# Patient Record
Sex: MALE | Race: WHITE | NOT HISPANIC OR LATINO | Employment: OTHER | URBAN - METROPOLITAN AREA
[De-identification: names, ages, dates, MRNs, and addresses within clinical notes are randomized per-mention and may not be internally consistent; named-entity substitution may affect disease eponyms.]

---

## 2020-01-13 ENCOUNTER — TRANSFERRED RECORDS (OUTPATIENT)
Dept: HEALTH INFORMATION MANAGEMENT | Facility: CLINIC | Age: 75
End: 2020-01-13

## 2020-02-03 ENCOUNTER — TRANSFERRED RECORDS (OUTPATIENT)
Dept: HEALTH INFORMATION MANAGEMENT | Facility: CLINIC | Age: 75
End: 2020-02-03

## 2020-02-11 ENCOUNTER — TRANSFERRED RECORDS (OUTPATIENT)
Dept: HEALTH INFORMATION MANAGEMENT | Facility: CLINIC | Age: 75
End: 2020-02-11

## 2020-02-24 ENCOUNTER — TRANSFERRED RECORDS (OUTPATIENT)
Dept: HEALTH INFORMATION MANAGEMENT | Facility: CLINIC | Age: 75
End: 2020-02-24

## 2020-02-27 ENCOUNTER — TRANSFERRED RECORDS (OUTPATIENT)
Dept: HEALTH INFORMATION MANAGEMENT | Facility: CLINIC | Age: 75
End: 2020-02-27

## 2020-03-13 ENCOUNTER — TRANSFERRED RECORDS (OUTPATIENT)
Dept: HEALTH INFORMATION MANAGEMENT | Facility: CLINIC | Age: 75
End: 2020-03-13

## 2020-03-24 ENCOUNTER — TRANSFERRED RECORDS (OUTPATIENT)
Dept: HEALTH INFORMATION MANAGEMENT | Facility: CLINIC | Age: 75
End: 2020-03-24

## 2020-04-23 ENCOUNTER — TRANSFERRED RECORDS (OUTPATIENT)
Dept: HEALTH INFORMATION MANAGEMENT | Facility: CLINIC | Age: 75
End: 2020-04-23

## 2020-04-29 ENCOUNTER — TRANSFERRED RECORDS (OUTPATIENT)
Dept: HEALTH INFORMATION MANAGEMENT | Facility: CLINIC | Age: 75
End: 2020-04-29

## 2020-05-05 ENCOUNTER — TRANSFERRED RECORDS (OUTPATIENT)
Dept: HEALTH INFORMATION MANAGEMENT | Facility: CLINIC | Age: 75
End: 2020-05-05

## 2020-05-07 ENCOUNTER — TRANSFERRED RECORDS (OUTPATIENT)
Dept: HEALTH INFORMATION MANAGEMENT | Facility: CLINIC | Age: 75
End: 2020-05-07

## 2020-05-28 ENCOUNTER — TELEPHONE (OUTPATIENT)
Dept: ONCOLOGY | Facility: CLINIC | Age: 75
End: 2020-05-28

## 2020-05-28 NOTE — TELEPHONE ENCOUNTER
I received a call from Newton asking if it is possible to schedule an infusion appt for 6/19/20 at [facility]. Newton is scheduled as a new patient with Dr Chen on 6/15/20 to establish care/transfer care from [facility] in [place]. I spoke with Sukhi at [facility] to inquire about scheduling the infusion appt for a date after his appt with Dr Chen & was told that once the visit with Dr Chen is completed & infusion is a part of the care plan, Newton would be able to schedule at that point, but not prior to. I explained that Newton was concerned that he wouldn't be able to get in for an appt on 6/19/20 if he has to wait to schedule until 6/15/20.   I called Newton & informed him of this. Newton then asked if we have records from [facility] yet, and I was able to pull some records through [facility] into Epic, and the records team has already started the process for records, path, imaging & anything additional needed.

## 2020-05-28 NOTE — TELEPHONE ENCOUNTER
RECORDS STATUS - ALL OTHER DIAGNOSIS      RECORDS RECEIVED FROM: Epic/   DATE RECEIVED: 6/15/2020    NOTES STATUS DETAILS   OFFICE NOTE from referring provider     OFFICE NOTE from medical oncologist Complete- Medical Records   Fax: [phone]  I faxed a request for records on 5/28/2020   They had originally fax records (160pgs or so) to Fax: [phone] [facility] Records are in [facility]- Last office visit was on 5/7/2020   DISCHARGE SUMMARY from hospital     DISCHARGE REPORT from the ER     OPERATIVE REPORT     MEDICATION LIST     CLINICAL TRIAL TREATMENTS TO DATE     LABS     PATHOLOGY REPORTS Complete- Bx slide consult is in process- EPIC  [facility]  Ph: [phone]  Fax: [phone]  Mailing Address:   [facility] [place] Pathology Dept   [address] [address] [phone]  Tracking Number:   [id number]   Medical Records Dept   Ph: [phone]  Fax: [phone]    [facility] [place] [place]  Ph: [phone]  Fax: [phone]       ANYTHING RELATED TO DIAGNOSIS Complete  Epic-outside records - [facility]  Labs last updated on 5/5/2020    GENONOMIC TESTING     TYPE:     IMAGING (NEED IMAGES & REPORT)     CT SCANS [facility]  Ph: Ph:[phone]   Fax: [phone]    Mailing Address:   [address]   [facility] [address]    Tracking Number for IMG Disc: [id number]   CT chest neck 2/3/2020            MRI     MAMMO     ULTRASOUND     PET [facility]       Action    Action Takenph: [phone]   5/28/2020 2:52pm     In-basket message:   Patient needs records from Dr Margie Menon [facility]   Appt with Dr Chen 6/15/20   He said he asked for them to be sent but I do not see in system.   Thanks.   Kathleen     Action: I called Dr Margie Menon [facility] Phone: [phone]- I was on hold for a while.     I called pt Newton- he provided me with the [facility]   Pt was dx in Feb 2020  First infusion date was on May 8th, he has another  infusion appt tomorrow (5/29/2020) and then on June 19th    Release signed May 8th     6/5/2020 11:27am   I checked the tracking number on the [facility] disc: Jun, 05 2020 9:59 AM Delivered [place], [place]     I called [facility] again ph: [phone]    6/9/2020 10:11am   I emailed the path report and genetic notes to HIM's scanning

## 2020-06-03 ENCOUNTER — TRANSFERRED RECORDS (OUTPATIENT)
Dept: HEALTH INFORMATION MANAGEMENT | Facility: CLINIC | Age: 75
End: 2020-06-03

## 2020-06-05 NOTE — TELEPHONE ENCOUNTER
Action 06/08/20 1:01 PM - Sandy   Action Taken  Pathology received from [facility] and sent to be reviewed with filled out pathology form.     Action 06/05/20 11:58 AM - Sandy   Action Taken  Imaging disc and report received from [facility] and sent to [facility] to be uploaded into PACs.  Reports sent to [facility] to be scanned into the chart.

## 2020-06-08 PROCEDURE — 00000346 ZZHCL STATISTIC REVIEW OUTSIDE SLIDES TC 88321: Performed by: INTERNAL MEDICINE

## 2020-06-09 LAB — COPATH REPORT: NORMAL

## 2020-06-14 NOTE — PROGRESS NOTES
NEW PATIENT ONCOLOGY CONSULT      REQUESTING PROVIDER/SERVICE: Dr. Odell Young, [facility]    PATIENT NAME: Newton Buchanan   MRN# [id number]     Date of Visit: Kevin 15, 2020     YOB: 1945       REASON FOR VISIT/CC:    Stage IV metastatic squamous cell carcinoma   Maybe transferring care from [place] [facility] Diamond from [facility] Hematology/Oncology from [facility]      HISTORY OF ONCOLOGY ILLNESS:    Patient had noted right neck swelling over 2 years. Has had FNAs in the past that were benign. He was followed by q 6 months.     He reported the neck mass was getting larger. Denies any pain/dysphagia/difficuly swallowing/sob/skin changes.   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     2/24/20 PET/CT found:  1. Hypermetabolic right cervical lymphadenopathy.  2. Numerous hypermetabolic bilateral lung metastases.  3. Hypermetabolic right hilar nodes, also likely metastatic disease    It was felt that with no obvious primary on PET/CT but given CK7 +, P16 positivity, and neck mass on presentation, suspicion is high that origin is still of H&N (right cervical LAD) with metastatic spread to lung with bilateral multiple lung nodules .  Pt made informed decision to proceed with single agent Keytruda due to his PD-L1 testing from [employer] with CPS of 70.    Last dose was 5/29/2020 in [place] before his trip back to [place].        PAST MEDICAL HISTORY  Obstructive sleep apnea   HTN  Diverticulosis of colon. Esophageal reflux disease   Admitted to hospital 03/09/2020 for lower GI bleed, ischemic colitis,  c.dff repeat testing 03/31/2020 positive, s/p vancomycin.   Ascending polyp  Malignant neoplasm of prostate /2008 PSA 4.79, Gl 3+3 s/p proctectomy.      MEDICATIONS/ALLERY:  Reviewed in Epic system.        SOCIAL HISTORY:    Single. Denies smoking, one drink per night.   He is retired in [place], maybe moving back to [place].  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  Denies smoking or ETOH      FAMILY HISTORY:    Denies any FH of cancers       REVIEW OF SYSTEMS:   A 10-point review of systems was performed. Pertinent findings are noted in the HPI.    GENERAL: pt is in usual state of health.  No B symptoms  NEURO:   No headache, double vision, or focal weakness.  No neuropathy.   SKIN:  No chronic skin rash or skin infection.   CARDIOVASCULAR:  No High blood pressure or hyperlipidemia. NO REYES  PULMONARY:  Obstructive sleep apnea   GI: Diverticulosis of colon. Esophageal reflux disease   Admitted to hospital 2020 for lower GI bleed, ischemic colitis,  c.dff repeat testing 2020 positive, s/p vancomycin. + Ascending polyp.  Reports today he has loose BM again in the last 2 days.   :  No urgency, frequency.  No recurrent urinary tract infection.  No kidney problems.   RHEUMATOLOGY/MUSCULOSKELETAL SYSTEM:  no arthritic pain, or muscle pain. No muscle ache.   ENDOCRINE:  No history of diabetes or thyroid problem.  No complaints of hot flashes.   HEMATOLOGY:  No history of bleeding or thrombosis episode.   Oncology: Malignant neoplasm of prostate in/ PSA 4.79, Gl 3+3 s/p proctectomy.  On tx for his presumed H/N cancer dx 2020.  IMMUNOLOGY:  No recurrent fever or chills episode.  No recurrent infectious episode.   PSYCHIATRY:  No anxiety or depression.          PHYSICAL EXAMINATION:   VITAL SIGNS:  Blood pressure 139/67, pulse 66, temperature 98.5  F (36.9  C), temperature source Tympanic, resp. rate 18, height 1.816 m (5' 11.5\"), weight 86.1 kg (189 lb 14.4 oz), SpO2 97 %.    GENERAL APPEARANCE:  looks like stated age, very pleasant, not in acute distress.     ECO    ENT, MOUTH: Pupils are equally reactive to light.  Sclerae are anicteric.  Moist oral mucosa without lesion or ulcer.   Negative pharynx.  No oral thrush.   NECK:  Supple.  No jugular venous distention.  Thyroid is not palpable. Right submandibular elias " sternomastoid muscle area fullness  LYMPH NODES:  + right cervical JOSÉ MIGUEL.  CARDIOVASCULAR:  S1, S2 regular with no murmurs or gallops.  No carotid or abdominal bruits.   PULMONARY:  Lungs are clear to auscultation and percussion bilaterally.  There is no wheezing or rhonchi.   GASTROINTESTINAL:  Abdomen is soft, nontender.  No hepatosplenomegaly.  No signs of ascites.  No mass appreciable.   MUSCULOSKELETAL/EXTREMITIES:  No edema.  No cyanotic changes.  No signs of joint deformity.  No lymphedema.   NEUROLOGIC:  Cranial nerves II-XII are grossly intact.  Sensation intact.  Muscle strength and muscle tone symmetrical, 5/5 throughout.   BACK  No spinal or paraspinal tenderness.  No CVA tenderness.   SKIN:  No petechiae.  No rash.  No signs of cellulitis.   PSYCHIATRIC: Oriented to time, person, and places. Normal mood and affect. Good memory. Proper insight and judgement.       CURRENT LAB DATA REVIEWED TODAY WITH PATIENT:    2/11/20 Biopsy metastatic squamous cell carcinoma - HPV16+.      CURRENT IMAGING REVIEWED TODAY WITH PATIENT:  2/24/20 PET/CT IMPRESSION:   1. Hypermetabolic right cervical lymphadenopathy.  2. Numerous hypermetabolic bilateral lung metastases.  3. Hypermetabolic right hilar nodes, also likely metastatic disease      2/3/20 CT Neck/Chest IMPRESSION:  1. Right cervical lymphadenopathy, with areas suspicious for  necrosis, highly concerning for metastatic involvement by  malignancy. Recommend direct visualization of the upper  aerodigestive tract.  2. Numerous bilateral lung nodules, suspicious for metastases.         OLD DATA REVIEWED TODAY WITH SUMMARY  dexa 2019 - low density.       ASSESSMENT AND PLAN DISCUSSED WITH PATIENT TODAY:   1.  Presumed  H&N (right cervical LAD) with metastatic spread to lung with bilateral multiple lung nodules.   He is first-line palliative treatment with Keytruda was started when he was in [place] early May.    He is spending his summer in [place].  Today's the  first time he is seen this for this cancer treatment progress.    Need to obtain PD-L 1 testing result from [place].     He he is to continue Keytruda but he is here [place], next dose due July 19.    We discussed he can use therapy side effect profiles, they can be unpredictable.  And how he should be monitored.    He understands treatment is palliative in nature.  He is amazed at how good his quality of life is and he is already having some response with immunotherapy single agent.    We discussed the timing of restaging PET scan.  If he continues to have clinical response by physical examination, I do not feel compelled to do PET scan after 4 cycles.      2.  Return loose bowel movement after his trip from [place] back to [place].  Twice C. difficile in March in [place] was treated with Vanco taper.    Advised him to be rechecked with C. difficile toxin.      3. osteopenia on DEXA scan 2019.  We discussed importance of vitamin D intake outdoor activity weightbearing exercise.

## 2020-06-15 ENCOUNTER — HOSPITAL ENCOUNTER (OUTPATIENT)
Dept: LAB | Facility: CLINIC | Age: 75
Discharge: HOME | End: 2020-06-15
Attending: INTERNAL MEDICINE | Admitting: INTERNAL MEDICINE
Payer: MEDICARE

## 2020-06-15 ENCOUNTER — ONCOLOGY VISIT (OUTPATIENT)
Dept: ONCOLOGY | Facility: CLINIC | Age: 75
End: 2020-06-15
Attending: INTERNAL MEDICINE
Payer: MEDICARE

## 2020-06-15 ENCOUNTER — PRE VISIT (OUTPATIENT)
Dept: ONCOLOGY | Facility: CLINIC | Age: 75
End: 2020-06-15

## 2020-06-15 VITALS
SYSTOLIC BLOOD PRESSURE: 139 MMHG | HEIGHT: 72 IN | RESPIRATION RATE: 18 BRPM | TEMPERATURE: 98.5 F | BODY MASS INDEX: 25.72 KG/M2 | OXYGEN SATURATION: 97 % | WEIGHT: 189.9 LBS | DIASTOLIC BLOOD PRESSURE: 67 MMHG | HEART RATE: 66 BPM

## 2020-06-15 DIAGNOSIS — M85.80 OSTEOPENIA, UNSPECIFIED LOCATION: ICD-10-CM

## 2020-06-15 DIAGNOSIS — R19.7 DIARRHEA, UNSPECIFIED TYPE: Primary | ICD-10-CM

## 2020-06-15 DIAGNOSIS — C76.0 HEAD AND NECK CANCER (H): ICD-10-CM

## 2020-06-15 LAB
ALBUMIN SERPL-MCNC: 3.7 G/DL (ref 3.4–5)
ALP SERPL-CCNC: 60 U/L (ref 40–150)
ALT SERPL W P-5'-P-CCNC: 36 U/L (ref 0–70)
ANION GAP SERPL CALCULATED.3IONS-SCNC: 2 MMOL/L (ref 3–14)
AST SERPL W P-5'-P-CCNC: 24 U/L (ref 0–45)
BILIRUB SERPL-MCNC: 0.7 MG/DL (ref 0.2–1.3)
BUN SERPL-MCNC: 14 MG/DL (ref 7–30)
CALCIUM SERPL-MCNC: 9.1 MG/DL (ref 8.5–10.1)
CHLORIDE SERPL-SCNC: 105 MMOL/L (ref 94–109)
CO2 SERPL-SCNC: 32 MMOL/L (ref 20–32)
CREAT SERPL-MCNC: 0.84 MG/DL (ref 0.66–1.25)
GFR SERPL CREATININE-BSD FRML MDRD: 86 ML/MIN/{1.73_M2}
GLUCOSE SERPL-MCNC: 93 MG/DL (ref 70–99)
POTASSIUM SERPL-SCNC: 4.1 MMOL/L (ref 3.4–5.3)
PROT SERPL-MCNC: 7.5 G/DL (ref 6.8–8.8)
SODIUM SERPL-SCNC: 139 MMOL/L (ref 133–144)
TSH SERPL DL<=0.005 MIU/L-ACNC: 1.32 MU/L (ref 0.4–4)

## 2020-06-15 PROCEDURE — 36415 COLL VENOUS BLD VENIPUNCTURE: CPT | Performed by: INTERNAL MEDICINE

## 2020-06-15 PROCEDURE — 84443 ASSAY THYROID STIM HORMONE: CPT | Performed by: INTERNAL MEDICINE

## 2020-06-15 PROCEDURE — 80053 COMPREHEN METABOLIC PANEL: CPT | Performed by: INTERNAL MEDICINE

## 2020-06-15 PROCEDURE — 99204 OFFICE O/P NEW MOD 45 MIN: CPT | Performed by: INTERNAL MEDICINE

## 2020-06-15 PROCEDURE — G0463 HOSPITAL OUTPT CLINIC VISIT: HCPCS

## 2020-06-15 RX ORDER — PROCHLORPERAZINE MALEATE 10 MG
10 TABLET ORAL EVERY 6 HOURS PRN
COMMUNITY
Start: 2020-03-30 | End: 2021-01-20

## 2020-06-15 RX ORDER — FLUTICASONE PROPIONATE 50 MCG
1-2 SPRAY, SUSPENSION (ML) NASAL
COMMUNITY
Start: 2018-12-11 | End: 2023-02-09

## 2020-06-15 RX ORDER — LOSARTAN POTASSIUM 25 MG/1
TABLET ORAL
COMMUNITY
Start: 2020-03-09 | End: 2020-06-15

## 2020-06-15 RX ORDER — ASPIRIN 81 MG/1
TABLET, CHEWABLE ORAL
COMMUNITY
End: 2020-06-15

## 2020-06-15 RX ORDER — PRAVASTATIN SODIUM 20 MG
20 TABLET ORAL DAILY
COMMUNITY
Start: 2020-04-15 | End: 2023-02-09

## 2020-06-15 RX ORDER — SILDENAFIL 100 MG/1
100 TABLET, FILM COATED ORAL
COMMUNITY
Start: 2020-03-09 | End: 2020-06-15

## 2020-06-15 RX ORDER — SACCHAROMYCES BOULARDII 250 MG
1 CAPSULE ORAL 2 TIMES DAILY
COMMUNITY
End: 2023-02-09

## 2020-06-15 ASSESSMENT — PAIN SCALES - GENERAL: PAINLEVEL: NO PAIN (0)

## 2020-06-15 ASSESSMENT — MIFFLIN-ST. JEOR: SCORE: 1636.44

## 2020-06-15 NOTE — LETTER
6/15/2020         RE: Newton Buchanan  [address] [facility] [address]        Dear Colleague,    Thank you for referring your patient, Newton Buchanan, to the [facility]. Please see a copy of my visit note below.    NEW PATIENT ONCOLOGY CONSULT      REQUESTING PROVIDER/SERVICE: Dr. Odell Young, [facility]    PATIENT NAME: Newton Buchanan   MRN# [id number]     Date of Visit: Kevin 15, 2020     YOB: 1945       REASON FOR VISIT/CC:    Stage IV metastatic squamous cell carcinoma   Maybe transferring care from [place] [facility] from [facility] Hematology/Oncology from [facility]      HISTORY OF ONCOLOGY ILLNESS:    Patient had noted right neck swelling over 2 years. Has had FNAs in the past that were benign. He was followed by q 6 months.     He reported the neck mass was getting larger. Denies any pain/dysphagia/difficuly swallowing/sob/skin changes.   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     2/24/20 PET/CT found:  1. Hypermetabolic right cervical lymphadenopathy.  2. Numerous hypermetabolic bilateral lung metastases.  3. Hypermetabolic right hilar nodes, also likely metastatic disease    It was felt that with no obvious primary on PET/CT but given CK7 +, P16 positivity, and neck mass on presentation, suspicion is high that origin is still of H&N (right cervical LAD) with metastatic spread to lung with bilateral multiple lung nodules .  Pt made informed decision to proceed with single agent Keytruda due to his PD-L1 testing (need to obtain this from [place]).   Last dose was 5/29/2020 in [place] before his trip back to [place].        PAST MEDICAL HISTORY  Obstructive sleep apnea   HTN  Diverticulosis of colon. Esophageal reflux disease   Admitted to hospital 03/09/2020 for lower GI bleed, ischemic colitis,  c.dff repeat testing 03/31/2020 positive, s/p vancomycin.   Ascending polyp  Malignant neoplasm of prostate  "/ PSA 4.79, Gl 3+3 s/p proctectomy.      MEDICATIONS/ALLERY:  Reviewed in Epic system.        SOCIAL HISTORY:    Single. Denies smoking, one drink per night.   [place] retired in [place], maybe moving back to [place].  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  Denies smoking or ETOH      FAMILY HISTORY:    Denies any FH of cancers       REVIEW OF SYSTEMS:   A 10-point review of systems was performed. Pertinent findings are noted in the HPI.    GENERAL: pt is in usual state of health.  No B symptoms  NEURO:   No headache, double vision, or focal weakness.  No neuropathy.   SKIN:  No chronic skin rash or skin infection.   CARDIOVASCULAR:  No High blood pressure or hyperlipidemia. NO REYES  PULMONARY:  Obstructive sleep apnea   GI: Diverticulosis of colon. Esophageal reflux disease   Admitted to hospital 2020 for lower GI bleed, ischemic colitis,  c.dff repeat testing 2020 positive, s/p vancomycin. + Ascending polyp.  Reports today he has loose BM again in the last 2 days.   :  No urgency, frequency.  No recurrent urinary tract infection.  No kidney problems.   RHEUMATOLOGY/MUSCULOSKELETAL SYSTEM:  no arthritic pain, or muscle pain. No muscle ache.   ENDOCRINE:  No history of diabetes or thyroid problem.  No complaints of hot flashes.   HEMATOLOGY:  No history of bleeding or thrombosis episode.   Oncology: Malignant neoplasm of prostate in/ PSA 4.79, Gl 3+3 s/p proctectomy.  On tx for his presumed H/N cancer dx 2020.  IMMUNOLOGY:  No recurrent fever or chills episode.  No recurrent infectious episode.   PSYCHIATRY:  No anxiety or depression.          PHYSICAL EXAMINATION:   VITAL SIGNS:  Blood pressure 139/67, pulse 66, temperature 98.5  F (36.9  C), temperature source Tympanic, resp. rate 18, height 1.816 m (5' 11.5\"), weight 86.1 kg (189 lb 14.4 oz), SpO2 97 %.    GENERAL APPEARANCE:  looks like stated age, very pleasant, not in acute distress.     ECO    ENT, MOUTH: Pupils are " equally reactive to light.  Sclerae are anicteric.  Moist oral mucosa without lesion or ulcer.   Negative pharynx.  No oral thrush.   NECK:  Supple.  No jugular venous distention.  Thyroid is not palpable. Right submandibular elias sternomastoid muscle area fullness  LYMPH NODES:  + right cervical JOSÉ MIGUEL.  CARDIOVASCULAR:  S1, S2 regular with no murmurs or gallops.  No carotid or abdominal bruits.   PULMONARY:  Lungs are clear to auscultation and percussion bilaterally.  There is no wheezing or rhonchi.   GASTROINTESTINAL:  Abdomen is soft, nontender.  No hepatosplenomegaly.  No signs of ascites.  No mass appreciable.   MUSCULOSKELETAL/EXTREMITIES:  No edema.  No cyanotic changes.  No signs of joint deformity.  No lymphedema.   NEUROLOGIC:  Cranial nerves II-XII are grossly intact.  Sensation intact.  Muscle strength and muscle tone symmetrical, 5/5 throughout.   BACK  No spinal or paraspinal tenderness.  No CVA tenderness.   SKIN:  No petechiae.  No rash.  No signs of cellulitis.   PSYCHIATRIC: Oriented to time, person, and places. Normal mood and affect. Good memory. Proper insight and judgement.       CURRENT LAB DATA REVIEWED TODAY WITH PATIENT:    2/11/20 Biopsy metastatic squamous cell carcinoma - HPV16+.      CURRENT IMAGING REVIEWED TODAY WITH PATIENT:  2/24/20 PET/CT IMPRESSION:   1. Hypermetabolic right cervical lymphadenopathy.  2. Numerous hypermetabolic bilateral lung metastases.  3. Hypermetabolic right hilar nodes, also likely metastatic disease      2/3/20 CT Neck/Chest IMPRESSION:  1. Right cervical lymphadenopathy, with areas suspicious for  necrosis, highly concerning for metastatic involvement by  malignancy. Recommend direct visualization of the upper  aerodigestive tract.  2. Numerous bilateral lung nodules, suspicious for metastases.         OLD DATA REVIEWED TODAY WITH SUMMARY  dexa 2019 - low density.       ASSESSMENT AND PLAN DISCUSSED WITH PATIENT TODAY:   1.  Presumed  H&N (right cervical  "LAD) with metastatic spread to lung with bilateral multiple lung nodules.   He is first-line palliative treatment with Keytruda was started when he was in [place] early May.    He is spending his summer in [place].  Today's the first time he is seen this for this cancer treatment progress.    Need to obtain PD-L 1 testing result from [place].     He he is to continue Keytruda but he is here [place], next dose due July 19.    We discussed he can use therapy side effect profiles, they can be unpredictable.  And how he should be monitored.    He understands treatment is palliative in nature.  He is amazed at how good his quality of life is and he is already having some response with immunotherapy single agent.    We discussed the timing of restaging PET scan.  If he continues to have clinical response by physical examination, I do not feel compelled to do PET scan after 4 cycles.      2.  Return loose bowel movement after his trip from [place] back to [place].  Twice C. difficile in March in [place] was treated with Vanco taper.    Advised him to be rechecked with C. difficile toxin.      3. osteopenia on DEXA scan 2019.  We discussed importance of vitamin D intake outdoor activity weightbearing exercise.    Oncology Rooming Note    Farida 15, 2020 1:25 PM   Newton Buchanan is a 74 year old male who presents for:    Chief Complaint   Patient presents with     Oncology Clinic Visit     New Patient - Stage IV metastatic squamous cell carcinoma, transfer from [place], [place]     Initial Vitals: /67 (BP Location: Left arm, Patient Position: Sitting, Cuff Size: Adult Regular)   Pulse 66   Temp 98.5  F (36.9  C) (Tympanic)   Resp 18   Ht 1.816 m (5' 11.5\")   Wt 86.1 kg (189 lb 14.4 oz)   SpO2 97%   BMI 26.12 kg/m   Estimated body mass index is 26.12 kg/m  as calculated from the following:    Height as of this encounter: 1.816 m (5' 11.5\").    Weight as of this encounter: 86.1 kg (189 lb 14.4 oz). Body " surface area is 2.08 meters squared.  No Pain (0) Comment: Data Unavailable   No LMP for male patient.  Allergies reviewed: Yes  Medications reviewed: Yes    Medications: Medication refills not needed today.  Pharmacy name entered into [employer]:      [id number] - [address] AT [address]    Clinical concerns: New Patient - Stage IV metastatic squamous cell carcinoma, transfer from [place], [place].       Sandy Carrillo [facility]                Again, thank you for allowing me to participate in the care of your patient.        Sincerely,        Rosetta Chen MD, MD

## 2020-06-15 NOTE — PROGRESS NOTES
"Oncology Rooming Note    Farida 15, 2020 1:25 PM   Newton Buchanan is a 74 year old male who presents for:    Chief Complaint   Patient presents with     Oncology Clinic Visit     New Patient - Stage IV metastatic squamous cell carcinoma, transfer from [place], [place]     Initial Vitals: /67 (BP Location: Left arm, Patient Position: Sitting, Cuff Size: Adult Regular)   Pulse 66   Temp 98.5  F (36.9  C) (Tympanic)   Resp 18   Ht 1.816 m (5' 11.5\")   Wt 86.1 kg (189 lb 14.4 oz)   SpO2 97%   BMI 26.12 kg/m   Estimated body mass index is 26.12 kg/m  as calculated from the following:    Height as of this encounter: 1.816 m (5' 11.5\").    Weight as of this encounter: 86.1 kg (189 lb 14.4 oz). Body surface area is 2.08 meters squared.  No Pain (0) Comment: Data Unavailable   No LMP for male patient.  Allergies reviewed: Yes  Medications reviewed: Yes    Medications: Medication refills not needed today.  Pharmacy name entered into [employer]:      [id number] - [facility] [address] AT [facility] OF [address] [place] [address]    Clinical concerns: New Patient - Stage IV metastatic squamous cell carcinoma, transfer from [place], [place].       Sandy Carrillo CMA              "

## 2020-06-15 NOTE — PATIENT INSTRUCTIONS
c diff test ordered.   Obtain PD-L1 testing result from [place], [place] [facility]ematology/Oncology from [facility].   Start keytruda on 19th.   Follow-up with July 9th tx.

## 2020-06-19 ENCOUNTER — INFUSION THERAPY VISIT (OUTPATIENT)
Dept: INFUSION THERAPY | Facility: CLINIC | Age: 75
End: 2020-06-19
Attending: INTERNAL MEDICINE
Payer: MEDICARE

## 2020-06-19 VITALS — TEMPERATURE: 98 F | SYSTOLIC BLOOD PRESSURE: 108 MMHG | DIASTOLIC BLOOD PRESSURE: 63 MMHG | HEART RATE: 55 BPM

## 2020-06-19 DIAGNOSIS — C76.0 HEAD AND NECK CANCER (H): Primary | ICD-10-CM

## 2020-06-19 PROCEDURE — 96413 CHEMO IV INFUSION 1 HR: CPT

## 2020-06-19 PROCEDURE — 25000128 H RX IP 250 OP 636: Performed by: NURSE PRACTITIONER

## 2020-06-19 PROCEDURE — 25800030 ZZH RX IP 258 OP 636: Performed by: NURSE PRACTITIONER

## 2020-06-19 RX ORDER — NALOXONE HYDROCHLORIDE 0.4 MG/ML
.1-.4 INJECTION, SOLUTION INTRAMUSCULAR; INTRAVENOUS; SUBCUTANEOUS
Status: CANCELLED | OUTPATIENT
Start: 2020-06-19

## 2020-06-19 RX ORDER — SODIUM CHLORIDE 9 MG/ML
1000 INJECTION, SOLUTION INTRAVENOUS CONTINUOUS PRN
Status: CANCELLED
Start: 2020-06-19

## 2020-06-19 RX ORDER — ALBUTEROL SULFATE 90 UG/1
1-2 AEROSOL, METERED RESPIRATORY (INHALATION)
Status: CANCELLED
Start: 2020-06-19

## 2020-06-19 RX ORDER — LORAZEPAM 2 MG/ML
0.5 INJECTION INTRAMUSCULAR EVERY 4 HOURS PRN
Status: CANCELLED
Start: 2020-06-19

## 2020-06-19 RX ORDER — MEPERIDINE HYDROCHLORIDE 25 MG/ML
25 INJECTION INTRAMUSCULAR; INTRAVENOUS; SUBCUTANEOUS EVERY 30 MIN PRN
Status: CANCELLED | OUTPATIENT
Start: 2020-06-19

## 2020-06-19 RX ORDER — METHYLPREDNISOLONE SODIUM SUCCINATE 125 MG/2ML
125 INJECTION, POWDER, LYOPHILIZED, FOR SOLUTION INTRAMUSCULAR; INTRAVENOUS
Status: CANCELLED
Start: 2020-06-19

## 2020-06-19 RX ORDER — ALBUTEROL SULFATE 0.83 MG/ML
2.5 SOLUTION RESPIRATORY (INHALATION)
Status: CANCELLED | OUTPATIENT
Start: 2020-06-19

## 2020-06-19 RX ORDER — DIPHENHYDRAMINE HYDROCHLORIDE 50 MG/ML
50 INJECTION INTRAMUSCULAR; INTRAVENOUS
Status: CANCELLED
Start: 2020-06-19

## 2020-06-19 RX ORDER — EPINEPHRINE 1 MG/ML
0.3 INJECTION, SOLUTION, CONCENTRATE INTRAVENOUS EVERY 5 MIN PRN
Status: CANCELLED | OUTPATIENT
Start: 2020-06-19

## 2020-06-19 RX ORDER — HEPARIN SODIUM,PORCINE 10 UNIT/ML
5 VIAL (ML) INTRAVENOUS
Status: CANCELLED | OUTPATIENT
Start: 2020-06-19

## 2020-06-19 RX ORDER — HEPARIN SODIUM (PORCINE) LOCK FLUSH IV SOLN 100 UNIT/ML 100 UNIT/ML
5 SOLUTION INTRAVENOUS
Status: CANCELLED | OUTPATIENT
Start: 2020-06-19

## 2020-06-19 RX ADMIN — SODIUM CHLORIDE 250 ML: 9 INJECTION, SOLUTION INTRAVENOUS at 10:34

## 2020-06-19 RX ADMIN — SODIUM CHLORIDE 200 MG: 9 INJECTION, SOLUTION INTRAVENOUS at 10:54

## 2020-06-19 NOTE — PROGRESS NOTES
Infusion Nursing Note:  Newton Buchanan presents today for Keytruda.    Patient seen by provider today: No   present during visit today: Not Applicable.    Note: N/A.    Intravenous Access:  Peripheral IV placed.    Treatment Conditions:  Lab Results   Component Value Date     06/15/2020                   Lab Results   Component Value Date    POTASSIUM 4.1 06/15/2020           No results found for: MAG         Lab Results   Component Value Date    CR 0.84 06/15/2020                   Lab Results   Component Value Date    JÚNIOR 9.1 06/15/2020                Lab Results   Component Value Date    BILITOTAL 0.7 06/15/2020           Lab Results   Component Value Date    ALBUMIN 3.7 06/15/2020                    Lab Results   Component Value Date    ALT 36 06/15/2020           Lab Results   Component Value Date    AST 24 06/15/2020       Results reviewed, labs MET treatment parameters, ok to proceed with treatment.      Post Infusion Assessment:  Patient tolerated infusion without incident.  Blood return noted pre and post infusion  Site patent and intact, free from redness, edema or discomfort.  No evidence of extravasations.       Discharge Plan:   Patient discharged in stable condition accompanied by: self.  Departure Mode: Ambulatory. Pt to return 7/9/2020.    Crescencio Cervantes RN

## 2020-06-27 ENCOUNTER — HOSPITAL ENCOUNTER (EMERGENCY)
Facility: CLINIC | Age: 75
Discharge: HOME | End: 2020-06-27
Attending: PHYSICIAN ASSISTANT | Admitting: PHYSICIAN ASSISTANT
Payer: MEDICARE

## 2020-06-27 ENCOUNTER — APPOINTMENT (OUTPATIENT)
Dept: ULTRASOUND IMAGING | Facility: CLINIC | Age: 75
End: 2020-06-27
Attending: PHYSICIAN ASSISTANT
Payer: MEDICARE

## 2020-06-27 VITALS
BODY MASS INDEX: 25.73 KG/M2 | OXYGEN SATURATION: 97 % | SYSTOLIC BLOOD PRESSURE: 136 MMHG | WEIGHT: 190 LBS | HEART RATE: 61 BPM | DIASTOLIC BLOOD PRESSURE: 70 MMHG | RESPIRATION RATE: 16 BRPM | HEIGHT: 72 IN | TEMPERATURE: 98.2 F

## 2020-06-27 DIAGNOSIS — R60.0 EDEMA OF RIGHT LOWER LEG: ICD-10-CM

## 2020-06-27 LAB
ALBUMIN SERPL-MCNC: 3.6 G/DL (ref 3.4–5)
ALP SERPL-CCNC: 62 U/L (ref 40–150)
ALT SERPL W P-5'-P-CCNC: 34 U/L (ref 0–70)
ANION GAP SERPL CALCULATED.3IONS-SCNC: 8 MMOL/L (ref 3–14)
AST SERPL W P-5'-P-CCNC: 26 U/L (ref 0–45)
BASOPHILS # BLD AUTO: 0 10E9/L (ref 0–0.2)
BASOPHILS NFR BLD AUTO: 0.2 %
BILIRUB SERPL-MCNC: 1 MG/DL (ref 0.2–1.3)
BUN SERPL-MCNC: 11 MG/DL (ref 7–30)
CALCIUM SERPL-MCNC: 9.2 MG/DL (ref 8.5–10.1)
CHLORIDE SERPL-SCNC: 105 MMOL/L (ref 94–109)
CO2 SERPL-SCNC: 25 MMOL/L (ref 20–32)
CREAT SERPL-MCNC: 0.88 MG/DL (ref 0.66–1.25)
DIFFERENTIAL METHOD BLD: ABNORMAL
EOSINOPHIL # BLD AUTO: 0.2 10E9/L (ref 0–0.7)
EOSINOPHIL NFR BLD AUTO: 1.7 %
ERYTHROCYTE [DISTWIDTH] IN BLOOD BY AUTOMATED COUNT: 12.5 % (ref 10–15)
GFR SERPL CREATININE-BSD FRML MDRD: 85 ML/MIN/{1.73_M2}
GLUCOSE SERPL-MCNC: 116 MG/DL (ref 70–99)
HCT VFR BLD AUTO: 39 % (ref 40–53)
HGB BLD-MCNC: 13.3 G/DL (ref 13.3–17.7)
IMM GRANULOCYTES # BLD: 0 10E9/L (ref 0–0.4)
IMM GRANULOCYTES NFR BLD: 0.3 %
LYMPHOCYTES # BLD AUTO: 1.3 10E9/L (ref 0.8–5.3)
LYMPHOCYTES NFR BLD AUTO: 11.6 %
MCH RBC QN AUTO: 30 PG (ref 26.5–33)
MCHC RBC AUTO-ENTMCNC: 34.1 G/DL (ref 31.5–36.5)
MCV RBC AUTO: 88 FL (ref 78–100)
MONOCYTES # BLD AUTO: 1 10E9/L (ref 0–1.3)
MONOCYTES NFR BLD AUTO: 8.8 %
NEUTROPHILS # BLD AUTO: 8.7 10E9/L (ref 1.6–8.3)
NEUTROPHILS NFR BLD AUTO: 77.4 %
NRBC # BLD AUTO: 0 10*3/UL
NRBC BLD AUTO-RTO: 0 /100
PLATELET # BLD AUTO: 284 10E9/L (ref 150–450)
POTASSIUM SERPL-SCNC: 4.1 MMOL/L (ref 3.4–5.3)
PROT SERPL-MCNC: 7.3 G/DL (ref 6.8–8.8)
RBC # BLD AUTO: 4.44 10E12/L (ref 4.4–5.9)
SODIUM SERPL-SCNC: 138 MMOL/L (ref 133–144)
WBC # BLD AUTO: 11.3 10E9/L (ref 4–11)

## 2020-06-27 PROCEDURE — 99284 EMERGENCY DEPT VISIT MOD MDM: CPT | Mod: Z6 | Performed by: PHYSICIAN ASSISTANT

## 2020-06-27 PROCEDURE — 99284 EMERGENCY DEPT VISIT MOD MDM: CPT | Mod: 25 | Performed by: PHYSICIAN ASSISTANT

## 2020-06-27 PROCEDURE — 80053 COMPREHEN METABOLIC PANEL: CPT | Performed by: PHYSICIAN ASSISTANT

## 2020-06-27 PROCEDURE — 85025 COMPLETE CBC W/AUTO DIFF WBC: CPT | Performed by: PHYSICIAN ASSISTANT

## 2020-06-27 PROCEDURE — 93971 EXTREMITY STUDY: CPT | Mod: RT

## 2020-06-27 ASSESSMENT — MIFFLIN-ST. JEOR: SCORE: 1644.83

## 2020-06-27 NOTE — ED AVS SNAPSHOT
[facility] Emergency Department  [address] [address]  Phone:  [phone]  Fax:  [phone]                                    Newton Buchanan   MRN: [id number]    Department:  [facility] Emergency Department   Date of Visit:  6/27/2020           After Visit Summary Signature Page    I have received my discharge instructions, and my questions have been answered. I have discussed any challenges I see with this plan with the nurse or doctor.    ..........................................................................................................................................  Patient/Patient Representative Signature      ..........................................................................................................................................  Patient Representative Print Name and Relationship to Patient    ..................................................               ................................................  Date                                   Time    ..........................................................................................................................................  Reviewed by Signature/Title    ...................................................              ..............................................  Date                                               Time          22EPIC Rev 08/18

## 2020-06-27 NOTE — ED PROVIDER NOTES
History     Chief Complaint   Patient presents with     Leg Pain     knee injury 3 weeks ago; now has pain and bruising below the knee     HPI  Newton Buchanan is a 74 year old male with past medical history significant for squamous of carcinoma of head/neck, hypertension, GERD, sleep apnea, history of prostate cancer who presents to the  with concern over right lower leg pain and swelling.  Patient reports that he did sustain a fall several weeks ago while he was hiking landing directly on the knee.  He did have some pain, swelling initially which seemed to resolve.  He then moved across the country to [place] spending 4 days driving in car 8-10-hour per day 10-14 days prior to arrival in ED.  In the last 2 to 3 days he had noted increasing pain, swelling diffusely of the right lower leg accompanied by ecchymosis of the ankle.  He denies any overlying erythema rashes or skin changes.  No fever, chills, allergies, cough, dyspnea, wheezing, vomiting, diarrhea or abdominal pain.  He is currently undergoing immunotherapy of his cancer with Keytruda every 3 weeks.  He has follow up appointment with oncology scheduled on 7/9/20.      Allergies:  Allergies   Allergen Reactions     Atorvastatin      Other reaction(s): Confusion  unusual behavior and dizziness       Problem List:    Patient Active Problem List    Diagnosis Date Noted     Head and neck cancer (H) 06/14/2020     Priority: Medium     Essential hypertension with goal blood pressure less than 140/90 06/08/2016     Priority: Medium     Gastroesophageal reflux disease without esophagitis 06/08/2016     Priority: Medium     CRYSTAL (obstructive sleep apnea) 06/08/2016     Priority: Medium     Uses CPAP.  Uses prn nasal sprays for nasal congestion at times so he can use his CPAP.       Prostate cancer (H) 06/08/2016     Priority: Medium     2008:Robotic prostate removal.  6/8/2016:He reports PSA has been oK.         Past Medical History:    No past medical history  on file.    Past Surgical History:    Past Surgical History:   Procedure Laterality Date     DAVINCI PROSTATECTOMY       Family History:    No family history on file.    Social History:  Marital Status:  Single [1]  Social History     Tobacco Use     Smoking status: Never Smoker     Smokeless tobacco: Never Used   Substance Use Topics     Alcohol use: Yes     Alcohol/week: 0.0 standard drinks     Comment: weekly     Drug use: No        Medications:    aspirin 81 MG tablet  Cholecalciferol (VITAMIN D) 2000 UNITS tablet  fluticasone (FLONASE) 50 MCG/ACT nasal spray  losartan (COZAAR) 25 MG tablet  omeprazole (PRILOSEC) 40 MG capsule  pembrolizumab (KEYTRUDA) 25 MG/ML  pravastatin (PRAVACHOL) 20 MG tablet  prochlorperazine (COMPAZINE) 10 MG tablet  saccharomyces boulardii (FLORASTOR) 250 MG capsule  sildenafil (VIAGRA) 100 MG tablet  VITAMIN D, CHOLECALCIFEROL, PO      Review of Systems   Constitutional: Negative for chills and fever.   Respiratory: Negative for cough and shortness of breath.    Cardiovascular: Negative for chest pain and palpitations.   Gastrointestinal: Negative for abdominal pain, diarrhea and vomiting.   Musculoskeletal: Positive for joint swelling (right ankle). Negative for gait problem.   Skin: Positive for color change (ecchymosis). Negative for rash and wound.   Neurological: Negative for dizziness, light-headedness and headaches.     Physical Exam   BP: (!) 152/72  Pulse: 61  Temp: 98.2  F (36.8  C)  Resp: 16  Height: 182.9 cm (6')  Weight: 86.2 kg (190 lb)  SpO2: 97 %    Physical Exam  Constitutional:       General: He is not in acute distress.     Appearance: Normal appearance. He is not ill-appearing or toxic-appearing.   HENT:      Head: Normocephalic and atraumatic.   Cardiovascular:      Rate and Rhythm: Normal rate and regular rhythm.      Heart sounds: No murmur. No friction rub. No gallop.    Pulmonary:      Effort: Pulmonary effort is normal. No respiratory distress.      Breath  sounds: Normal breath sounds. No stridor. No wheezing, rhonchi or rales.   Musculoskeletal:      Right knee: He exhibits swelling. He exhibits normal range of motion, no effusion, no ecchymosis, no laceration, no erythema, no LCL laxity and no MCL laxity. Tenderness found.      Right ankle: He exhibits decreased range of motion, swelling and ecchymosis. He exhibits no deformity and no laceration. Tenderness. Achilles tendon normal.      Right lower leg: He exhibits tenderness and swelling. He exhibits no bony tenderness, no deformity and no laceration. Edema present.   Skin:     General: Skin is warm and dry.      Capillary Refill: Capillary refill takes less than 2 seconds.      Findings: Ecchymosis (medial and lateral aspect of right heel) present. No abrasion, erythema, laceration or rash.   Neurological:      Mental Status: He is alert.      Sensory: No sensory deficit.       ED Course        Procedures        Critical Care time:  none          Results for orders placed or performed during the hospital encounter of 06/27/20   US Lower Extremity Venous Duplex Right     Status: None    Narrative    ULTRASOUND RIGHT LOWER EXTREMITY VENOUS DUPLEX  6/27/2020 2:48 PM    CLINICAL HISTORY: Swelling, rule out DVT.    TECHNIQUE: Venous Duplex ultrasound of the right lower extremity with  and without compression, augmentation and duplex. Color flow and  spectral Doppler with waveform analysis performed.    COMPARISON: None.    FINDINGS: Exam includes the common femoral, femoral, popliteal, and  contralateral common femoral veins as well as segmentally visualized  deep calf veins and greater saphenous vein.     RIGHT: No deep vein thrombosis. No superficial thrombophlebitis. Along  the inferior right knee there is a small fluid collection that is 2.7  x 0.4 x 2.6 cm. At the medial right knee there is an additional mildly  complex fluid collection that is 2.0 x 0.8 x 2.3 cm.      Impression    IMPRESSION:  1.  No deep venous  thrombosis in the right lower extremity.  2.  Two fluid collections about the right knee as above. One of these  appears complex medially and could represent blood products.  Infectious etiology cannot be entirely excluded based on imaging  alone.    BHAVNA MCMANUS MD   CBC with platelets differential     Status: Abnormal   Result Value Ref Range    WBC 11.3 (H) 4.0 - 11.0 10e9/L    RBC Count 4.44 4.4 - 5.9 10e12/L    Hemoglobin 13.3 13.3 - 17.7 g/dL    Hematocrit 39.0 (L) 40.0 - 53.0 %    MCV 88 78 - 100 fl    MCH 30.0 26.5 - 33.0 pg    MCHC 34.1 31.5 - 36.5 g/dL    RDW 12.5 10.0 - 15.0 %    Platelet Count 284 150 - 450 10e9/L    Diff Method Automated Method     % Neutrophils 77.4 %    % Lymphocytes 11.6 %    % Monocytes 8.8 %    % Eosinophils 1.7 %    % Basophils 0.2 %    % Immature Granulocytes 0.3 %    Nucleated RBCs 0 0 /100    Absolute Neutrophil 8.7 (H) 1.6 - 8.3 10e9/L    Absolute Lymphocytes 1.3 0.8 - 5.3 10e9/L    Absolute Monocytes 1.0 0.0 - 1.3 10e9/L    Absolute Eosinophils 0.2 0.0 - 0.7 10e9/L    Absolute Basophils 0.0 0.0 - 0.2 10e9/L    Abs Immature Granulocytes 0.0 0 - 0.4 10e9/L    Absolute Nucleated RBC 0.0    Comprehensive metabolic panel     Status: Abnormal   Result Value Ref Range    Sodium 138 133 - 144 mmol/L    Potassium 4.1 3.4 - 5.3 mmol/L    Chloride 105 94 - 109 mmol/L    Carbon Dioxide 25 20 - 32 mmol/L    Anion Gap 8 3 - 14 mmol/L    Glucose 116 (H) 70 - 99 mg/dL    Urea Nitrogen 11 7 - 30 mg/dL    Creatinine 0.88 0.66 - 1.25 mg/dL    GFR Estimate 85 >60 mL/min/[1.73_m2]    GFR Estimate If Black >90 >60 mL/min/[1.73_m2]    Calcium 9.2 8.5 - 10.1 mg/dL    Bilirubin Total 1.0 0.2 - 1.3 mg/dL    Albumin 3.6 3.4 - 5.0 g/dL    Protein Total 7.3 6.8 - 8.8 g/dL    Alkaline Phosphatase 62 40 - 150 U/L    ALT 34 0 - 70 U/L    AST 26 0 - 45 U/L     Medications - No data to display    Assessments & Plan (with Medical Decision Making)     I have reviewed the nursing notes.  I have reviewed the  findings, diagnosis, plan and need for follow up with the patient.     Discharge Medication List as of 6/27/2020  3:36 PM        Final diagnoses:   Edema of right lower leg     74-year-old male who is currently undergoing treatment with Keytruda for squamous cell carcinoma of the head and neck presents to the emergency department with concern over right lower leg swelling, ecchymosis, pain.  He had stable vital signs upon arrival.  Physical exam findings as described above are significant for mild pitting edema of the right lower leg, loacliazed pain, swellig iferior to the left knee overlying the prepatellar bursa, ecchymosis over the medial and lateral aspect of the left heel.  As part of evaluation he did have laboratory testing including non-concerning CBC, CMP.  Ultrasound of his right lower leg was negative for evidence of DVT, there was two complex areas of fluid collection one just inferior to the knee and one medially.  I suspect that patient has bursitis secondary to his fall with dependent edema of the right lower extremity.  There is no evidence of erythema, warmth to suggest cellulitis or abscess.  He has good ROM of the knee and I do not suspect septic arthritis.  Patient was discharged home stable with instructions for elevation of the right lower leg, activity as tolerated.  Follow-up with primary care provider oncology as previously scheduled.  Worrisome reasons to return to the ER sooner discussed.     Disclaimer: This note consists of symbols derived from keyboarding, dictation, and/or voice recognition software. As a result, there may be errors in the script that have gone undetected.  Please consider this when interpreting information found in the chart.    6/27/2020   [facility] EMERGENCY DEPARTMENT     Olga Mercer PA-C  06/28/20 [id number]

## 2020-06-28 ASSESSMENT — ENCOUNTER SYMPTOMS
JOINT SWELLING: 1
DIARRHEA: 0
DIZZINESS: 0
COUGH: 0
ABDOMINAL PAIN: 0
CHILLS: 0
LIGHT-HEADEDNESS: 0
HEADACHES: 0
WOUND: 0
PALPITATIONS: 0
SHORTNESS OF BREATH: 0
FEVER: 0
COLOR CHANGE: 1
VOMITING: 0

## 2020-07-09 ENCOUNTER — INFUSION THERAPY VISIT (OUTPATIENT)
Dept: INFUSION THERAPY | Facility: CLINIC | Age: 75
End: 2020-07-09
Attending: INTERNAL MEDICINE
Payer: MEDICARE

## 2020-07-09 ENCOUNTER — HOSPITAL ENCOUNTER (OUTPATIENT)
Dept: LAB | Facility: CLINIC | Age: 75
Discharge: HOME | End: 2020-07-09
Attending: INTERNAL MEDICINE | Admitting: INTERNAL MEDICINE
Payer: MEDICARE

## 2020-07-09 ENCOUNTER — VIRTUAL VISIT (OUTPATIENT)
Dept: ONCOLOGY | Facility: CLINIC | Age: 75
End: 2020-07-09
Attending: INTERNAL MEDICINE
Payer: MEDICARE

## 2020-07-09 VITALS — SYSTOLIC BLOOD PRESSURE: 129 MMHG | TEMPERATURE: 97.7 F | HEART RATE: 56 BPM | DIASTOLIC BLOOD PRESSURE: 57 MMHG

## 2020-07-09 DIAGNOSIS — C76.0 HEAD AND NECK CANCER (H): Primary | ICD-10-CM

## 2020-07-09 DIAGNOSIS — M85.80 OSTEOPENIA, UNSPECIFIED LOCATION: ICD-10-CM

## 2020-07-09 DIAGNOSIS — Z78.9 ALCOHOL USE: ICD-10-CM

## 2020-07-09 LAB
ALBUMIN SERPL-MCNC: 3.6 G/DL (ref 3.4–5)
ALP SERPL-CCNC: 53 U/L (ref 40–150)
ALT SERPL W P-5'-P-CCNC: 29 U/L (ref 0–70)
ANION GAP SERPL CALCULATED.3IONS-SCNC: 4 MMOL/L (ref 3–14)
AST SERPL W P-5'-P-CCNC: 21 U/L (ref 0–45)
BILIRUB SERPL-MCNC: 0.7 MG/DL (ref 0.2–1.3)
BUN SERPL-MCNC: 11 MG/DL (ref 7–30)
CALCIUM SERPL-MCNC: 9.1 MG/DL (ref 8.5–10.1)
CHLORIDE SERPL-SCNC: 108 MMOL/L (ref 94–109)
CO2 SERPL-SCNC: 29 MMOL/L (ref 20–32)
CREAT SERPL-MCNC: 0.85 MG/DL (ref 0.66–1.25)
GFR SERPL CREATININE-BSD FRML MDRD: 86 ML/MIN/{1.73_M2}
GLUCOSE SERPL-MCNC: 78 MG/DL (ref 70–99)
POTASSIUM SERPL-SCNC: 4.3 MMOL/L (ref 3.4–5.3)
PROT SERPL-MCNC: 7.3 G/DL (ref 6.8–8.8)
SODIUM SERPL-SCNC: 141 MMOL/L (ref 133–144)
TSH SERPL DL<=0.005 MIU/L-ACNC: 1.93 MU/L (ref 0.4–4)

## 2020-07-09 PROCEDURE — 25800030 ZZH RX IP 258 OP 636: Performed by: INTERNAL MEDICINE

## 2020-07-09 PROCEDURE — 36415 COLL VENOUS BLD VENIPUNCTURE: CPT | Performed by: INTERNAL MEDICINE

## 2020-07-09 PROCEDURE — 80053 COMPREHEN METABOLIC PANEL: CPT | Performed by: INTERNAL MEDICINE

## 2020-07-09 PROCEDURE — 40001009 ZZH VIDEO/TELEPHONE VISIT; NO CHARGE

## 2020-07-09 PROCEDURE — 99214 OFFICE O/P EST MOD 30 MIN: CPT | Mod: 95 | Performed by: INTERNAL MEDICINE

## 2020-07-09 PROCEDURE — 25000128 H RX IP 250 OP 636: Performed by: INTERNAL MEDICINE

## 2020-07-09 PROCEDURE — 84443 ASSAY THYROID STIM HORMONE: CPT | Performed by: INTERNAL MEDICINE

## 2020-07-09 PROCEDURE — 96413 CHEMO IV INFUSION 1 HR: CPT

## 2020-07-09 PROCEDURE — G0463 HOSPITAL OUTPT CLINIC VISIT: HCPCS | Mod: 25

## 2020-07-09 RX ORDER — NALOXONE HYDROCHLORIDE 0.4 MG/ML
.1-.4 INJECTION, SOLUTION INTRAMUSCULAR; INTRAVENOUS; SUBCUTANEOUS
Status: CANCELLED | OUTPATIENT
Start: 2020-07-09

## 2020-07-09 RX ORDER — HEPARIN SODIUM (PORCINE) LOCK FLUSH IV SOLN 100 UNIT/ML 100 UNIT/ML
5 SOLUTION INTRAVENOUS
Status: CANCELLED | OUTPATIENT
Start: 2020-07-09

## 2020-07-09 RX ORDER — ALBUTEROL SULFATE 0.83 MG/ML
2.5 SOLUTION RESPIRATORY (INHALATION)
Status: CANCELLED | OUTPATIENT
Start: 2020-07-09

## 2020-07-09 RX ORDER — LORAZEPAM 2 MG/ML
0.5 INJECTION INTRAMUSCULAR EVERY 4 HOURS PRN
Status: CANCELLED
Start: 2020-07-09

## 2020-07-09 RX ORDER — EPINEPHRINE 1 MG/ML
0.3 INJECTION, SOLUTION, CONCENTRATE INTRAVENOUS EVERY 5 MIN PRN
Status: CANCELLED | OUTPATIENT
Start: 2020-07-09

## 2020-07-09 RX ORDER — ALBUTEROL SULFATE 90 UG/1
1-2 AEROSOL, METERED RESPIRATORY (INHALATION)
Status: CANCELLED
Start: 2020-07-09

## 2020-07-09 RX ORDER — DIPHENHYDRAMINE HYDROCHLORIDE 50 MG/ML
50 INJECTION INTRAMUSCULAR; INTRAVENOUS
Status: CANCELLED
Start: 2020-07-09

## 2020-07-09 RX ORDER — METHYLPREDNISOLONE SODIUM SUCCINATE 125 MG/2ML
125 INJECTION, POWDER, LYOPHILIZED, FOR SOLUTION INTRAMUSCULAR; INTRAVENOUS
Status: CANCELLED
Start: 2020-07-09

## 2020-07-09 RX ORDER — MEPERIDINE HYDROCHLORIDE 25 MG/ML
25 INJECTION INTRAMUSCULAR; INTRAVENOUS; SUBCUTANEOUS EVERY 30 MIN PRN
Status: CANCELLED | OUTPATIENT
Start: 2020-07-09

## 2020-07-09 RX ORDER — HEPARIN SODIUM,PORCINE 10 UNIT/ML
5 VIAL (ML) INTRAVENOUS
Status: CANCELLED | OUTPATIENT
Start: 2020-07-09

## 2020-07-09 RX ORDER — SODIUM CHLORIDE 9 MG/ML
1000 INJECTION, SOLUTION INTRAVENOUS CONTINUOUS PRN
Status: CANCELLED
Start: 2020-07-09

## 2020-07-09 RX ADMIN — SODIUM CHLORIDE 200 MG: 9 INJECTION, SOLUTION INTRAVENOUS at 15:18

## 2020-07-09 RX ADMIN — SODIUM CHLORIDE 250 ML: 9 INJECTION, SOLUTION INTRAVENOUS at 15:47

## 2020-07-09 NOTE — PROGRESS NOTES
Type of Visit: Video Visit  Patient has given verbal consent for Video visit.     Video Start Time: 1:50 pm  Video End Time: 2:15 pm    Originating Location (pt. Location): Home     Distant Location (provider location):  [facility]      Platform used for Video Visit:  [facility]    ONCOLOGY FOLLOW UP VISIT      REQUESTING PROVIDER/SERVICE: Dr. Odell Young, [facility]    PATIENT NAME: Newton Buchanan   MRN# [id number]     Date of Visit: Jul 9, 2020     YOB: 1945       REASON FOR VISIT/CC:    Stage IV metastatic squamous cell carcinoma   Maybe transferring care from [place] [facility] from [facility]/Oncology from [facility]      HISTORY OF ONCOLOGY ILLNESS:    Patient had noted right neck swelling over 2 years. Has had FNAs in the past that were benign. He was followed by q 6 months.     He reported the neck mass was getting larger. Denies any pain/dysphagia/difficuly swallowing/sob/skin changes.   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     2/24/20 PET/CT found:  1. Hypermetabolic right cervical lymphadenopathy.  2. Numerous hypermetabolic bilateral lung metastases.  3. Hypermetabolic right hilar nodes, also likely metastatic disease    It was felt that with no obvious primary on PET/CT but given CK7 +, P16 positivity, and neck mass on presentation, suspicion is high that origin is still of H&N (right cervical LAD) with metastatic spread to lung with bilateral multiple lung nodules .  Pt made informed decision to proceed with single agent Keytruda due to his PD-L1 testing from [employer] with CPS of 70.  Started early May in [place].       INTERVAL HISTORY:  He transferred his care to us in June 2020.       PAST MEDICAL HISTORY  Obstructive sleep apnea   HTN  Diverticulosis of colon. Esophageal reflux disease   Admitted to hospital 03/09/2020 for lower GI bleed, ischemic colitis,  c.dff repeat testing  03/31/2020 positive, s/p vancomycin.   Ascending polyp  Malignant neoplasm of prostate /2008 PSA 4.79, Gl 3+3 s/p proctectomy.      MEDICATIONS/ALLERY:  Reviewed in Epic system.        SOCIAL HISTORY:    Single. Denies smoking, one drink per night.   He is retired in [place], maybe moving back to [place].  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  Denies smoking or ETOH      FAMILY HISTORY:    Denies any FH of cancers       REVIEW OF SYSTEMS:   A 10-point review of systems was performed. Pertinent findings are noted in the HPI.    GENERAL: pt is in usual state of health.  No B symptoms  NEURO:   No headache, double vision, or focal weakness.  No neuropathy.   SKIN:  No chronic skin rash or skin infection.   CARDIOVASCULAR:  No High blood pressure or hyperlipidemia. NO REYES  PULMONARY:  Obstructive sleep apnea   GI: Diverticulosis of colon. Esophageal reflux disease   Admitted to hospital 03/09/2020 for lower GI bleed, ischemic colitis,  c.dff repeat testing 03/31/2020 positive, s/p vancomycin. + Ascending polyp.  Reports today he has loose BM again in the last 2 days.   :  No urgency, frequency.  No recurrent urinary tract infection.  No kidney problems.   RHEUMATOLOGY/MUSCULOSKELETAL SYSTEM:  no arthritic pain, or muscle pain. No muscle ache.   ENDOCRINE:  No history of diabetes or thyroid problem.  No complaints of hot flashes.   HEMATOLOGY:  No history of bleeding or thrombosis episode.   Oncology: Malignant neoplasm of prostate in/2008 PSA 4.79, Gl 3+3 s/p proctectomy.  On tx for his presumed H/N cancer dx 2/2020.  IMMUNOLOGY:  No recurrent fever or chills episode.  No recurrent infectious episode.   PSYCHIATRY:  No anxiety or depression.          PHYSICAL EXAMINATION ATTAINABLE DURING VIDEO VISIT:  CONSTITUTIONAL - Pt looks like stated age, pleasant, not in acute distress. Not obese.  NEURO: Oriented to time, person, and places. No tremor. Normal gait.   ENT, MOUTH: Pupils are equal.  Sclerae are  anicteric.  Moist oral mucosa. No oral thrush.   NECK:  No jugular venous distention.  No thyroid enlargement.   RESPIRATORY: talk nl, no sob during conversation, no cough.   MUSCULOSKELETAL/EXTREMITIES:  No edema.  No joint deformity. Normal range of motion.  SKIN:  No petechiae.  No rash.  No signs of cellulitis.   PSYCHIATRIC: Normal mood and affect. Good memory. Proper insight and judgement.   THE REST OF A COMPREHENSIVE PHYSICIAL EXAM IS DEFERRED DUE TO COVID-19 PUBLIC HEALTH EMERGENCY RELATED VIDEO VISIT RESCTRICTION.      CURRENT LAB DATA REVIEWED TODAY WITH PATIENT:  DURING VISIT:   CMP, TSH are fine    2/11/20 Biopsy metastatic squamous cell carcinoma - HPV16+.      CURRENT IMAGING REVIEWED TODAY WITH PATIENT DURING VISIT:  2/24/20 PET/CT IMPRESSION:   1. Hypermetabolic right cervical lymphadenopathy.  2. Numerous hypermetabolic bilateral lung metastases.  3. Hypermetabolic right hilar nodes, also likely metastatic disease      2/3/20 CT Neck/Chest IMPRESSION:  1. Right cervical lymphadenopathy, with areas suspicious for  necrosis, highly concerning for metastatic involvement by  malignancy. Recommend direct visualization of the upper  aerodigestive tract.  2. Numerous bilateral lung nodules, suspicious for metastases.         OLD DATA REVIEWED TODAY WITH SUMMARY  dexa 2019 - low density.       ASSESSMENT AND PLAN DISCUSSED WITH PATIENT TODAY DURING VISIT:   1.  Presumed  H&N (right cervical LAD) with metastatic spread to lung with bilateral multiple lung nodules.   He is first-line palliative treatment with Keytruda was started when he was in [place] early May.    We discussed he can use therapy side effect profiles, they can be unpredictable.  And how he should be monitored.    Plan restaging PET in Aug.     He understands treatment is palliative in nature.  He is amazed at how good his quality of life is and he is already having some response with immunotherapy single agent.      2.  ETOH use with  Keytruda.  We discussed the limited data regarding this concern.   We discussed what is considered acceptable amount of ETOH.       3. osteopenia on DEXA scan 2019.  We discussed importance of vitamin D intake outdoor activity weightbearing exercise.

## 2020-07-09 NOTE — LETTER
"    7/9/2020         RE: Newton Buchanan  [address] [address]        Dear Colleague,    Thank you for referring your patient, Newton Buchanan, to the [facility]. Please see a copy of my visit note below.    Newton Buchanan is a 74 year old male who is being evaluated via a billable video visit.      The patient has been notified of following:     \"This video visit will be conducted via a call between you and your physician/provider. We have found that certain health care needs can be provided without the need for an in-person physical exam.  This service lets us provide the care you need with a video conversation.  If a prescription is necessary we can send it directly to your pharmacy.  If lab work is needed we can place an order for that and you can then stop by our lab to have the test done at a later time.    Video visits are billed at different rates depending on your insurance coverage.  Please reach out to your insurance provider with any questions.    If during the course of the call the physician/provider feels a video visit is not appropriate, you will not be charged for this service.\"    Patient has given verbal consent for Video visit? Yes  How would you like to obtain your AVS? [employer]  Patient would like the video invitation sent by:  [phone] please use [facility].  Will anyone else be joining your video visit? No        Video-Visit Details    Type of service:  Video Visit      Originating Location (pt. Location)     Distant Location (provider location):  [facility]     Platform used for Video Visit: Adrienne Carrillo CMA        Type of Visit: Video Visit  Patient has given verbal consent for Video visit.     Video Start Time: 1:50 pm  Video End Time: 2:15 pm    Originating Location (pt. Location): Home     Distant Location (provider location):  [facility]      Platform used for Video Visit:  [employer]    ONCOLOGY FOLLOW UP VISIT      REQUESTING " PROVIDER/SERVICE: Dr. Odell Young, [facility]    PATIENT NAME: Newton Buchanan   MRN# [id number]     Date of Visit: Jul 9, 2020     YOB: 1945       REASON FOR VISIT/CC:    Stage IV metastatic squamous cell carcinoma   Maybe transferring care from [place] [facility] from [facility] from [facility]      HISTORY OF ONCOLOGY ILLNESS:    Patient had noted right neck swelling over 2 years. Has had FNAs in the past that were benign. He was followed by q 6 months.     He reported the neck mass was getting larger. Denies any pain/dysphagia/difficuly swallowing/sob/skin changes.   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     2/24/20 PET/CT found:  1. Hypermetabolic right cervical lymphadenopathy.  2. Numerous hypermetabolic bilateral lung metastases.  3. Hypermetabolic right hilar nodes, also likely metastatic disease    It was felt that with no obvious primary on PET/CT but given CK7 +, P16 positivity, and neck mass on presentation, suspicion is high that origin is still of H&N (right cervical LAD) with metastatic spread to lung with bilateral multiple lung nodules .  Pt made informed decision to proceed with single agent Keytruda due to his PD-L1 testing from [employer] with CPS of 70.  Started early May in [place].       INTERVAL HISTORY:  He transferred his care to us in June 2020.       PAST MEDICAL HISTORY  Obstructive sleep apnea   HTN  Diverticulosis of colon. Esophageal reflux disease   Admitted to hospital 03/09/2020 for lower GI bleed, ischemic colitis,  c.dff repeat testing 03/31/2020 positive, s/p vancomycin.   Ascending polyp  Malignant neoplasm of prostate /2008 PSA 4.79, Gl 3+3 s/p proctectomy.      MEDICATIONS/ALLERY:  Reviewed in Epic system.        SOCIAL HISTORY:    Single. Denies smoking, one drink per night.   He is retired in [place], maybe moving back to [place].  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  Denies smoking or ETOH      FAMILY HISTORY:    Denies any FH of cancers       REVIEW OF SYSTEMS:   A 10-point review of systems was performed. Pertinent findings are noted in the HPI.    GENERAL: pt is in usual state of health.  No B symptoms  NEURO:   No headache, double vision, or focal weakness.  No neuropathy.   SKIN:  No chronic skin rash or skin infection.   CARDIOVASCULAR:  No High blood pressure or hyperlipidemia. NO REYES  PULMONARY:  Obstructive sleep apnea   GI: Diverticulosis of colon. Esophageal reflux disease   Admitted to hospital 03/09/2020 for lower GI bleed, ischemic colitis,  c.dff repeat testing 03/31/2020 positive, s/p vancomycin. + Ascending polyp.  Reports today he has loose BM again in the last 2 days.   :  No urgency, frequency.  No recurrent urinary tract infection.  No kidney problems.   RHEUMATOLOGY/MUSCULOSKELETAL SYSTEM:  no arthritic pain, or muscle pain. No muscle ache.   ENDOCRINE:  No history of diabetes or thyroid problem.  No complaints of hot flashes.   HEMATOLOGY:  No history of bleeding or thrombosis episode.   Oncology: Malignant neoplasm of prostate in/2008 PSA 4.79, Gl 3+3 s/p proctectomy.  On tx for his presumed H/N cancer dx 2/2020.  IMMUNOLOGY:  No recurrent fever or chills episode.  No recurrent infectious episode.   PSYCHIATRY:  No anxiety or depression.          PHYSICAL EXAMINATION ATTAINABLE DURING VIDEO VISIT:  CONSTITUTIONAL - Pt looks like stated age, pleasant, not in acute distress. Not obese.  NEURO: Oriented to time, person, and places. No tremor. Normal gait.   ENT, MOUTH: Pupils are equal.  Sclerae are anicteric.  Moist oral mucosa. No oral thrush.   NECK:  No jugular venous distention.  No thyroid enlargement.   RESPIRATORY: talk nl, no sob during conversation, no cough.   MUSCULOSKELETAL/EXTREMITIES:  No edema.  No joint deformity. Normal range of motion.  SKIN:  No petechiae.  No rash.  No signs of cellulitis.   PSYCHIATRIC:  Normal mood and affect. Good memory. Proper insight and judgement.   THE REST OF A COMPREHENSIVE PHYSICIAL EXAM IS DEFERRED DUE TO COVID-19 PUBLIC HEALTH EMERGENCY RELATED VIDEO VISIT RESCTRICTION.      CURRENT LAB DATA REVIEWED TODAY WITH PATIENT:  DURING VISIT:   CMP, TSH are fine    2/11/20 Biopsy metastatic squamous cell carcinoma - HPV16+.      CURRENT IMAGING REVIEWED TODAY WITH PATIENT DURING VISIT:  2/24/20 PET/CT IMPRESSION:   1. Hypermetabolic right cervical lymphadenopathy.  2. Numerous hypermetabolic bilateral lung metastases.  3. Hypermetabolic right hilar nodes, also likely metastatic disease      2/3/20 CT Neck/Chest IMPRESSION:  1. Right cervical lymphadenopathy, with areas suspicious for  necrosis, highly concerning for metastatic involvement by  malignancy. Recommend direct visualization of the upper  aerodigestive tract.  2. Numerous bilateral lung nodules, suspicious for metastases.         OLD DATA REVIEWED TODAY WITH SUMMARY  dexa 2019 - low density.       ASSESSMENT AND PLAN DISCUSSED WITH PATIENT TODAY DURING VISIT:   1.  Presumed  H&N (right cervical LAD) with metastatic spread to lung with bilateral multiple lung nodules.   He is first-line palliative treatment with Keytruda was started when he was in [place] early May.    We discussed he can use therapy side effect profiles, they can be unpredictable.  And how he should be monitored.    Plan restaging PET in Aug.     He understands treatment is palliative in nature.  He is amazed at how good his quality of life is and he is already having some response with immunotherapy single agent.      2.  ETOH use with Keytruda.  We discussed the limited data regarding this concern.   We discussed what is considered acceptable amount of ETOH.       3. osteopenia on DEXA scan 2019.  We discussed importance of vitamin D intake outdoor activity weightbearing exercise.    Again, thank you for allowing me to participate in the care of your patient.         Sincerely,        Rosetta Chen MD, MD

## 2020-07-09 NOTE — LETTER
"    7/9/2020         RE: Newton Buchanan  [address] [facility] [address]        Dear Colleague,    Thank you for referring your patient, Newton Buchanan, to the [facility]. Please see a copy of my visit note below.    Newton Buchanan is a 74 year old male who is being evaluated via a billable video visit.      The patient has been notified of following:     \"This video visit will be conducted via a call between you and your physician/provider. We have found that certain health care needs can be provided without the need for an in-person physical exam.  This service lets us provide the care you need with a video conversation.  If a prescription is necessary we can send it directly to your pharmacy.  If lab work is needed we can place an order for that and you can then stop by our lab to have the test done at a later time.    Video visits are billed at different rates depending on your insurance coverage.  Please reach out to your insurance provider with any questions.    If during the course of the call the physician/provider feels a video visit is not appropriate, you will not be charged for this service.\"    Patient has given verbal consent for Video visit? Yes  How would you like to obtain your AVS? REEL Qualifiedhart  Patient would like the video invitation sent by:  [phone] please use [employer].  Will anyone else be joining your video visit? No        Video-Visit Details    Type of service:  Video Visit      Originating Location (pt. Location)     Distant Location (provider location):  [facility]     Platform used for Video Visit: Adrienne Carrillo CMA        Type of Visit: Video Visit  Patient has given verbal consent for Video visit.     Video Start Time: 1:50 pm  Video End Time: 2:15 pm    Originating Location (pt. Location): Home     Distant Location (provider location):  [facility]      Platform used for Video Visit:  DoximLÃ¡nzanos    ONCOLOGY FOLLOW UP VISIT      REQUESTING " PROVIDER/SERVICE: Dr. Odell Young, [facility]    PATIENT NAME: Newton Buchanan   MRN# [id number]     Date of Visit: Jul 9, 2020     YOB: 1945       REASON FOR VISIT/CC:    Stage IV metastatic squamous cell carcinoma   Maybe transferring care from [place] [facility] from [facility]/Oncology from [facility]      HISTORY OF ONCOLOGY ILLNESS:    Patient had noted right neck swelling over 2 years. Has had FNAs in the past that were benign. He was followed by q 6 months.     He reported the neck mass was getting larger. Denies any pain/dysphagia/difficuly swallowing/sob/skin changes.   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     2/24/20 PET/CT found:  1. Hypermetabolic right cervical lymphadenopathy.  2. Numerous hypermetabolic bilateral lung metastases.  3. Hypermetabolic right hilar nodes, also likely metastatic disease    It was felt that with no obvious primary on PET/CT but given CK7 +, P16 positivity, and neck mass on presentation, suspicion is high that origin is still of H&N (right cervical LAD) with metastatic spread to lung with bilateral multiple lung nodules .  Pt made informed decision to proceed with single agent Keytruda due to his PD-L1 testing from [employer] with CPS of 70.  Started early May in [place].       INTERVAL HISTORY:  He transferred his care to us in June 2020.       PAST MEDICAL HISTORY  Obstructive sleep apnea   HTN  Diverticulosis of colon. Esophageal reflux disease   Admitted to hospital 03/09/2020 for lower GI bleed, ischemic colitis,  c.dff repeat testing 03/31/2020 positive, s/p vancomycin.   Ascending polyp  Malignant neoplasm of prostate /2008 PSA 4.79, Gl 3+3 s/p proctectomy.      MEDICATIONS/ALLERY:  Reviewed in Epic system.        SOCIAL HISTORY:    Single. Denies smoking, one drink per night.   He is retired in [place], maybe moving back to [place].  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  Denies smoking or ETOH      FAMILY HISTORY:    Denies any FH of cancers       REVIEW OF SYSTEMS:   A 10-point review of systems was performed. Pertinent findings are noted in the HPI.    GENERAL: pt is in usual state of health.  No B symptoms  NEURO:   No headache, double vision, or focal weakness.  No neuropathy.   SKIN:  No chronic skin rash or skin infection.   CARDIOVASCULAR:  No High blood pressure or hyperlipidemia. NO REYES  PULMONARY:  Obstructive sleep apnea   GI: Diverticulosis of colon. Esophageal reflux disease   Admitted to hospital 03/09/2020 for lower GI bleed, ischemic colitis,  c.dff repeat testing 03/31/2020 positive, s/p vancomycin. + Ascending polyp.  Reports today he has loose BM again in the last 2 days.   :  No urgency, frequency.  No recurrent urinary tract infection.  No kidney problems.   RHEUMATOLOGY/MUSCULOSKELETAL SYSTEM:  no arthritic pain, or muscle pain. No muscle ache.   ENDOCRINE:  No history of diabetes or thyroid problem.  No complaints of hot flashes.   HEMATOLOGY:  No history of bleeding or thrombosis episode.   Oncology: Malignant neoplasm of prostate in/2008 PSA 4.79, Gl 3+3 s/p proctectomy.  On tx for his presumed H/N cancer dx 2/2020.  IMMUNOLOGY:  No recurrent fever or chills episode.  No recurrent infectious episode.   PSYCHIATRY:  No anxiety or depression.          PHYSICAL EXAMINATION ATTAINABLE DURING VIDEO VISIT:  CONSTITUTIONAL - Pt looks like stated age, pleasant, not in acute distress. Not obese.  NEURO: Oriented to time, person, and places. No tremor. Normal gait.   ENT, MOUTH: Pupils are equal.  Sclerae are anicteric.  Moist oral mucosa. No oral thrush.   NECK:  No jugular venous distention.  No thyroid enlargement.   RESPIRATORY: talk nl, no sob during conversation, no cough.   MUSCULOSKELETAL/EXTREMITIES:  No edema.  No joint deformity. Normal range of motion.  SKIN:  No petechiae.  No rash.  No signs of cellulitis.   PSYCHIATRIC:  Normal mood and affect. Good memory. Proper insight and judgement.   THE REST OF A COMPREHENSIVE PHYSICIAL EXAM IS DEFERRED DUE TO COVID-19 PUBLIC HEALTH EMERGENCY RELATED VIDEO VISIT RESCTRICTION.      CURRENT LAB DATA REVIEWED TODAY WITH PATIENT:  DURING VISIT:   CMP, TSH are fine    2/11/20 Biopsy metastatic squamous cell carcinoma - HPV16+.      CURRENT IMAGING REVIEWED TODAY WITH PATIENT DURING VISIT:  2/24/20 PET/CT IMPRESSION:   1. Hypermetabolic right cervical lymphadenopathy.  2. Numerous hypermetabolic bilateral lung metastases.  3. Hypermetabolic right hilar nodes, also likely metastatic disease      2/3/20 CT Neck/Chest IMPRESSION:  1. Right cervical lymphadenopathy, with areas suspicious for  necrosis, highly concerning for metastatic involvement by  malignancy. Recommend direct visualization of the upper  aerodigestive tract.  2. Numerous bilateral lung nodules, suspicious for metastases.         OLD DATA REVIEWED TODAY WITH SUMMARY  dexa 2019 - low density.       ASSESSMENT AND PLAN DISCUSSED WITH PATIENT TODAY DURING VISIT:   1.  Presumed  H&N (right cervical LAD) with metastatic spread to lung with bilateral multiple lung nodules.   He is first-line palliative treatment with Keytruda was started when he was in [place] early May.    We discussed he can use therapy side effect profiles, they can be unpredictable.  And how he should be monitored.    Plan restaging PET in Aug.     He understands treatment is palliative in nature.  He is amazed at how good his quality of life is and he is already having some response with immunotherapy single agent.      2.  ETOH use with Keytruda.  We discussed the limited data regarding this concern.   We discussed what is considered acceptable amount of ETOH.       3. osteopenia on DEXA scan 2019.  We discussed importance of vitamin D intake outdoor activity weightbearing exercise.    Again, thank you for allowing me to participate in the care of your patient.         Sincerely,        Rosetta Chen MD, MD

## 2020-07-09 NOTE — PROGRESS NOTES
Infusion Nursing Note:  Newton Buchanan presents today for Keytruda.    Patient seen by provider today: Yes: Dr. Chen via virtual visit   present during visit today: Not Applicable.    Note: N/A.    Intravenous Access:  Labs drawn without difficulty.  Peripheral IV placed.    Treatment Conditions:  Lab Results   Component Value Date     07/09/2020                   Lab Results   Component Value Date    POTASSIUM 4.3 07/09/2020           No results found for: MAG         Lab Results   Component Value Date    CR 0.85 07/09/2020                   Lab Results   Component Value Date    JÚNIOR 9.1 07/09/2020                Lab Results   Component Value Date    BILITOTAL 0.7 07/09/2020           Lab Results   Component Value Date    ALBUMIN 3.6 07/09/2020                    Lab Results   Component Value Date    ALT 29 07/09/2020           Lab Results   Component Value Date    AST 21 07/09/2020       Results reviewed, labs MET treatment parameters, ok to proceed with treatment.      Post Infusion Assessment:  Patient tolerated infusion without incident.  Blood return noted pre and post infusion.  Site patent and intact, free from redness, edema or discomfort.  No evidence of extravasations.  Access discontinued per protocol.       Discharge Plan:   Patient discharged in stable condition accompanied by: self.  Departure Mode: Ambulatory.  Pt to return on 8/3 at 2:00 pm for labs followed by clinic appt with Dr. Chen and Sharad Carroll RN

## 2020-07-09 NOTE — PROGRESS NOTES
"Newton Buchanan is a 74 year old male who is being evaluated via a billable video visit.      The patient has been notified of following:     \"This video visit will be conducted via a call between you and your physician/provider. We have found that certain health care needs can be provided without the need for an in-person physical exam.  This service lets us provide the care you need with a video conversation.  If a prescription is necessary we can send it directly to your pharmacy.  If lab work is needed we can place an order for that and you can then stop by our lab to have the test done at a later time.    Video visits are billed at different rates depending on your insurance coverage.  Please reach out to your insurance provider with any questions.    If during the course of the call the physician/provider feels a video visit is not appropriate, you will not be charged for this service.\"    Patient has given verbal consent for Video visit? Yes  How would you like to obtain your AVS? [place]  Patient would like the video invitation sent by:  [phone] please use XipLink.  Will anyone else be joining your video visit? No        Video-Visit Details    Type of service:  Video Visit      Originating Location (pt. Location)     Distant Location (provider location):  [facility]     Platform used for Video Visit: Adrienne Carrillo CMA      "

## 2020-08-03 ENCOUNTER — HOSPITAL ENCOUNTER (OUTPATIENT)
Dept: LAB | Facility: CLINIC | Age: 75
Discharge: HOME | End: 2020-08-03
Attending: INTERNAL MEDICINE | Admitting: INTERNAL MEDICINE
Payer: MEDICARE

## 2020-08-03 ENCOUNTER — INFUSION THERAPY VISIT (OUTPATIENT)
Dept: INFUSION THERAPY | Facility: CLINIC | Age: 75
End: 2020-08-03
Attending: INTERNAL MEDICINE
Payer: MEDICARE

## 2020-08-03 ENCOUNTER — ONCOLOGY VISIT (OUTPATIENT)
Dept: ONCOLOGY | Facility: CLINIC | Age: 75
End: 2020-08-03
Attending: INTERNAL MEDICINE
Payer: MEDICARE

## 2020-08-03 VITALS
RESPIRATION RATE: 20 BRPM | DIASTOLIC BLOOD PRESSURE: 66 MMHG | BODY MASS INDEX: 26.36 KG/M2 | WEIGHT: 194.6 LBS | OXYGEN SATURATION: 97 % | SYSTOLIC BLOOD PRESSURE: 135 MMHG | HEART RATE: 58 BPM | TEMPERATURE: 98.4 F | HEIGHT: 72 IN

## 2020-08-03 VITALS — SYSTOLIC BLOOD PRESSURE: 126 MMHG | DIASTOLIC BLOOD PRESSURE: 68 MMHG | HEART RATE: 54 BPM

## 2020-08-03 DIAGNOSIS — Z86.19 HISTORY OF CLOSTRIDIOIDES DIFFICILE COLITIS: ICD-10-CM

## 2020-08-03 DIAGNOSIS — C76.0 HEAD AND NECK CANCER (H): Primary | ICD-10-CM

## 2020-08-03 DIAGNOSIS — M85.80 OSTEOPENIA, UNSPECIFIED LOCATION: ICD-10-CM

## 2020-08-03 LAB
ALBUMIN SERPL-MCNC: 3.7 G/DL (ref 3.4–5)
ALP SERPL-CCNC: 48 U/L (ref 40–150)
ALT SERPL W P-5'-P-CCNC: 27 U/L (ref 0–70)
ANION GAP SERPL CALCULATED.3IONS-SCNC: 4 MMOL/L (ref 3–14)
AST SERPL W P-5'-P-CCNC: 18 U/L (ref 0–45)
BILIRUB SERPL-MCNC: 0.6 MG/DL (ref 0.2–1.3)
BUN SERPL-MCNC: 20 MG/DL (ref 7–30)
CALCIUM SERPL-MCNC: 9.3 MG/DL (ref 8.5–10.1)
CHLORIDE SERPL-SCNC: 107 MMOL/L (ref 94–109)
CO2 SERPL-SCNC: 27 MMOL/L (ref 20–32)
CREAT SERPL-MCNC: 0.9 MG/DL (ref 0.66–1.25)
GFR SERPL CREATININE-BSD FRML MDRD: 84 ML/MIN/{1.73_M2}
GLUCOSE SERPL-MCNC: 93 MG/DL (ref 70–99)
POTASSIUM SERPL-SCNC: 4.1 MMOL/L (ref 3.4–5.3)
PROT SERPL-MCNC: 7.1 G/DL (ref 6.8–8.8)
SODIUM SERPL-SCNC: 138 MMOL/L (ref 133–144)
TSH SERPL DL<=0.005 MIU/L-ACNC: 2.08 MU/L (ref 0.4–4)

## 2020-08-03 PROCEDURE — 96413 CHEMO IV INFUSION 1 HR: CPT

## 2020-08-03 PROCEDURE — 36415 COLL VENOUS BLD VENIPUNCTURE: CPT | Performed by: INTERNAL MEDICINE

## 2020-08-03 PROCEDURE — 99214 OFFICE O/P EST MOD 30 MIN: CPT | Performed by: INTERNAL MEDICINE

## 2020-08-03 PROCEDURE — 36415 COLL VENOUS BLD VENIPUNCTURE: CPT

## 2020-08-03 PROCEDURE — 84443 ASSAY THYROID STIM HORMONE: CPT | Performed by: INTERNAL MEDICINE

## 2020-08-03 PROCEDURE — 80053 COMPREHEN METABOLIC PANEL: CPT | Performed by: INTERNAL MEDICINE

## 2020-08-03 PROCEDURE — 25800030 ZZH RX IP 258 OP 636: Performed by: INTERNAL MEDICINE

## 2020-08-03 PROCEDURE — G0463 HOSPITAL OUTPT CLINIC VISIT: HCPCS | Mod: 25

## 2020-08-03 PROCEDURE — 25000128 H RX IP 250 OP 636: Performed by: INTERNAL MEDICINE

## 2020-08-03 RX ORDER — METHYLPREDNISOLONE SODIUM SUCCINATE 125 MG/2ML
125 INJECTION, POWDER, LYOPHILIZED, FOR SOLUTION INTRAMUSCULAR; INTRAVENOUS
Status: CANCELLED
Start: 2020-08-03

## 2020-08-03 RX ORDER — DIPHENHYDRAMINE HYDROCHLORIDE 50 MG/ML
50 INJECTION INTRAMUSCULAR; INTRAVENOUS
Status: CANCELLED
Start: 2020-08-03

## 2020-08-03 RX ORDER — HEPARIN SODIUM (PORCINE) LOCK FLUSH IV SOLN 100 UNIT/ML 100 UNIT/ML
5 SOLUTION INTRAVENOUS
Status: CANCELLED | OUTPATIENT
Start: 2020-08-03

## 2020-08-03 RX ORDER — SODIUM CHLORIDE 9 MG/ML
1000 INJECTION, SOLUTION INTRAVENOUS CONTINUOUS PRN
Status: CANCELLED
Start: 2020-08-03

## 2020-08-03 RX ORDER — NALOXONE HYDROCHLORIDE 0.4 MG/ML
.1-.4 INJECTION, SOLUTION INTRAMUSCULAR; INTRAVENOUS; SUBCUTANEOUS
Status: CANCELLED | OUTPATIENT
Start: 2020-08-03

## 2020-08-03 RX ORDER — EPINEPHRINE 1 MG/ML
0.3 INJECTION, SOLUTION, CONCENTRATE INTRAVENOUS EVERY 5 MIN PRN
Status: CANCELLED | OUTPATIENT
Start: 2020-08-03

## 2020-08-03 RX ORDER — ALBUTEROL SULFATE 0.83 MG/ML
2.5 SOLUTION RESPIRATORY (INHALATION)
Status: CANCELLED | OUTPATIENT
Start: 2020-08-03

## 2020-08-03 RX ORDER — LORAZEPAM 2 MG/ML
0.5 INJECTION INTRAMUSCULAR EVERY 4 HOURS PRN
Status: CANCELLED
Start: 2020-08-03

## 2020-08-03 RX ORDER — MEPERIDINE HYDROCHLORIDE 25 MG/ML
25 INJECTION INTRAMUSCULAR; INTRAVENOUS; SUBCUTANEOUS EVERY 30 MIN PRN
Status: CANCELLED | OUTPATIENT
Start: 2020-08-03

## 2020-08-03 RX ORDER — ALBUTEROL SULFATE 90 UG/1
1-2 AEROSOL, METERED RESPIRATORY (INHALATION)
Status: CANCELLED
Start: 2020-08-03

## 2020-08-03 RX ORDER — HEPARIN SODIUM,PORCINE 10 UNIT/ML
5 VIAL (ML) INTRAVENOUS
Status: CANCELLED | OUTPATIENT
Start: 2020-08-03

## 2020-08-03 RX ADMIN — SODIUM CHLORIDE 250 ML: 9 INJECTION, SOLUTION INTRAVENOUS at 15:23

## 2020-08-03 RX ADMIN — SODIUM CHLORIDE 200 MG: 9 INJECTION, SOLUTION INTRAVENOUS at 15:21

## 2020-08-03 ASSESSMENT — MIFFLIN-ST. JEOR: SCORE: 1665.83

## 2020-08-03 ASSESSMENT — PAIN SCALES - GENERAL: PAINLEVEL: NO PAIN (0)

## 2020-08-03 NOTE — LETTER
"    8/3/2020         RE: Newton Buchanan  [address] [facility] [address]        Dear Colleague,    Thank you for referring your patient, Newton Buchanan, to the [facility]. Please see a copy of my visit note below.    Oncology Rooming Note    August 3, 2020 2:32 PM   Newton Buchanan is a 74 year old male who presents for:    Chief Complaint   Patient presents with     Oncology Clinic Visit     Recheck Head and neck cancer, Labs & Keytruda     Initial Vitals: /66 (BP Location: Left arm, Patient Position: Sitting, Cuff Size: Adult Regular)   Pulse 58   Temp 98.4  F (36.9  C) (Tympanic)   Resp 20   Ht 1.829 m (6' 0.01\")   Wt 88.3 kg (194 lb 9.6 oz)   SpO2 97%   BMI 26.39 kg/m   Estimated body mass index is 26.39 kg/m  as calculated from the following:    Height as of this encounter: 1.829 m (6' 0.01\").    Weight as of this encounter: 88.3 kg (194 lb 9.6 oz). Body surface area is 2.12 meters squared.  No Pain (0) Comment: Data Unavailable   No LMP for male patient.  Allergies reviewed: Yes  Medications reviewed: Yes    Medications: Medication refills not needed today.  Pharm[facility] into LS9: [id number] - [address] AT [address]    Clinical concerns:   Recheck Head and neck cancer, Labs & Keytruda.      Sandy Carrillo CMA                  ONCOLOGY FOLLOW UP VISIT      REQUESTING PROVIDER/SERVICE: Dr. Odell Young, [facility]    PATIENT NAME: Newton Buchanan   MRN# [id number]     Date of Visit: Aug 3, 2020     YOB: 1945       REASON FOR VISIT/CC:    Stage IV metastatic squamous cell carcinoma   Maybe transferring care from [place] [facility] from [facility]Oncology from [facility]      HISTORY OF ONCOLOGY ILLNESS:    Patient had noted right neck swelling over 2 years. Has had FNAs in the past that were benign. He was followed by q 6 months.     He reported the " "neck mass was getting larger. Denies any pain/dysphagia/difficuly swallowing/sob/skin changes.   Eventually got it biopsied 20 Biopsy metastatic squamous cell carcinoma - HPV16+.     20 PET/CT found:  1. Hypermetabolic right cervical lymphadenopathy.  2. Numerous hypermetabolic bilateral lung metastases.  3. Hypermetabolic right hilar nodes, also likely metastatic disease    It was felt that with no obvious primary on PET/CT but given CK7 +, P16 positivity, and neck mass on presentation, suspicion is high that origin is still of H&N (right cervical LAD) with metastatic spread to lung with bilateral multiple lung nodules .  Pt made informed decision to proceed with single agent Keytruda due to his PD-L1 testing from [facility] with CPS of 70.  Started early May in [place].     He transferred his care to us in 2020.       INTERVAL HISTORY:  He continues to tolerating Keytruda well, with decreasing neck mass.      PAST MEDICAL HISTORY  Obstructive sleep apnea   HTN  Diverticulosis of colon. Esophageal reflux disease   Admitted to hospital 2020 for lower GI bleed, ischemic colitis,  c.dff repeat testing 2020 positive, s/p vancomycin.   Ascending polyp  Malignant neoplasm of prostate / PSA 4.79, Gl 3+3 s/p proctectomy.      MEDICATIONS/ALLERY:  Reviewed in Epic system.        SOCIAL HISTORY:    Single. Denies smoking, one drink per night.   He is retired in [place], maybe moving back to [place].  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    Denies smoking or ETOH      FAMILY HISTORY:    Denies any FH of cancers       REVIEW OF SYSTEMS:   Reports nl BM.        PHYSICAL EXAMINATION:   Blood pressure 135/66, pulse 58, temperature 98.4  F (36.9  C), temperature source Tympanic, resp. rate 20, height 1.829 m (6' 0.01\"), weight 88.3 kg (194 lb 9.6 oz), SpO2 97 %.    GENERAL APPEARANCE:  looks like stated age, very pleasant, not in acute distress.      ECO     ENT, MOUTH: Pupils are " equally reactive to light.  Sclerae are anicteric.  Moist oral mucosa without lesion or ulcer.   Negative pharynx.  No oral thrush.   NECK:  Supple.  No jugular venous distention.  Thyroid is not palpable. No longer has right submandibular elias sternomastoid muscle area fullness  LYMPH NODES:  negative superficial JOSÉ MIGUEL.   CARDIOVASCULAR:  S1, S2 regular with no murmurs or gallops.  No carotid or abdominal bruits.   PULMONARY:  Lungs are clear to auscultation and percussion bilaterally.  There is no wheezing or rhonchi.   GASTROINTESTINAL:  Abdomen is soft, nontender.  No hepatosplenomegaly.  No signs of ascites.  No mass appreciable.   MUSCULOSKELETAL/EXTREMITIES:  No edema.  No cyanotic changes.  No signs of joint deformity.  No lymphedema.   NEUROLOGIC:  Cranial nerves II-XII are grossly intact.  Sensation intact.  Muscle strength and muscle tone symmetrical, 5/5 throughout.   BACK  No spinal or paraspinal tenderness.  No CVA tenderness.   SKIN:  No petechiae.  No rash.  No signs of cellulitis.   PSYCHIATRIC: Oriented to time, person, and places. Normal mood and affect. Good memory. Proper insight and judgement.       CURRENT LAB DATA REVIEWED TODAY WITH PATIENT:  DURING VISIT:   CMP, TSH are fine    2/11/20 Biopsy metastatic squamous cell carcinoma - HPV16+.      CURRENT IMAGING REVIEWED TODAY WITH PATIENT DURING VISIT:  2/24/20 PET/CT IMPRESSION:   1. Hypermetabolic right cervical lymphadenopathy.  2. Numerous hypermetabolic bilateral lung metastases.  3. Hypermetabolic right hilar nodes, also likely metastatic disease      2/3/20 CT Neck/Chest IMPRESSION:  1. Right cervical lymphadenopathy, with areas suspicious for  necrosis, highly concerning for metastatic involvement by  malignancy. Recommend direct visualization of the upper  aerodigestive tract.  2. Numerous bilateral lung nodules, suspicious for metastases.         OLD DATA REVIEWED TODAY WITH SUMMARY  dexa 2019 - low density.       ASSESSMENT AND PLAN  DISCUSSED WITH PATIENT TODAY DURING VISIT:   1.  Presumed  H&N (right cervical LAD) with metastatic spread to lung with bilateral multiple lung nodules.   He is first-line palliative treatment with Keytruda was started when he was in [place] early May 2020.    We discussed he can use therapy side effect profiles, they can be unpredictable.  And how he should be monitored.    Plan restaging PET in Aug.     He understands treatment is palliative in nature.  He is amazed at how good his quality of life is and he is already having some response with immunotherapy single agent.    Close to normal after the duration of Keytruda treatment in people who has response from it.     2.  Hx of C diff.   He is probiotics Florastor. He is to continue.  He feels it is helpful.       3. osteopenia on DEXA scan 2019.  We discussed importance of vitamin D intake outdoor activity weightbearing exercise.    Again, thank you for allowing me to participate in the care of your patient.        Sincerely,        Rosetta Chen MD, MD

## 2020-08-03 NOTE — PROGRESS NOTES
Infusion Nursing Note:  Newton Buchanan presents today for Keytruda C3D1.    Patient seen by provider today: Yes: Dr. Chen   present during visit today: Not Applicable.    Note: N/A.    Intravenous Access:  Peripheral IV placed.    Treatment Conditions:  Lab Results   Component Value Date     08/03/2020                   Lab Results   Component Value Date    POTASSIUM 4.1 08/03/2020           No results found for: MAG         Lab Results   Component Value Date    CR 0.90 08/03/2020                   Lab Results   Component Value Date    JÚNIOR 9.3 08/03/2020                Lab Results   Component Value Date    BILITOTAL 0.6 08/03/2020           Lab Results   Component Value Date    ALBUMIN 3.7 08/03/2020                    Lab Results   Component Value Date    ALT 27 08/03/2020           Lab Results   Component Value Date    AST 18 08/03/2020   Results reviewed, labs MET treatment parameters, ok to proceed with treatment.    Post Infusion Assessment:  Patient tolerated infusion without incident.  Blood return noted pre and post infusion.  Site patent and intact, free from redness, edema or discomfort.  No evidence of extravasations.  Access discontinued per protocol.     Discharge Plan:   Discharge instructions reviewed with: Patient.  Patient and/or family verbalized understanding of discharge instructions and all questions answered.  AVS to patient via [employer].  Patient will return 8/24/2020 for next appointment.   Patient discharged in stable condition accompanied by: self.  Departure Mode: Ambulatory.    Mercy Carvalho RN

## 2020-08-03 NOTE — PROGRESS NOTES
ONCOLOGY FOLLOW UP VISIT      REQUESTING PROVIDER/SERVICE: Dr. Odell Young, PMD [facility]    PATIENT NAME: Newton Buchanan   MRN# [id number]     Date of Visit: Aug 3, 2020     YOB: 1945       REASON FOR VISIT/CC:    Stage IV metastatic squamous cell carcinoma   Maybe transferring care from [place] [facility] from [facility] from [facility]      HISTORY OF ONCOLOGY ILLNESS:    Patient had noted right neck swelling over 2 years. Has had FNAs in the past that were benign. He was followed by q 6 months.     He reported the neck mass was getting larger. Denies any pain/dysphagia/difficuly swallowing/sob/skin changes.   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     2/24/20 PET/CT found:  1. Hypermetabolic right cervical lymphadenopathy.  2. Numerous hypermetabolic bilateral lung metastases.  3. Hypermetabolic right hilar nodes, also likely metastatic disease    It was felt that with no obvious primary on PET/CT but given CK7 +, P16 positivity, and neck mass on presentation, suspicion is high that origin is still of H&N (right cervical LAD) with metastatic spread to lung with bilateral multiple lung nodules .  Pt made informed decision to proceed with single agent Keytruda due to his PD-L1 testing from [facility] with CPS of 70.  Started early May in [place].     He transferred his care to us in June 2020.       INTERVAL HISTORY:  He continues to tolerating Keytruda well, with decreasing neck mass.      PAST MEDICAL HISTORY  Obstructive sleep apnea   HTN  Diverticulosis of colon. Esophageal reflux disease   Admitted to hospital 03/09/2020 for lower GI bleed, ischemic colitis,  c.dff repeat testing 03/31/2020 positive, s/p vancomycin.   Ascending polyp  Malignant neoplasm of prostate /2008 PSA 4.79, Gl 3+3 s/p proctectomy.      MEDICATIONS/ALLERY:  Reviewed in Epic system.        SOCIAL HISTORY:    Single.  "Denies smoking, one drink per night.   He is retired in [place], maybe moving back to [place].  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    Denies smoking or ETOH      FAMILY HISTORY:    Denies any FH of cancers       REVIEW OF SYSTEMS:   Reports nl BM.        PHYSICAL EXAMINATION:   Blood pressure 135/66, pulse 58, temperature 98.4  F (36.9  C), temperature source Tympanic, resp. rate 20, height 1.829 m (6' 0.01\"), weight 88.3 kg (194 lb 9.6 oz), SpO2 97 %.    GENERAL APPEARANCE:  looks like stated age, very pleasant, not in acute distress.      ECO     ENT, MOUTH: Pupils are equally reactive to light.  Sclerae are anicteric.  Moist oral mucosa without lesion or ulcer.   Negative pharynx.  No oral thrush.   NECK:  Supple.  No jugular venous distention.  Thyroid is not palpable. No longer has right submandibular elias sternomastoid muscle area fullness  LYMPH NODES:  negative superficial JOSÉ MIGUEL.   CARDIOVASCULAR:  S1, S2 regular with no murmurs or gallops.  No carotid or abdominal bruits.   PULMONARY:  Lungs are clear to auscultation and percussion bilaterally.  There is no wheezing or rhonchi.   GASTROINTESTINAL:  Abdomen is soft, nontender.  No hepatosplenomegaly.  No signs of ascites.  No mass appreciable.   MUSCULOSKELETAL/EXTREMITIES:  No edema.  No cyanotic changes.  No signs of joint deformity.  No lymphedema.   NEUROLOGIC:  Cranial nerves II-XII are grossly intact.  Sensation intact.  Muscle strength and muscle tone symmetrical, 5/5 throughout.   BACK  No spinal or paraspinal tenderness.  No CVA tenderness.   SKIN:  No petechiae.  No rash.  No signs of cellulitis.   PSYCHIATRIC: Oriented to time, person, and places. Normal mood and affect. Good memory. Proper insight and judgement.       CURRENT LAB DATA REVIEWED TODAY WITH PATIENT:  DURING VISIT:   CMP, TSH are fine    20 Biopsy metastatic squamous cell carcinoma - HPV16+.      CURRENT IMAGING REVIEWED TODAY WITH PATIENT DURING " VISIT:  2/24/20 PET/CT IMPRESSION:   1. Hypermetabolic right cervical lymphadenopathy.  2. Numerous hypermetabolic bilateral lung metastases.  3. Hypermetabolic right hilar nodes, also likely metastatic disease      2/3/20 CT Neck/Chest IMPRESSION:  1. Right cervical lymphadenopathy, with areas suspicious for  necrosis, highly concerning for metastatic involvement by  malignancy. Recommend direct visualization of the upper  aerodigestive tract.  2. Numerous bilateral lung nodules, suspicious for metastases.         OLD DATA REVIEWED TODAY WITH SUMMARY  dexa 2019 - low density.       ASSESSMENT AND PLAN DISCUSSED WITH PATIENT TODAY DURING VISIT:   1.  Presumed  H&N (right cervical LAD) with metastatic spread to lung with bilateral multiple lung nodules.   He is first-line palliative treatment with Keytruda was started when he was in [place] early May 2020.    We discussed he can use therapy side effect profiles, they can be unpredictable.  And how he should be monitored.    Plan restaging PET in Aug.     He understands treatment is palliative in nature.  He is amazed at how good his quality of life is and he is already having some response with immunotherapy single agent.    Close to normal after the duration of Keytruda treatment in people who has response from it.     2.  Hx of C diff.   He is probiotics Florastor. He is to continue.  He feels it is helpful.       3. osteopenia on DEXA scan 2019.  We discussed importance of vitamin D intake outdoor activity weightbearing exercise.

## 2020-08-03 NOTE — PROGRESS NOTES
"Oncology Rooming Note    August 3, 2020 2:32 PM   Newton Buchanan is a 74 year old male who presents for:    Chief Complaint   Patient presents with     Oncology Clinic Visit     Recheck Head and neck cancer, Labs & Keytruda     Initial Vitals: /66 (BP Location: Left arm, Patient Position: Sitting, Cuff Size: Adult Regular)   Pulse 58   Temp 98.4  F (36.9  C) (Tympanic)   Resp 20   Ht 1.829 m (6' 0.01\")   Wt 88.3 kg (194 lb 9.6 oz)   SpO2 97%   BMI 26.39 kg/m   Estimated body mass index is 26.39 kg/m  as calculated from the following:    Height as of this encounter: 1.829 m (6' 0.01\").    Weight as of this encounter: 88.3 kg (194 lb 9.6 oz). Body surface area is 2.12 meters squared.  No Pain (0) Comment: Data Unavailable   No LMP for male patient.  Allergies reviewed: Yes  Medications reviewed: Yes    Medications: Medication refills not needed today.  Pharmacy name entered into Test[employer]: [id number] - [place] [id number] AT [address]    Clinical concerns:   Recheck Head and neck cancer, Labs & Keytruda.      Sandy Carrillo CMA              "

## 2020-08-13 ENCOUNTER — HOSPITAL ENCOUNTER (OUTPATIENT)
Dept: PET IMAGING | Facility: CLINIC | Age: 75
Discharge: HOME | End: 2020-08-13
Attending: INTERNAL MEDICINE | Admitting: INTERNAL MEDICINE
Payer: MEDICARE

## 2020-08-13 PROCEDURE — 25000128 H RX IP 250 OP 636: Performed by: INTERNAL MEDICINE

## 2020-08-13 PROCEDURE — A9552 F18 FDG: HCPCS | Performed by: INTERNAL MEDICINE

## 2020-08-13 PROCEDURE — 74177 CT ABD & PELVIS W/CONTRAST: CPT

## 2020-08-13 PROCEDURE — 34300033 ZZH RX 343: Performed by: INTERNAL MEDICINE

## 2020-08-13 RX ORDER — IOPAMIDOL 755 MG/ML
10-135 INJECTION, SOLUTION INTRAVASCULAR ONCE
Status: COMPLETED | OUTPATIENT
Start: 2020-08-13 | End: 2020-08-13

## 2020-08-13 RX ADMIN — IOPAMIDOL 119 ML: 755 INJECTION, SOLUTION INTRAVENOUS at 10:39

## 2020-08-13 RX ADMIN — FLUDEOXYGLUCOSE F-18 15.52 MCI.: 500 INJECTION, SOLUTION INTRAVENOUS at 10:37

## 2020-08-19 NOTE — PROGRESS NOTES
ONCOLOGY FOLLOW UP VISIT      REQUESTING PROVIDER/SERVICE: Dr. Odell Young, PMD [facility]    PATIENT NAME: Newton Buchanan   MRN# [id number]     Date of Visit: Aug 24, 2020     YOB: 1945       REASON FOR VISIT/CC:    Stage IV metastatic squamous cell carcinoma   Maybe transferring care from [place] [facility] from [facility] from [facility]      HISTORY OF ONCOLOGY ILLNESS:    Patient had noted right neck swelling over 2 years. Has had FNAs in the past that were benign. He was followed by q 6 months.     He reported the neck mass was getting larger. Denies any pain/dysphagia/difficuly swallowing/sob/skin changes.   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     2/24/20 PET/CT found:  1. Hypermetabolic right cervical lymphadenopathy.  2. Numerous hypermetabolic bilateral lung metastases.  3. Hypermetabolic right hilar nodes, also likely metastatic disease    It was felt that with no obvious primary on PET/CT but given CK7 +, P16 positivity, and neck mass on presentation, suspicion is high that origin is still of H&N (right cervical LAD) with metastatic spread to lung with bilateral multiple lung nodules .  Pt made informed decision to proceed with single agent Keytruda due to his PD-L1 testing from [facility] with CPS of 70.  Started early May in [place].     He transferred his care to us in June 2020.       INTERVAL HISTORY:  He has mixed PET response in 8/2020 in neck JOSÉ MIGUEL, and decreased lung nodules uptake.       PAST MEDICAL HISTORY  Obstructive sleep apnea   HTN  Diverticulosis of colon. Esophageal reflux disease   Admitted to hospital 03/09/2020 for lower GI bleed, ischemic colitis,  c.dff repeat testing 03/31/2020 positive, s/p vancomycin.   Ascending polyp  Malignant neoplasm of prostate /2008 PSA 4.79, Gl 3+3 s/p proctectomy.      MEDICATIONS/ALLERY:  Reviewed in Epic system.        SOCIAL  "HISTORY:    Single. Denies smoking, one drink per night.   He is retired in [place], maybe moving back to [place].  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   Denies smoking/. He drinks daily with a couple of michael with dinner.       FAMILY HISTORY:    Denies any FH of cancers       REVIEW OF SYSTEMS:   Reports nl BM.   Extra fatigue.        PHYSICAL EXAMINATION:   Blood pressure (!) 140/74, pulse 60, temperature 98.6  F (37  C), temperature source Tympanic, resp. rate 18, height 1.829 m (6' 0.01\"), weight 87.7 kg (193 lb 4.8 oz), SpO2 96 %.      GENERAL APPEARANCE:  looks like stated age, very pleasant, not in acute distress.      ECO     ENT, MOUTH: Pupils are equally reactive to light.  Sclerae are anicteric.  Moist oral mucosa without lesion or ulcer.   Negative pharynx.  No oral thrush.   NECK:  Supple.  No jugular venous distention.  Thyroid is not palpable. Right cervical level V, and level II LN fullness.   LYMPH NODES:  negative superficial JOSÉ MIGUEL else where   CARDIOVASCULAR:  S1, S2 regular with no murmurs or gallops.  No carotid or abdominal bruits.   PULMONARY:  Lungs are clear to auscultation and percussion bilaterally.  There is no wheezing or rhonchi.   GASTROINTESTINAL:  Abdomen is soft, nontender.  No hepatosplenomegaly.  No signs of ascites.  No mass appreciable.   MUSCULOSKELETAL/EXTREMITIES:  No edema.  No cyanotic changes.  No signs of joint deformity.  No lymphedema.   NEUROLOGIC:  Cranial nerves II-XII are grossly intact.  Sensation intact.  Muscle strength and muscle tone symmetrical, 5/5 throughout.   BACK  No spinal or paraspinal tenderness.  No CVA tenderness.   SKIN:  No petechiae.  No rash.  No signs of cellulitis.   PSYCHIATRIC: Oriented to time, person, and places. Normal mood and affect. Good memory. Proper insight and judgement.       CURRENT LAB DATA REVIEWED TODAY WITH PATIENT:  DURING VISIT:   CMP, TSH are fine    20 Biopsy metastatic squamous cell carcinoma - " HPV16+.      CURRENT IMAGING REVIEWED TODAY WITH PATIENT DURING VISIT:  8/2020 PET  1. Multiple hypermetabolic lymph nodes in the neck which demonstrate mixed response since PET-CT 2/24/2020  E.g.   - Image 115: New right level 2B node, 1.6 x 1.0 cm, SUV max 10.5.  - Image 110: New right level 2B node, 0.9 x 0.7 cm, SUV max 6.1.  - Image 110: Smaller right level 2A node, 2.1 x 1.7 cm, SUV max 14.4; previously 3.6 x 3.0 cm, SUV max 13.2.  - Resolution of the previously seen hypermetabolic right level 3 lymph nodes.  2. Mildly increased FDG uptake to the palatine tonsils without appreciable mass lesion on CT images, the degree of uptake has mildly increased, slightly asymmetrically greater on  the right, SUV max 7.0 on right and 5.3 on left (series 5, image 79), previously 6.3 and 5.1 respectively.  3. Decreased FDG uptake to the numerous solid pulmonary nodules bilaterally. Resolved hypermetabolism of the previously seen hilar lymph nodes. No newly hypermetabolic lymph nodes in the chest.      2/24/20 PET/CT IMPRESSION:   1. Hypermetabolic right cervical lymphadenopathy.  2. Numerous hypermetabolic bilateral lung metastases.  3. Hypermetabolic right hilar nodes, also likely metastatic disease      2/3/20 CT Neck/Chest IMPRESSION:  1. Right cervical lymphadenopathy, with areas suspicious for  necrosis, highly concerning for metastatic involvement by  malignancy. Recommend direct visualization of the upper  aerodigestive tract.  2. Numerous bilateral lung nodules, suspicious for metastases.         OLD DATA REVIEWED TODAY WITH SUMMARY  dexa 2019 - low density.       ASSESSMENT AND PLAN DISCUSSED WITH PATIENT TODAY DURING VISIT:   1.  Presumed  H&N (right cervical LAD) with metastatic spread to lung with bilateral multiple lung nodules.   He is first-line palliative treatment with Keytruda was started when he was in [place] early May 2020.    We discussed he can use therapy side effect profiles, they can be  unpredictable.  And how he should be monitored.    Due to his mixed response by PET in 8/2020, recommend him to consider seeing  ENT at  to for right level 2B JOSÉ MIGUEL biopsy and also local exam on palatine tonsils.    Meanwhile continue keytruda      He understands treatment is palliative in nature.  He is amazed at how good his quality of life is and he is already having some response with immunotherapy single agent.    Close to normal after the duration of Keytruda treatment in people who has response from it.       2.  Hx of C diff.   He is probiotics Florastor. He is to continue.  He feels it is helpful.   He is to stay on probiotic for minimal 6 months.       3. osteopenia on DEXA scan 2019.  We discussed importance of vitamin D intake outdoor activity weightbearing exercise.

## 2020-08-24 ENCOUNTER — INFUSION THERAPY VISIT (OUTPATIENT)
Dept: INFUSION THERAPY | Facility: CLINIC | Age: 75
End: 2020-08-24
Attending: NURSE PRACTITIONER
Payer: MEDICARE

## 2020-08-24 ENCOUNTER — ONCOLOGY VISIT (OUTPATIENT)
Dept: ONCOLOGY | Facility: CLINIC | Age: 75
End: 2020-08-24
Attending: INTERNAL MEDICINE
Payer: MEDICARE

## 2020-08-24 ENCOUNTER — HOSPITAL ENCOUNTER (OUTPATIENT)
Dept: LAB | Facility: CLINIC | Age: 75
Discharge: HOME | End: 2020-08-24
Attending: INTERNAL MEDICINE | Admitting: NURSE PRACTITIONER
Payer: MEDICARE

## 2020-08-24 VITALS — DIASTOLIC BLOOD PRESSURE: 64 MMHG | HEART RATE: 54 BPM | SYSTOLIC BLOOD PRESSURE: 128 MMHG

## 2020-08-24 VITALS
DIASTOLIC BLOOD PRESSURE: 74 MMHG | HEART RATE: 60 BPM | SYSTOLIC BLOOD PRESSURE: 140 MMHG | HEIGHT: 72 IN | RESPIRATION RATE: 18 BRPM | WEIGHT: 193.3 LBS | BODY MASS INDEX: 26.18 KG/M2 | TEMPERATURE: 98.6 F | OXYGEN SATURATION: 96 %

## 2020-08-24 DIAGNOSIS — C76.0 HEAD AND NECK CANCER (H): Primary | ICD-10-CM

## 2020-08-24 DIAGNOSIS — Z78.9 ALCOHOL USE: ICD-10-CM

## 2020-08-24 DIAGNOSIS — Z86.19 HISTORY OF CLOSTRIDIOIDES DIFFICILE COLITIS: ICD-10-CM

## 2020-08-24 DIAGNOSIS — M85.80 OSTEOPENIA, UNSPECIFIED LOCATION: ICD-10-CM

## 2020-08-24 LAB
ALBUMIN SERPL-MCNC: 3.8 G/DL (ref 3.4–5)
ALP SERPL-CCNC: 46 U/L (ref 40–150)
ALT SERPL W P-5'-P-CCNC: 29 U/L (ref 0–70)
ANION GAP SERPL CALCULATED.3IONS-SCNC: 3 MMOL/L (ref 3–14)
AST SERPL W P-5'-P-CCNC: 21 U/L (ref 0–45)
BILIRUB SERPL-MCNC: 0.8 MG/DL (ref 0.2–1.3)
BUN SERPL-MCNC: 13 MG/DL (ref 7–30)
CALCIUM SERPL-MCNC: 8.9 MG/DL (ref 8.5–10.1)
CHLORIDE SERPL-SCNC: 106 MMOL/L (ref 94–109)
CO2 SERPL-SCNC: 31 MMOL/L (ref 20–32)
CREAT SERPL-MCNC: 0.81 MG/DL (ref 0.66–1.25)
GFR SERPL CREATININE-BSD FRML MDRD: 88 ML/MIN/{1.73_M2}
GLUCOSE SERPL-MCNC: 93 MG/DL (ref 70–99)
POTASSIUM SERPL-SCNC: 4 MMOL/L (ref 3.4–5.3)
PROT SERPL-MCNC: 7.3 G/DL (ref 6.8–8.8)
SODIUM SERPL-SCNC: 140 MMOL/L (ref 133–144)
TSH SERPL DL<=0.005 MIU/L-ACNC: 1.48 MU/L (ref 0.4–4)

## 2020-08-24 PROCEDURE — 36415 COLL VENOUS BLD VENIPUNCTURE: CPT | Performed by: NURSE PRACTITIONER

## 2020-08-24 PROCEDURE — 25000128 H RX IP 250 OP 636: Performed by: INTERNAL MEDICINE

## 2020-08-24 PROCEDURE — 99214 OFFICE O/P EST MOD 30 MIN: CPT | Performed by: INTERNAL MEDICINE

## 2020-08-24 PROCEDURE — 25800030 ZZH RX IP 258 OP 636: Performed by: INTERNAL MEDICINE

## 2020-08-24 PROCEDURE — 84443 ASSAY THYROID STIM HORMONE: CPT | Performed by: NURSE PRACTITIONER

## 2020-08-24 PROCEDURE — G0463 HOSPITAL OUTPT CLINIC VISIT: HCPCS | Mod: 25

## 2020-08-24 PROCEDURE — 80053 COMPREHEN METABOLIC PANEL: CPT | Performed by: NURSE PRACTITIONER

## 2020-08-24 PROCEDURE — 96413 CHEMO IV INFUSION 1 HR: CPT

## 2020-08-24 RX ORDER — MEPERIDINE HYDROCHLORIDE 25 MG/ML
25 INJECTION INTRAMUSCULAR; INTRAVENOUS; SUBCUTANEOUS EVERY 30 MIN PRN
Status: CANCELLED | OUTPATIENT
Start: 2020-08-24

## 2020-08-24 RX ORDER — METHYLPREDNISOLONE SODIUM SUCCINATE 125 MG/2ML
125 INJECTION, POWDER, LYOPHILIZED, FOR SOLUTION INTRAMUSCULAR; INTRAVENOUS
Status: CANCELLED
Start: 2020-08-24

## 2020-08-24 RX ORDER — HEPARIN SODIUM (PORCINE) LOCK FLUSH IV SOLN 100 UNIT/ML 100 UNIT/ML
5 SOLUTION INTRAVENOUS
Status: CANCELLED | OUTPATIENT
Start: 2020-08-24

## 2020-08-24 RX ORDER — EPINEPHRINE 1 MG/ML
0.3 INJECTION, SOLUTION, CONCENTRATE INTRAVENOUS EVERY 5 MIN PRN
Status: CANCELLED | OUTPATIENT
Start: 2020-08-24

## 2020-08-24 RX ORDER — LORAZEPAM 2 MG/ML
0.5 INJECTION INTRAMUSCULAR EVERY 4 HOURS PRN
Status: CANCELLED
Start: 2020-08-24

## 2020-08-24 RX ORDER — DIPHENHYDRAMINE HYDROCHLORIDE 50 MG/ML
50 INJECTION INTRAMUSCULAR; INTRAVENOUS
Status: CANCELLED
Start: 2020-08-24

## 2020-08-24 RX ORDER — ALBUTEROL SULFATE 90 UG/1
1-2 AEROSOL, METERED RESPIRATORY (INHALATION)
Status: CANCELLED
Start: 2020-08-24

## 2020-08-24 RX ORDER — HEPARIN SODIUM,PORCINE 10 UNIT/ML
5 VIAL (ML) INTRAVENOUS
Status: CANCELLED | OUTPATIENT
Start: 2020-08-24

## 2020-08-24 RX ORDER — SODIUM CHLORIDE 9 MG/ML
1000 INJECTION, SOLUTION INTRAVENOUS CONTINUOUS PRN
Status: CANCELLED
Start: 2020-08-24

## 2020-08-24 RX ORDER — ALBUTEROL SULFATE 0.83 MG/ML
2.5 SOLUTION RESPIRATORY (INHALATION)
Status: CANCELLED | OUTPATIENT
Start: 2020-08-24

## 2020-08-24 RX ORDER — NALOXONE HYDROCHLORIDE 0.4 MG/ML
.1-.4 INJECTION, SOLUTION INTRAMUSCULAR; INTRAVENOUS; SUBCUTANEOUS
Status: CANCELLED | OUTPATIENT
Start: 2020-08-24

## 2020-08-24 RX ADMIN — SODIUM CHLORIDE 200 MG: 9 INJECTION, SOLUTION INTRAVENOUS at 14:33

## 2020-08-24 RX ADMIN — SODIUM CHLORIDE 250 ML: 9 INJECTION, SOLUTION INTRAVENOUS at 15:07

## 2020-08-24 ASSESSMENT — PAIN SCALES - GENERAL: PAINLEVEL: NO PAIN (0)

## 2020-08-24 ASSESSMENT — MIFFLIN-ST. JEOR: SCORE: 1659.93

## 2020-08-24 NOTE — LETTER
8/24/2020         RE: Newton Buchanan  [address] [address]        Dear Colleague,    Thank you for referring your patient, Newton Buchanan, to the [facility]. Please see a copy of my visit note below.      ONCOLOGY FOLLOW UP VISIT      REQUESTING PROVIDER/SERVICE: [facility]ell [facility]    PATIENT NAME: Newton Buchanan   MRN# [id number]     Date of Visit: Aug 24, 2020     YOB: 1945       REASON FOR VISIT/CC:    Stage IV metastatic squamous cell carcinoma   Maybe transferring care from [place] [facility] from [facility] from [facility]      HISTORY OF ONCOLOGY ILLNESS:    Patient had noted right neck swelling over 2 years. Has had FNAs in the past that were benign. He was followed by q 6 months.     He reported the neck mass was getting larger. Denies any pain/dysphagia/difficuly swallowing/sob/skin changes.   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     2/24/20 PET/CT found:  1. Hypermetabolic right cervical lymphadenopathy.  2. Numerous hypermetabolic bilateral lung metastases.  3. Hypermetabolic right hilar nodes, also likely metastatic disease    It was felt that with no obvious primary on PET/CT but given CK7 +, P16 positivity, and neck mass on presentation, suspicion is high that origin is still of H&N (right cervical LAD) with metastatic spread to lung with bilateral multiple lung nodules .  Pt made informed decision to proceed with single agent Keytruda due to his PD-L1 testing from [facility] with CPS of 70.  Started early May in [place].     He transferred his care to us in June 2020.       INTERVAL HISTORY:  He has mixed PET response in 8/2020 in neck JOSÉ MIGUEL, and decreased lung nodules uptake.       PAST MEDICAL HISTORY  Obstructive sleep apnea   HTN  Diverticulosis of colon. Esophageal reflux disease   Admitted to hospital 03/09/2020 for lower GI bleed,  "ischemic colitis,  c.dff repeat testing 2020 positive, s/p vancomycin.   Ascending polyp  Malignant neoplasm of prostate / PSA 4.79, Gl 3+3 s/p proctectomy.      MEDICATIONS/ALLERY:  Reviewed in Epic system.        SOCIAL HISTORY:    Single. Denies smoking, one drink per night.   He is retired in [place], maybe moving back to [place].  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   Denies smoking/. He drinks daily with a couple of michael with dinner.       FAMILY HISTORY:    Denies any FH of cancers       REVIEW OF SYSTEMS:   Reports nl BM.   Extra fatigue.        PHYSICAL EXAMINATION:   Blood pressure (!) 140/74, pulse 60, temperature 98.6  F (37  C), temperature source Tympanic, resp. rate 18, height 1.829 m (6' 0.01\"), weight 87.7 kg (193 lb 4.8 oz), SpO2 96 %.      GENERAL APPEARANCE:  looks like stated age, very pleasant, not in acute distress.      ECO     ENT, MOUTH: Pupils are equally reactive to light.  Sclerae are anicteric.  Moist oral mucosa without lesion or ulcer.   Negative pharynx.  No oral thrush.   NECK:  Supple.  No jugular venous distention.  Thyroid is not palpable. Right cervical level V, and level II LN fullness.   LYMPH NODES:  negative superficial JOSÉ MIGUEL else where   CARDIOVASCULAR:  S1, S2 regular with no murmurs or gallops.  No carotid or abdominal bruits.   PULMONARY:  Lungs are clear to auscultation and percussion bilaterally.  There is no wheezing or rhonchi.   GASTROINTESTINAL:  Abdomen is soft, nontender.  No hepatosplenomegaly.  No signs of ascites.  No mass appreciable.   MUSCULOSKELETAL/EXTREMITIES:  No edema.  No cyanotic changes.  No signs of joint deformity.  No lymphedema.   NEUROLOGIC:  Cranial nerves II-XII are grossly intact.  Sensation intact.  Muscle strength and muscle tone symmetrical, 5/5 throughout.   BACK  No spinal or paraspinal tenderness.  No CVA tenderness.   SKIN:  No petechiae.  No rash.  No signs of cellulitis.   PSYCHIATRIC: Oriented to time, " person, and places. Normal mood and affect. Good memory. Proper insight and judgement.       CURRENT LAB DATA REVIEWED TODAY WITH PATIENT:  DURING VISIT:   CMP, TSH are fine    2/11/20 Biopsy metastatic squamous cell carcinoma - HPV16+.      CURRENT IMAGING REVIEWED TODAY WITH PATIENT DURING VISIT:  8/2020 PET  1. Multiple hypermetabolic lymph nodes in the neck which demonstrate mixed response since PET-CT 2/24/2020  E.g.   - Image 115: New right level 2B node, 1.6 x 1.0 cm, SUV max 10.5.  - Image 110: New right level 2B node, 0.9 x 0.7 cm, SUV max 6.1.  - Image 110: Smaller right level 2A node, 2.1 x 1.7 cm, SUV max 14.4; previously 3.6 x 3.0 cm, SUV max 13.2.  - Resolution of the previously seen hypermetabolic right level 3 lymph nodes.  2. Mildly increased FDG uptake to the palatine tonsils without appreciable mass lesion on CT images, the degree of uptake has mildly increased, slightly asymmetrically greater on  the right, SUV max 7.0 on right and 5.3 on left (series 5, image 79), previously 6.3 and 5.1 respectively.  3. Decreased FDG uptake to the numerous solid pulmonary nodules bilaterally. Resolved hypermetabolism of the previously seen hilar lymph nodes. No newly hypermetabolic lymph nodes in the chest.      2/24/20 PET/CT IMPRESSION:   1. Hypermetabolic right cervical lymphadenopathy.  2. Numerous hypermetabolic bilateral lung metastases.  3. Hypermetabolic right hilar nodes, also likely metastatic disease      2/3/20 CT Neck/Chest IMPRESSION:  1. Right cervical lymphadenopathy, with areas suspicious for  necrosis, highly concerning for metastatic involvement by  malignancy. Recommend direct visualization of the upper  aerodigestive tract.  2. Numerous bilateral lung nodules, suspicious for metastases.         OLD DATA REVIEWED TODAY WITH SUMMARY  dexa 2019 - low density.       ASSESSMENT AND PLAN DISCUSSED WITH PATIENT TODAY DURING VISIT:   1.  Presumed  H&N (right cervical LAD) with metastatic spread to  "lung with bilateral multiple lung nodules.   He is first-line palliative treatment with Keytruda was started when he was in [place] early May 2020.    We discussed he can use therapy side effect profiles, they can be unpredictable.  And how he should be monitored.    Due to his mixed response by PET in 8/2020, recommend him to consider seeing  ENT at [facility] for right level 2B JOSÉ MIGUEL biopsy and also local exam on palatine tonsils.    Meanwhile continue keytruda      He understands treatment is palliative in nature.  He is amazed at how good his quality of life is and he is already having some response with immunotherapy single agent.    Close to normal after the duration of Keytruda treatment in people who has response from it.       2.  Hx of C diff.   He is probiotics Florastor. He is to continue.  He feels it is helpful.   He is to stay on probiotic for minimal 6 months.       3. osteopenia on DEXA scan 2019.  We discussed importance of vitamin D intake outdoor activity weightbearing exercise.    Oncology Rooming Note    August 24, 2020 1:32 PM   Newton Buchanan is a 74 year old male who presents for:    Chief Complaint   Patient presents with     Oncology Clinic Visit     Recheck Head and neck cancer, labs & keytruda     Initial Vitals: BP (!) 140/74 (BP Location: Left arm, Patient Position: Sitting, Cuff Size: Adult Regular)   Pulse 60   Temp 98.6  F (37  C) (Tympanic)   Resp 18   Ht 1.829 m (6' 0.01\")   Wt 87.7 kg (193 lb 4.8 oz)   SpO2 96%   BMI 26.21 kg/m   Estimated body mass index is 26.21 kg/m  as calculated from the following:    Height as of this encounter: 1.829 m (6' 0.01\").    Weight as of this encounter: 87.7 kg (193 lb 4.8 oz). Body surface area is 2.11 meters squared.  No Pain (0) Comment: Data Unavailable   No LMP for male patient.  Allergies reviewed: Yes  Medications reviewed: Yes    Medications: Medication refills not needed today.  Pharmacy name entered into [employer]: [facility] " #[id number] - [address] AT [facility] [address] [place]    Clinical concerns: Recheck Head and neck cancer, labs & Keytruda.       Sandy Carrillo, [facility]                Again, thank you for allowing me to participate in the care of your patient.        Sincerely,        Rosetta Chen MD, MD

## 2020-08-24 NOTE — PATIENT INSTRUCTIONS
Refer to ENT at [facility] for PET result: biopsy right level 2B, and check on palantine tounsil.   Continue Keytruda.  Follow-up with next keytruda

## 2020-08-24 NOTE — PROGRESS NOTES
Infusion Nursing Note:  Newton Buchanan presents today for Keytruda.    Patient seen by provider today: No   present during visit today: Not Applicable.    Note: Labs drawn peripherally.    Intravenous Access:  Peripheral IV placed.    Treatment Conditions:  Lab Results   Component Value Date     08/24/2020                   Lab Results   Component Value Date    POTASSIUM 4.0 08/24/2020           No results found for: MAG         Lab Results   Component Value Date    CR 0.81 08/24/2020                   Lab Results   Component Value Date    JÚNIOR 8.9 08/24/2020                Lab Results   Component Value Date    BILITOTAL 0.8 08/24/2020           Lab Results   Component Value Date    ALBUMIN 3.8 08/24/2020                    Lab Results   Component Value Date    ALT 29 08/24/2020           Lab Results   Component Value Date    AST 21 08/24/2020       Results reviewed, labs MET treatment parameters, ok to proceed with treatment.      Post Infusion Assessment:  Patient tolerated infusion without incident.  Blood return noted pre and post infusion.  Site patent and intact, free from redness, edema or discomfort.  No evidence of extravasations.  Access discontinued per protocol.       Discharge Plan:   Patient discharged in stable condition accompanied by: self.  Departure Mode: Ambulatory.    Shannan Melchor RN

## 2020-08-24 NOTE — PROGRESS NOTES
"Oncology Rooming Note    August 24, 2020 1:32 PM   Newton Buchanan is a 74 year old male who presents for:    Chief Complaint   Patient presents with     Oncology Clinic Visit     Recheck Head and neck cancer, labs & keytruda     Initial Vitals: BP (!) 140/74 (BP Location: Left arm, Patient Position: Sitting, Cuff Size: Adult Regular)   Pulse 60   Temp 98.6  F (37  C) (Tympanic)   Resp 18   Ht 1.829 m (6' 0.01\")   Wt 87.7 kg (193 lb 4.8 oz)   SpO2 96%   BMI 26.21 kg/m   Estimated body mass index is 26.21 kg/m  as calculated from the following:    Height as of this encounter: 1.829 m (6' 0.01\").    Weight as of this encounter: 87.7 kg (193 lb 4.8 oz). Body surface area is 2.11 meters squared.  No Pain (0) Comment: Data Unavailable   No LMP for male patient.  Allergies reviewed: Yes  Medications reviewed: Yes    Medications: Medication refills not needed today.  Pharmacy name entered into [employer]: [facility] #[id number] - [facility] [address] AT [address]    Clinical concerns: Recheck Head and neck cancer, labs & Keytruda.       Sandy Carrillo CMA              "

## 2020-09-02 ENCOUNTER — TELEPHONE (OUTPATIENT)
Dept: OTOLARYNGOLOGY | Facility: CLINIC | Age: 75
End: 2020-09-02

## 2020-09-03 NOTE — TELEPHONE ENCOUNTER
FUTURE VISIT INFORMATION      FUTURE VISIT INFORMATION:    Date: 9/9/2020    Time: 10:20AM    Location: [facility]  REFERRAL INFORMATION:    Referring provider:  Rosetta Chen MD     Referring providers clinic:  [facility]     Reason for visit/diagnosis  Head and neck cancer (H) [C76.0]    RECORDS REQUESTED FROM:       Clinic name Comments Records Status Imaging Status   [facility]  8/24/2020 referral and note from Rosetta Chen MD  [facility]    Imaging 8/13/2020 PET   9/23/16 MRA Neck  9/23/16 MR Brain  [facility] PACs   [facility], [address] [place] [address]  [phone])  6/8/2020 neck biopsy (Specimen #: [id number]) - consult on an outside case from [facility] in [place], [place] (2/11/2020 neck Bx case #) [facility] imaging 2/24/2020 PET CT   2/3/2020 CT Neck  Scanned in [facility] PACS   [facility] Surgery Department    [address] [place] [address]    [phone]   02/11/2020 INCISION and Biopsy of right neck mass with Aristides Blake MD   Care Everywhere

## 2020-09-08 NOTE — PROGRESS NOTES
"Dear Dr. Chen:    I had the pleasure of meeting Newton Buchanan in consultation today at the [facility] at your request.     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  He has a history of right neck swelling over about 2 years. He had FNAs performed by ENT locally in [place] about every 6 months. Then in January 2020 he said that it became larger and more firm so a \"surgical biopsy\" was performed by his local ENT. The biopsy demonstrated p16+ squamous cell carcinoma.  He underwent a PET scan which demonstrated right lymphadenopathy, bilateral lung metastases, hypermetabolic right hilar nodes but no obvious primary. He never underwent panendoscopy or tonsillectomy per report.  He was started on single agent Keytruda due to 70% PD-L1.  This was done in [place] for a total of 2 cycles.  He transferred his care to [facility] (Dr. Chen) in May 2020, received a total of 4 cycles per his report.  He had repeat imaging with a PET scan in August 2020 which demonstrated decrease in the lung nodules but some new lymphadenopathy in the neck.  There was increase in the uptake in the tonsils with slight asymmetry on the right. He says that he noticed new right neck lymphadenopathy about 1 month ago. His next Keytuda infusion is on Monday. He has never received any radiation.    He says that he has never experienced any sore throat, dysphagia or odynophagia. He reports a constrained feeling in his neck that is like a tightening sensation. This does radiate up to his ear on the right side. He denies any current swallowing issues. He denies any hemoptysis. He says his weight is stable. He has no fevers, chills, nightsweats.     He typically spends May to end of October in [place] (siblings here) and spends the rest of the year in [place]. He is trying to sell his house in [place].         Past " medical history: CRYSTAL with CPAP, hypertension, diverticulosis, reflux, history of GI bleed related to hemorrhoids and ischemic colitis, history of C. difficile in March 2020, history of prostate cancer    Past surgical history: Prostatectomy in 2008     Social history: .  No smoking.  No chewing tobacco. Couple glasses of wine daily. He lives in [place]. Retired. Former .    Family history: Negative for head and neck cancer. Sister with breast cancer    MEDICATIONS:     Current Outpatient Medications   Medication Sig Dispense Refill     aspirin 81 MG tablet Take by mouth daily 30 tablet      fluticasone (FLONASE) 50 MCG/ACT nasal spray Spray 1-2 sprays in nostril       losartan (COZAAR) 25 MG tablet Take 1 tablet (25 mg) by mouth daily 90 tablet 3     pembrolizumab (KEYTRUDA) 25 MG/ML        pravastatin (PRAVACHOL) 20 MG tablet Take 20 mg by mouth daily        prochlorperazine (COMPAZINE) 10 MG tablet Take 10 mg by mouth every 6 hours as needed        saccharomyces boulardii (FLORASTOR) 250 MG capsule Take 1 capsule by mouth 2 times daily       sildenafil (VIAGRA) 100 MG tablet Take by mouth daily as needed for erectile dysfunction 30 tablet      VITAMIN D, CHOLECALCIFEROL, PO Take 1,000 Units by mouth daily         ALLERGIES:    Allergies   Allergen Reactions     Atorvastatin      Other reaction(s): Confusion  unusual behavior and dizziness         HABITS/SOCIAL HISTORY:   .    No smoking.  No chewing tobacco. Couple glasses of wine daily.   He lives in [place] from November to May, [place] from May to October.   Retired. Former .    Social History     Socioeconomic History     Marital status: Single     Spouse name: Not on file     Number of children: Not on file     Years of education: Not on file     Highest education level: Not on file   Occupational History     Not on file   Social Needs     Financial resource strain: Not on file     Food insecurity     Worry: Not on file      Inability: Not on file     Transportation needs     Medical: Not on file     Non-medical: Not on file   Tobacco Use     Smoking status: Never Smoker     Smokeless tobacco: Never Used   Substance and Sexual Activity     Alcohol use: Yes     Alcohol/week: 0.0 standard drinks     Comment: weekly     Drug use: No     Sexual activity: Not on file   Lifestyle     Physical activity     Days per week: Not on file     Minutes per session: Not on file     Stress: Not on file   Relationships     Social connections     Talks on phone: Not on file     Gets together: Not on file     Attends Yarsanism service: Not on file     Active member of club or organization: Not on file     Attends meetings of clubs or organizations: Not on file     Relationship status: Not on file     Intimate partner violence     Fear of current or ex partner: Not on file     Emotionally abused: Not on file     Physically abused: Not on file     Forced sexual activity: Not on file   Other Topics Concern     Parent/sibling w/ CABG, MI or angioplasty before 65F 55M? Not Asked   Social History Narrative     Not on file       PAST MEDICAL HISTORY: No past medical history on file.     PAST SURGICAL HISTORY:   Past Surgical History:   Procedure Laterality Date     DAVINCI PROSTATECTOMY         FAMILY HISTORY:  No family history on file.    REVIEW OF SYSTEMS:  12 point ROS was negative other than the symptoms noted above in the HPI.  Patient Supplied Answers to Review of Systems  No flowsheet data found.      PHYSICAL EXAMINATION:   BP (!) 153/84   Pulse 69   Temp 97.7  F (36.5  C)   Resp 18   Ht 1.829 m (6')   Wt 87.5 kg (193 lb)   SpO2 100%   BMI 26.18 kg/m     Appearance:   normal; NAD, age-appropriate appearance, well-developed, normal habitus   Communication:   normal; communicates verbally, normal voice quality   Head/Face:   inspection -  Normal; no scars or visible lesions   Salivary glands -  Normal size, no tenderness, swelling, or palpable  masses   Facial strength -  Normal and symmetric bilateral; H/B I/VI   Skin:  normal, no rash   Ears:  auricle (AD) -  normal  EAC (AD) -  normal  TM (AD) -  Normal, no effusion  auricle (AS) -  normal  EAC (AS) -  normal  TM (AS) -  Normal, no effusion  Normal clinical speech reception   Nose:  Ext. inspection -  Normal  Internal Inspection -  Normal mucosa, septum, and turbinates; deviated septum   Nasopharynx:  normal mucosa, no masses   Oral Cavity:  lips -  Normal mucosa, oral competence, and stoma size   Age-appropriate dentition, healthy gingival mucosa   Hard palate, buccal, floor of mouth mucosa normal   Tongue - normal movement, no lesions, no palpable masses   Oropharynx:  mucosa -  Normal, no lesions  soft palate -  Normal, no lesions, no asymmetry, normal elevation  tonsils -  Normal, no exudates, no abnormal lesions, symmetric, no palpable masses  Right lateral base of tongue with likely primary tumor present about 1 cm in size   Hypopharynx:  Normal pyriform sinus and pharyngeal wall mucosa   No pooled secretions    Larynx:  Epiglottis, AE folds, false vocal cords, true vocal cords, arytenoids normal in appearance, bilaterally mobile cords    Neck: Palpable lymphadenopathy about 2 cm in size in right level V and in right level IB/II  Normal range of motion   Lymphatic:  lymphadenopathy as above   Cardiovascular:  warm, pink, well-perfused extremities without swelling, tenderness, or edema   Respiratory:  Normal respiratory effort, no stridor   Neuro/Psych.:  mood/affect -  normal  mental status -  normal  cranial nerves -  normal          PROCEDURES:   Flexible fiberoptic laryngoscopy: Scope exam was indicated due to oropharyngeal carcinoma. Verbal consent was obtained. The nasal cavity was prepped with an aerosolized solution of topical anesthetic and vasoconstrictive agent. The scope was passed through the anterior nasal cavity and advanced. Inspection of the nasopharynx revealed no gross  abnormality. The base of tongue and vallecula are normal with the exception of a likely primary tumor in the right lateral base of tongue about 1 cm in size. The epiglottis, AE folds, false cords, true cords, arytenoids are normal.  Inspection of the larynx revealed bilaterally mobile vocal cords. Pyriform sinuses are symmetric. The airway is patent. Procedure tolerated well with no immediate complications noted.      RESULTS REVIEWED:   I reviewed the referral notes from Dr. 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 He is currently received Keytruda which was started while he was in [place] and then transitioned his care to [place] while here for the summer. He had repeat imaging in August which showed disease progression in the neck with new lymphadenopathy. I am suspicious that a lesion seen in the right BOT is the primary tumor. I explained to him that based on his imaging I am concerned that this is disease progression. He still has disease in his lungs so I do not think he is a candidate yet for chemoradiation. I think he will need systemic therapy with either chemotherapy or the addition of chemotherapy to his immunotherapy. I discussed with him that I do not think there will be considerable benefit to biopsying the node unless medical oncology is going to do further Next Gen sequencing and he has not yet had traditional chemotherapy. Biopsy of the tongue base may demonstrate the primary but is unlikely to significantly change treatment at this point.  We will review his case at tumor conference on Friday and  contact him with recommendations. We will arrange appropriate follow-up pending this discussion.    Thank you very much for the opportunity to participate in the care of your patient.      Taisha Aparicio MD, M.D.  Otolaryngology- Head & Neck Surgery      This note was dictated with voice recognition software and then edited. Please excuse any unintentional errors.         CC:  Shelley Chen MD  Department of Oncology  [facility]

## 2020-09-09 ENCOUNTER — OFFICE VISIT (OUTPATIENT)
Dept: OTOLARYNGOLOGY | Facility: CLINIC | Age: 75
End: 2020-09-09
Attending: INTERNAL MEDICINE
Payer: MEDICARE

## 2020-09-09 ENCOUNTER — PRE VISIT (OUTPATIENT)
Dept: OTOLARYNGOLOGY | Facility: CLINIC | Age: 75
End: 2020-09-09

## 2020-09-09 VITALS
SYSTOLIC BLOOD PRESSURE: 153 MMHG | TEMPERATURE: 97.7 F | WEIGHT: 193 LBS | DIASTOLIC BLOOD PRESSURE: 84 MMHG | RESPIRATION RATE: 18 BRPM | HEIGHT: 72 IN | OXYGEN SATURATION: 100 % | HEART RATE: 69 BPM | BODY MASS INDEX: 26.14 KG/M2

## 2020-09-09 DIAGNOSIS — C76.0 HEAD AND NECK CANCER (H): Primary | ICD-10-CM

## 2020-09-09 ASSESSMENT — MIFFLIN-ST. JEOR: SCORE: 1658.44

## 2020-09-09 ASSESSMENT — PAIN SCALES - GENERAL: PAINLEVEL: NO PAIN (0)

## 2020-09-09 NOTE — LETTER
"9/9/2020       RE: Newton Buchanan  [address] [address]     Dear Colleague,    Thank you for referring your patient, Newton Buchanan, to the [facility] EAR NOSE [facility] THROAT at [facility]. Please see a copy of my visit note below.    Dear Dr. Chen:    I had the pleasure of meeting Newton Buchanan in consultation today at the [facility] at your request.     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  He has a history of right neck swelling over about 2 years. He had FNAs performed by ENT locally in [place] about every 6 months. Then in January 2020 he said that it became larger and more firm so a \"surgical biopsy\" was performed by his local ENT. The biopsy demonstrated p16+ squamous cell carcinoma.  He underwent a PET scan which demonstrated right lymphadenopathy, bilateral lung metastases, hypermetabolic right hilar nodes but no obvious primary. He never underwent panendoscopy or tonsillectomy per report.  He was started on single agent Keytruda due to 70% PD-L1.  This was done in [place] for a total of 2 cycles.  He transferred his care to [facility] (Dr. Chen) in May 2020, received a total of 4 cycles per his report.  He had repeat imaging with a PET scan in August 2020 which demonstrated decrease in the lung nodules but some new lymphadenopathy in the neck.  There was increase in the uptake in the tonsils with slight asymmetry on the right. He says that he noticed new right neck lymphadenopathy about 1 month ago. His next Keytuda infusion is on Monday. He has never received any radiation.    He says that he has never experienced any sore throat, dysphagia or odynophagia. He reports a constrained feeling in his neck that is like a tightening sensation. This does radiate up to his ear on the right side. He denies any current swallowing issues. " He denies any hemoptysis. He says his weight is stable. He has no fevers, chills, nightsweats.     He typically spends May to end of October in [place] (siblings here) and spends the rest of the year in [place]. He is trying to sell his house in [place].         Past medical history: CRYSTAL with CPAP, hypertension, diverticulosis, reflux, history of GI bleed related to hemorrhoids and ischemic colitis, history of C. difficile in March 2020, history of prostate cancer    Past surgical history: Prostatectomy in 2008     Social history: .  No smoking.  No chewing tobacco. Couple glasses of wine daily. He lives in [place]. Retired. Former .    Family history: Negative for head and neck cancer. Sister with breast cancer    MEDICATIONS:     Current Outpatient Medications   Medication Sig Dispense Refill     aspirin 81 MG tablet Take by mouth daily 30 tablet      fluticasone (FLONASE) 50 MCG/ACT nasal spray Spray 1-2 sprays in nostril       losartan (COZAAR) 25 MG tablet Take 1 tablet (25 mg) by mouth daily 90 tablet 3     pembrolizumab (KEYTRUDA) 25 MG/ML        pravastatin (PRAVACHOL) 20 MG tablet Take 20 mg by mouth daily        prochlorperazine (COMPAZINE) 10 MG tablet Take 10 mg by mouth every 6 hours as needed        saccharomyces boulardii (FLORASTOR) 250 MG capsule Take 1 capsule by mouth 2 times daily       sildenafil (VIAGRA) 100 MG tablet Take by mouth daily as needed for erectile dysfunction 30 tablet      VITAMIN D, CHOLECALCIFEROL, PO Take 1,000 Units by mouth daily         ALLERGIES:    Allergies   Allergen Reactions     Atorvastatin      Other reaction(s): Confusion  unusual behavior and dizziness         HABITS/SOCIAL HISTORY:   .    No smoking.  No chewing tobacco. Couple glasses of wine daily.   He lives in [place] from November to May, [place] from May to October.   Retired. Former .    Social History     Socioeconomic History     Marital status: Single      Spouse name: Not on file     Number of children: Not on file     Years of education: Not on file     Highest education level: Not on file   Occupational History     Not on file   Social Needs     Financial resource strain: Not on file     Food insecurity     Worry: Not on file     Inability: Not on file     Transportation needs     Medical: Not on file     Non-medical: Not on file   Tobacco Use     Smoking status: Never Smoker     Smokeless tobacco: Never Used   Substance and Sexual Activity     Alcohol use: Yes     Alcohol/week: 0.0 standard drinks     Comment: weekly     Drug use: No     Sexual activity: Not on file   Lifestyle     Physical activity     Days per week: Not on file     Minutes per session: Not on file     Stress: Not on file   Relationships     Social connections     Talks on phone: Not on file     Gets together: Not on file     Attends Jainism service: Not on file     Active member of club or organization: Not on file     Attends meetings of clubs or organizations: Not on file     Relationship status: Not on file     Intimate partner violence     Fear of current or ex partner: Not on file     Emotionally abused: Not on file     Physically abused: Not on file     Forced sexual activity: Not on file   Other Topics Concern     Parent/sibling w/ CABG, MI or angioplasty before 65F 55M? Not Asked   Social History Narrative     Not on file       PAST MEDICAL HISTORY: No past medical history on file.     PAST SURGICAL HISTORY:   Past Surgical History:   Procedure Laterality Date     DAVINCI PROSTATECTOMY         FAMILY HISTORY:  No family history on file.    REVIEW OF SYSTEMS:  12 point ROS was negative other than the symptoms noted above in the HPI.  Patient Supplied Answers to Review of Systems  No flowsheet data found.      PHYSICAL EXAMINATION:   BP (!) 153/84   Pulse 69   Temp 97.7  F (36.5  C)   Resp 18   Ht 1.829 m (6')   Wt 87.5 kg (193 lb)   SpO2 100%   BMI 26.18 kg/m     Appearance:    normal; NAD, age-appropriate appearance, well-developed, normal habitus   Communication:   normal; communicates verbally, normal voice quality   Head/Face:   inspection -  Normal; no scars or visible lesions   Salivary glands -  Normal size, no tenderness, swelling, or palpable masses   Facial strength -  Normal and symmetric bilateral; H/B I/VI   Skin:  normal, no rash   Ears:  auricle (AD) -  normal  EAC (AD) -  normal  TM (AD) -  Normal, no effusion  auricle (AS) -  normal  EAC (AS) -  normal  TM (AS) -  Normal, no effusion  Normal clinical speech reception   Nose:  Ext. inspection -  Normal  Internal Inspection -  Normal mucosa, septum, and turbinates; deviated septum   Nasopharynx:  normal mucosa, no masses   Oral Cavity:  lips -  Normal mucosa, oral competence, and stoma size   Age-appropriate dentition, healthy gingival mucosa   Hard palate, buccal, floor of mouth mucosa normal   Tongue - normal movement, no lesions, no palpable masses   Oropharynx:  mucosa -  Normal, no lesions  soft palate -  Normal, no lesions, no asymmetry, normal elevation  tonsils -  Normal, no exudates, no abnormal lesions, symmetric, no palpable masses  Right lateral base of tongue with likely primary tumor present about 1 cm in size   Hypopharynx:  Normal pyriform sinus and pharyngeal wall mucosa   No pooled secretions    Larynx:  Epiglottis, AE folds, false vocal cords, true vocal cords, arytenoids normal in appearance, bilaterally mobile cords    Neck: Palpable lymphadenopathy about 2 cm in size in right level V and in right level IB/II  Normal range of motion   Lymphatic:  lymphadenopathy as above   Cardiovascular:  warm, pink, well-perfused extremities without swelling, tenderness, or edema   Respiratory:  Normal respiratory effort, no stridor   Neuro/Psych.:  mood/affect -  normal  mental status -  normal  cranial nerves -  normal          PROCEDURES:   Flexible fiberoptic laryngoscopy: Scope exam was indicated due to  oropharyngeal carcinoma. Verbal consent was obtained. The nasal cavity was prepped with an aerosolized solution of topical anesthetic and vasoconstrictive agent. The scope was passed through the anterior nasal cavity and advanced. Inspection of the nasopharynx revealed no gross abnormality. The base of tongue and vallecula are normal with the exception of a likely primary tumor in the right lateral base of tongue about 1 cm in size. The epiglottis, AE folds, false cords, true cords, arytenoids are normal.  Inspection of the larynx revealed bilaterally mobile vocal cords. Pyriform sinuses are symmetric. The airway is patent. Procedure tolerated well with no immediate complications noted.      RESULTS REVIEWED:   I reviewed the referral notes from Dr. 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 He is currently received Keytruda which was started while he was in [place] and then transitioned his care to [place] while here for the summer. He had repeat imaging in August which showed disease progression in the neck with new lymphadenopathy. I am suspicious that a lesion seen in the right BOT is the primary tumor. I explained to him that based on his imaging I am concerned that this is disease progression. He still has disease in his lungs so I do not think he is a candidate yet for chemoradiation. I think he will need systemic therapy with either chemotherapy or the addition of chemotherapy to his immunotherapy. I discussed with him that I do not think there will be considerable benefit to biopsying the node unless medical  oncology is going to do further Next Gen sequencing and he has not yet had traditional chemotherapy. Biopsy of the tongue base may demonstrate the primary but is unlikely to significantly change treatment at this point.  We will review his case at tumor conference on Friday and contact him with recommendations. We will arrange appropriate follow-up pending this discussion.    Thank you very much for the opportunity to participate in the care of your patient.      Taisha Aparicio MD, M.D.  Otolaryngology- Head & Neck Surgery      This note was dictated with voice recognition software and then edited. Please excuse any unintentional errors.         CC:  Shelley Chen MD  Department of Oncology  [facility]

## 2020-09-09 NOTE — PROGRESS NOTES
"Head & Neck Tumor Conference Note        Status: New  Staff: Dr. Aparicio    Tumor Site: Unknown primary  Tumor Pathology: p16+ SCC  Tumor Stage: TxN1M1, largest node 3.5 x 3.1 cm  Tumor Treatment:   - palliative immunotherapy with Keytruda (70% PD-L1)    Reason for Review: Review imaging, path, and POC    Brief History: Newton Buchanan is a 74-year-old man referred to establish care for p16+ squamous cell carcinoma.  He has a history of right neck swelling over about 2 years that began enlarging rapidly in January 2020. He underwent  \"surgical biopsy\" by his local ENT, demonstrating p16+ squamous cell carcinoma.  Staging PET/CT showed right-sided lymphadenopathy (largest node 3.5 x 3.1 cm), bilateral lung metastases, and hypermetabolic right hilar nodes but no obvious primary tumor. He never underwent panendoscopy or tonsillectomy per report.  He was started on single agent Keytruda due to 70% PD-L1.  He had two cycles done in [place] and then transferred his care to [facility] (Dr. Chen) in May 2020, received a total of 4 cycles per his report.  He had repeat imaging with a PET scan in August 2020 which demonstrated decrease in the lung nodules but some new lymphadenopathy in the neck.  There was increase in the uptake in the tonsils with slight asymmetry on the right.     Pertinent PMH: No past medical history on file.   Obstructive sleep apnea   HTN  Diverticulosis of colon. Esophageal reflux disease   Admitted to hospital 03/09/2020 for lower GI bleed, ischemic colitis,  c.dff repeat testing 03/31/2020 positive, s/p vancomycin.   Ascending polyp  Malignant neoplasm of prostate (2008 PSA 4.79, Gl 3+3 s/p proctectomy)    Smoking Hx:   Social History     Tobacco Use     Smoking status: Never Smoker     Smokeless tobacco: Never Used   Substance Use Topics     Alcohol use: Yes     Alcohol/week: 0.0 standard drinks     Comment: weekly     Drug use: No     Imaging:   PET/CT (8/13/20):  1. Mixed response to the " cervical lymph nodes, with two new hypermetabolic right level 2B lymph nodes, whereas the remainder have decreased or resolved. Residual uptake in the smaller right level 2A lymph nodes likely contain residual viable tumor. Presence of new lesions disfavors pseudoprogression.   2. Decreased FDG uptake of the numerous metastatic pulmonary nodules.   3. Resolved hypermetabolism of the right hilar lymph nodes.   4. Mildly increased FDG uptake to the palatine tonsils, slightly greater on the right. Favor reactive or inflammatory uptake given lack of correlate on CT. However, in the setting of occult primary and absence of other clear primary on today's or comparison study, may contain the primary mass. Consider direct visualization    Pathology:   FINAL DIAGNOSIS:   CASE FROM [place], [place] (, OBTAINED 02/11/20):   A. RIGHT NECK MASS, EXCISIONAL BIOPSY:   - METASTATIC HPV-ASSOCIATED NONKERATINIZING SQUAMOUS CELL CARCINOMA, present in soft tissue and extending to biopsy margins.     Tumor Board Recommendation:   Discussion: Reviewed most recent imaging, which showed partial response to Keytruda with a mix of new hypermetabolic activity in the neck and decreased/resolution of other metastatic sites in the neck and lungs. Due to partial response to Keytruda, he would qualify for clinical trials for systemic therapy. He has another Keytruda infusion next week, which he should plan to continue in order to qualify for as many trials as possible until a definitive plan is reached.     Plan:   - referral for discussion of clinical trials with medical oncology  - continue Keytruda infusions until definitive plan is reached    Neil Rees MD PGY-3  Otolaryngology- Head and Neck Surgery    Documentation / Disclaimer Cancer Tumor Board Note  Cancer tumor board recommendations do not override what is determined to be reasonable care and treatment, which is dependent on the circumstances of a patient's  case; the patient's medical, social, and personal concerns; and the clinical judgment of the oncologist [physician].

## 2020-09-09 NOTE — NURSING NOTE
Chief Complaint   Patient presents with     Consult     head and neck cancer      Blood pressure (!) 153/84, pulse 69, temperature 97.7  F (36.5  C), resp. rate 18, height 1.829 m (6'), weight 87.5 kg (193 lb), SpO2 100 %.    Oscar Hoang LPN

## 2020-09-11 ENCOUNTER — TUMOR CONFERENCE (OUTPATIENT)
Dept: ONCOLOGY | Facility: CLINIC | Age: 75
End: 2020-09-11
Payer: MEDICARE

## 2020-09-11 DIAGNOSIS — C10.9 SQUAMOUS CELL CARCINOMA OF OROPHARYNX (H): Primary | ICD-10-CM

## 2020-09-11 NOTE — PROGRESS NOTES
Called patient to review tumor board discussion and recommendations. Reviewed with patient that medical oncology here at the [facility] would like to see patient to discuss possibility of clinical trials that he may qualify for. Advised that Dr. Aparicio did update Dr. Chen who was in agreement with this plan.     Patient would like to move forward with referral. Referral for medical oncology placed and patient was advised that he will be called to schedule.     He was given direct line and encouraged to call with further questions or concerns.     Mercy Donald, RN, BSN

## 2020-09-12 NOTE — PROGRESS NOTES
ONCOLOGY FOLLOW UP VISIT        PATIENT NAME: Newton Buchanan   MRN# [id number]     Date of Visit: Sep 14, 2020     YOB: 1945       REASON FOR VISIT/CC:    Stage IV metastatic squamous cell carcinoma   Maybe transferring care from [place] [facility] from [place] [facility] [facility] from [facility]      HISTORY OF ONCOLOGY ILLNESS:    Patient had noted right neck swelling over 2 years. Has had FNAs in the past that were benign. He was followed by q 6 months.     He reported the neck mass was getting larger. Denies any pain/dysphagia/difficuly swallowing/sob/skin changes.   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     2/24/20 PET/CT found:  1. Hypermetabolic right cervical lymphadenopathy.  2. Numerous hypermetabolic bilateral lung metastases.  3. Hypermetabolic right hilar nodes, also likely metastatic disease    It was felt that with no obvious primary on PET/CT but given CK7 +, P16 positivity, and neck mass on presentation, suspicion is high that origin is still of H&N (right cervical LAD) with metastatic spread to lung with bilateral multiple lung nodules .  Pt made informed decision to proceed with single agent Keytruda due to his PD-L1 testing from [employer] with CPS of 70.  Started early May in [place].     He transferred his care to us in June 2020.     He has mixed PET response in 8/2020 in neck JOSÉ MIGUEL, and decreased lung nodules uptake.       INTERVAL HISTORY:  Case is discussed at U conference, and he saw ENT, Dr. Aparicio -- He is still not a candidate for definitive chemoradiation given the distant disease.   Clinical trail is recommended with N-803 (formerly Alt-803), which is an IL-15 superagonist in combination with pembrolizumab.  It is specifically for patients who have had a positive response to keytruda but then experience progression. This is based on 90% disease control in a similar cohort of lung cancer patients that we  "participated in a few years back.   It is a multi-disease cohort phase 2.         PAST MEDICAL HISTORY  Obstructive sleep apnea   HTN  Diverticulosis of colon. Esophageal reflux disease   Admitted to hospital 2020 for lower GI bleed, ischemic colitis,  c.dff repeat testing 2020 positive, s/p vancomycin.   Ascending polyp  Malignant neoplasm of prostate / PSA 4.79, Gl 3+3 s/p proctectomy.      MEDICATIONS/ALLERY:  Reviewed in Epic system.        SOCIAL HISTORY:    Single. Denies smoking, one drink per night.   He is retired in [place], maybe moving back to [place].  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   Denies smoking/. He drinks daily with a couple of michael with dinner.       FAMILY HISTORY:    Denies any FH of cancers       REVIEW OF SYSTEMS:   Reports nl BM.   Extra fatigue.        PHYSICAL EXAMINATION:   Blood pressure 128/65, pulse 61, temperature 98.2  F (36.8  C), temperature source Oral, resp. rate 20, height 1.829 m (6' 0.01\"), weight 86.2 kg (190 lb 1.6 oz), SpO2 97 %.      GENERAL APPEARANCE:  looks like stated age, very pleasant, not in acute distress.      ECO     ENT, MOUTH: Pupils are equally reactive to light.  Sclerae are anicteric.  Moist oral mucosa without lesion or ulcer.   Negative pharynx.  No oral thrush.   NECK:  Supple.  No jugular venous distention.  Thyroid is not palpable. Right cervical level V, and level II LN fullness.   LYMPH NODES:  negative superficial JOSÉ MIGUEL else where   CARDIOVASCULAR:  S1, S2 regular with no murmurs or gallops.  No carotid or abdominal bruits.   PULMONARY:  Lungs are clear to auscultation and percussion bilaterally.  There is no wheezing or rhonchi.   GASTROINTESTINAL:  Abdomen is soft, nontender.  No hepatosplenomegaly.  No signs of ascites.  No mass appreciable.   MUSCULOSKELETAL/EXTREMITIES:  No edema.  No cyanotic changes.  No signs of joint deformity.  No lymphedema.   NEUROLOGIC:  Cranial nerves II-XII are grossly intact.  " Sensation intact.  Muscle strength and muscle tone symmetrical, 5/5 throughout.   BACK  No spinal or paraspinal tenderness.  No CVA tenderness.   SKIN:  No petechiae.  No rash.  No signs of cellulitis.   PSYCHIATRIC: Oriented to time, person, and places. Normal mood and affect. Good memory. Proper insight and judgement.       CURRENT LAB DATA REVIEWED TODAY WITH PATIENT:  DURING VISIT:   CMP, TSH are fine    2/11/20 Biopsy metastatic squamous cell carcinoma - HPV16+.      CURRENT IMAGING REVIEWED TODAY WITH PATIENT DURING VISIT:  8/2020 PET  1. Multiple hypermetabolic lymph nodes in the neck which demonstrate mixed response since PET-CT 2/24/2020  E.g.   - Image 115: New right level 2B node, 1.6 x 1.0 cm, SUV max 10.5.  - Image 110: New right level 2B node, 0.9 x 0.7 cm, SUV max 6.1.  - Image 110: Smaller right level 2A node, 2.1 x 1.7 cm, SUV max 14.4; previously 3.6 x 3.0 cm, SUV max 13.2.  - Resolution of the previously seen hypermetabolic right level 3 lymph nodes.  2. Mildly increased FDG uptake to the palatine tonsils without appreciable mass lesion on CT images, the degree of uptake has mildly increased, slightly asymmetrically greater on  the right, SUV max 7.0 on right and 5.3 on left (series 5, image 79), previously 6.3 and 5.1 respectively.  3. Decreased FDG uptake to the numerous solid pulmonary nodules bilaterally. Resolved hypermetabolism of the previously seen hilar lymph nodes. No newly hypermetabolic lymph nodes in the chest.      2/24/20 PET/CT IMPRESSION:   1. Hypermetabolic right cervical lymphadenopathy.  2. Numerous hypermetabolic bilateral lung metastases.  3. Hypermetabolic right hilar nodes, also likely metastatic disease      2/3/20 CT Neck/Chest IMPRESSION:  1. Right cervical lymphadenopathy, with areas suspicious for  necrosis, highly concerning for metastatic involvement by  malignancy. Recommend direct visualization of the upper  aerodigestive tract.  2. Numerous bilateral lung nodules,  suspicious for metastases.         OLD DATA REVIEWED TODAY WITH SUMMARY  dexa 2019 - low density.       ASSESSMENT AND PLAN DISCUSSED WITH PATIENT TODAY DURING VISIT:   1.  Presumed  H&N (right cervical LAD) with metastatic spread to lung with bilateral multiple lung nodules.   He is first-line palliative treatment with Keytruda was started when he was in [place] early May 2020.    We discussed he can use therapy side effect profiles, they can be unpredictable.  And how he should be monitored.    Due to his mixed response by PET in 8/2020.    Case is discussed at U conference, and he saw ENT, Dr. Aparicio -- He is still not a candidate for definitive chemoradiation given the distant disease. Clinical trail is recommended with N-803 (formerly Alt-803), which is an IL-15 superagonist in combination with pembrolizumab.  It is specifically for patients who have had a positive response to keytruda but then experience progression.  This is based on 90% disease control in a similar cohort of lung cancer patients that we participated in a few years back.  It is a multi-disease cohort phase 2.         I explained to him the rationale of the trial, and encourage him to consider. He is meeting with Dr. Vears in 2 days.     He understands treatment is palliative in nature.  He is amazed at how good his quality of life is and he is already having some response with immunotherapy single agent.      2.  Hx of C diff.   He is probiotics Florastor. He is to continue.  He feels it is helpful.   He is to stay on probiotic for minimal 6 months.       3. osteopenia on DEXA scan 2019.  We discussed importance of vitamin D intake outdoor activity weightbearing exercise.    Total time spent 25 minutes, more than 15 minutes spent in counseling regarding his disease status, PET scan results, rationale and clinical trial recommendations, further treatment recommendations follow-up plan.

## 2020-09-14 ENCOUNTER — ONCOLOGY VISIT (OUTPATIENT)
Dept: ONCOLOGY | Facility: CLINIC | Age: 75
End: 2020-09-14
Attending: INTERNAL MEDICINE
Payer: MEDICARE

## 2020-09-14 ENCOUNTER — HOSPITAL ENCOUNTER (OUTPATIENT)
Dept: LAB | Facility: CLINIC | Age: 75
Discharge: HOME | End: 2020-09-14
Attending: INTERNAL MEDICINE | Admitting: INTERNAL MEDICINE
Payer: MEDICARE

## 2020-09-14 ENCOUNTER — INFUSION THERAPY VISIT (OUTPATIENT)
Dept: INFUSION THERAPY | Facility: CLINIC | Age: 75
End: 2020-09-14
Attending: INTERNAL MEDICINE
Payer: MEDICARE

## 2020-09-14 VITALS
SYSTOLIC BLOOD PRESSURE: 128 MMHG | OXYGEN SATURATION: 97 % | DIASTOLIC BLOOD PRESSURE: 65 MMHG | WEIGHT: 190.1 LBS | HEART RATE: 61 BPM | TEMPERATURE: 98.2 F | RESPIRATION RATE: 20 BRPM | HEIGHT: 72 IN | BODY MASS INDEX: 25.75 KG/M2

## 2020-09-14 VITALS — SYSTOLIC BLOOD PRESSURE: 135 MMHG | HEART RATE: 56 BPM | DIASTOLIC BLOOD PRESSURE: 64 MMHG

## 2020-09-14 DIAGNOSIS — C76.0 HEAD AND NECK CANCER (H): ICD-10-CM

## 2020-09-14 DIAGNOSIS — C10.9 SQUAMOUS CELL CARCINOMA OF OROPHARYNX (H): Primary | ICD-10-CM

## 2020-09-14 DIAGNOSIS — C76.0 HEAD AND NECK CANCER (H): Primary | ICD-10-CM

## 2020-09-14 LAB
ALBUMIN SERPL-MCNC: 3.9 G/DL (ref 3.4–5)
ALP SERPL-CCNC: 52 U/L (ref 40–150)
ALT SERPL W P-5'-P-CCNC: 35 U/L (ref 0–70)
ANION GAP SERPL CALCULATED.3IONS-SCNC: 3 MMOL/L (ref 3–14)
AST SERPL W P-5'-P-CCNC: 29 U/L (ref 0–45)
BILIRUB SERPL-MCNC: 0.9 MG/DL (ref 0.2–1.3)
BUN SERPL-MCNC: 14 MG/DL (ref 7–30)
CALCIUM SERPL-MCNC: 9.5 MG/DL (ref 8.5–10.1)
CHLORIDE SERPL-SCNC: 106 MMOL/L (ref 94–109)
CO2 SERPL-SCNC: 30 MMOL/L (ref 20–32)
CREAT SERPL-MCNC: 0.78 MG/DL (ref 0.66–1.25)
GFR SERPL CREATININE-BSD FRML MDRD: 89 ML/MIN/{1.73_M2}
GLUCOSE SERPL-MCNC: 81 MG/DL (ref 70–99)
POTASSIUM SERPL-SCNC: 4.2 MMOL/L (ref 3.4–5.3)
PROT SERPL-MCNC: 7.4 G/DL (ref 6.8–8.8)
SODIUM SERPL-SCNC: 139 MMOL/L (ref 133–144)
TSH SERPL DL<=0.005 MIU/L-ACNC: 1.46 MU/L (ref 0.4–4)

## 2020-09-14 PROCEDURE — 80053 COMPREHEN METABOLIC PANEL: CPT | Performed by: INTERNAL MEDICINE

## 2020-09-14 PROCEDURE — 99214 OFFICE O/P EST MOD 30 MIN: CPT | Performed by: INTERNAL MEDICINE

## 2020-09-14 PROCEDURE — 25000128 H RX IP 250 OP 636: Performed by: INTERNAL MEDICINE

## 2020-09-14 PROCEDURE — G0463 HOSPITAL OUTPT CLINIC VISIT: HCPCS | Mod: 25

## 2020-09-14 PROCEDURE — 25800030 ZZH RX IP 258 OP 636: Performed by: INTERNAL MEDICINE

## 2020-09-14 PROCEDURE — 84443 ASSAY THYROID STIM HORMONE: CPT | Performed by: INTERNAL MEDICINE

## 2020-09-14 PROCEDURE — 36415 COLL VENOUS BLD VENIPUNCTURE: CPT | Performed by: INTERNAL MEDICINE

## 2020-09-14 PROCEDURE — 96413 CHEMO IV INFUSION 1 HR: CPT

## 2020-09-14 RX ORDER — MEPERIDINE HYDROCHLORIDE 25 MG/ML
25 INJECTION INTRAMUSCULAR; INTRAVENOUS; SUBCUTANEOUS EVERY 30 MIN PRN
Status: CANCELLED | OUTPATIENT
Start: 2020-09-14

## 2020-09-14 RX ORDER — DIPHENHYDRAMINE HYDROCHLORIDE 50 MG/ML
50 INJECTION INTRAMUSCULAR; INTRAVENOUS
Status: CANCELLED
Start: 2020-09-14

## 2020-09-14 RX ORDER — HEPARIN SODIUM (PORCINE) LOCK FLUSH IV SOLN 100 UNIT/ML 100 UNIT/ML
5 SOLUTION INTRAVENOUS
Status: CANCELLED | OUTPATIENT
Start: 2020-09-14

## 2020-09-14 RX ORDER — ALBUTEROL SULFATE 0.83 MG/ML
2.5 SOLUTION RESPIRATORY (INHALATION)
Status: CANCELLED | OUTPATIENT
Start: 2020-09-14

## 2020-09-14 RX ORDER — LORAZEPAM 2 MG/ML
0.5 INJECTION INTRAMUSCULAR EVERY 4 HOURS PRN
Status: CANCELLED
Start: 2020-09-14

## 2020-09-14 RX ORDER — METHYLPREDNISOLONE SODIUM SUCCINATE 125 MG/2ML
125 INJECTION, POWDER, LYOPHILIZED, FOR SOLUTION INTRAMUSCULAR; INTRAVENOUS
Status: CANCELLED
Start: 2020-09-14

## 2020-09-14 RX ORDER — NALOXONE HYDROCHLORIDE 0.4 MG/ML
.1-.4 INJECTION, SOLUTION INTRAMUSCULAR; INTRAVENOUS; SUBCUTANEOUS
Status: CANCELLED | OUTPATIENT
Start: 2020-09-14

## 2020-09-14 RX ORDER — HEPARIN SODIUM,PORCINE 10 UNIT/ML
5 VIAL (ML) INTRAVENOUS
Status: CANCELLED | OUTPATIENT
Start: 2020-09-14

## 2020-09-14 RX ORDER — SODIUM CHLORIDE 9 MG/ML
1000 INJECTION, SOLUTION INTRAVENOUS CONTINUOUS PRN
Status: CANCELLED
Start: 2020-09-14

## 2020-09-14 RX ORDER — ALBUTEROL SULFATE 90 UG/1
1-2 AEROSOL, METERED RESPIRATORY (INHALATION)
Status: CANCELLED
Start: 2020-09-14

## 2020-09-14 RX ORDER — EPINEPHRINE 1 MG/ML
0.3 INJECTION, SOLUTION, CONCENTRATE INTRAVENOUS EVERY 5 MIN PRN
Status: CANCELLED | OUTPATIENT
Start: 2020-09-14

## 2020-09-14 RX ADMIN — SODIUM CHLORIDE 200 MG: 9 INJECTION, SOLUTION INTRAVENOUS at 14:25

## 2020-09-14 RX ADMIN — SODIUM CHLORIDE 250 ML: 9 INJECTION, SOLUTION INTRAVENOUS at 14:25

## 2020-09-14 ASSESSMENT — PAIN SCALES - GENERAL: PAINLEVEL: NO PAIN (0)

## 2020-09-14 ASSESSMENT — MIFFLIN-ST. JEOR: SCORE: 1645.42

## 2020-09-14 NOTE — LETTER
9/14/2020         RE: Newton Buchanan  [address] [facility] [address]        Dear Colleague,    Thank you for referring your patient, Newton Buchanan, to the [facility] [place], operated by [facility]. Please see a copy of my visit note below.      ONCOLOGY FOLLOW UP VISIT        PATIENT NAME: Newton Buchanan   MRN# [id number]     Date of Visit: Sep 14, 2020     YOB: 1945       REASON FOR VISIT/CC:    Stage IV metastatic squamous cell carcinoma   Maybe transferring care from [place] [facility] from [facility] from [facility]      HISTORY OF ONCOLOGY ILLNESS:    Patient had noted right neck swelling over 2 years. Has had FNAs in the past that were benign. He was followed by q 6 months.     He reported the neck mass was getting larger. Denies any pain/dysphagia/difficuly swallowing/sob/skin changes.   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     2/24/20 PET/CT found:  1. Hypermetabolic right cervical lymphadenopathy.  2. Numerous hypermetabolic bilateral lung metastases.  3. Hypermetabolic right hilar nodes, also likely metastatic disease    It was felt that with no obvious primary on PET/CT but given CK7 +, P16 positivity, and neck mass on presentation, suspicion is high that origin is still of H&N (right cervical LAD) with metastatic spread to lung with bilateral multiple lung nodules .  Pt made informed decision to proceed with single agent Keytruda due to his PD-L1 testing from [employer] with CPS of 70.  Started early May in [place].     He transferred his care to us in June 2020.     He has mixed PET response in 8/2020 in neck JOSÉ MIGUEL, and decreased lung nodules uptake.       INTERVAL HISTORY:  Case is discussed at U conference, and he saw ENT, Dr. Aparicio -- He is still not a candidate for definitive chemoradiation given the distant disease.   Clinical trail is recommended with N-803 (formerly Alt-803), which is an IL-15 superagonist in  "combination with pembrolizumab.  It is specifically for patients who have had a positive response to keytruda but then experience progression. This is based on 90% disease control in a similar cohort of lung cancer patients that we participated in a few years back.   It is a multi-disease cohort phase 2.         PAST MEDICAL HISTORY  Obstructive sleep apnea   HTN  Diverticulosis of colon. Esophageal reflux disease   Admitted to hospital 2020 for lower GI bleed, ischemic colitis,  c.dff repeat testing 2020 positive, s/p vancomycin.   Ascending polyp  Malignant neoplasm of prostate / PSA 4.79, Gl 3+3 s/p proctectomy.      MEDICATIONS/ALLERY:  Reviewed in Epic system.        SOCIAL HISTORY:    Single. Denies smoking, one drink per night.   He is retired in [place], maybe moving back to [place].  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   Denies smoking/. He drinks daily with a couple of michael with dinner.       FAMILY HISTORY:    Denies any FH of cancers       REVIEW OF SYSTEMS:   Reports nl BM.   Extra fatigue.        PHYSICAL EXAMINATION:   Blood pressure 128/65, pulse 61, temperature 98.2  F (36.8  C), temperature source Oral, resp. rate 20, height 1.829 m (6' 0.01\"), weight 86.2 kg (190 lb 1.6 oz), SpO2 97 %.      GENERAL APPEARANCE:  looks like stated age, very pleasant, not in acute distress.      ECO     ENT, MOUTH: Pupils are equally reactive to light.  Sclerae are anicteric.  Moist oral mucosa without lesion or ulcer.   Negative pharynx.  No oral thrush.   NECK:  Supple.  No jugular venous distention.  Thyroid is not palpable. Right cervical level V, and level II LN fullness.   LYMPH NODES:  negative superficial JOSÉ MIGUEL else where   CARDIOVASCULAR:  S1, S2 regular with no murmurs or gallops.  No carotid or abdominal bruits.   PULMONARY:  Lungs are clear to auscultation and percussion bilaterally.  There is no wheezing or rhonchi.   GASTROINTESTINAL:  Abdomen is soft, nontender.  No " hepatosplenomegaly.  No signs of ascites.  No mass appreciable.   MUSCULOSKELETAL/EXTREMITIES:  No edema.  No cyanotic changes.  No signs of joint deformity.  No lymphedema.   NEUROLOGIC:  Cranial nerves II-XII are grossly intact.  Sensation intact.  Muscle strength and muscle tone symmetrical, 5/5 throughout.   BACK  No spinal or paraspinal tenderness.  No CVA tenderness.   SKIN:  No petechiae.  No rash.  No signs of cellulitis.   PSYCHIATRIC: Oriented to time, person, and places. Normal mood and affect. Good memory. Proper insight and judgement.       CURRENT LAB DATA REVIEWED TODAY WITH PATIENT:  DURING VISIT:   CMP, TSH are fine    2/11/20 Biopsy metastatic squamous cell carcinoma - HPV16+.      CURRENT IMAGING REVIEWED TODAY WITH PATIENT DURING VISIT:  8/2020 PET  1. Multiple hypermetabolic lymph nodes in the neck which demonstrate mixed response since PET-CT 2/24/2020  E.g.   - Image 115: New right level 2B node, 1.6 x 1.0 cm, SUV max 10.5.  - Image 110: New right level 2B node, 0.9 x 0.7 cm, SUV max 6.1.  - Image 110: Smaller right level 2A node, 2.1 x 1.7 cm, SUV max 14.4; previously 3.6 x 3.0 cm, SUV max 13.2.  - Resolution of the previously seen hypermetabolic right level 3 lymph nodes.  2. Mildly increased FDG uptake to the palatine tonsils without appreciable mass lesion on CT images, the degree of uptake has mildly increased, slightly asymmetrically greater on  the right, SUV max 7.0 on right and 5.3 on left (series 5, image 79), previously 6.3 and 5.1 respectively.  3. Decreased FDG uptake to the numerous solid pulmonary nodules bilaterally. Resolved hypermetabolism of the previously seen hilar lymph nodes. No newly hypermetabolic lymph nodes in the chest.      2/24/20 PET/CT IMPRESSION:   1. Hypermetabolic right cervical lymphadenopathy.  2. Numerous hypermetabolic bilateral lung metastases.  3. Hypermetabolic right hilar nodes, also likely metastatic disease      2/3/20 CT Neck/Chest IMPRESSION:  1.  Right cervical lymphadenopathy, with areas suspicious for  necrosis, highly concerning for metastatic involvement by  malignancy. Recommend direct visualization of the upper  aerodigestive tract.  2. Numerous bilateral lung nodules, suspicious for metastases.         OLD DATA REVIEWED TODAY WITH SUMMARY  dexa 2019 - low density.       ASSESSMENT AND PLAN DISCUSSED WITH PATIENT TODAY DURING VISIT:   1.  Presumed  H&N (right cervical LAD) with metastatic spread to lung with bilateral multiple lung nodules.   He is first-line palliative treatment with Keytruda was started when he was in [place] early May 2020.    We discussed he can use therapy side effect profiles, they can be unpredictable.  And how he should be monitored.    Due to his mixed response by PET in 8/2020.    Case is discussed at U conference, and he saw ENT, Dr. Aparicio -- He is still not a candidate for definitive chemoradiation given the distant disease. Clinical trail is recommended with N-803 (formerly Alt-803), which is an IL-15 superagonist in combination with pembrolizumab.  It is specifically for patients who have had a positive response to keytruda but then experience progression.  This is based on 90% disease control in a similar cohort of lung cancer patients that we participated in a few years back.  It is a multi-disease cohort phase 2.         I explained to him the rationale of the trial, and encourage him to consider. He is meeting with Dr. Veras in 2 days.     He understands treatment is palliative in nature.  He is amazed at how good his quality of life is and he is already having some response with immunotherapy single agent.      2.  Hx of C diff.   He is probiotics Florastor. He is to continue.  He feels it is helpful.   He is to stay on probiotic for minimal 6 months.       3. osteopenia on DEXA scan 2019.  We discussed importance of vitamin D intake outdoor activity weightbearing exercise.    Total time spent 25 minutes, more  "than 15 minutes spent in counseling regarding his disease status, PET scan results, rationale and clinical trial recommendations, further treatment recommendations follow-up plan.    Oncology Rooming Note    September 14, 2020 1:21 PM   Newton Buchanan is a 74 year old male who presents for:    Chief Complaint   Patient presents with     Oncology Clinic Visit     Head and neck cancer,ENT F/U, labs & infusion     Initial Vitals: /65 (BP Location: Left arm, Patient Position: Sitting, Cuff Size: Adult Regular)   Pulse 61   Temp 98.2  F (36.8  C) (Oral)   Resp 20   Ht 1.829 m (6' 0.01\")   Wt 86.2 kg (190 lb 1.6 oz)   SpO2 97%   BMI 25.78 kg/m   Estimated body mass index is 25.78 kg/m  as calculated from the following:    Height as of this encounter: 1.829 m (6' 0.01\").    Weight as of this encounter: 86.2 kg (190 lb 1.6 oz). Body surface area is 2.09 meters squared.  No Pain (0) Comment: Data Unavailable   No LMP for male patient.  Allergies reviewed: Yes  Medications reviewed: Yes    Medications: Medication refills not needed today.  Pharmacy name entered into [facility]: [facility] #[id number] - [address] AT [facility] [place] [address] [place] [address]    Clinical concerns: Head and neck cancer, ENT F/U, labs & infusion.       Sandy Carrillo [facility]                Again, thank you for allowing me to participate in the care of your patient.        Sincerely,        Rosetta Chen MD, MD    "

## 2020-09-14 NOTE — PROGRESS NOTES
Infusion Nursing Note:  Newton Chanel presents today for Keytruda.    Patient seen by provider today: yes: Dr. Chen   present during visit today: Not Applicable.    Note: N/A.    Intravenous Access:  Peripheral IV placed.    Treatment Conditions:  Lab Results   Component Value Date     09/14/2020                   Lab Results   Component Value Date    POTASSIUM 4.2 09/14/2020           No results found for: MAG         Lab Results   Component Value Date    CR 0.78 09/14/2020                   Lab Results   Component Value Date    JÚNIOR 9.5 09/14/2020                Lab Results   Component Value Date    BILITOTAL 0.9 09/14/2020           Lab Results   Component Value Date    ALBUMIN 3.9 09/14/2020                    Lab Results   Component Value Date    ALT 35 09/14/2020           Lab Results   Component Value Date    AST 29 09/14/2020       Results reviewed, labs MET treatment parameters, ok to proceed with treatment.      Post Infusion Assessment:  Patient tolerated infusion without incident.  Blood return noted pre and post infusion.  Site patent and intact, free from redness, edema or discomfort.  No evidence of extravasations.       Discharge Plan:   Patient discharged in stable condition accompanied by: self.  Departure Mode: Ambulatory. Pt will return 10/5/2020.    Crescencio Cervantes RN

## 2020-09-14 NOTE — PROGRESS NOTES
"Oncology Rooming Note    September 14, 2020 1:21 PM   Newton Buchanan is a 74 year old male who presents for:    Chief Complaint   Patient presents with     Oncology Clinic Visit     Head and neck cancer,ENT F/U, labs & infusion     Initial Vitals: /65 (BP Location: Left arm, Patient Position: Sitting, Cuff Size: Adult Regular)   Pulse 61   Temp 98.2  F (36.8  C) (Oral)   Resp 20   Ht 1.829 m (6' 0.01\")   Wt 86.2 kg (190 lb 1.6 oz)   SpO2 97%   BMI 25.78 kg/m   Estimated body mass index is 25.78 kg/m  as calculated from the following:    Height as of this encounter: 1.829 m (6' 0.01\").    Weight as of this encounter: 86.2 kg (190 lb 1.6 oz). Body surface area is 2.09 meters squared.  No Pain (0) Comment: Data Unavailable   No LMP for male patient.  Allergies reviewed: Yes  Medications reviewed: Yes    Medications: Medication refills not needed today.  Pharmacy name entered into [facility]: [id number] - [place] [id number] AT [address]    Clinical concerns: Head and neck cancer, ENT F/U, labs & infusion.       Sandy Carrillo CMA              "

## 2020-09-14 NOTE — PATIENT INSTRUCTIONS
keytruda today.   To see Dr. Veras on Wed regarding clinical trial.   Follow-up open at this point.

## 2020-09-15 NOTE — TELEPHONE ENCOUNTER
RECORDS STATUS - ALL OTHER DIAGNOSIS      RECORDS RECEIVED FROM: [facility] - Internal Referral   DATE RECEIVED: 9/15/20   NOTES STATUS DETAILS   OFFICE NOTE from referring provider Liliana Chen    Also seen: Dr. Aparicio - 9/9/20    Tumor Conference: 9/11/20   OFFICE NOTE from medical oncologist Liliana Chen: 9/14/20   DISCHARGE SUMMARY from hospital [facility] 6/27/20, 9/23/16   DISCHARGE REPORT from the ER     OPERATIVE REPORT [facility] 2/11/20   MEDICATION LIST     CLINICAL TRIAL TREATMENTS TO DATE     LABS     PATHOLOGY REPORTS [facility] 6/8/20: Path Consult   ANYTHING RELATED TO DIAGNOSIS     GENONOMIC TESTING     TYPE:     IMAGING (NEED IMAGES & REPORT)     CT SCANS PACS    MRI     MAMMO     ULTRASOUND PACS    PET PACS

## 2020-09-16 ENCOUNTER — ONCOLOGY VISIT (OUTPATIENT)
Dept: ONCOLOGY | Facility: CLINIC | Age: 75
End: 2020-09-16
Attending: INTERNAL MEDICINE
Payer: MEDICARE

## 2020-09-16 ENCOUNTER — PRE VISIT (OUTPATIENT)
Dept: ONCOLOGY | Facility: CLINIC | Age: 75
End: 2020-09-16

## 2020-09-16 VITALS
RESPIRATION RATE: 16 BRPM | BODY MASS INDEX: 26.15 KG/M2 | HEART RATE: 61 BPM | DIASTOLIC BLOOD PRESSURE: 79 MMHG | HEIGHT: 72 IN | TEMPERATURE: 98 F | WEIGHT: 193.1 LBS | OXYGEN SATURATION: 98 % | SYSTOLIC BLOOD PRESSURE: 144 MMHG

## 2020-09-16 DIAGNOSIS — C10.9 SQUAMOUS CELL CARCINOMA OF OROPHARYNX (H): ICD-10-CM

## 2020-09-16 PROCEDURE — 99215 OFFICE O/P EST HI 40 MIN: CPT | Mod: ZP | Performed by: INTERNAL MEDICINE

## 2020-09-16 PROCEDURE — G0463 HOSPITAL OUTPT CLINIC VISIT: HCPCS | Mod: ZF

## 2020-09-16 ASSESSMENT — PAIN SCALES - GENERAL: PAINLEVEL: NO PAIN (0)

## 2020-09-16 ASSESSMENT — MIFFLIN-ST. JEOR: SCORE: 1654.03

## 2020-09-16 NOTE — LETTER
9/16/2020         RE: Newton Buchanan  [address] [address]        Dear Colleague,    Thank you for referring your patient, Newton Buchanan, to the [facility]. Please see a copy of my visit note below.    REASON FOR VISIT:    CANCER STAGE: Cancer Staging  No matching staging information was found for the patient.      HISTORY OF PRESENT ILLNESS:    I am seeing Newton Buchanan for a 2nd opinion and consider for a potential clinical trial.      Mr. Buchanan has had a lump in his neck for several years. Apparently, this was biopsied on several occasions but was negative per patient.   It wasn't until last winter that it began to grow. 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  This was done in [facility].  This was in Feb of 2020.  He had a PET/CT at that time that showed evidence of distant metastatic disease in the lungs with several hypermetabolic lung nodules.  Shortly thereafter, the coronarvirus pandemic started.  He had concern for an infection and was put on antibiotics but ultimately developed C. Diff colitis and this was a relatively prolonged hospitalization.  IN any case, this delayed the start of systemic treatment.  PDL-1 was 70% on his biopsy and therefore, he was recommended to start on pembrolizumab monotherapy which he did in early May. He received his 2nd dose of pembrolizumab on May 29 before returning to [place].  He typically summers here, but is planning on moving back her full time soon.     He established with Dr. Chen at [facility] shortly after returning here.  HE has received keytruda every 3 weeks since then.  He has noticed very early after he started the keytruda that the mass in his L neck has shrunk considerably - he can barely notice it anymore.  He has not had any toxicity with the keytruda except perhaps fatigue - which he is not sure related.  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    He otherwise does not and has not had other symptoms. He does not have any pain, no shortness of breath. He is fully active during the day. He eats and drinks well and has not had any recent weight loss.      He did notice in the past several weeks a new lymph node in his R neck that has popped up.  Again this is not tender, but he has noticed it.      PMH  HTN - well-controlled  Prostate cancer - s/p prostatectomy in 2007    Socialx  He is a retired  in [place].  He did previously serve in the [employer] over 20 years ago.   He is a never smoker.  He drinks 1-2 glasses of wine per day but says he has never been a \"heavy\" drinker. He is a .  He also had a son that passed away at a young age.      Allergies: Atovastatin    FamHX - no known hx of cancer    Review Of Systems  10-point review of systems were negative except as noted in HPI.        EXAM:  Blood pressure (!) 144/79, pulse 61, temperature 98  F (36.7  C), temperature source Oral, resp. rate 16, height 1.821 m (5' 11.69\"), weight 87.6 kg (193 lb 1.6 oz), SpO2 98 %.  GEN: alert and oriented x 3, nad  HEENT: perrla, eomi, sclera anicteric, oral mucosa moist without thrush  NECK: he has a palpable firm mass in his R neck - this is not tender to palpation  HT: reg rate and rhythm, no murmurs  LUNGS: clear to auscultation bilaterally  ABD: soft, nt, nd, +bs x 4  EXT: no clubbing, cyanosis, or edema  NEURO: CN 2-12 intact, MS 5/5 b/l    Current Outpatient Medications   Medication Sig Dispense Refill     aspirin 81 MG tablet Take by mouth daily 30 tablet      fluticasone (FLONASE) 50 MCG/ACT nasal spray Spray 1-2 sprays in nostril       losartan (COZAAR) 25 MG tablet Take 1 tablet (25 mg) by mouth daily 90 tablet 3     pembrolizumab (KEYTRUDA) 25 MG/ML        pravastatin (PRAVACHOL) 20 MG tablet Take 20 mg by mouth daily        prochlorperazine (COMPAZINE) 10 MG " tablet Take 10 mg by mouth every 6 hours as needed        saccharomyces boulardii (FLORASTOR) 250 MG capsule Take 1 capsule by mouth 2 times daily       sildenafil (VIAGRA) 100 MG tablet Take by mouth daily as needed for erectile dysfunction 30 tablet      VITAMIN D, CHOLECALCIFEROL, PO Take 1,000 Units by mouth daily             Recent Labs   Lab Test 06/27/20  1324   WBC 11.3*   HGB 13.3        @labrcent[na,potassium,chloride,co2,bun,cr@  Recent Labs   Lab Test 09/14/20  1302   PROTTOTAL 7.4   ALBUMIN 3.9   BILITOTAL 0.9   AST 29   ALT 35   ALKPHOS 52         No results found for this or any previous visit (from the past 744 hour(s)).        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  On the other hand has 2 new hypermetabolic lymph nodes in his neck.  These are relatively small, but concerning that they could be progression.     We discussed the possibilities.  1) if we called this progression - he could potentially enroll in a clinical trial to try to combine pembrolizumab with cytokine therapy - we discussed the rationale for this approach - the uncertainties about clinical trials and the extra study visits that might be involved.   2) We could offer him radiotherapy to the neck - either alone or with systemic chemotherapy.  The major drawback of this approach is the toxicity of radiation therapy on the neck - on the other hand it is very likely to work on controlling the disease in his neck.    3) Add chemotherapy to pembrolizumab - we discussed the chemo+io arm of the [id number] study.  The main drawback here again is toxicity.   4) We could consider this unconfirmed progression and continue with keytruda - the drawback here is that we could be wrong and delay his starting more effective therapy.      In the end, the patient and I agree that perhaps the most prudent way forward would be to continue with keytruda cautiously as he is asymptomatic now and it is possible that this is inflammatory.  He feels that he cannot do radiation therapy now due to his other things that he needs to do.  And is not interested in adding chemotherapy at this point due to toxicity.  He would be open to study participation, but also is wary of the additional study visits and time commitment.      I have communicated the above to Dr. Chen and he will receive 2 more cycles of Keytruda and reimage.  I have asked him to keep close watch on his R neck node as it is palpable and if it is growing substantially to let Dr. Chen or I know.      I did provide him with consent form for QUILT3.055 for him to look over in case he was interested and my email address if he has questions.      I spent 55 minutes with the patient.  >50% of the time was spent in counseling and coordination of care.    Xavier Veras   of Medicine  Division of Hematology, Oncology, and Transplantation

## 2020-09-16 NOTE — NURSING NOTE
"Oncology Rooming Note    September 16, 2020 10:37 AM   Newton Buchanan is a 74 year old male who presents for:    Chief Complaint   Patient presents with     Oncology Clinic Visit     New; R Neck SCC     Initial Vitals: BP (!) 144/79 (BP Location: Right arm, Patient Position: Sitting, Cuff Size: Adult Regular)   Pulse 61   Temp 98  F (36.7  C) (Oral)   Resp 16   Ht 1.821 m (5' 11.69\")   Wt 87.6 kg (193 lb 1.6 oz)   SpO2 98%   BMI 26.41 kg/m   Estimated body mass index is 26.41 kg/m  as calculated from the following:    Height as of this encounter: 1.821 m (5' 11.69\").    Weight as of this encounter: 87.6 kg (193 lb 1.6 oz). Body surface area is 2.11 meters squared.  No Pain (0) Comment: Data Unavailable   No LMP for male patient.  Allergies reviewed: Yes  Medications reviewed: Yes    Medications: Medication refills not needed today.  Pharmacy name entered into [email or web address]: [facility] #[id number] - [address] AT [address]    Clinical concerns: Would like to discuss trial.        Jessica Garcia CMA              "

## 2020-09-17 NOTE — PROGRESS NOTES
REASON FOR VISIT:    CANCER STAGE: Cancer Staging  No matching staging information was found for the patient.      HISTORY OF PRESENT ILLNESS:    I am seeing Newton Buchanan for a 2nd opinion and consider for a potential clinical trial.      Mr. Buchanan has had a lump in his neck for several years. Apparently, this was biopsied on several occasions but was negative per patient.   It wasn't until last winter that it began to grow. 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  This was done in [facility].  This was in Feb of 2020.  He had a PET/CT at that time that showed evidence of distant metastatic disease in the lungs with several hypermetabolic lung nodules.  Shortly thereafter, the coronarvirus pandemic started.  He had concern for an infection and was put on antibiotics but ultimately developed C. Diff colitis and this was a relatively prolonged hospitalization.  IN any case, this delayed the start of systemic treatment.  PDL-1 was 70% on his biopsy and therefore, he was recommended to start on pembrolizumab monotherapy which he did in early May. He received his 2nd dose of pembrolizumab on May 29 before returning to [place].  He typically summers here, but is planning on moving back her full time soon.     He established with Dr. Chen at [facility] shortly after returning here.  HE has received keytruda every 3 weeks since then.  He has noticed very early after he started the keytruda that the mass in his L neck has shrunk considerably - he can barely notice it anymore.  He has not had any toxicity with the keytruda except perhaps fatigue - which he is not sure related.  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    He otherwise does not and has not had other symptoms. He does not have any pain, no shortness of breath. He is fully active during the day. He eats and drinks well and  "has not had any recent weight loss.      He did notice in the past several weeks a new lymph node in his R neck that has popped up.  Again this is not tender, but he has noticed it.      PMH  HTN - well-controlled  Prostate cancer - s/p prostatectomy in 2007    SocialHx  He is a retired  in [place].  He did previously serve in the [employer] over 20 years ago.   He is a never smoker.  He drinks 1-2 glasses of wine per day but says he has never been a \"heavy\" drinker. He is a .  He also had a son that passed away at a young age.      Allergies: Atovastatin    FamHX - no known hx of cancer    Review Of Systems  10-point review of systems were negative except as noted in HPI.        EXAM:  Blood pressure (!) 144/79, pulse 61, temperature 98  F (36.7  C), temperature source Oral, resp. rate 16, height 1.821 m (5' 11.69\"), weight 87.6 kg (193 lb 1.6 oz), SpO2 98 %.  GEN: alert and oriented x 3, nad  HEENT: perrla, eomi, sclera anicteric, oral mucosa moist without thrush  NECK: he has a palpable firm mass in his R neck - this is not tender to palpation  HT: reg rate and rhythm, no murmurs  LUNGS: clear to auscultation bilaterally  ABD: soft, nt, nd, +bs x 4  EXT: no clubbing, cyanosis, or edema  NEURO: CN 2-12 intact, MS 5/5 b/l    Current Outpatient Medications   Medication Sig Dispense Refill     aspirin 81 MG tablet Take by mouth daily 30 tablet      fluticasone (FLONASE) 50 MCG/ACT nasal spray Spray 1-2 sprays in nostril       losartan (COZAAR) 25 MG tablet Take 1 tablet (25 mg) by mouth daily 90 tablet 3     pembrolizumab (KEYTRUDA) 25 MG/ML        pravastatin (PRAVACHOL) 20 MG tablet Take 20 mg by mouth daily        prochlorperazine (COMPAZINE) 10 MG tablet Take 10 mg by mouth every 6 hours as needed        saccharomyces boulardii (FLORASTOR) 250 MG capsule Take 1 capsule by mouth 2 times daily       sildenafil (VIAGRA) 100 MG tablet Take by mouth daily as needed for erectile dysfunction 30 " tablet      VITAMIN D, CHOLECALCIFEROL, PO Take 1,000 Units by mouth daily             Recent Labs   Lab Test 06/27/20  1324   WBC 11.3*   HGB 13.3        @labrcent[na,potassium,chloride,co2,bun,cr@  Recent Labs   Lab Test 09/14/20  1302   PROTTOTAL 7.4   ALBUMIN 3.9   BILITOTAL 0.9   AST 29   ALT 35   ALKPHOS 52         No results found for this or any previous visit (from the past 744 hour(s)).        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  On the other hand has 2 new hypermetabolic lymph nodes in his neck.  These are relatively small, but concerning that they could be progression.     We discussed the possibilities.  1) if we called this progression - he could potentially enroll in a clinical trial to try to combine pembrolizumab with cytokine therapy - we discussed the rationale for this approach - the uncertainties about clinical trials and the extra study visits that might be involved.   2) We could offer him radiotherapy to the neck - either alone or with systemic chemotherapy.  The major drawback of this approach is the toxicity of radiation therapy on the neck - on the other hand it is very likely to work on controlling the disease in his neck.    3) Add chemotherapy to pembrolizumab - we discussed the chemo+io arm of the [id number] study.  The main drawback here again is toxicity.   4) We could consider this unconfirmed progression and continue with keytruda - the drawback here is that we could be wrong and delay his starting more effective therapy.     In the end, the patient and I agree that perhaps the most prudent way forward would be to continue with keytruda cautiously as he is asymptomatic now and it is possible that this is inflammatory.  He feels that he cannot do radiation  therapy now due to his other things that he needs to do.  And is not interested in adding chemotherapy at this point due to toxicity.  He would be open to study participation, but also is wary of the additional study visits and time commitment.      I have communicated the above to Dr. Chen and he will receive 2 more cycles of Keytruda and reimage.  I have asked him to keep close watch on his R neck node as it is palpable and if it is growing substantially to let Dr. Chen or I know.      I did provide him with consent form for QUILT3.055 for him to look over in case he was interested and my email address if he has questions.      I spent 55 minutes with the patient.  >50% of the time was spent in counseling and coordination of care.    Xavier Veras   of Medicine  Division of Hematology, Oncology, and Transplantation

## 2020-10-04 NOTE — PROGRESS NOTES
"  ONCOLOGY FOLLOW UP VISIT        PATIENT NAME: Newton Buchanan   MRN# [id number]     Date of Visit: Oct 5, 2020     YOB: 1945       REASON FOR VISIT/CC:    Stage IV metastatic squamous cell carcinoma   Maybe transferring care from [place] [facility] from [place] [facility] [facility] from [facility]      HISTORY OF ONCOLOGY ILLNESS:    Patient had noted right neck swelling over 2 years. Has had FNAs in the past that were benign. He was followed by q 6 months.     He reported the neck mass was getting larger. Denies any pain/dysphagia/difficuly swallowing/sob/skin changes.   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     2/24/20 PET/CT found:  1. Hypermetabolic right cervical lymphadenopathy.  2. Numerous hypermetabolic bilateral lung metastases.  3. Hypermetabolic right hilar nodes, also likely metastatic disease    It was felt that with no obvious primary on PET/CT but given CK7 +, P16 positivity, and neck mass on presentation, suspicion is high that origin is still of H&N (right cervical LAD) with metastatic spread to lung with bilateral multiple lung nodules .  Pt made informed decision to proceed with single agent Keytruda due to his PD-L1 testing from [facility] with CPS of 70.  Started early May in [place].     He transferred his care to us in June 2020.     He has mixed PET response in 8/2020 in neck JOSÉ MIGUEL, and decreased lung nodules uptake.     Case was discussed at U conference, and he saw ENT, Dr. Aparicio -- \"He is still not a candidate for definitive chemoradiation given the distant disease\".     Clinical trail was recommended with N-803 (formerly Alt-803), which is an IL-15 superagonist in combination with pembrolizumab.  It is specifically for patients who have had a positive response to keytruda but then experience progression. This is based on 90% disease control in a similar cohort of lung cancer patients that we participated in a few " "years back.   It is a multi-disease cohort phase 2.         INTERVAL HISTORY:  He met with Dr. Veras 2020 for trial discussion.   Felt his mixed response (resolution of lung diease, and mixed response in right neck LN) could be pseudoprogression from IO or true progression. [place] decision is to do 2 more keytruda and restage.       PAST MEDICAL HISTORY  Obstructive sleep apnea   HTN  Diverticulosis of colon. Esophageal reflux disease   Admitted to hospital 2020 for lower GI bleed, ischemic colitis,  c.dff repeat testing 2020 positive, s/p vancomycin.   Ascending polyp  Malignant neoplasm of prostate / PSA 4.79, Gl 3+3 s/p proctectomy.      MEDICATIONS/ALLERY:  Reviewed in Epic system.        SOCIAL HISTORY:    Single. Denies smoking, one drink per night.   He is retired in [place], maybe moving back to [place].  He served in [employer] in [place] as [employer] .   Denies smoking/. He drinks daily with a couple of michael with dinner.       FAMILY HISTORY:    Denies any FH of cancers       REVIEW OF SYSTEMS:   Reports nl BM.   Extra fatigue.        PHYSICAL EXAMINATION:   Blood pressure (!) 152/73, pulse 62, temperature 98.5  F (36.9  C), temperature source Oral, resp. rate 28, height 1.821 m (5' 11.69\"), weight 86.8 kg (191 lb 6.4 oz), SpO2 96 %.      GENERAL APPEARANCE:  looks like stated age, very pleasant, not in acute distress.      ECO     ENT, MOUTH: Pupils are equally reactive to light.  Sclerae are anicteric.  Moist oral mucosa without lesion or ulcer.   Negative pharynx.  No oral thrush.   NECK:  Supple.  No jugular venous distention.  Thyroid is not palpable. Right cervical LN fullness at level II.   LYMPH NODES:  negative superficial JOSÉ MIGUEL else where   CARDIOVASCULAR:  S1, S2 regular with no murmurs or gallops.  No carotid or abdominal bruits.   PULMONARY:  Lungs are clear to auscultation and percussion bilaterally.  There is no wheezing or rhonchi.   GASTROINTESTINAL:  Abdomen is " soft, nontender.  No hepatosplenomegaly.  No signs of ascites.  No mass appreciable.   MUSCULOSKELETAL/EXTREMITIES:  No edema.  No cyanotic changes.  No signs of joint deformity.  No lymphedema.   NEUROLOGIC:  Cranial nerves II-XII are grossly intact.  Sensation intact.  Muscle strength and muscle tone symmetrical, 5/5 throughout.   BACK  No spinal or paraspinal tenderness.  No CVA tenderness.   SKIN:  No petechiae.  No rash.  No signs of cellulitis.   PSYCHIATRIC: Oriented to time, person, and places. Normal mood and affect. Good memory. Proper insight and judgement.       CURRENT LAB DATA REVIEWED TODAY :  DURING VISIT:   CMP, TSH are fine    2/11/20 Biopsy metastatic squamous cell carcinoma - HPV16+.      CURRENT IMAGING REVIEWED TODAY:  8/2020 PET  1. Multiple hypermetabolic lymph nodes in the neck which demonstrate mixed response since PET-CT 2/24/2020  E.g.   - Image 115: New right level 2B node, 1.6 x 1.0 cm, SUV max 10.5.  - Image 110: New right level 2B node, 0.9 x 0.7 cm, SUV max 6.1.  - Image 110: Smaller right level 2A node, 2.1 x 1.7 cm, SUV max 14.4; previously 3.6 x 3.0 cm, SUV max 13.2.  - Resolution of the previously seen hypermetabolic right level 3 lymph nodes.  2. Mildly increased FDG uptake to the palatine tonsils without appreciable mass lesion on CT images, the degree of uptake has mildly increased, slightly asymmetrically greater on  the right, SUV max 7.0 on right and 5.3 on left (series 5, image 79), previously 6.3 and 5.1 respectively.  3. Decreased FDG uptake to the numerous solid pulmonary nodules bilaterally. Resolved hypermetabolism of the previously seen hilar lymph nodes. No newly hypermetabolic lymph nodes in the chest.      2/24/20 PET/CT IMPRESSION:   1. Hypermetabolic right cervical lymphadenopathy.  2. Numerous hypermetabolic bilateral lung metastases.  3. Hypermetabolic right hilar nodes, also likely metastatic disease      2/3/20 CT Neck/Chest IMPRESSION:  1. Right cervical  "lymphadenopathy, with areas suspicious for  necrosis, highly concerning for metastatic involvement by  malignancy. Recommend direct visualization of the upper  aerodigestive tract.  2. Numerous bilateral lung nodules, suspicious for metastases.         OLD DATA REVIEWED TODAY WITH SUMMARY  dexa 2019 - low density.       ASSESSMENT AND PLAN:   1.  Presumed  H&N (right cervical LAD) with metastatic spread to lung with bilateral multiple lung nodules.   He is first-line palliative treatment with Keytruda was started when he was in [place] early May 2020.    We discussed he can use therapy side effect profiles, they can be unpredictable.  And how he should be monitored.    Due to his mixed response by PET in 8/2020.    Case was discussed at U conference, and he saw ENT, Dr. Aparicio -- \"He is still not a candidate for definitive chemoradiation given the distant disease.\"    Clinical trail is recommended with N-803 (formerly Alt-803), which is an IL-15 superagonist in combination with pembrolizumab.  It is specifically for patients who have had a positive response to keytruda but then experience progression.  This is based on 90% disease control in a similar cohort of lung cancer patients that we participated in a few years back. It is a multi-disease cohort phase 2.     He met with Dr. Veras 9/16/2020 for trial discussion.   Felt his mixed response (resolution of lung diease, and mixed response in right neck LN) could be pseudoprogression from IO or true progression. [place] decision is to do 2 more keytruda and restage    I d/w Dr. Veras, he will ask Dr. Aguila if he thinks doing SBRT to the new nodes would be feasible as a way of avoiding toxicity.     If he continues to have progression in those areas in the neck in 6 weeks time. We could still enroll him on trial - but if he does not, then we can just continue on keytruda.    He also has plan to get back to [place] for 2-3 months.   We had a long discussion today on the " timing of his tx and so on.     Will further d/w Dr. Veras.     He understands treatment is palliative in nature.  He is amazed at how good his quality of life is and he is already having some response with immunotherapy single agent.      2.  Hx of C diff.   He is probiotics Florastor. He is to continue.  He feels it is helpful.   He is to stay on probiotic for minimal 6 months.       3. osteopenia on DEXA scan 2019.  We discussed importance of vitamin D intake outdoor activity weightbearing exercise.      Total time spent 25 minutes, more than 15 minutes spent in counseling regarding his disease status, PET scan results, rationale and clinical trial recommendations, further treatment recommendations follow-up plan.

## 2020-10-05 ENCOUNTER — INFUSION THERAPY VISIT (OUTPATIENT)
Dept: INFUSION THERAPY | Facility: CLINIC | Age: 75
End: 2020-10-05
Attending: INTERNAL MEDICINE
Payer: MEDICARE

## 2020-10-05 ENCOUNTER — ONCOLOGY VISIT (OUTPATIENT)
Dept: ONCOLOGY | Facility: CLINIC | Age: 75
End: 2020-10-05
Attending: INTERNAL MEDICINE
Payer: MEDICARE

## 2020-10-05 ENCOUNTER — HOSPITAL ENCOUNTER (OUTPATIENT)
Dept: LAB | Facility: CLINIC | Age: 75
Discharge: HOME | End: 2020-10-05
Attending: INTERNAL MEDICINE | Admitting: INTERNAL MEDICINE
Payer: MEDICARE

## 2020-10-05 VITALS — SYSTOLIC BLOOD PRESSURE: 130 MMHG | DIASTOLIC BLOOD PRESSURE: 53 MMHG | HEART RATE: 58 BPM

## 2020-10-05 VITALS
DIASTOLIC BLOOD PRESSURE: 73 MMHG | HEIGHT: 72 IN | OXYGEN SATURATION: 96 % | RESPIRATION RATE: 28 BRPM | TEMPERATURE: 98.5 F | SYSTOLIC BLOOD PRESSURE: 152 MMHG | BODY MASS INDEX: 25.92 KG/M2 | HEART RATE: 62 BPM | WEIGHT: 191.4 LBS

## 2020-10-05 DIAGNOSIS — C76.0 HEAD AND NECK CANCER (H): ICD-10-CM

## 2020-10-05 DIAGNOSIS — C76.0 HEAD AND NECK CANCER (H): Primary | ICD-10-CM

## 2020-10-05 DIAGNOSIS — M85.80 OSTEOPENIA, UNSPECIFIED LOCATION: ICD-10-CM

## 2020-10-05 DIAGNOSIS — C10.9 SQUAMOUS CELL CARCINOMA OF OROPHARYNX (H): Primary | ICD-10-CM

## 2020-10-05 LAB
ALBUMIN SERPL-MCNC: 3.7 G/DL (ref 3.4–5)
ALP SERPL-CCNC: 51 U/L (ref 40–150)
ALT SERPL W P-5'-P-CCNC: 35 U/L (ref 0–70)
ANION GAP SERPL CALCULATED.3IONS-SCNC: 7 MMOL/L (ref 3–14)
AST SERPL W P-5'-P-CCNC: 28 U/L (ref 0–45)
BILIRUB SERPL-MCNC: 0.8 MG/DL (ref 0.2–1.3)
BUN SERPL-MCNC: 14 MG/DL (ref 7–30)
CALCIUM SERPL-MCNC: 9.2 MG/DL (ref 8.5–10.1)
CHLORIDE SERPL-SCNC: 107 MMOL/L (ref 94–109)
CO2 SERPL-SCNC: 24 MMOL/L (ref 20–32)
CREAT SERPL-MCNC: 0.86 MG/DL (ref 0.66–1.25)
GFR SERPL CREATININE-BSD FRML MDRD: 85 ML/MIN/{1.73_M2}
GLUCOSE SERPL-MCNC: 106 MG/DL (ref 70–99)
POTASSIUM SERPL-SCNC: 4 MMOL/L (ref 3.4–5.3)
PROT SERPL-MCNC: 7.4 G/DL (ref 6.8–8.8)
SODIUM SERPL-SCNC: 138 MMOL/L (ref 133–144)
TSH SERPL DL<=0.005 MIU/L-ACNC: 1.31 MU/L (ref 0.4–4)

## 2020-10-05 PROCEDURE — 84443 ASSAY THYROID STIM HORMONE: CPT | Mod: GZ | Performed by: INTERNAL MEDICINE

## 2020-10-05 PROCEDURE — 99214 OFFICE O/P EST MOD 30 MIN: CPT | Performed by: INTERNAL MEDICINE

## 2020-10-05 PROCEDURE — 258N000003 HC RX IP 258 OP 636: Performed by: INTERNAL MEDICINE

## 2020-10-05 PROCEDURE — 36415 COLL VENOUS BLD VENIPUNCTURE: CPT | Performed by: INTERNAL MEDICINE

## 2020-10-05 PROCEDURE — 80053 COMPREHEN METABOLIC PANEL: CPT | Performed by: INTERNAL MEDICINE

## 2020-10-05 PROCEDURE — 96413 CHEMO IV INFUSION 1 HR: CPT

## 2020-10-05 PROCEDURE — 250N000011 HC RX IP 250 OP 636: Performed by: INTERNAL MEDICINE

## 2020-10-05 PROCEDURE — 99207 PR NO CHARGE LOS: CPT

## 2020-10-05 PROCEDURE — G0463 HOSPITAL OUTPT CLINIC VISIT: HCPCS | Mod: 25

## 2020-10-05 RX ORDER — HEPARIN SODIUM,PORCINE 10 UNIT/ML
5 VIAL (ML) INTRAVENOUS
Status: CANCELLED | OUTPATIENT
Start: 2020-10-05

## 2020-10-05 RX ORDER — ALBUTEROL SULFATE 0.83 MG/ML
2.5 SOLUTION RESPIRATORY (INHALATION)
Status: CANCELLED | OUTPATIENT
Start: 2020-10-05

## 2020-10-05 RX ORDER — SODIUM CHLORIDE 9 MG/ML
1000 INJECTION, SOLUTION INTRAVENOUS CONTINUOUS PRN
Status: CANCELLED
Start: 2020-10-05

## 2020-10-05 RX ORDER — ALBUTEROL SULFATE 90 UG/1
1-2 AEROSOL, METERED RESPIRATORY (INHALATION)
Status: CANCELLED
Start: 2020-10-05

## 2020-10-05 RX ORDER — EPINEPHRINE 1 MG/ML
0.3 INJECTION, SOLUTION, CONCENTRATE INTRAVENOUS EVERY 5 MIN PRN
Status: CANCELLED | OUTPATIENT
Start: 2020-10-05

## 2020-10-05 RX ORDER — NALOXONE HYDROCHLORIDE 0.4 MG/ML
.1-.4 INJECTION, SOLUTION INTRAMUSCULAR; INTRAVENOUS; SUBCUTANEOUS
Status: CANCELLED | OUTPATIENT
Start: 2020-10-05

## 2020-10-05 RX ORDER — MEPERIDINE HYDROCHLORIDE 25 MG/ML
25 INJECTION INTRAMUSCULAR; INTRAVENOUS; SUBCUTANEOUS EVERY 30 MIN PRN
Status: CANCELLED | OUTPATIENT
Start: 2020-10-05

## 2020-10-05 RX ORDER — HEPARIN SODIUM (PORCINE) LOCK FLUSH IV SOLN 100 UNIT/ML 100 UNIT/ML
5 SOLUTION INTRAVENOUS
Status: CANCELLED | OUTPATIENT
Start: 2020-10-05

## 2020-10-05 RX ORDER — LORAZEPAM 2 MG/ML
0.5 INJECTION INTRAMUSCULAR EVERY 4 HOURS PRN
Status: CANCELLED
Start: 2020-10-05

## 2020-10-05 RX ORDER — METHYLPREDNISOLONE SODIUM SUCCINATE 125 MG/2ML
125 INJECTION, POWDER, LYOPHILIZED, FOR SOLUTION INTRAMUSCULAR; INTRAVENOUS
Status: CANCELLED
Start: 2020-10-05

## 2020-10-05 RX ORDER — DIPHENHYDRAMINE HYDROCHLORIDE 50 MG/ML
50 INJECTION INTRAMUSCULAR; INTRAVENOUS
Status: CANCELLED
Start: 2020-10-05

## 2020-10-05 RX ADMIN — SODIUM CHLORIDE 250 ML: 9 INJECTION, SOLUTION INTRAVENOUS at 15:04

## 2020-10-05 RX ADMIN — SODIUM CHLORIDE 200 MG: 9 INJECTION, SOLUTION INTRAVENOUS at 15:03

## 2020-10-05 ASSESSMENT — PAIN SCALES - GENERAL: PAINLEVEL: NO PAIN (0)

## 2020-10-05 ASSESSMENT — MIFFLIN-ST. JEOR: SCORE: 1646.31

## 2020-10-05 NOTE — LETTER
"    10/5/2020         RE: Newton Buchanan  [address]  [place] [address]        Dear Colleague,    Thank you for referring your patient, Newton Buchanan, to the [facility]. Please see a copy of my visit note below.      ONCOLOGY FOLLOW UP VISIT        PATIENT NAME: Newton Buchanan   MRN# [id number]     Date of Visit: Oct 5, 2020     YOB: 1945       REASON FOR VISIT/CC:    Stage IV metastatic squamous cell carcinoma   Maybe transferring care from [place] [facility] from [facility]Oncology from [facility]      HISTORY OF ONCOLOGY ILLNESS:    Patient had noted right neck swelling over 2 years. Has had FNAs in the past that were benign. He was followed by q 6 months.     He reported the neck mass was getting larger. Denies any pain/dysphagia/difficuly swallowing/sob/skin changes.   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     2/24/20 PET/CT found:  1. Hypermetabolic right cervical lymphadenopathy.  2. Numerous hypermetabolic bilateral lung metastases.  3. Hypermetabolic right hilar nodes, also likely metastatic disease    It was felt that with no obvious primary on PET/CT but given CK7 +, P16 positivity, and neck mass on presentation, suspicion is high that origin is still of H&N (right cervical LAD) with metastatic spread to lung with bilateral multiple lung nodules .  Pt made informed decision to proceed with single agent Keytruda due to his PD-L1 testing from [facility] with CPS of 70.  Started early May in [place].     He transferred his care to us in June 2020.     He has mixed PET response in 8/2020 in neck JOSÉ MIGUEL, and decreased lung nodules uptake.     Case was discussed at U conference, and he saw ENT, Dr. Aparicio -- \"He is still not a candidate for definitive chemoradiation given the distant disease\".     Clinical trail was recommended with N-803 (formerly Alt-803), which is an IL-15 superagonist in " "combination with pembrolizumab.  It is specifically for patients who have had a positive response to keytruda but then experience progression. This is based on 90% disease control in a similar cohort of lung cancer patients that we participated in a few years back.   It is a multi-disease cohort phase 2.         INTERVAL HISTORY:  He met with Dr. Veras 2020 for trial discussion.   Felt his mixed response (resolution of lung diease, and mixed response in right neck LN) could be pseudoprogression from IO or true progression. [place] decision is to do 2 more keytruda and restage.       PAST MEDICAL HISTORY  Obstructive sleep apnea   HTN  Diverticulosis of colon. Esophageal reflux disease   Admitted to hospital 2020 for lower GI bleed, ischemic colitis,  c.dff repeat testing 2020 positive, s/p vancomycin.   Ascending polyp  Malignant neoplasm of prostate / PSA 4.79, Gl 3+3 s/p proctectomy.      MEDICATIONS/ALLERY:  Reviewed in Epic system.        SOCIAL HISTORY:    Single. Denies smoking, one drink per night.   [place] is retired in [place], maybe moving back to [place].  He served in [employer] in [place] as [employer] .   Denies smoking/. He drinks daily with a couple of michael with dinner.       FAMILY HISTORY:    Denies any FH of cancers       REVIEW OF SYSTEMS:   Reports nl BM.   Extra fatigue.        PHYSICAL EXAMINATION:   Blood pressure (!) 152/73, pulse 62, temperature 98.5  F (36.9  C), temperature source Oral, resp. rate 28, height 1.821 m (5' 11.69\"), weight 86.8 kg (191 lb 6.4 oz), SpO2 96 %.      GENERAL APPEARANCE:  looks like stated age, very pleasant, not in acute distress.      ECO     ENT, MOUTH: Pupils are equally reactive to light.  Sclerae are anicteric.  Moist oral mucosa without lesion or ulcer.   Negative pharynx.  No oral thrush.   NECK:  Supple.  No jugular venous distention.  Thyroid is not palpable. Right cervical LN fullness at level II.   LYMPH NODES:  negative " superficial JOSÉ MIGUEL else where   CARDIOVASCULAR:  S1, S2 regular with no murmurs or gallops.  No carotid or abdominal bruits.   PULMONARY:  Lungs are clear to auscultation and percussion bilaterally.  There is no wheezing or rhonchi.   GASTROINTESTINAL:  Abdomen is soft, nontender.  No hepatosplenomegaly.  No signs of ascites.  No mass appreciable.   MUSCULOSKELETAL/EXTREMITIES:  No edema.  No cyanotic changes.  No signs of joint deformity.  No lymphedema.   NEUROLOGIC:  Cranial nerves II-XII are grossly intact.  Sensation intact.  Muscle strength and muscle tone symmetrical, 5/5 throughout.   BACK  No spinal or paraspinal tenderness.  No CVA tenderness.   SKIN:  No petechiae.  No rash.  No signs of cellulitis.   PSYCHIATRIC: Oriented to time, person, and places. Normal mood and affect. Good memory. Proper insight and judgement.       CURRENT LAB DATA REVIEWED TODAY :  DURING VISIT:   CMP, TSH are fine    2/11/20 Biopsy metastatic squamous cell carcinoma - HPV16+.      CURRENT IMAGING REVIEWED TODAY:  8/2020 PET  1. Multiple hypermetabolic lymph nodes in the neck which demonstrate mixed response since PET-CT 2/24/2020  E.g.   - Image 115: New right level 2B node, 1.6 x 1.0 cm, SUV max 10.5.  - Image 110: New right level 2B node, 0.9 x 0.7 cm, SUV max 6.1.  - Image 110: Smaller right level 2A node, 2.1 x 1.7 cm, SUV max 14.4; previously 3.6 x 3.0 cm, SUV max 13.2.  - Resolution of the previously seen hypermetabolic right level 3 lymph nodes.  2. Mildly increased FDG uptake to the palatine tonsils without appreciable mass lesion on CT images, the degree of uptake has mildly increased, slightly asymmetrically greater on  the right, SUV max 7.0 on right and 5.3 on left (series 5, image 79), previously 6.3 and 5.1 respectively.  3. Decreased FDG uptake to the numerous solid pulmonary nodules bilaterally. Resolved hypermetabolism of the previously seen hilar lymph nodes. No newly hypermetabolic lymph nodes in the  "chest.      2/24/20 PET/CT IMPRESSION:   1. Hypermetabolic right cervical lymphadenopathy.  2. Numerous hypermetabolic bilateral lung metastases.  3. Hypermetabolic right hilar nodes, also likely metastatic disease      2/3/20 CT Neck/Chest IMPRESSION:  1. Right cervical lymphadenopathy, with areas suspicious for  necrosis, highly concerning for metastatic involvement by  malignancy. Recommend direct visualization of the upper  aerodigestive tract.  2. Numerous bilateral lung nodules, suspicious for metastases.         OLD DATA REVIEWED TODAY WITH SUMMARY  dexa 2019 - low density.       ASSESSMENT AND PLAN:   1.  Presumed  H&N (right cervical LAD) with metastatic spread to lung with bilateral multiple lung nodules.   He is first-line palliative treatment with Keytruda was started when he was in [place] early May 2020.    We discussed he can use therapy side effect profiles, they can be unpredictable.  And how he should be monitored.    Due to his mixed response by PET in 8/2020.    Case was discussed at U conference, and he saw ENT, Dr. Aparicio -- \"He is still not a candidate for definitive chemoradiation given the distant disease.\"    Clinical trail is recommended with N-803 (formerly Alt-803), which is an IL-15 superagonist in combination with pembrolizumab.  It is specifically for patients who have had a positive response to keytruda but then experience progression.  This is based on 90% disease control in a similar cohort of lung cancer patients that we participated in a few years back. It is a multi-disease cohort phase 2.     He met with Dr. Veras 9/16/2020 for trial discussion.   Felt his mixed response (resolution of lung diease, and mixed response in right neck LN) could be pseudoprogression from IO or true progression. [place] decision is to do 2 more keytruda and restage    I d/w Dr. Veras, he will ask Dr. Aguila if he thinks doing SBRT to the new nodes would be feasible as a way of avoiding toxicity.   " "  If he continues to have progression in those areas in the neck in 6 weeks time. We could still enroll him on trial - but if he does not, then we can just continue on keytruda.    He also has plan to get back to [place] for 2-3 months.   We had a long discussion today on the timing of his tx and so on.     Will further d/w Dr. Veras.     He understands treatment is palliative in nature.  He is amazed at how good his quality of life is and he is already having some response with immunotherapy single agent.      2.  Hx of C diff.   He is probiotics Florastor. He is to continue.  He feels it is helpful.   He is to stay on probiotic for minimal 6 months.       3. osteopenia on DEXA scan 2019.  We discussed importance of vitamin D intake outdoor activity weightbearing exercise.      Total time spent 25 minutes, more than 15 minutes spent in counseling regarding his disease status, PET scan results, rationale and clinical trial recommendations, further treatment recommendations follow-up plan.    Oncology Rooming Note    October 5, 2020 1:30 PM   Newton Buchanan is a 74 year old male who presents for:    Chief Complaint   Patient presents with     Oncology Clinic Visit     Return Squamous cell carcinoma of oropharynx,      Initial Vitals: BP (!) 152/73 (BP Location: Left arm, Patient Position: Sitting, Cuff Size: Adult Regular)   Pulse 62   Temp 98.5  F (36.9  C) (Oral)   Resp 28   Ht 1.821 m (5' 11.69\")   Wt 86.8 kg (191 lb 6.4 oz)   SpO2 96%   BMI 26.18 kg/m   Estimated body mass index is 26.18 kg/m  as calculated from the following:    Height as of this encounter: 1.821 m (5' 11.69\").    Weight as of this encounter: 86.8 kg (191 lb 6.4 oz). Body surface area is 2.1 meters squared.  No Pain (0) Comment: Data Unavailable   No LMP for male patient.  Allergies reviewed: Yes  Medications reviewed: Yes    Medications: Medication refills not needed today.  Pharmacy name entered into [employer]: [id number] - " [place], [place] - [address] AT [address]    Clinical concerns: Return Squamous cell carcinoma of oropharynx.       Sandy Carrillo [facility]                  Again, thank you for allowing me to participate in the care of your patient.        Sincerely,        Rosetta Chen MD, MD

## 2020-10-05 NOTE — PROGRESS NOTES
"Oncology Rooming Note    October 5, 2020 1:30 PM   Newton Buchanan is a 74 year old male who presents for:    Chief Complaint   Patient presents with     Oncology Clinic Visit     Return Squamous cell carcinoma of oropharynx,      Initial Vitals: BP (!) 152/73 (BP Location: Left arm, Patient Position: Sitting, Cuff Size: Adult Regular)   Pulse 62   Temp 98.5  F (36.9  C) (Oral)   Resp 28   Ht 1.821 m (5' 11.69\")   Wt 86.8 kg (191 lb 6.4 oz)   SpO2 96%   BMI 26.18 kg/m   Estimated body mass index is 26.18 kg/m  as calculated from the following:    Height as of this encounter: 1.821 m (5' 11.69\").    Weight as of this encounter: 86.8 kg (191 lb 6.4 oz). Body surface area is 2.1 meters squared.  No Pain (0) Comment: Data Unavailable   No LMP for male patient.  Allergies reviewed: Yes  Medications reviewed: Yes    Medications: Medication refills not needed today.  Pharmacy name entered into [employer]: [id number] - [place] [id number] AT [address]    Clinical concerns: Return Squamous cell carcinoma of oropharynx.       Sandy Carrillo CMA              "

## 2020-10-05 NOTE — PROGRESS NOTES
Infusion Nursing Note:  Newton Buchanan presents today for Keytruda C6D1.    Patient seen by provider today: Yes: Dr. Chen   present during visit today: Not Applicable.    Note: N/A.    Intravenous Access:  Peripheral IV placed.    Treatment Conditions:  Lab Results   Component Value Date     10/05/2020                   Lab Results   Component Value Date    POTASSIUM 4.0 10/05/2020           No results found for: MAG         Lab Results   Component Value Date    CR 0.86 10/05/2020                   Lab Results   Component Value Date    JÚNIOR 9.2 10/05/2020                Lab Results   Component Value Date    BILITOTAL 0.8 10/05/2020           Lab Results   Component Value Date    ALBUMIN 3.7 10/05/2020                    Lab Results   Component Value Date    ALT 35 10/05/2020           Lab Results   Component Value Date    AST 28 10/05/2020   Results reviewed, labs MET treatment parameters, ok to proceed with treatment.    Post Infusion Assessment:  Patient tolerated infusion without incident.  Site patent and intact, free from redness, edema or discomfort.  No evidence of extravasations.  Access discontinued per protocol.     Discharge Plan:   Discharge instructions reviewed with: Patient.  Patient and/or family verbalized understanding of discharge instructions and all questions answered.  AVS to patient via [email or web address].  Patient will return 10/26/2020 for next appointment.   Patient discharged in stable condition accompanied by: self.  Departure Mode: Ambulatory.    Mercy Carvalho RN

## 2020-10-15 ENCOUNTER — OFFICE VISIT (OUTPATIENT)
Dept: RADIATION ONCOLOGY | Facility: CLINIC | Age: 75
End: 2020-10-15
Attending: INTERNAL MEDICINE
Payer: MEDICARE

## 2020-10-15 VITALS — BODY MASS INDEX: 25.99 KG/M2 | WEIGHT: 190 LBS | SYSTOLIC BLOOD PRESSURE: 149 MMHG | DIASTOLIC BLOOD PRESSURE: 76 MMHG

## 2020-10-15 DIAGNOSIS — C10.9 OROPHARYNGEAL CANCER (H): ICD-10-CM

## 2020-10-15 PROCEDURE — G0463 HOSPITAL OUTPT CLINIC VISIT: HCPCS | Performed by: RADIOLOGY

## 2020-10-15 ASSESSMENT — ENCOUNTER SYMPTOMS
CONSTIPATION: 0
SORE THROAT: 0
DIZZINESS: 0
TREMORS: 0
SEIZURES: 0
HEMATURIA: 0
VOMITING: 0
ABDOMINAL PAIN: 0
FEVER: 0
CHILLS: 0
BLURRED VISION: 0
SENSORY CHANGE: 0
EYE PAIN: 0
BACK PAIN: 1
PALPITATIONS: 0
NERVOUS/ANXIOUS: 0
DEPRESSION: 0
FOCAL WEAKNESS: 0
HEMOPTYSIS: 0
WEAKNESS: 0
DOUBLE VISION: 0
PHOTOPHOBIA: 0
MYALGIAS: 0
CLAUDICATION: 0
SHORTNESS OF BREATH: 0
FREQUENCY: 0
EYE DISCHARGE: 0
DYSURIA: 0
DIAPHORESIS: 0
FLANK PAIN: 0
DIARRHEA: 0
PND: 0
NAUSEA: 0
LOSS OF CONSCIOUSNESS: 0
WEIGHT LOSS: 0
TINGLING: 0
WHEEZING: 0
BRUISES/BLEEDS EASILY: 0
NECK PAIN: 0
BLOOD IN STOOL: 0
STRIDOR: 0
SINUS PAIN: 0
SPEECH CHANGE: 0
POLYDIPSIA: 0
INSOMNIA: 0
EYE REDNESS: 0
COUGH: 0
FALLS: 0
MEMORY LOSS: 0
SPUTUM PRODUCTION: 0
HEADACHES: 0
HEARTBURN: 0
ORTHOPNEA: 0
HALLUCINATIONS: 0

## 2020-10-15 ASSESSMENT — LIFESTYLE VARIABLES: SUBSTANCE_ABUSE: 0

## 2020-10-15 NOTE — PROGRESS NOTES
Department of Radiation Oncology  [facility]  [address] [place] [address]  [phone]       Consultation Note    Name: Newton Buchanan MRN: [id number]   : 1945   Date of Service: 10/15/2020  Referring: Dr. Chen     Reason for consultation: squamous cell carcinoma of unknown primary, p16+, [id number]    History of Present Illness   Mr. Buchanan is a 74 year old male with a known diagnosis of squamous cell carcinoma of unknown primary, p16+, [id number]. He received his initial oncology care outside of [facility]. He has had right neck swelling for over 2 years, and had reportedly undergone FNAs in the past that were not diagnostic. His neck swelling began to grow over last winter, and a CT neck and chest on 2/3/20 found a 3.5 cm right level IIa-III conglomerate lymphadenopathy, and numerous bilateral lung nodules. He had a biopsy on 20, which found metastatic non-keratinizing squamous cell carcinoma, p16+, PD-L1+ (fidings confirmed by our institutional review, [id number]). Staging PET/CT on 20 demonstrated increased FDG uptake associated with the right cervical node and the lung nodules, and also noted multiple right hilar nodes. His disease was felt to be most consistent with a head and neck primary, and he was started on Pembrolizumab in 2020.    In 2020, Mr. Buchanan relocated to [place] and established care with Dr. Chen of Medical Oncology at [facility]. Pembrolizumab was continued. Clinically, his right neck mass was noted to have shrunk in response. A re-staging PET/CT obtained on 20 confirmed disease response to treatment as the previously noted right level IIa-III adenopathy and the lung nodules have reduced in size and in FDG uptake. However, two new level IIb nodes with increased FDG uptake were noted. Also, there is mildly increased FDG uptake to the palatine tonsils, R>L, favoring inflammatory uptake but cannot rule out a occult primary. His  case was discussed in the [facility] Multidisciplinary Head and Neck Conference, and he was felt to be a candidate for clinical trial. He saw Dr. Veras of Medical Oncology at [facility] on 9/16/20 and his treatment options were reviewed, including enrollment of clinical trial, addition of chemotherapy to Pembrolizumab, and potentially use radiotherapy. Mr. Buchanan favored continuing of his monotherapy with Pembrolizumab at this point.     Mr. Buchanan presents today for initial consultation. He is doing well. He reports skin tightness from his right neck mass, but denies headache, nausea, reduced range of motion, pain/difficulty with swallowing, weakness/numbness, or weight loss. Of note, Mr. 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    Past Medical History:    HTN    Prostate cancer, s/p prostatectomy in 2007    SqCC of unknown primary per HPI    Chemotherapy History:  Per HPI, has been on pembrolizumab since 05/2020.    Radiation History:  no    Implanted Cardiac Devices: No    Medications:    Current Outpatient Medications   Medication     aspirin 81 MG tablet     fluticasone (FLONASE) 50 MCG/ACT nasal spray     losartan (COZAAR) 25 MG tablet     pembrolizumab (KEYTRUDA) 25 MG/ML     pravastatin (PRAVACHOL) 20 MG tablet     prochlorperazine (COMPAZINE) 10 MG tablet     saccharomyces boulardii (FLORASTOR) 250 MG capsule     sildenafil (VIAGRA) 100 MG tablet     VITAMIN D, CHOLECALCIFEROL, PO     No current facility-administered medications for this visit.      Allergies:     Allergies   Allergen Reactions     Atorvastatin      Other reaction(s): Confusion  unusual behavior and dizziness       Social History:  He is a  "retired  in [place].   He is a never smoker.    He drinks 1-2 glasses of wine per day but says he has never been a \"heavy\" drinker.    Family History:    No partinent history of cancer    Review of Systems   A 10-point review of systems was performed. Pertinent findings are noted in the HPI.    Physical Exam   ECOG Status: 0    VITALS: BP (!) 149/76   Wt 86.2 kg (190 lb)   BMI 25.99 kg/m    GEN: Appears well, alert, oriented, and in NAD  HEENT: EOMI, normal conjunctiva, MMM  NECK: Full ROM. There is a palpable, firm mass of 2-3 cm in the right level Ib/IIa region, and there is another smaller, ~1 cm nodule posteroinferiorly in the right level IIb/V region. No palpable left lymphadenopathy.   CV: Good distal perfusion.  RESP: Breathing comfortably on room air.  SKIN: Normal color and turgor.  NEURO: No focal deficits, CN III-XII grossly intact, normal and symmetric motor and sensory exam in bilateral upper and lower extremities, normal gait  PSYCH: Appropriate mood and affect    Imaging/Path/Labs     Imaging: reviewed    Path: reviewed    Labs: reviewed    Assessment      Mr. Buchanan is a 74 year old male with squamous cell carcinoma of unknown primary, p16+, [id number]. He has been started on Pembrolizumab since 05/2020. Repeat PET/CT in 08/2020 revealed mixed response. In regards to his disease burden in his right neck, his initial right level IIa/III lymphadenopathy showed partial response, but there are two new nodules in the right level IIb area, new from his initial PET/CT. He is an appropriate candidate who can benefit from palliative radiation.       Plan     We had a detailed discussion with Mr. Buchanan with regard to the role of palliative radiation in his care. Given the mixed response of his disease to pembrolizumab, we think there is a role for radiation, and our hope is to achieve disease control. We explained that there are several palliative radiation regimens that can be considered " in his case, and the key differences amongst these regimens are the likelihood of achieving durable disease control, and the severity of the associated side effects. In particular, we reviewed the two palliative radiation regimens that we would recommend, namely 3000 cGy in 10 fractions and 5000 cGy in 20 fractions with a 1-week break after 10th fraction. We compared and contrasted the benefits and risks of side effects associated with each regimen, and also went over the logistics. In regards to his challenge with commute, we explained that we are more than happy to refer him to see our colleagues up [place] in [facility], who can see him for consultation and provide radiation treatment. At the end of our discussion, Mr. Buchanan expressed that he had all of his questions addressed, and he is willing to explore radiation further. We will therefore make referral for him to be seen by Dr. Johns, the staff radiation oncologist at [facility].    The patient was seen and discussed with staff, Dr. Aguila.    Lori Wynn MD  Resident, PGY-3  Department of Radiation Oncology  [facility]

## 2020-10-15 NOTE — PROGRESS NOTES
HPI  INITIAL PATIENT ASSESSMENT    Diagnosis: Oropharyngeal Cancer    Prior radiation therapy: None    Prior chemotherapy: None    Prior hormonal therapy:No    Pain Eval:  Denies    Psychosocial  Living arrangements: Lives alone  Fall Risk: independent   referral needs: Not needed    Advanced Directive: No  Implantable Cardiac Device? No      Nurse face-to-face time: Level 5:  over 15 min face to face time    Review of Systems   Constitutional: Positive for malaise/fatigue. Negative for chills, diaphoresis, fever and weight loss.   HENT: Negative for congestion, ear discharge, ear pain, hearing loss, nosebleeds, sinus pain, sore throat and tinnitus.    Eyes: Negative for blurred vision, double vision, photophobia, pain, discharge and redness.   Respiratory: Negative for cough, hemoptysis, sputum production, shortness of breath, wheezing and stridor.    Cardiovascular: Negative for chest pain, palpitations, orthopnea, claudication, leg swelling and PND.   Gastrointestinal: Negative for abdominal pain, blood in stool, constipation, diarrhea, heartburn, melena, nausea and vomiting.   Genitourinary: Negative for dysuria, flank pain, frequency, hematuria and urgency.   Musculoskeletal: Positive for back pain. Negative for falls, joint pain, myalgias and neck pain.   Skin: Negative for itching and rash.   Neurological: Negative for dizziness, tingling, tremors, sensory change, speech change, focal weakness, seizures, loss of consciousness, weakness and headaches.   Endo/Heme/Allergies: Negative for environmental allergies and polydipsia. Does not bruise/bleed easily.   Psychiatric/Behavioral: Negative for depression, hallucinations, memory loss, substance abuse and suicidal ideas. The patient is not nervous/anxious and does not have insomnia.

## 2020-10-15 NOTE — LETTER
10/15/2020         RE: Newton Buchanan  [address]  [place] [address]        [facility]  INITIAL PATIENT ASSESSMENT    Diagnosis: Oropharyngeal Cancer    Prior radiation therapy: None    Prior chemotherapy: None    Prior hormonal therapy:No    Pain Eval:  Denies    Psychosocial  Living arrangements: Lives alone  Fall Risk: independent   referral needs: Not needed    Advanced Directive: No  Implantable Cardiac Device? No      Nurse face-to-face time: Level 5:  over 15 min face to face time    Review of Systems   Constitutional: Positive for malaise/fatigue. Negative for chills, diaphoresis, fever and weight loss.   HENT: Negative for congestion, ear discharge, ear pain, hearing loss, nosebleeds, sinus pain, sore throat and tinnitus.    Eyes: Negative for blurred vision, double vision, photophobia, pain, discharge and redness.   Respiratory: Negative for cough, hemoptysis, sputum production, shortness of breath, wheezing and stridor.    Cardiovascular: Negative for chest pain, palpitations, orthopnea, claudication, leg swelling and PND.   Gastrointestinal: Negative for abdominal pain, blood in stool, constipation, diarrhea, heartburn, melena, nausea and vomiting.   Genitourinary: Negative for dysuria, flank pain, frequency, hematuria and urgency.   Musculoskeletal: Positive for back pain. Negative for falls, joint pain, myalgias and neck pain.   Skin: Negative for itching and rash.   Neurological: Negative for dizziness, tingling, tremors, sensory change, speech change, focal weakness, seizures, loss of consciousness, weakness and headaches.   Endo/Heme/Allergies: Negative for environmental allergies and polydipsia. Does not bruise/bleed easily.   Psychiatric/Behavioral: Negative for depression, hallucinations, memory loss, substance abuse and suicidal ideas. The patient is not nervous/anxious and does not have insomnia.                  Attending addendum:   I saw and examined the patient with the  resident and agree with the documented plan of care.    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 He has been treated with Pembrolizumab monotherapy which he has tolerated very well. He has had an excellent response to his metastatic pulmonary disease as well as most of his right cervical adenopathy with the exception of some progressive adenopathy within right level II.      I discussed the use of palliative radiotherapy for improved local control of his right cervical richard disease in order to facilitate the continued use of Pembrolizumab for ongoing control of his remaining systemic disease burden. In light of his excellent performance status and minimal systemic disease burden at this time, I discussed that I would consider a more aggressive palliative regimen for improved local control of his disease while at the same time balancing this against the toxicities of treatment. As it has been approximately 2 months since his last PET scan, I discussed consideration of repeat imaging prior to initiation of radiotherapy to confirm the absence of progressive disease outside of the head and neck. He does live some distance from the [place] which would make the daily commute for treatment somewhat difficult. He does, however, live close to our treatment facility in [place], [place]. I have therefore made arrangements for him to be seen by my colleague, Dr. Johns, for treatment locally within the coming weeks.    Alban Aguila MD/PhD    Dept of Radiation Oncology  [facility]           Department of Radiation Oncology  [address] [address]  [phone]       Consultation Note    Name: Newton Buchanan MRN: [id number]   : 1945   Date of Service: 10/15/2020  Referring: Dr. Shelley Chen / medical oncology      Reason for consultation: cT0 N1 M1 p16-positive squamous cell carcinoma of the head and neck    History of Present Illness   Mr. Buchanan is a 74 year old male with a known diagnosis of a p16-positive cT0 N1 M1 squamous cell carcinoma of suspected head and neck origin. He was initially diagnosed after presenting with a 2 year history of right neck swelling. He had undergone prior FNAs which were non-diagnostic but after his symptoms progressed, he underwent an excisional biopsy in 2/2020 which revealed a p16-positive squamous cell carcinoma with high PD-L1 expression. Staging imaging including a 2/27/2020 PET/CT revealed FDG avid right cervical adenopathy within levels II/III as well as numerous small pulmonary and hilar metastases. Following a discussion on his treatment options, he was started on single-agent Pembrolizumab in 5/2020.    In 06/2020, Mr. Buchanan relocated to [place] and established care with Dr. Chen of Medical Oncology at [facility]. Pembrolizumab was continued. Clinically, his right neck mass was noted to have shrunk in response. A re-staging PET/CT obtained on 8/13/20 and confirmed his disease response to treatment with a decrease in size of the previous right level II/III adenopathy and near-resolution of the pulmonary disease. There were, however, two new level IIb nodes with increased FDG uptake noted concerning for progressive disease. Also, there was mildly increased FDG uptake within the palatine tonsils, R>L, favoring inflammatory uptake but could not rule out an occult primary. His case was discussed in the [facility]'s Multidisciplinary Head and Neck Conference, where he was felt to be a candidate for potential clinical trial enrollment. He saw Dr. Veras of Medical Oncology at [facility] on 9/16/20 and his treatment options were reviewed, including clinical trial options, the addition of chemotherapy to Pembrolizumab, and potentially use radiotherapy. Mr. Buchanan ultimately  "favored continuing of his monotherapy with Pembrolizumab.     Mr. Buchanan presents today for initial consultation. He is doing well. He reports skin tightness from his right neck mass, but denies headache, nausea, reduced range of motion, pain/difficulty with swallowing, weakness/numbness, or weight loss. Of note, Mr. 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    Past Medical History:    HTN    Prostate cancer, s/p prostatectomy in 2007    SqCC of unknown primary per HPI    Chemotherapy History:  Per HPI, has been on Pembrolizumab since 05/2020.    Radiation History:  No    Implanted Cardiac Devices: No    Medications:  Current Outpatient Medications   Medication     aspirin 81 MG tablet     fluticasone (FLONASE) 50 MCG/ACT nasal spray     losartan (COZAAR) 25 MG tablet     pembrolizumab (KEYTRUDA) 25 MG/ML     pravastatin (PRAVACHOL) 20 MG tablet     prochlorperazine (COMPAZINE) 10 MG tablet     saccharomyces boulardii (FLORASTOR) 250 MG capsule     sildenafil (VIAGRA) 100 MG tablet     VITAMIN D, CHOLECALCIFEROL, PO     No current facility-administered medications for this visit.      Allergies:  Allergies   Allergen Reactions     Atorvastatin      Other reaction(s): Confusion  unusual behavior and dizziness       Social History:  He is a retired  in [place].   He is a never smoker.    He drinks 1-2 glasses of wine per day but says he has never been a \"heavy\" drinker.    Family History:    No pertinent history of cancer    Review of Systems   A 10-point review of systems was performed. Pertinent findings are noted in the HPI.    Physical Exam   ECOG Status: 0    VITALS: BP (!) 149/76   Wt 86.2 kg (190 lb)   " BMI 25.99 kg/m    GEN: Appears well, alert, oriented, and in NAD  HEENT: EOMI, normal conjunctiva, MMM. Mild fullness of the right tonsil. No obvious exophytic lesions.  NECK: Full ROM. There is a palpable, firm mass of 2-3 cm in the right level Ib/IIa region, and there is another smaller, ~1 cm nodule posteroinferiorly in the right posterior level IIb/V region. No palpable left lymphadenopathy.   CV: Good distal perfusion.  RESP: Breathing comfortably on room air.  SKIN: Normal color and turgor.  NEURO: No focal deficits, CN III-XII grossly intact, normal and symmetric motor and sensory exam in bilateral upper and lower extremities, normal gait  PSYCH: Appropriate mood and affect    Flexible Fiberoptic Nasopharyngoscopy:  Consent for fiberoptic laryngoscopy was obtained and I confirmed correctness of procedure and identity of patient. Fiberoptic laryngoscopy was indicated due to pre-radiotherapy disease evaluation. The fiberoptic laryngoscope was passed through the right naris under endoscopic vision. The turbinates were normal. The inferior and middle meati were clear without purulence, masses, or polyps. The nasopharynx was clear. The Eustachian tubes were clear. The soft palate appeared normal with good mobility.  Advancement of the scope in the oropharynx revealed moderate enlargement of the right tonsil in comparison to the left with no obvious exophytic lesions.  The base of tongue was normal-appearing bilaterally as was the vallecula and epiglottis. The cords were normal bilaterally, the piriforms were clear and there was no pooling of secretions within the hypopharynx. The scope was then removed and the procedure was terminated without incident.     Imaging/Path/Labs   Imaging: Reviewed    Path: Reviewed    Labs: Reviewed    Assessment    Mr. Buchanan is a 74 year old male with a squamous cell carcinoma of unknown primary origin, p16+, cT0 N1 M1. He has been started on Pembrolizumab since 05/2020. Repeat  PET/CT in 08/2020 revealed mixed response. In regards to his disease burden in his right neck, his initial right level IIa/III lymphadenopathy showed partial response, but there are two new nodules in the right level IIb area, new from his initial PET/CT. He is an appropriate candidate who can benefit from palliative radiation.       Plan   We had a detailed discussion with Mr. Buchanan with regard to the role of palliative radiation in his care. Given the mixed response of his disease to Pembrolizumab, we think there is a role for radiation, and our hope is to achieve disease control. We explained that there are several palliative radiation regimens that can be considered in his case, and the key differences amongst these regimens are the likelihood of achieving durable disease control, and the severity of the associated side effects. In particular, we reviewed several palliative radiotherapy options as well as the logistics and anticipated outcomes of each.     In regards to his challenge with the commute, we explained that we are more than happy to refer him to see our colleagues in [facility], who can see him for consultation and provide radiation treatment. At the end of our discussion, Mr. Buchanan expressed that he had all of his questions addressed, and he is willing to explore radiation further. We will therefore make referral for him to be seen by Dr. Johns, the staff radiation oncologist at [facility].    The patient was seen and discussed with staff, Dr. Aguila.    Lori Wynn MD  Resident, PGY-3  Department of Radiation Oncology  [facility]

## 2020-10-16 NOTE — PROGRESS NOTES
Attending addendum:   I saw and examined the patient with the resident and agree with the documented plan of care.    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 He has been treated with Pembrolizumab monotherapy which he has tolerated very well. He has had an excellent response to his metastatic pulmonary disease as well as most of his right cervical adenopathy with the exception of some progressive adenopathy within right level II.      I discussed the use of palliative radiotherapy for improved local control of his right cervical richard disease in order to facilitate the continued use of Pembrolizumab for ongoing control of his remaining systemic disease burden. In light of his excellent performance status and minimal systemic disease burden at this time, I discussed that I would consider a more aggressive palliative regimen for improved local control of his disease while at the same time balancing this against the toxicities of treatment. As it has been approximately 2 months since his last PET scan, I discussed consideration of repeat imaging prior to initiation of radiotherapy to confirm the absence of progressive disease outside of the head and neck. He does live some distance from the [place] which would make the daily commute for treatment somewhat difficult. He does, however, live close to our treatment facility in [place], [place]. I have therefore made arrangements for him to be seen by my colleague, Dr. Johns, for treatment locally within the coming weeks.    Alban Aguila MD/PhD    Dept of Radiation Oncology  [facility]           Department of Radiation Oncology  [address] [address]  [phone]       Consultation Note    Name: Newton Buchanan MRN: [id number]   : 1945   Date of  Service: 10/15/2020  Referring: Dr. Shelley Chen / medical oncology     Reason for consultation: cT0 N1 M1 p16-positive squamous cell carcinoma of the head and neck    History of Present Illness   Mr. Buchanan is a 74 year old male with a known diagnosis of a p16-positive cT0 N1 M1 squamous cell carcinoma of suspected head and neck origin. He was initially diagnosed after presenting with a 2 year history of right neck swelling. He had undergone prior FNAs which were non-diagnostic but after his symptoms progressed, he underwent an excisional biopsy in 2/2020 which revealed a p16-positive squamous cell carcinoma with high PD-L1 expression. Staging imaging including a 2/27/2020 PET/CT revealed FDG avid right cervical adenopathy within levels II/III as well as numerous small pulmonary and hilar metastases. Following a discussion on his treatment options, he was started on single-agent Pembrolizumab in 5/2020.    In 06/2020, Mr. Buchanan relocated to [place] and established care with Dr. Chen of Medical Oncology at [facility]. Pembrolizumab was continued. Clinically, his right neck mass was noted to have shrunk in response. A re-staging PET/CT obtained on 8/13/20 and confirmed his disease response to treatment with a decrease in size of the previous right level II/III adenopathy and near-resolution of the pulmonary disease. There were, however, two new level IIb nodes with increased FDG uptake noted concerning for progressive disease. Also, there was mildly increased FDG uptake within the palatine tonsils, R>L, favoring inflammatory uptake but could not rule out an occult primary. His case was discussed in the [facility]'s Multidisciplinary Head and Neck Conference, where he was felt to be a candidate for potential clinical trial enrollment. He saw Dr. Veras of Medical Oncology at [facility] on 9/16/20 and his treatment options were reviewed, including clinical trial options, the addition of chemotherapy to  "Pembrolizumab, and potentially use radiotherapy. Mr. Buchanan ultimately favored continuing of his monotherapy with Pembrolizumab.     Mr. Buchanan presents today for initial consultation. He is doing well. He reports skin tightness from his right neck mass, but denies headache, nausea, reduced range of motion, pain/difficulty with swallowing, weakness/numbness, or weight loss. Of note, Mr. 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    Past Medical History:    HTN    Prostate cancer, s/p prostatectomy in 2007    SqCC of unknown primary per HPI    Chemotherapy History:  Per HPI, has been on Pembrolizumab since 05/2020.    Radiation History:  No    Implanted Cardiac Devices: No    Medications:  Current Outpatient Medications   Medication     aspirin 81 MG tablet     fluticasone (FLONASE) 50 MCG/ACT nasal spray     losartan (COZAAR) 25 MG tablet     pembrolizumab (KEYTRUDA) 25 MG/ML     pravastatin (PRAVACHOL) 20 MG tablet     prochlorperazine (COMPAZINE) 10 MG tablet     saccharomyces boulardii (FLORASTOR) 250 MG capsule     sildenafil (VIAGRA) 100 MG tablet     VITAMIN D, CHOLECALCIFEROL, PO     No current facility-administered medications for this visit.      Allergies:  Allergies   Allergen Reactions     Atorvastatin      Other reaction(s): Confusion  unusual behavior and dizziness       Social History:  He is a retired  in [place].   He is a never smoker.    He drinks 1-2 glasses of wine per day but says he has never been a \"heavy\" drinker.    Family History:    No pertinent history of cancer    Review of Systems   A 10-point review of systems was performed. Pertinent findings are noted in the HPI.    Physical " Exam   ECOG Status: 0    VITALS: BP (!) 149/76   Wt 86.2 kg (190 lb)   BMI 25.99 kg/m    GEN: Appears well, alert, oriented, and in NAD  HEENT: EOMI, normal conjunctiva, MMM. Mild fullness of the right tonsil. No obvious exophytic lesions.  NECK: Full ROM. There is a palpable, firm mass of 2-3 cm in the right level Ib/IIa region, and there is another smaller, ~1 cm nodule posteroinferiorly in the right posterior level IIb/V region. No palpable left lymphadenopathy.   CV: Good distal perfusion.  RESP: Breathing comfortably on room air.  SKIN: Normal color and turgor.  NEURO: No focal deficits, CN III-XII grossly intact, normal and symmetric motor and sensory exam in bilateral upper and lower extremities, normal gait  PSYCH: Appropriate mood and affect    Flexible Fiberoptic Nasopharyngoscopy:  Consent for fiberoptic laryngoscopy was obtained and I confirmed correctness of procedure and identity of patient. Fiberoptic laryngoscopy was indicated due to pre-radiotherapy disease evaluation. The fiberoptic laryngoscope was passed through the right naris under endoscopic vision. The turbinates were normal. The inferior and middle meati were clear without purulence, masses, or polyps. The nasopharynx was clear. The Eustachian tubes were clear. The soft palate appeared normal with good mobility.  Advancement of the scope in the oropharynx revealed moderate enlargement of the right tonsil in comparison to the left with no obvious exophytic lesions.  The base of tongue was normal-appearing bilaterally as was the vallecula and epiglottis. The cords were normal bilaterally, the piriforms were clear and there was no pooling of secretions within the hypopharynx. The scope was then removed and the procedure was terminated without incident.     Imaging/Path/Labs   Imaging: Reviewed    Path: Reviewed    Labs: Reviewed    Assessment    Mr. Buchanan is a 74 year old male with a squamous cell carcinoma of unknown primary origin, p16+,  cT0 N1 M1. He has been started on Pembrolizumab since 05/2020. Repeat PET/CT in 08/2020 revealed mixed response. In regards to his disease burden in his right neck, his initial right level IIa/III lymphadenopathy showed partial response, but there are two new nodules in the right level IIb area, new from his initial PET/CT. He is an appropriate candidate who can benefit from palliative radiation.       Plan   We had a detailed discussion with Mr. Buchanan with regard to the role of palliative radiation in his care. Given the mixed response of his disease to Pembrolizumab, we think there is a role for radiation, and our hope is to achieve disease control. We explained that there are several palliative radiation regimens that can be considered in his case, and the key differences amongst these regimens are the likelihood of achieving durable disease control, and the severity of the associated side effects. In particular, we reviewed several palliative radiotherapy options as well as the logistics and anticipated outcomes of each.     In regards to his challenge with the commute, we explained that we are more than happy to refer him to see our colleagues in [facility], who can see him for consultation and provide radiation treatment. At the end of our discussion, Mr. Buchanan expressed that he had all of his questions addressed, and he is willing to explore radiation further. We will therefore make referral for him to be seen by Dr. Johns, the staff radiation oncologist at [facility].    The patient was seen and discussed with staff, Dr. Aguila.    Lori Wynn MD  Resident, PGY-3  Department of Radiation Oncology  [facility]

## 2020-10-25 NOTE — PROGRESS NOTES
"  ONCOLOGY FOLLOW UP VISIT        PATIENT NAME: Newton Buchanan   MRN# [id number]     Date of Visit: Oct 26, 2020     YOB: 1945       REASON FOR VISIT/CC:    Stage IV metastatic squamous cell carcinoma   Maybe transferring care from [place] [facility] from [facility] from [facility]      HISTORY OF ONCOLOGY ILLNESS:    Patient had noted right neck swelling over 2 years. Has had FNAs in the past that were benign. He was followed by q 6 months.     He reported the neck mass was getting larger. Denies any pain/dysphagia/difficuly swallowing/sob/skin changes.   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     2/24/20 PET/CT found:  1. Hypermetabolic right cervical lymphadenopathy.  2. Numerous hypermetabolic bilateral lung metastases.  3. Hypermetabolic right hilar nodes, also likely metastatic disease    It was felt that with no obvious primary on PET/CT but given CK7 +, P16 positivity, and neck mass on presentation, suspicion is high that origin is still of H&N (right cervical LAD) with metastatic spread to lung with bilateral multiple lung nodules .  Pt made informed decision to proceed with single agent Keytruda due to his PD-L1 testing from [facility] with CPS of 70.  Started early May in [place].     He transferred his care to us in June 2020.     He has mixed PET response in 8/2020 in neck JOSÉ MIGUEL, and decreased lung nodules uptake.     Case was discussed at U conference, and he saw ENT, Dr. Aparicio -- \"He is still not a candidate for definitive chemoradiation given the distant disease\".     Clinical trail was recommended with N-803 (formerly Alt-803), which is an IL-15 superagonist in combination with pembrolizumab.  It is specifically for patients who have had a positive response to keytruda but then experience progression. This is based on 90% disease control in a similar cohort of lung cancer patients that we participated in a " "few years back.   It is a multi-disease cohort phase 2.       He met with Dr. Veras 2020 for trial discussion.   Felt his mixed response (resolution of lung diease, and mixed response in right neck LN) could be pseudoprogression from IO or true progression. [place] decision is to do 2 more keytruda and restage.       INTERVAL HISTORY:  He also met with Rad-Onc Dr. Aguila, at  on 10/15/2020 for possible palliative radiotherapy for improved local control of his right cervical richard disease in the back ground of rest of disease response to Keytruda therapy.        PAST MEDICAL HISTORY  Obstructive sleep apnea   HTN  Diverticulosis of colon. Esophageal reflux disease   Admitted to hospital 2020 for lower GI bleed, ischemic colitis,  c.dff repeat testing 2020 positive, s/p vancomycin.   Ascending polyp  Malignant neoplasm of prostate / PSA 4.79, Gl 3+3 s/p proctectomy.      MEDICATIONS/ALLERY:  Reviewed in Epic system.        SOCIAL HISTORY:    Single. Denies smoking, one drink per night.   He is retired in [place], maybe moving back to [place].  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   Denies smoking/. He drinks daily with a couple of michael with dinner.       FAMILY HISTORY:    Denies any FH of cancers       REVIEW OF SYSTEMS:   Reports nl BM.   Extra fatigue.        PHYSICAL EXAMINATION:   Blood pressure (!) 148/75, pulse 60, temperature 98.1  F (36.7  C), temperature source Oral, resp. rate 18, height 1.821 m (5' 11.69\"), weight 88.3 kg (194 lb 9.6 oz), SpO2 96 %.      GENERAL APPEARANCE:  looks like stated age, very pleasant, not in acute distress.      ECO     ENT, MOUTH: Pupils are equally reactive to light.  Sclerae are anicteric.  Moist oral mucosa without lesion or ulcer.   Negative pharynx.  No oral thrush.   NECK:  Supple.  No jugular venous distention.  Thyroid is not palpable. Right cervical LN fullness at level II.   LYMPH NODES:  negative superficial JOSÉ MIGUEL else where "   CARDIOVASCULAR:  S1, S2 regular with no murmurs or gallops.  No carotid or abdominal bruits.   PULMONARY:  Lungs are clear to auscultation and percussion bilaterally.  There is no wheezing or rhonchi.   GASTROINTESTINAL:  Abdomen is soft, nontender.  No hepatosplenomegaly.  No signs of ascites.  No mass appreciable.   MUSCULOSKELETAL/EXTREMITIES:  No edema.  No cyanotic changes.  No signs of joint deformity.  No lymphedema.   NEUROLOGIC:  Cranial nerves II-XII are grossly intact.  Sensation intact.  Muscle strength and muscle tone symmetrical, 5/5 throughout.   BACK  No spinal or paraspinal tenderness.  No CVA tenderness.   SKIN:  No petechiae.  No rash.  No signs of cellulitis.   PSYCHIATRIC: Oriented to time, person, and places. Normal mood and affect. Good memory. Proper insight and judgement.       CURRENT LAB DATA REVIEWED TODAY :  DURING VISIT:   CMP, TSH are fine    2/11/20 Biopsy metastatic squamous cell carcinoma - HPV16+.      CURRENT IMAGING REVIEWED TODAY:  8/2020 PET  1. Multiple hypermetabolic lymph nodes in the neck which demonstrate mixed response since PET-CT 2/24/2020  E.g.   - Image 115: New right level 2B node, 1.6 x 1.0 cm, SUV max 10.5.  - Image 110: New right level 2B node, 0.9 x 0.7 cm, SUV max 6.1.  - Image 110: Smaller right level 2A node, 2.1 x 1.7 cm, SUV max 14.4; previously 3.6 x 3.0 cm, SUV max 13.2.  - Resolution of the previously seen hypermetabolic right level 3 lymph nodes.  2. Mildly increased FDG uptake to the palatine tonsils without appreciable mass lesion on CT images, the degree of uptake has mildly increased, slightly asymmetrically greater on  the right, SUV max 7.0 on right and 5.3 on left (series 5, image 79), previously 6.3 and 5.1 respectively.  3. Decreased FDG uptake to the numerous solid pulmonary nodules bilaterally. Resolved hypermetabolism of the previously seen hilar lymph nodes. No newly hypermetabolic lymph nodes in the chest.      2/24/20 PET/CT IMPRESSION:  "  1. Hypermetabolic right cervical lymphadenopathy.  2. Numerous hypermetabolic bilateral lung metastases.  3. Hypermetabolic right hilar nodes, also likely metastatic disease      2/3/20 CT Neck/Chest IMPRESSION:  1. Right cervical lymphadenopathy, with areas suspicious for  necrosis, highly concerning for metastatic involvement by  malignancy. Recommend direct visualization of the upper  aerodigestive tract.  2. Numerous bilateral lung nodules, suspicious for metastases.         OLD DATA REVIEWED TODAY WITH SUMMARY  dexa 2019 - low density.       ASSESSMENT AND PLAN:   1.  Presumed  H&N (right cervical LAD) with metastatic spread to lung with bilateral multiple lung nodules.   He is first-line palliative treatment with Keytruda was started when he was in [place] early May 2020.    We discussed he can use therapy side effect profiles, they can be unpredictable.  And how he should be monitored.    Due to his mixed response by PET in 8/2020.    Case was discussed at  conference, and he saw ENT, Dr. Aparicio -- \"He is still not a candidate for definitive chemoradiation given the distant disease.\"    Clinical trail is recommended with N-803 (formerly Alt-803), which is an IL-15 superagonist in combination with pembrolizumab.  It is specifically for patients who have had a positive response to keytruda but then experience progression. This is based on 90% disease control in a similar cohort of lung cancer patients that we participated in a few years back. It is a multi-disease cohort phase 2.     He met with Dr. Veras 9/16/2020 for trial discussion.   Felt his mixed response (resolution of lung diease, and mixed response in right neck LN) could be pseudoprogression from IO or true progression. [place] decision is to do 2 more keytruda and restage    He also met with Rad-Onc Dr. Aguila, at  on 10/15/2020 for possible palliative radiotherapy for improved local control of his right cervical richard disease in the back ground " of rest of disease response to Keytruda therapy.       He is going to see Dr. oJhns tomorrow.     Plan restaging PET in Nov prior to palliative RT.     If he continues to have progression in those areas in the neck in 6 weeks time. We could still enroll him on trial - but if he does not, then we can just continue on keytruda.    He also has plan to get back to [place] for 2-3 months.   We had a long discussion today on the timing of his tx and so on.     He understands treatment is palliative in nature.  He is amazed at how good his quality of life is and he is already having some response with immunotherapy single agent.      2.  Hx of C diff.   He is probiotics Florastor. He is to continue.  He feels it is helpful.   He is to stay on probiotic for minimal 6 months.       3. osteopenia on DEXA scan 2019.  We discussed importance of vitamin D intake outdoor activity weightbearing exercise.      Total time spent 25 minutes, more than 15 minutes spent in counseling regarding his disease status, PET scan results, rationale and clinical trial recommendations, further treatment recommendations follow-up plan.

## 2020-10-26 ENCOUNTER — INFUSION THERAPY VISIT (OUTPATIENT)
Dept: INFUSION THERAPY | Facility: CLINIC | Age: 75
End: 2020-10-26
Attending: INTERNAL MEDICINE
Payer: MEDICARE

## 2020-10-26 ENCOUNTER — ONCOLOGY VISIT (OUTPATIENT)
Dept: ONCOLOGY | Facility: CLINIC | Age: 75
End: 2020-10-26
Attending: INTERNAL MEDICINE
Payer: MEDICARE

## 2020-10-26 ENCOUNTER — HOSPITAL ENCOUNTER (OUTPATIENT)
Dept: LAB | Facility: CLINIC | Age: 75
Discharge: HOME | End: 2020-10-26
Attending: INTERNAL MEDICINE | Admitting: INTERNAL MEDICINE
Payer: MEDICARE

## 2020-10-26 VITALS
BODY MASS INDEX: 26.36 KG/M2 | RESPIRATION RATE: 18 BRPM | WEIGHT: 194.6 LBS | OXYGEN SATURATION: 96 % | HEIGHT: 72 IN | DIASTOLIC BLOOD PRESSURE: 75 MMHG | SYSTOLIC BLOOD PRESSURE: 148 MMHG | TEMPERATURE: 98.1 F | HEART RATE: 60 BPM

## 2020-10-26 DIAGNOSIS — Z79.899 HIGH RISK MEDICATION USE: ICD-10-CM

## 2020-10-26 DIAGNOSIS — M85.80 OSTEOPENIA, UNSPECIFIED LOCATION: ICD-10-CM

## 2020-10-26 DIAGNOSIS — C76.0 HEAD AND NECK CANCER (H): ICD-10-CM

## 2020-10-26 DIAGNOSIS — C76.0 HEAD AND NECK CANCER (H): Primary | ICD-10-CM

## 2020-10-26 DIAGNOSIS — C10.9 SQUAMOUS CELL CARCINOMA OF OROPHARYNX (H): Primary | ICD-10-CM

## 2020-10-26 LAB
ALBUMIN SERPL-MCNC: 3.9 G/DL (ref 3.4–5)
ALP SERPL-CCNC: 55 U/L (ref 40–150)
ALT SERPL W P-5'-P-CCNC: 31 U/L (ref 0–70)
ANION GAP SERPL CALCULATED.3IONS-SCNC: 6 MMOL/L (ref 3–14)
AST SERPL W P-5'-P-CCNC: 21 U/L (ref 0–45)
BILIRUB SERPL-MCNC: 0.5 MG/DL (ref 0.2–1.3)
BUN SERPL-MCNC: 17 MG/DL (ref 7–30)
CALCIUM SERPL-MCNC: 9.7 MG/DL (ref 8.5–10.1)
CHLORIDE SERPL-SCNC: 107 MMOL/L (ref 94–109)
CO2 SERPL-SCNC: 27 MMOL/L (ref 20–32)
CREAT SERPL-MCNC: 0.88 MG/DL (ref 0.66–1.25)
GFR SERPL CREATININE-BSD FRML MDRD: 85 ML/MIN/{1.73_M2}
GLUCOSE SERPL-MCNC: 99 MG/DL (ref 70–99)
POTASSIUM SERPL-SCNC: 4.1 MMOL/L (ref 3.4–5.3)
PROT SERPL-MCNC: 7.5 G/DL (ref 6.8–8.8)
SODIUM SERPL-SCNC: 140 MMOL/L (ref 133–144)
TSH SERPL DL<=0.005 MIU/L-ACNC: 2.29 MU/L (ref 0.4–4)

## 2020-10-26 PROCEDURE — 99207 PR NO CHARGE LOS: CPT

## 2020-10-26 PROCEDURE — 258N000003 HC RX IP 258 OP 636: Performed by: INTERNAL MEDICINE

## 2020-10-26 PROCEDURE — 250N000011 HC RX IP 250 OP 636: Performed by: INTERNAL MEDICINE

## 2020-10-26 PROCEDURE — 96413 CHEMO IV INFUSION 1 HR: CPT

## 2020-10-26 PROCEDURE — 36415 COLL VENOUS BLD VENIPUNCTURE: CPT | Performed by: INTERNAL MEDICINE

## 2020-10-26 PROCEDURE — 80053 COMPREHEN METABOLIC PANEL: CPT | Performed by: INTERNAL MEDICINE

## 2020-10-26 PROCEDURE — 99214 OFFICE O/P EST MOD 30 MIN: CPT | Performed by: INTERNAL MEDICINE

## 2020-10-26 PROCEDURE — 84443 ASSAY THYROID STIM HORMONE: CPT | Performed by: INTERNAL MEDICINE

## 2020-10-26 PROCEDURE — G0463 HOSPITAL OUTPT CLINIC VISIT: HCPCS | Mod: 25

## 2020-10-26 RX ORDER — METHYLPREDNISOLONE SODIUM SUCCINATE 125 MG/2ML
125 INJECTION, POWDER, LYOPHILIZED, FOR SOLUTION INTRAMUSCULAR; INTRAVENOUS
Status: CANCELLED
Start: 2020-10-26

## 2020-10-26 RX ORDER — DIPHENHYDRAMINE HYDROCHLORIDE 50 MG/ML
50 INJECTION INTRAMUSCULAR; INTRAVENOUS
Status: CANCELLED
Start: 2020-10-26

## 2020-10-26 RX ORDER — NALOXONE HYDROCHLORIDE 0.4 MG/ML
.1-.4 INJECTION, SOLUTION INTRAMUSCULAR; INTRAVENOUS; SUBCUTANEOUS
Status: CANCELLED | OUTPATIENT
Start: 2020-10-26

## 2020-10-26 RX ORDER — ALBUTEROL SULFATE 0.83 MG/ML
2.5 SOLUTION RESPIRATORY (INHALATION)
Status: CANCELLED | OUTPATIENT
Start: 2020-10-26

## 2020-10-26 RX ORDER — ALBUTEROL SULFATE 90 UG/1
1-2 AEROSOL, METERED RESPIRATORY (INHALATION)
Status: CANCELLED
Start: 2020-10-26

## 2020-10-26 RX ORDER — SODIUM CHLORIDE 9 MG/ML
1000 INJECTION, SOLUTION INTRAVENOUS CONTINUOUS PRN
Status: CANCELLED
Start: 2020-10-26

## 2020-10-26 RX ORDER — HEPARIN SODIUM (PORCINE) LOCK FLUSH IV SOLN 100 UNIT/ML 100 UNIT/ML
5 SOLUTION INTRAVENOUS
Status: CANCELLED | OUTPATIENT
Start: 2020-10-26

## 2020-10-26 RX ORDER — MEPERIDINE HYDROCHLORIDE 25 MG/ML
25 INJECTION INTRAMUSCULAR; INTRAVENOUS; SUBCUTANEOUS EVERY 30 MIN PRN
Status: CANCELLED | OUTPATIENT
Start: 2020-10-26

## 2020-10-26 RX ORDER — HEPARIN SODIUM,PORCINE 10 UNIT/ML
5 VIAL (ML) INTRAVENOUS
Status: CANCELLED | OUTPATIENT
Start: 2020-10-26

## 2020-10-26 RX ORDER — EPINEPHRINE 1 MG/ML
0.3 INJECTION, SOLUTION, CONCENTRATE INTRAVENOUS EVERY 5 MIN PRN
Status: CANCELLED | OUTPATIENT
Start: 2020-10-26

## 2020-10-26 RX ORDER — LORAZEPAM 2 MG/ML
0.5 INJECTION INTRAMUSCULAR EVERY 4 HOURS PRN
Status: CANCELLED
Start: 2020-10-26

## 2020-10-26 RX ADMIN — SODIUM CHLORIDE 200 MG: 9 INJECTION, SOLUTION INTRAVENOUS at 15:13

## 2020-10-26 RX ADMIN — SODIUM CHLORIDE 250 ML: 9 INJECTION, SOLUTION INTRAVENOUS at 14:42

## 2020-10-26 ASSESSMENT — PAIN SCALES - GENERAL: PAINLEVEL: NO PAIN (0)

## 2020-10-26 ASSESSMENT — MIFFLIN-ST. JEOR: SCORE: 1655.83

## 2020-10-26 NOTE — PROGRESS NOTES
Infusion Nursing Note:  Newton Buchanan presents today for C7D1 Keytruda.    Patient seen by provider today: Yes: Dr. Chen   present during visit today: Not Applicable.    Note: N/A.    Intravenous Access:  Peripheral IV placed.    Treatment Conditions:  Results reviewed, labs MET treatment parameters, ok to proceed with treatment.      Post Infusion Assessment:  Patient tolerated infusion without incident.  Blood return noted pre and post infusion.  Site patent and intact, free from redness, edema or discomfort.  No evidence of extravasations.  Access discontinued per protocol.       Discharge Plan:   Patient discharged in stable condition accompanied by: self.  Departure Mode: Ambulatory.  Pt given new appt schedule.    Alejandra Monaco RN

## 2020-10-26 NOTE — LETTER
"    10/26/2020         RE: Newton Buchanan  [address]  [place] [address]        Dear Colleague,    Thank you for referring your patient, Newton Buchanan, to the [facility] [place]. Please see a copy of my visit note below.      ONCOLOGY FOLLOW UP VISIT        PATIENT NAME: Newton Buchanan   MRN# [id number]     Date of Visit: Oct 26, 2020     YOB: 1945       REASON FOR VISIT/CC:    Stage IV metastatic squamous cell carcinoma   Maybe transferring care from [place] [facility] from [facility] Hematology/Oncology from [facility]      HISTORY OF ONCOLOGY ILLNESS:    Patient had noted right neck swelling over 2 years. Has had FNAs in the past that were benign. He was followed by q 6 months.     He reported the neck mass was getting larger. Denies any pain/dysphagia/difficuly swallowing/sob/skin changes.   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     2/24/20 PET/CT found:  1. Hypermetabolic right cervical lymphadenopathy.  2. Numerous hypermetabolic bilateral lung metastases.  3. Hypermetabolic right hilar nodes, also likely metastatic disease    It was felt that with no obvious primary on PET/CT but given CK7 +, P16 positivity, and neck mass on presentation, suspicion is high that origin is still of H&N (right cervical LAD) with metastatic spread to lung with bilateral multiple lung nodules .  Pt made informed decision to proceed with single agent Keytruda due to his PD-L1 testing from [facility] with CPS of 70.  Started early May in [place].     He transferred his care to us in June 2020.     He has mixed PET response in 8/2020 in neck JOSÉ MIGUEL, and decreased lung nodules uptake.     Case was discussed at U conference, and he saw ENT, Dr. Aparicio -- \"He is still not a candidate for definitive chemoradiation given the distant disease\".     Clinical trail was recommended with N-803 (formerly Alt-803), which is an IL-15 superagonist in " "combination with pembrolizumab.  It is specifically for patients who have had a positive response to keytruda but then experience progression. This is based on 90% disease control in a similar cohort of lung cancer patients that we participated in a few years back.   It is a multi-disease cohort phase 2.       He met with Dr. Veras 2020 for trial discussion.   Felt his mixed response (resolution of lung diease, and mixed response in right neck LN) could be pseudoprogression from IO or true progression. [place] decision is to do 2 more keytruda and restage.       INTERVAL HISTORY:  He also met with Rad-Onc Dr. Aguila, at  on 10/15/2020 for possible palliative radiotherapy for improved local control of his right cervical richard disease in the back ground of rest of disease response to Keytruda therapy.        PAST MEDICAL HISTORY  Obstructive sleep apnea   HTN  Diverticulosis of colon. Esophageal reflux disease   Admitted to hospital 2020 for lower GI bleed, ischemic colitis,  c.dff repeat testing 2020 positive, s/p vancomycin.   Ascending polyp  Malignant neoplasm of prostate / PSA 4.79, Gl 3+3 s/p proctectomy.      MEDICATIONS/ALLERY:  Reviewed in Epic system.        SOCIAL HISTORY:    Single. Denies smoking, one drink per night.   He is retired in [place], maybe moving back to [place].  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   Denies smoking/. He drinks daily with a couple of michael with dinner.       FAMILY HISTORY:    Denies any FH of cancers       REVIEW OF SYSTEMS:   Reports nl BM.   Extra fatigue.        PHYSICAL EXAMINATION:   Blood pressure (!) 148/75, pulse 60, temperature 98.1  F (36.7  C), temperature source Oral, resp. rate 18, height 1.821 m (5' 11.69\"), weight 88.3 kg (194 lb 9.6 oz), SpO2 96 %.      GENERAL APPEARANCE:  looks like stated age, very pleasant, not in acute distress.      ECO     ENT, MOUTH: Pupils are equally reactive to light.  Sclerae are anicteric.  " Moist oral mucosa without lesion or ulcer.   Negative pharynx.  No oral thrush.   NECK:  Supple.  No jugular venous distention.  Thyroid is not palpable. Right cervical LN fullness at level II.   LYMPH NODES:  negative superficial JOSÉ MIGUEL else where   CARDIOVASCULAR:  S1, S2 regular with no murmurs or gallops.  No carotid or abdominal bruits.   PULMONARY:  Lungs are clear to auscultation and percussion bilaterally.  There is no wheezing or rhonchi.   GASTROINTESTINAL:  Abdomen is soft, nontender.  No hepatosplenomegaly.  No signs of ascites.  No mass appreciable.   MUSCULOSKELETAL/EXTREMITIES:  No edema.  No cyanotic changes.  No signs of joint deformity.  No lymphedema.   NEUROLOGIC:  Cranial nerves II-XII are grossly intact.  Sensation intact.  Muscle strength and muscle tone symmetrical, 5/5 throughout.   BACK  No spinal or paraspinal tenderness.  No CVA tenderness.   SKIN:  No petechiae.  No rash.  No signs of cellulitis.   PSYCHIATRIC: Oriented to time, person, and places. Normal mood and affect. Good memory. Proper insight and judgement.       CURRENT LAB DATA REVIEWED TODAY :  DURING VISIT:   CMP, TSH are fine    2/11/20 Biopsy metastatic squamous cell carcinoma - HPV16+.      CURRENT IMAGING REVIEWED TODAY:  8/2020 PET  1. Multiple hypermetabolic lymph nodes in the neck which demonstrate mixed response since PET-CT 2/24/2020  E.g.   - Image 115: New right level 2B node, 1.6 x 1.0 cm, SUV max 10.5.  - Image 110: New right level 2B node, 0.9 x 0.7 cm, SUV max 6.1.  - Image 110: Smaller right level 2A node, 2.1 x 1.7 cm, SUV max 14.4; previously 3.6 x 3.0 cm, SUV max 13.2.  - Resolution of the previously seen hypermetabolic right level 3 lymph nodes.  2. Mildly increased FDG uptake to the palatine tonsils without appreciable mass lesion on CT images, the degree of uptake has mildly increased, slightly asymmetrically greater on  the right, SUV max 7.0 on right and 5.3 on left (series 5, image 79), previously 6.3 and  "5.1 respectively.  3. Decreased FDG uptake to the numerous solid pulmonary nodules bilaterally. Resolved hypermetabolism of the previously seen hilar lymph nodes. No newly hypermetabolic lymph nodes in the chest.      2/24/20 PET/CT IMPRESSION:   1. Hypermetabolic right cervical lymphadenopathy.  2. Numerous hypermetabolic bilateral lung metastases.  3. Hypermetabolic right hilar nodes, also likely metastatic disease      2/3/20 CT Neck/Chest IMPRESSION:  1. Right cervical lymphadenopathy, with areas suspicious for  necrosis, highly concerning for metastatic involvement by  malignancy. Recommend direct visualization of the upper  aerodigestive tract.  2. Numerous bilateral lung nodules, suspicious for metastases.         OLD DATA REVIEWED TODAY WITH SUMMARY  dexa 2019 - low density.       ASSESSMENT AND PLAN:   1.  Presumed  H&N (right cervical LAD) with metastatic spread to lung with bilateral multiple lung nodules.   He is first-line palliative treatment with Keytruda was started when he was in [place] early May 2020.    We discussed he can use therapy side effect profiles, they can be unpredictable.  And how he should be monitored.    Due to his mixed response by PET in 8/2020.    Case was discussed at U conference, and he saw ENT, Dr. Aparicio -- \"He is still not a candidate for definitive chemoradiation given the distant disease.\"    Clinical trail is recommended with N-803 (formerly Alt-803), which is an IL-15 superagonist in combination with pembrolizumab.  It is specifically for patients who have had a positive response to keytruda but then experience progression. This is based on 90% disease control in a similar cohort of lung cancer patients that we participated in a few years back. It is a multi-disease cohort phase 2.     He met with Dr. Veras 9/16/2020 for trial discussion.   Felt his mixed response (resolution of lung diease, and mixed response in right neck LN) could be pseudoprogression from IO or " "true progression. [place] decision is to do 2 more keytruda and restage    He also met with Rad-Onc Dr. Aguila, at  on 10/15/2020 for possible palliative radiotherapy for improved local control of his right cervical richard disease in the back ground of rest of disease response to Keytruda therapy.       He is going to see Dr. Johns tomorrow.     Plan restaging PET in Nov prior to palliative RT.     If he continues to have progression in those areas in the neck in 6 weeks time. We could still enroll him on trial - but if he does not, then we can just continue on keytruda.    He also has plan to get back to [place] for 2-3 months.   We had a long discussion today on the timing of his tx and so on.     He understands treatment is palliative in nature.  He is amazed at how good his quality of life is and he is already having some response with immunotherapy single agent.      2.  Hx of C diff.   He is probiotics Florastor. He is to continue.  He feels it is helpful.   He is to stay on probiotic for minimal 6 months.       3. osteopenia on DEXA scan 2019.  We discussed importance of vitamin D intake outdoor activity weightbearing exercise.      Total time spent 25 minutes, more than 15 minutes spent in counseling regarding his disease status, PET scan results, rationale and clinical trial recommendations, further treatment recommendations follow-up plan.    Oncology Rooming Note    October 26, 2020 2:09 PM   Newton Buchanan is a 75 year old male who presents for:    Chief Complaint   Patient presents with     Oncology Clinic Visit     Return Squamous cell carcinoma of oropharynx     Initial Vitals: BP (!) 148/75 (BP Location: Left arm, Patient Position: Sitting, Cuff Size: Adult Regular)   Pulse 60   Temp 98.1  F (36.7  C) (Oral)   Resp 18   Ht 1.821 m (5' 11.69\")   Wt 88.3 kg (194 lb 9.6 oz)   SpO2 96%   BMI 26.62 kg/m   Estimated body mass index is 26.62 kg/m  as calculated from the following:    Height as of this " "encounter: 1.821 m (5' 11.69\").    Weight as of this encounter: 88.3 kg (194 lb 9.6 oz). Body surface area is 2.11 meters squared.  No Pain (0) Comment: Data Unavailable   No LMP for male patient.  Allergies reviewed: Yes  Medications reviewed: Yes    Medications: Medication refills not needed today.  Pharmacy name entered into [employer]: [facility] #[id number] - [address] AT [address]    Clinical concerns:  Return Squamous cell carcinoma of oropharynx.      Sandy Carrillo CMA          '      Again, thank you for allowing me to participate in the care of your patient.        Sincerely,        Rosetta Chen MD, MD    "

## 2020-10-26 NOTE — PATIENT INSTRUCTIONS
keytruda today.   PET in Nov, please copy result to Dr. Johns. Likely RT after.   Follow-up open at this point.

## 2020-10-26 NOTE — PROGRESS NOTES
"Infusion Nursing Note:  Newton Buchanan presents today for ***.    Patient seen by provider today: {YES (EXPLAIN)/NO:030033}   present during visit today: {UMHLANGUAGE:867183}    Note: {Not Applicable or free text:744863:s:\"N/A\"}.    Intravenous Access:  {UMHIVACCESS:745019}    Treatment Conditions:  {UMHTXCONDITIONS:034984}      Post Infusion Assessment:  {UMHPOSTINFUSION:937632}       Discharge Plan:   {UMHDISCHARGE:153590}    Alejandra Monaco RN                        "

## 2020-10-26 NOTE — PROGRESS NOTES
"Oncology Rooming Note    October 26, 2020 2:09 PM   Newton Buchanan is a 75 year old male who presents for:    Chief Complaint   Patient presents with     Oncology Clinic Visit     Return Squamous cell carcinoma of oropharynx     Initial Vitals: BP (!) 148/75 (BP Location: Left arm, Patient Position: Sitting, Cuff Size: Adult Regular)   Pulse 60   Temp 98.1  F (36.7  C) (Oral)   Resp 18   Ht 1.821 m (5' 11.69\")   Wt 88.3 kg (194 lb 9.6 oz)   SpO2 96%   BMI 26.62 kg/m   Estimated body mass index is 26.62 kg/m  as calculated from the following:    Height as of this encounter: 1.821 m (5' 11.69\").    Weight as of this encounter: 88.3 kg (194 lb 9.6 oz). Body surface area is 2.11 meters squared.  No Pain (0) Comment: Data Unavailable   No LMP for male patient.  Allergies reviewed: Yes  Medications reviewed: Yes    Medications: Medication refills not needed today.  Pharmacy name entered into [employer]: [facility] #[id number] - [address] AT [address]    Clinical concerns:  Return Squamous cell carcinoma of oropharynx.      Sandy Carrillo, PAUL          '  "

## 2020-10-27 ENCOUNTER — OFFICE VISIT (OUTPATIENT)
Dept: RADIATION THERAPY | Facility: OUTPATIENT CENTER | Age: 75
End: 2020-10-27
Payer: MEDICARE

## 2020-10-27 VITALS
SYSTOLIC BLOOD PRESSURE: 166 MMHG | OXYGEN SATURATION: 98 % | WEIGHT: 194.8 LBS | DIASTOLIC BLOOD PRESSURE: 89 MMHG | HEART RATE: 70 BPM | RESPIRATION RATE: 18 BRPM | BODY MASS INDEX: 26.65 KG/M2

## 2020-10-27 DIAGNOSIS — C76.0 HEAD AND NECK CANCER (H): Primary | ICD-10-CM

## 2020-10-27 ASSESSMENT — PAIN SCALES - GENERAL: PAINLEVEL: NO PAIN (0)

## 2020-10-27 NOTE — LETTER
10/27/2020         RE: Newton Buchanan  [address]  [place] [address]        Dear Colleague,    Thank you for referring your patient, Newton Buchanan, to the [facility]. Please see a copy of my visit note below.       Department of Radiation Oncology  Radiation Therapy Center  [facility]  [address], [address]  [place] [id number] [phone]       Consultation Note    Name: Newton Buchanan MRN: [id number]   : 1945   Date of Service: 10/27/2020  Referring: Dr. Aguila     Reason for consultation: Squamous cell carcinoma of the head and neck, p16 positive, clinical T0N1M1 (thorax) on systemic immunotherapy with local progression in the right neck.  Evaluate potential role of radiation therapy    History of Present Illness   Mr. Buchanan is a 75 year old male a diagnosis of squamous cell carcinoma of the head and neck, p16 positive, clinical T0N1M1 (thorax) on systemic immunotherapy with local progression in the right neck.  Evaluate potential role of radiation therapy.    The patient initially presented with a 2-year history of right neck swelling.  Initial FNAs of the right neck were nondiagnostic.  Excisional biopsy in 2020 confirmed p16 positive squamous cell carcinoma with high PD-L1 expression.  PET scan 2020 demonstrated right cervical adenopathy and additional numerous small pulmonary and hilar metastasis.  Patient eventually started systemic treatment with pembrolizumab in May 2020 under the care of Dr. Chen.  PJET scan obtained on 2020 demonstrated mixed response with decrease in size and activity of the pulmonary disease and initial right neck adenopathy.  There were 2 new right level 2 nodes with increased activity, concerning for progressive disease.  Additionally there was slight increase in FDG avidity in the palatine tonsil, right greater than left, favoring inflammatory change but cannot rule out occult primary.  The  patient's case has been reviewed at the head and neck tumor conference where consideration of clinical trial was discussed.  However patient likely continue with immunotherapy as monotherapy at this time.   More recently the has noticed worsening of right swelling in the right neck, possibly to progression of regional disease.  He met with Dr. Chen who has ordered restaging PET scan on 11/5/2020.  Patient met with Dr. Aguila at the  to discuss consideration of palliative radiation therapy to the head and neck region.  Due to proximity, the patient was referred to our clinic.    Patient is overall doing fairly well.  He has noticed increase in swelling in the right neck.  Does not have significant pain.  Able to tolerate PO. No prior radiation.  No pacemaker.    Past Medical History:   Past Medical History:   Diagnosis Date     Benign essential hypertension      Oropharyngeal cancer (H)        Past Surgical History:   Past Surgical History:   Procedure Laterality Date     DAVINCI PROSTATECTOMY         Chemotherapy History:  Per HPI    Radiation History:  None    Pregnant: Not Applicable  Implanted Cardiac Devices: No    Medications:  Current Outpatient Medications   Medication     aspirin 81 MG tablet     fluticasone (FLONASE) 50 MCG/ACT nasal spray     losartan (COZAAR) 25 MG tablet     pembrolizumab (KEYTRUDA) 25 MG/ML     pravastatin (PRAVACHOL) 20 MG tablet     prochlorperazine (COMPAZINE) 10 MG tablet     saccharomyces boulardii (FLORASTOR) 250 MG capsule     sildenafil (VIAGRA) 100 MG tablet     VITAMIN D, CHOLECALCIFEROL, PO     No current facility-administered medications for this visit.          Allergies:     Allergies   Allergen Reactions     Atorvastatin      Other reaction(s): Confusion  unusual behavior and dizziness           Family History:  No family history on file.    Review of Systems   A 10-point review of systems was performed. Pertinent findings are noted in the HPI.    Physical Exam   ECOG  Status: 1    Vitals:  BP (!) 166/89   Pulse 70   Resp 18   Wt 88.4 kg (194 lb 12.8 oz)   SpO2 98%   BMI 26.65 kg/m      Gen: Alert, in NAD  Head: NC/AT  Eyes: PERRL, EOMI, sclera anicteric  Ears: No external auricular lesions  Nose/sinus: No rhinorrhea or epistaxis  Oral cavity/oropharynx: MMM, no visible oral cavity lesions, FOM and BOT are soft to palpation  Neck: + Palpable R cervical neck disease.   Pulm: No wheezing, stridor or respiratory distress  CV: Extremities are warm and well-perfused, no cyanosis, no pedal edema  Abdominal: Normal bowel sounds, soft, nontender, no masses  Musculoskeletal: Normal bulk and tone  Skin: Normal color and turgor  Neuro: A/Ox3, CN II-XII intact, normal gait    Imaging/Path/Labs   Imagin20  PET  IMPRESSION: In this patient with a history of metastatic squamous cell  carcinoma of the head and neck region currently on immunotherapy  (pembrolizumab), there is mixed response to treatment:     1. Mixed response to the cervical lymph nodes, with two new  hypermetabolic right level 2B lymph nodes, whereas the remainder have  decreased or resolved. Residual uptake in the smaller right level 2A  lymph nodes likely contain residual viable tumor. Presence of new  lesions disfavors pseudoprogression.     2. Decreased FDG uptake of the numerous metastatic pulmonary nodules.     3. Resolved hypermetabolism of the right hilar lymph nodes.     4. Mildly increased FDG uptake to the palatine tonsils, slightly  greater on the right. Favor reactive or inflammatory uptake given lack  of correlate on CT. However, in the setting of occult primary and  absence of other clear primary on today's or comparison study, may  contain the primary mass. Consider direct visualization      Path:   Per HPI        Assessment    Mr. Buchanan is a 75 year old male with a diagnosis of squamous cell carcinoma of the head and neck, p16 positive, clinical T0N1M1 (thorax) on systemic immunotherapy with  local progression in the right neck.  Evaluate potential role of radiation therapy      Plan   1.  I discussed the natural history of metastatic p16 positive squamous cell carcinoma of the head and neck region.    2.  I agree with restaging PET scan prior to initiation of any local therapy.  PET scan is scheduled for 11/5/2020.    3.  I discussed consideration of different dose fractionation scheme is for palliation including 30 Gy in 10 fractions, 37.5 Gy in 15 fractions, and 50 Gy in 20 fractions (split course with 1 week break).  The patient is currently in towards the 15 fractions of treatment.    4.  I discussed side effects in great detail.      5. We will have the patient tentatively return for CT simulation after obtaining PET scan.  We will likely plan to begin radiation approximately 1 week thereafter.      Ronen Johns MD  Department of Radiation Oncology  [facility]

## 2020-10-27 NOTE — NURSING NOTE
"REASON FOR APPOINTMENT   Type of Cancer: metastatic squamous cell of H&N area, unknown priamry  Location: R neck nodes lighing up on PET after ongoing Keytruda infusions  Date of Symptom Onset: started treatment 2/2020, repeat PET 8/2020    TREATMENT TO-DATE FOR THIS CANCER  Surgery ? Not offered due to metastaic disease   Chemotherapy ? Dr. Gustavo Orourke   Other Treatments for this Cancer ? Discussion for radiation    PERSONAL HISTORY OF CANCER   Previous Cancer ? Prostate cancer 2008, surgery alone   Prior Radiation ? no   Prior Chemotherapy ? no   Prior Hormonal Therapy ? no     RECENT IMAGING STUDIES  PET 2/2020,8/2020, bone scan    REFERRALS NEEDED  None at this time    VITALS  BP (!) 166/89   Pulse 70   Resp 18   Wt 88.4 kg (194 lb 12.8 oz)   SpO2 98%   BMI 26.65 kg/m      PACEMAKER/IMPLANTED CARDIAC DEVICE no    PAIN  Denies    PSYCHOSOCIAL  Marital Status:   Patient lives in [facility] with self. Also has home in [place] that he is hoping to travel to January 2021  Number of children: none living  Working status: retired  Do you feel safe in your home? Yes    REVIEW OF SYSTEMS  Skin: negative  Eyes: glasses  Ears/Nose/Throat: negative  Respiratory: Dyspnea on exertion;, No cough and No hemoptysis  Cardiovascular: negative  Gastrointestinal: bowel changes - looser stool  Genitourinary: negative and erectile dysfunction  Musculoskeletal: back pain, arthritis and joint pain  Neurologic: negative  Psychiatric: negative  Hematologic/Lymphatic/Immunologic: fatigue  Endocrine: negative        Radiation Oncology Patient Teaching    Current Concern: \"what are the side effects? If I start radiation ,can I stop at anytime?\"    Person involved with teaching: Patient  Patient asked Questions: Yes  Patient was cooperative: Yes  Patient was receptive (willing to accept information given): Yes   justyn  Education Assessment  Comprehension ability: Medium  Knowledge level: Medium  Factors affecting teaching: " None    Education Materials Given  Radiation Therapy and You  Caring for Your Skin During Radiation ...  Radiation Therapy for Head and Neck    Educational Topics Discussed  Side effects, Medications, Activity, Nutrition, Adjustment to illness and When to call MD/RN    Response To Teaching  More review necessary      Do you have an advanced directive or living will? no  Are you DNR/DNI? no

## 2020-10-27 NOTE — PROGRESS NOTES
Department of Radiation Oncology  Radiation Therapy Center  [facility]  [address], [address]  [place] [address]  [phone]       Consultation Note    Name: Newton Buchanan MRN: [id number]   : 1945   Date of Service: 10/27/2020  Referring: Dr. Aguila     Reason for consultation: Squamous cell carcinoma of the head and neck, p16 positive, clinical T0N1M1 (thorax) on systemic immunotherapy with local progression in the right neck.  Evaluate potential role of radiation therapy    History of Present Illness   Mr. Buchanan is a 75 year old male a diagnosis of squamous cell carcinoma of the head and neck, p16 positive, clinical T0N1M1 (thorax) on systemic immunotherapy with local progression in the right neck.  Evaluate potential role of radiation therapy.    The patient initially presented with a 2-year history of right neck swelling.  Initial FNAs of the right neck were nondiagnostic.  Excisional biopsy in 2020 confirmed p16 positive squamous cell carcinoma with high PD-L1 expression.  PET scan 2020 demonstrated right cervical adenopathy and additional numerous small pulmonary and hilar metastasis.  Patient eventually started systemic treatment with pembrolizumab in May 2020 under the care of Dr. Chen.  PJET scan obtained on 2020 demonstrated mixed response with decrease in size and activity of the pulmonary disease and initial right neck adenopathy.  There were 2 new right level 2 nodes with increased activity, concerning for progressive disease.  Additionally there was slight increase in FDG avidity in the palatine tonsil, right greater than left, favoring inflammatory change but cannot rule out occult primary.  The patient's case has been reviewed at the head and neck tumor conference where consideration of clinical trial was discussed.  However patient likely continue with immunotherapy as monotherapy at this time.   More recently the has noticed  worsening of right swelling in the right neck, possibly to progression of regional disease.  He met with Dr. Chen who has ordered restaging PET scan on 11/5/2020.  Patient met with Dr. Aguila at the  to discuss consideration of palliative radiation therapy to the head and neck region.  Due to proximity, the patient was referred to our clinic.    Patient is overall doing fairly well.  He has noticed increase in swelling in the right neck.  Does not have significant pain.  Able to tolerate PO. No prior radiation.  No pacemaker.    Past Medical History:   Past Medical History:   Diagnosis Date     Benign essential hypertension      Oropharyngeal cancer (H)        Past Surgical History:   Past Surgical History:   Procedure Laterality Date     DAVINCI PROSTATECTOMY         Chemotherapy History:  Per HPI    Radiation History:  None    Pregnant: Not Applicable  Implanted Cardiac Devices: No    Medications:  Current Outpatient Medications   Medication     aspirin 81 MG tablet     fluticasone (FLONASE) 50 MCG/ACT nasal spray     losartan (COZAAR) 25 MG tablet     pembrolizumab (KEYTRUDA) 25 MG/ML     pravastatin (PRAVACHOL) 20 MG tablet     prochlorperazine (COMPAZINE) 10 MG tablet     saccharomyces boulardii (FLORASTOR) 250 MG capsule     sildenafil (VIAGRA) 100 MG tablet     VITAMIN D, CHOLECALCIFEROL, PO     No current facility-administered medications for this visit.          Allergies:     Allergies   Allergen Reactions     Atorvastatin      Other reaction(s): Confusion  unusual behavior and dizziness           Family History:  No family history on file.    Review of Systems   A 10-point review of systems was performed. Pertinent findings are noted in the HPI.    Physical Exam   ECOG Status: 1    Vitals:  BP (!) 166/89   Pulse 70   Resp 18   Wt 88.4 kg (194 lb 12.8 oz)   SpO2 98%   BMI 26.65 kg/m      Gen: Alert, in NAD  Head: NC/AT  Eyes: PERRL, EOMI, sclera anicteric  Ears: No external auricular  lesions  Nose/sinus: No rhinorrhea or epistaxis  Oral cavity/oropharynx: MMM, no visible oral cavity lesions, FOM and BOT are soft to palpation  Neck: + Palpable R cervical neck disease.   Pulm: No wheezing, stridor or respiratory distress  CV: Extremities are warm and well-perfused, no cyanosis, no pedal edema  Abdominal: Normal bowel sounds, soft, nontender, no masses  Musculoskeletal: Normal bulk and tone  Skin: Normal color and turgor  Neuro: A/Ox3, CN II-XII intact, normal gait    Imaging/Path/Labs   Imagin20  PET  IMPRESSION: In this patient with a history of metastatic squamous cell  carcinoma of the head and neck region currently on immunotherapy  (pembrolizumab), there is mixed response to treatment:     1. Mixed response to the cervical lymph nodes, with two new  hypermetabolic right level 2B lymph nodes, whereas the remainder have  decreased or resolved. Residual uptake in the smaller right level 2A  lymph nodes likely contain residual viable tumor. Presence of new  lesions disfavors pseudoprogression.     2. Decreased FDG uptake of the numerous metastatic pulmonary nodules.     3. Resolved hypermetabolism of the right hilar lymph nodes.     4. Mildly increased FDG uptake to the palatine tonsils, slightly  greater on the right. Favor reactive or inflammatory uptake given lack  of correlate on CT. However, in the setting of occult primary and  absence of other clear primary on today's or comparison study, may  contain the primary mass. Consider direct visualization      Path:   Per HPI        Assessment    Mr. Buchanan is a 75 year old male with a diagnosis of squamous cell carcinoma of the head and neck, p16 positive, clinical T0N1M1 (thorax) on systemic immunotherapy with local progression in the right neck.  Evaluate potential role of radiation therapy      Plan   1.  I discussed the natural history of metastatic p16 positive squamous cell carcinoma of the head and neck region.    2.  I agree  with restaging PET scan prior to initiation of any local therapy.  PET scan is scheduled for 11/5/2020.    3.  I discussed consideration of different dose fractionation scheme is for palliation including 30 Gy in 10 fractions, 37.5 Gy in 15 fractions, and 50 Gy in 20 fractions (split course with 1 week break).  The patient is currently in towards the 15 fractions of treatment.    4.  I discussed side effects in great detail.      5. We will have the patient tentatively return for CT simulation after obtaining PET scan.  We will likely plan to begin radiation approximately 1 week thereafter.      Ronen Johns MD  Department of Radiation Oncology  [facility]

## 2020-11-04 ENCOUNTER — DOCUMENTATION ONLY (OUTPATIENT)
Dept: SLEEP MEDICINE | Facility: CLINIC | Age: 75
End: 2020-11-04

## 2020-11-04 VITALS — HEIGHT: 72 IN | WEIGHT: 190 LBS | BODY MASS INDEX: 25.73 KG/M2

## 2020-11-04 ASSESSMENT — MIFFLIN-ST. JEOR: SCORE: 1639.83

## 2020-11-04 NOTE — PROGRESS NOTES
Patient came to [facility] for a CPAP download. He is not a current patient of [facility] and at this time would like to stay with his current DME company. Educate patient to call current DME company to assure they have access to his CPAP machine to send to providers. He states understanding and will reach out to them.

## 2020-11-04 NOTE — PROGRESS NOTES
"Newton Buchanan is a 75 year old male who is being evaluated via a billable video visit.      The patient has been notified of following:     \"This video visit will be conducted via a call between you and your physician/provider. We have found that certain health care needs can be provided without the need for an in-person physical exam.  This service lets us provide the care you need with a video conversation.  If a prescription is necessary we can send it directly to your pharmacy.  If lab work is needed we can place an order for that and you can then stop by our lab to have the test done at a later time.    Video visits are billed at different rates depending on your insurance coverage.  Please reach out to your insurance provider with any questions.    If during the course of the call the physician/provider feels a video visit is not appropriate, you will not be charged for this service.\"    Patient has given verbal consent for Video visit? Yes  How would you like to obtain your AVS? MyChart  If you are dropped from the video visit, the video invite should be resent to: Send to e-mail at: [email or web address]  Will anyone else be joining your video visit? No      Video-Visit Details    Type of service:  Video Visit    Video Start Time: 0830  Video End Time: 0900    Originating Location (pt. Location): Home    Distant Location (provider location):  [facility]     Platform used for Video Visit: inZair    Virtual visit to establish care for severe obstructive sleep apnea managed with CPAP.    Assessment:  -Severe obstructive sleep apnea without sleep associated hypoxemia  -Comorbid oropharyngeal cancer    Plan:  -Appears to be well controlled with excellent compliance on CPAP auto titrate 5-12 cm H2O.  -We will place new CPAP supply order and otherwise plan to follow-up in 1 year.    75-year-old male with past medical history of severe obstructive sleep apnea, oropharyngeal " "cancer, hypertension, prostate cancer.    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  Potential radiation exposure, but no known exposure to asbestos.    He was diagnosed with obstructive sleep apnea roughly 10 years ago.  Prior to moving back to [place], he follows with Dr. Palmer in [place].  He did have a repeat split-night sleep study performed November 15, 2017 with AHI 64.2, RDI 75.8, mean SPO2 94%, cristina SPO2 90%.  Treated with CPAP auto titrate 5-12 cm H2O.    Overall, he feels he continues to sleep very well and feels that his CPAP is a \"life saver\".  Uses on a nightly basis.  Denies any known residual snoring or observed apnea.    He denies any abnormal nocturnal behaviors, nocturnal injurious behaviors.    CPAP download was reviewed over the past 90 days.  Excellent compliance and average AHI appears to be between 2-3.    10 point ROS of systems including Constitutional, Eyes, Respiratory, Cardiovascular, Gastroenterology, Genitourinary, Integumentary, Muscularskeletal, Psychiatric were all negative except for pertinent positives noted in my HPI.    Physical Exam  Constitutional:       General: He is not in acute distress.     Appearance: Normal appearance. He is not ill-appearing, toxic-appearing or diaphoretic.   HENT:      Head: Normocephalic and atraumatic.      Right Ear: External ear normal.      Left Ear: External ear normal.   Eyes:      Conjunctiva/sclera: Conjunctivae normal.   Pulmonary:      Effort: Pulmonary effort is normal. No respiratory distress.   Skin:     Coloration: Skin is not jaundiced or pale.      Findings: No bruising or erythema.   Neurological:      General: No focal deficit present.      Mental Status: He is alert and oriented to person, place, and time.   Psychiatric:         Mood and Affect: Mood normal.         " Behavior: Behavior normal.         Thought Content: Thought content normal.         Judgment: Judgment normal.         This note was written with the assistance of the Dragon voice-dictation technology software. The final document, although reviewed, may contain errors. For corrections, please contact the office.      Marcial Blunt MD, MD

## 2020-11-04 NOTE — PATIENT INSTRUCTIONS
Your BMI is Body mass index is 25.77 kg/m .  Weight management is a personal decision.  If you are interested in exploring weight loss strategies, the following discussion covers the approaches that may be successful. Body mass index (BMI) is one way to tell whether you are at a healthy weight, overweight, or obese. It measures your weight in relation to your height.  A BMI of 18.5 to 24.9 is in the healthy range. A person with a BMI of 25 to 29.9 is considered overweight, and someone with a BMI of 30 or greater is considered obese. More than two-thirds of American adults are considered overweight or obese.  Being overweight or obese increases the risk for further weight gain. Excess weight may lead to heart disease and diabetes.  Creating and following plans for healthy eating and physical activity may help you improve your health.  Weight control is part of healthy lifestyle and includes exercise, emotional health, and healthy eating habits. Careful eating habits lifelong are the mainstay of weight control. Though there are significant health benefits from weight loss, long-term weight loss with diet alone may be very difficult to achieve- studies show long-term success with dietary management in less than 10% of people. Attaining a healthy weight may be especially difficult to achieve in those with severe obesity. In some cases, medications, devices and surgical management might be considered.  What can you do?  If you are overweight or obese and are interested in methods for weight loss, you should discuss this with your provider.     Consider reducing daily calorie intake by 500 calories.     Keep a food journal.     Avoiding skipping meals, consider cutting portions instead.    Diet combined with exercise helps maintain muscle while optimizing fat loss. Strength training is particularly important for building and maintaining muscle mass. Exercise helps reduce stress, increase energy, and improves fitness.  Increasing exercise without diet control, however, may not burn enough calories to loose weight.       Start walking three days a week 10-20 minutes at a time    Work towards walking thirty minutes five days a week     Eventually, increase the speed of your walking for 1-2 minutes at time    In addition, we recommend that you review healthy lifestyles and methods for weight loss available through the National Institutes of Health patient information sites:  http://win.niddk.nih.gov/publications/index.htm    And look into health and wellness programs that may be available through your health insurance provider, employer, local community center, or teresita club.

## 2020-11-05 ENCOUNTER — HOSPITAL ENCOUNTER (OUTPATIENT)
Dept: PET IMAGING | Facility: CLINIC | Age: 75
Discharge: HOME | End: 2020-11-05
Attending: INTERNAL MEDICINE | Admitting: INTERNAL MEDICINE
Payer: MEDICARE

## 2020-11-05 ENCOUNTER — TELEPHONE (OUTPATIENT)
Dept: SLEEP MEDICINE | Facility: CLINIC | Age: 75
End: 2020-11-05

## 2020-11-05 ENCOUNTER — VIRTUAL VISIT (OUTPATIENT)
Dept: SLEEP MEDICINE | Facility: CLINIC | Age: 75
End: 2020-11-05
Payer: MEDICARE

## 2020-11-05 DIAGNOSIS — I10 ESSENTIAL HYPERTENSION WITH GOAL BLOOD PRESSURE LESS THAN 140/90: ICD-10-CM

## 2020-11-05 DIAGNOSIS — C76.0 HEAD AND NECK CANCER (H): ICD-10-CM

## 2020-11-05 DIAGNOSIS — C10.9 OROPHARYNGEAL CANCER (H): ICD-10-CM

## 2020-11-05 DIAGNOSIS — G47.33 OSA (OBSTRUCTIVE SLEEP APNEA): Primary | ICD-10-CM

## 2020-11-05 PROCEDURE — A9552 F18 FDG: HCPCS | Performed by: INTERNAL MEDICINE

## 2020-11-05 PROCEDURE — 99203 OFFICE O/P NEW LOW 30 MIN: CPT | Mod: 95 | Performed by: FAMILY MEDICINE

## 2020-11-05 PROCEDURE — 343N000001 HC RX 343: Performed by: INTERNAL MEDICINE

## 2020-11-05 PROCEDURE — 78816 PET IMAGE W/CT FULL BODY: CPT | Mod: 26

## 2020-11-05 PROCEDURE — 78816 PET IMAGE W/CT FULL BODY: CPT | Mod: PS

## 2020-11-05 RX ADMIN — FLUDEOXYGLUCOSE F-18 15 MCI.: 500 INJECTION, SOLUTION INTRAVENOUS at 13:05

## 2020-11-10 ENCOUNTER — OFFICE VISIT (OUTPATIENT)
Dept: RADIATION THERAPY | Facility: OUTPATIENT CENTER | Age: 75
End: 2020-11-10
Payer: MEDICARE

## 2020-11-10 DIAGNOSIS — C76.0 HEAD AND NECK CANCER (H): Primary | ICD-10-CM

## 2020-11-10 DIAGNOSIS — C10.9 OROPHARYNGEAL CANCER (H): Primary | ICD-10-CM

## 2020-11-10 NOTE — LETTER
11/10/2020         RE: Newton Buchanan  [address]  [facility] [address]        Dear Colleague,    Thank you for referring your patient, Newton Buchanan, to the RADIATION THERAPY CENTER. Please see a copy of my visit note below.    Patient underwent CT simulation.     Ronen Johns M.D.  Department of Radiation Oncology  [facility]         Again, thank you for allowing me to participate in the care of your patient.        Sincerely,        Ronen Johns MD

## 2020-11-10 NOTE — PROGRESS NOTES
The patient presents for CT simulation.     Re-staging PET (11/5/20) demonstrated mixed response in R neck, no clear evidence of POD distantly.     Will proceed with R neck palliative RT. Plan for 37.5 Gy in 15 fractions.     Consent obtained.     Ronen Johns M.D.  Department of Radiation Oncology  [facility]

## 2020-11-10 NOTE — LETTER
11/10/2020         RE: Newton Buchanan  [address]  [facility] [address]        Dear Colleague,    Thank you for referring your patient, Newton Buchanan, to the RADIATION THERAPY CENTER. Please see a copy of my visit note below.    The patient presents for CT simulation.     Re-staging PET (11/5/20) demonstrated mixed response in R neck, no clear evidence of POD distantly.     Will proceed with R neck palliative RT. Plan for 37.5 Gy in 15 fractions.     Consent obtained.     Ronen Johns M.D.  Department of Radiation Oncology  [facility]

## 2020-11-10 NOTE — PROGRESS NOTES
Patient underwent CT simulation.     Ronen Johns M.D.  Department of Radiation Oncology  [facility]

## 2020-11-16 DIAGNOSIS — C10.9 OROPHARYNGEAL CANCER (H): ICD-10-CM

## 2020-11-16 PROCEDURE — U0003 INFECTIOUS AGENT DETECTION BY NUCLEIC ACID (DNA OR RNA); SEVERE ACUTE RESPIRATORY SYNDROME CORONAVIRUS 2 (SARS-COV-2) (CORONAVIRUS DISEASE [COVID-19]), AMPLIFIED PROBE TECHNIQUE, MAKING USE OF HIGH THROUGHPUT TECHNOLOGIES AS DESCRIBED BY CMS-2020-01-R: HCPCS | Performed by: STUDENT IN AN ORGANIZED HEALTH CARE EDUCATION/TRAINING PROGRAM

## 2020-11-17 LAB
SARS-COV-2 RNA SPEC QL NAA+PROBE: NOT DETECTED
SPECIMEN SOURCE: NORMAL

## 2020-11-19 ENCOUNTER — APPOINTMENT (OUTPATIENT)
Dept: RADIATION THERAPY | Facility: OUTPATIENT CENTER | Age: 75
End: 2020-11-19
Payer: MEDICARE

## 2020-11-20 ENCOUNTER — APPOINTMENT (OUTPATIENT)
Dept: RADIATION THERAPY | Facility: OUTPATIENT CENTER | Age: 75
End: 2020-11-20
Payer: MEDICARE

## 2020-11-23 ENCOUNTER — APPOINTMENT (OUTPATIENT)
Dept: RADIATION THERAPY | Facility: OUTPATIENT CENTER | Age: 75
End: 2020-11-23
Payer: MEDICARE

## 2020-11-24 ENCOUNTER — APPOINTMENT (OUTPATIENT)
Dept: RADIATION THERAPY | Facility: OUTPATIENT CENTER | Age: 75
End: 2020-11-24
Payer: MEDICARE

## 2020-11-25 ENCOUNTER — APPOINTMENT (OUTPATIENT)
Dept: RADIATION THERAPY | Facility: OUTPATIENT CENTER | Age: 75
End: 2020-11-25
Payer: MEDICARE

## 2020-11-25 ENCOUNTER — OFFICE VISIT (OUTPATIENT)
Dept: RADIATION THERAPY | Facility: OUTPATIENT CENTER | Age: 75
End: 2020-11-25
Payer: MEDICARE

## 2020-11-25 VITALS
BODY MASS INDEX: 26.34 KG/M2 | DIASTOLIC BLOOD PRESSURE: 79 MMHG | RESPIRATION RATE: 18 BRPM | OXYGEN SATURATION: 98 % | WEIGHT: 194.2 LBS | SYSTOLIC BLOOD PRESSURE: 144 MMHG | HEART RATE: 57 BPM

## 2020-11-25 DIAGNOSIS — G89.3 CANCER RELATED PAIN: Primary | ICD-10-CM

## 2020-11-25 ASSESSMENT — PAIN SCALES - GENERAL: PAINLEVEL: NO PAIN (0)

## 2020-11-25 NOTE — PROGRESS NOTES
[facility]  SPECIALIZING IN BREAKTHROUGHS  Radiation Oncology    On Treatment Visit Note      Newton Buchanan      Date: 2020   MRN: [id number]   : 1945  Diagnosis: H&N cancer      Reason for Visit:  On Radiation Treatment Visit     Treatment Summary to Date  Treatment Site: R neck Current Dose: 1250/3750 cGy Fractions: 5/15           Subjective:   Doing well. No acute trouble. Mild xerostomia. No mucositis.     Nursing ROS:   Nutrition Alteration  Diet Type: Patient's Preference  Skin  Skin Reaction: 0 - No changes     ENT and Mouth Exam  Mucositis - Current: 0 - None   Mucositis - Internal Severity: 0 - None   Esophagitis: 0 - None     Gastrointestinal  Nausea: 0 - None        Pain Assessment  0-10 Pain Scale: 0      Objective:   BP (!) 144/79   Pulse 57   Resp 18   Wt 88.1 kg (194 lb 3.2 oz)   SpO2 98%   BMI 26.34 kg/m      Labs:  CBC RESULTS:   Recent Labs   Lab Test 20  1324   WBC 11.3*   RBC 4.44   HGB 13.3   HCT 39.0*   MCV 88   MCH 30.0   MCHC 34.1   RDW 12.5        ELECTROLYTES:  Recent Labs   Lab Test 10/26/20  1357      POTASSIUM 4.1   CHLORIDE 107   JÚNIOR 9.7   CO2 27   BUN 17   CR 0.88   GLC 99       Assessment:  Mr. Buchanan is a 75 year old male with a diagnosis of squamous cell carcinoma of the head and neck, p16 positive, clinical T0N1M1 (thorax) on systemic immunotherapy with local progression in the right neck.  He is undergoing palliative radiation therapy to the right neck.    Tolerating radiation therapy well.  All questions and concerns addressed.    Plan:   1. Continue current therapy.    2. Mucositis/ Esophagitis. Magic mouthwash PRN.   3. Xerostomia. Salt/soda risnes.       Mosaiq chart and setup information reviewed  Ports checked         Educational Topic Discussed  Education Instructions: reviewed salt/soda, nutrition/hydration      Ronen Johns MD

## 2020-11-25 NOTE — LETTER
2020         RE: Newton Buchanan  [address]  [place] [address]        Dear Colleague,    Thank you for referring your patient, Newton Buchanan, to the RADIATION THERAPY CENTER. Please see a copy of my visit note below.    [facility]  SPECIALIZING IN BREAKTHROUGHS  Radiation Oncology    On Treatment Visit Note      Newton Buchanan      Date: 2020   MRN: [id number]   : 1945  Diagnosis: H&N cancer      Reason for Visit:  On Radiation Treatment Visit     Treatment Summary to Date  Treatment Site: R neck Current Dose: 1250/3750 cGy Fractions: 5/15           Subjective:   Doing well. No acute trouble. Mild xerostomia. No mucositis.     Nursing ROS:   Nutrition Alteration  Diet Type: Patient's Preference  Skin  Skin Reaction: 0 - No changes     ENT and Mouth Exam  Mucositis - Current: 0 - None   Mucositis - Internal Severity: 0 - None   Esophagitis: 0 - None     Gastrointestinal  Nausea: 0 - None        Pain Assessment  0-10 Pain Scale: 0      Objective:   BP (!) 144/79   Pulse 57   Resp 18   Wt 88.1 kg (194 lb 3.2 oz)   SpO2 98%   BMI 26.34 kg/m      Labs:  CBC RESULTS:   Recent Labs   Lab Test 20  1324   WBC 11.3*   RBC 4.44   HGB 13.3   HCT 39.0*   MCV 88   MCH 30.0   MCHC 34.1   RDW 12.5        ELECTROLYTES:  Recent Labs   Lab Test 10/26/20  1357      POTASSIUM 4.1   CHLORIDE 107   JÚNIOR 9.7   CO2 27   BUN 17   CR 0.88   GLC 99       Assessment:  Mr. Buchanan is a 75 year old male with a diagnosis of squamous cell carcinoma of the head and neck, p16 positive, clinical T0N1M1 (thorax) on systemic immunotherapy with local progression in the right neck.  He is undergoing palliative radiation therapy to the right neck.    Tolerating radiation therapy well.  All questions and concerns addressed.    Plan:   1. Continue current therapy.    2. Mucositis/ Esophagitis. Magic mouthwash PRN.   3. Xerostomia. Salt/soda risnes.       Mosaiq chart and setup  information reviewed  Ports checked         Educational Topic Discussed  Education Instructions: reviewed salt/soda, nutrition/hydration      Ronen Johns MD

## 2020-11-27 ENCOUNTER — APPOINTMENT (OUTPATIENT)
Dept: RADIATION THERAPY | Facility: OUTPATIENT CENTER | Age: 75
End: 2020-11-27
Payer: MEDICARE

## 2020-11-30 ENCOUNTER — APPOINTMENT (OUTPATIENT)
Dept: RADIATION THERAPY | Facility: OUTPATIENT CENTER | Age: 75
End: 2020-11-30
Payer: MEDICARE

## 2020-12-01 ENCOUNTER — APPOINTMENT (OUTPATIENT)
Dept: RADIATION THERAPY | Facility: OUTPATIENT CENTER | Age: 75
End: 2020-12-01
Payer: MEDICARE

## 2020-12-02 ENCOUNTER — OFFICE VISIT (OUTPATIENT)
Dept: RADIATION THERAPY | Facility: OUTPATIENT CENTER | Age: 75
End: 2020-12-02
Payer: MEDICARE

## 2020-12-02 ENCOUNTER — APPOINTMENT (OUTPATIENT)
Dept: RADIATION THERAPY | Facility: OUTPATIENT CENTER | Age: 75
End: 2020-12-02
Payer: MEDICARE

## 2020-12-02 VITALS
RESPIRATION RATE: 18 BRPM | HEART RATE: 57 BPM | DIASTOLIC BLOOD PRESSURE: 68 MMHG | SYSTOLIC BLOOD PRESSURE: 126 MMHG | BODY MASS INDEX: 26.42 KG/M2 | OXYGEN SATURATION: 98 % | WEIGHT: 194.8 LBS

## 2020-12-02 DIAGNOSIS — G89.3 CANCER RELATED PAIN: ICD-10-CM

## 2020-12-02 NOTE — PROGRESS NOTES
[facility]  SPECIALIZING IN BREAKTHROUGHS  Radiation Oncology    On Treatment Visit Note      Newton Buchanan      Date: 2020   MRN: [id number]   : 1945  Diagnosis: H&N cancer      Reason for Visit:  On Radiation Treatment Visit     Treatment Summary to Date  Treatment Site: R neck Current Dose: 2250/3750 cGy Fractions: 9/15           Subjective:   Doing well. Very mild ? mucositis. Mild xerostomia. Tolerating PO. Remains active. Patient has noticed mild reduction in size of treated R neck nodes.     Nursing ROS:   Nutrition Alteration  Diet Type: Patient's Preference  Skin  Skin Reaction: 0 - No changes     ENT and Mouth Exam  Mucositis - Current: 0 - None   Mucositis - Internal Severity: 0 - None   Esophagitis: 0 - None     Gastrointestinal  Nausea: 0 - None        Pain Assessment  0-10 Pain Scale: 0      Objective:   /68   Pulse 57   Resp 18   Wt 88.4 kg (194 lb 12.8 oz)   SpO2 98%   BMI 26.42 kg/m    NAD  Mild mucositis in R OPX region.   + Right neck nodes, slightly reduced in size in start of RT.     Labs:  CBC RESULTS:   Recent Labs   Lab Test 20  1324   WBC 11.3*   RBC 4.44   HGB 13.3   HCT 39.0*   MCV 88   MCH 30.0   MCHC 34.1   RDW 12.5        ELECTROLYTES:  Recent Labs   Lab Test 10/26/20  1357      POTASSIUM 4.1   CHLORIDE 107   JÚNIOR 9.7   CO2 27   BUN 17   CR 0.88   GLC 99       Assessment:  Mr. Buchanan is a 75 year old male with a diagnosis of squamous cell carcinoma of the head and neck, p16 positive, clinical T0N1M1 (thorax) on systemic immunotherapy with local progression in the right neck.  He is undergoing palliative radiation therapy to the right neck.    Tolerating radiation therapy well.  All questions and concerns addressed.    Plan:   1. Continue current therapy.  Patient weighing pros and cons of considering planned 15 fraction course vs. 20 fx course based upon tolerance.   2. Mucositis/ Esophagitis. Magic mouthwash PRN.    3. Xerostomia. Salt/soda risciaran.       Mosaiq chart and setup information reviewed  Ports checked         Educational Topic Discussed  Education Instructions: reviewed salt/soda, nutrition/hydration      Ronen Johns MD

## 2020-12-02 NOTE — LETTER
2020         RE: Newton Buchanan  [address]  [place] [address]        Dear Colleague,    Thank you for referring your patient, Newton Buchanan, to the [facility]. Please see a copy of my visit note below.    [facility] PHYSICIANS  SPECIALIZING IN BREAKTHROUGHS  Radiation Oncology    On Treatment Visit Note      Newton Buchanan      Date: 2020   MRN: [id number]   : 1945  Diagnosis: H&N cancer      Reason for Visit:  On Radiation Treatment Visit     Treatment Summary to Date  Treatment Site: R neck Current Dose: 2250/3750 cGy Fractions: 9/15           Subjective:   Doing well. Very mild ? mucositis. Mild xerostomia. Tolerating PO. Remains active. Patient has noticed mild reduction in size of treated R neck nodes.     Nursing ROS:   Nutrition Alteration  Diet Type: Patient's Preference  Skin  Skin Reaction: 0 - No changes     ENT and Mouth Exam  Mucositis - Current: 0 - None   Mucositis - Internal Severity: 0 - None   Esophagitis: 0 - None     Gastrointestinal  Nausea: 0 - None        Pain Assessment  0-10 Pain Scale: 0      Objective:   /68   Pulse 57   Resp 18   Wt 88.4 kg (194 lb 12.8 oz)   SpO2 98%   BMI 26.42 kg/m    NAD  Mild mucositis in R OPX region.   + Right neck nodes, slightly reduced in size in start of RT.     Labs:  CBC RESULTS:   Recent Labs   Lab Test 20  1324   WBC 11.3*   RBC 4.44   HGB 13.3   HCT 39.0*   MCV 88   MCH 30.0   MCHC 34.1   RDW 12.5        ELECTROLYTES:  Recent Labs   Lab Test 10/26/20  1357      POTASSIUM 4.1   CHLORIDE 107   JÚNIOR 9.7   CO2 27   BUN 17   CR 0.88   GLC 99       Assessment:  Mr. Buchanan is a 75 year old male with a diagnosis of squamous cell carcinoma of the head and neck, p16 positive, clinical T0N1M1 (thorax) on systemic immunotherapy with local progression in the right neck.  He is undergoing palliative radiation therapy to the right neck.    Tolerating radiation therapy well.  All questions  and concerns addressed.    Plan:   1. Continue current therapy.  Patient weighing pros and cons of considering planned 15 fraction course vs. 20 fx course based upon tolerance.   2. Mucositis/ Esophagitis. Magic mouthwash PRN.   3. Xerostomia. Salt/soda risnes.       Mosaiq chart and setup information reviewed  Ports checked         Educational Topic Discussed  Education Instructions: reviewed salt/soda, nutrition/hydration      Ronen Johns MD

## 2020-12-03 ENCOUNTER — HOSPITAL ENCOUNTER (OUTPATIENT)
Dept: LAB | Facility: CLINIC | Age: 75
Discharge: HOME | End: 2020-12-03
Attending: INTERNAL MEDICINE | Admitting: INTERNAL MEDICINE
Payer: MEDICARE

## 2020-12-03 ENCOUNTER — APPOINTMENT (OUTPATIENT)
Dept: RADIATION THERAPY | Facility: OUTPATIENT CENTER | Age: 75
End: 2020-12-03
Payer: MEDICARE

## 2020-12-03 ENCOUNTER — INFUSION THERAPY VISIT (OUTPATIENT)
Dept: INFUSION THERAPY | Facility: CLINIC | Age: 75
End: 2020-12-03
Attending: INTERNAL MEDICINE
Payer: MEDICARE

## 2020-12-03 VITALS
SYSTOLIC BLOOD PRESSURE: 129 MMHG | DIASTOLIC BLOOD PRESSURE: 58 MMHG | WEIGHT: 195.2 LBS | BODY MASS INDEX: 26.47 KG/M2 | HEART RATE: 53 BPM | RESPIRATION RATE: 18 BRPM

## 2020-12-03 DIAGNOSIS — C76.0 HEAD AND NECK CANCER (H): Primary | ICD-10-CM

## 2020-12-03 LAB
ALBUMIN SERPL-MCNC: 3.6 G/DL (ref 3.4–5)
ALP SERPL-CCNC: 60 U/L (ref 40–150)
ALT SERPL W P-5'-P-CCNC: 28 U/L (ref 0–70)
ANION GAP SERPL CALCULATED.3IONS-SCNC: 3 MMOL/L (ref 3–14)
AST SERPL W P-5'-P-CCNC: 21 U/L (ref 0–45)
BILIRUB SERPL-MCNC: 0.8 MG/DL (ref 0.2–1.3)
BUN SERPL-MCNC: 16 MG/DL (ref 7–30)
CALCIUM SERPL-MCNC: 9 MG/DL (ref 8.5–10.1)
CHLORIDE SERPL-SCNC: 105 MMOL/L (ref 94–109)
CO2 SERPL-SCNC: 31 MMOL/L (ref 20–32)
CREAT SERPL-MCNC: 0.91 MG/DL (ref 0.66–1.25)
GFR SERPL CREATININE-BSD FRML MDRD: 83 ML/MIN/{1.73_M2}
GLUCOSE SERPL-MCNC: 72 MG/DL (ref 70–99)
POTASSIUM SERPL-SCNC: 4.2 MMOL/L (ref 3.4–5.3)
PROT SERPL-MCNC: 7.2 G/DL (ref 6.8–8.8)
SODIUM SERPL-SCNC: 139 MMOL/L (ref 133–144)
TSH SERPL DL<=0.005 MIU/L-ACNC: 2.42 MU/L (ref 0.4–4)

## 2020-12-03 PROCEDURE — 36415 COLL VENOUS BLD VENIPUNCTURE: CPT | Performed by: INTERNAL MEDICINE

## 2020-12-03 PROCEDURE — 84443 ASSAY THYROID STIM HORMONE: CPT | Performed by: INTERNAL MEDICINE

## 2020-12-03 PROCEDURE — 258N000003 HC RX IP 258 OP 636: Performed by: INTERNAL MEDICINE

## 2020-12-03 PROCEDURE — 96413 CHEMO IV INFUSION 1 HR: CPT

## 2020-12-03 PROCEDURE — 80053 COMPREHEN METABOLIC PANEL: CPT | Performed by: INTERNAL MEDICINE

## 2020-12-03 PROCEDURE — 250N000011 HC RX IP 250 OP 636: Performed by: INTERNAL MEDICINE

## 2020-12-03 RX ORDER — METHYLPREDNISOLONE SODIUM SUCCINATE 125 MG/2ML
125 INJECTION, POWDER, LYOPHILIZED, FOR SOLUTION INTRAMUSCULAR; INTRAVENOUS
Status: CANCELLED
Start: 2020-12-03

## 2020-12-03 RX ORDER — MEPERIDINE HYDROCHLORIDE 25 MG/ML
25 INJECTION INTRAMUSCULAR; INTRAVENOUS; SUBCUTANEOUS EVERY 30 MIN PRN
Status: CANCELLED | OUTPATIENT
Start: 2020-12-03

## 2020-12-03 RX ORDER — EPINEPHRINE 1 MG/ML
0.3 INJECTION, SOLUTION, CONCENTRATE INTRAVENOUS EVERY 5 MIN PRN
Status: CANCELLED | OUTPATIENT
Start: 2020-12-03

## 2020-12-03 RX ORDER — SODIUM CHLORIDE 9 MG/ML
1000 INJECTION, SOLUTION INTRAVENOUS CONTINUOUS PRN
Status: CANCELLED
Start: 2020-12-03

## 2020-12-03 RX ORDER — LORAZEPAM 2 MG/ML
0.5 INJECTION INTRAMUSCULAR EVERY 4 HOURS PRN
Status: CANCELLED
Start: 2020-12-03

## 2020-12-03 RX ORDER — ALBUTEROL SULFATE 90 UG/1
1-2 AEROSOL, METERED RESPIRATORY (INHALATION)
Status: CANCELLED
Start: 2020-12-03

## 2020-12-03 RX ORDER — ALBUTEROL SULFATE 0.83 MG/ML
2.5 SOLUTION RESPIRATORY (INHALATION)
Status: CANCELLED | OUTPATIENT
Start: 2020-12-03

## 2020-12-03 RX ORDER — NALOXONE HYDROCHLORIDE 0.4 MG/ML
.1-.4 INJECTION, SOLUTION INTRAMUSCULAR; INTRAVENOUS; SUBCUTANEOUS
Status: CANCELLED | OUTPATIENT
Start: 2020-12-03

## 2020-12-03 RX ORDER — DIPHENHYDRAMINE HYDROCHLORIDE 50 MG/ML
50 INJECTION INTRAMUSCULAR; INTRAVENOUS
Status: CANCELLED
Start: 2020-12-03

## 2020-12-03 RX ADMIN — SODIUM CHLORIDE 200 MG: 9 INJECTION, SOLUTION INTRAVENOUS at 09:08

## 2020-12-03 RX ADMIN — SODIUM CHLORIDE 250 ML: 9 INJECTION, SOLUTION INTRAVENOUS at 09:09

## 2020-12-03 ASSESSMENT — PAIN SCALES - GENERAL: PAINLEVEL: NO PAIN (0)

## 2020-12-03 NOTE — PROGRESS NOTES
Infusion Nursing Note:  Newton Buchanan presents today for Keytruda C8D1.    Patient seen by provider today: No   present during visit today: Not Applicable.    Note: N/A.    Intravenous Access:  Peripheral IV placed.    Treatment Conditions:  Lab Results   Component Value Date     12/03/2020                   Lab Results   Component Value Date    POTASSIUM 4.2 12/03/2020           No results found for: MAG         Lab Results   Component Value Date    CR 0.91 12/03/2020                   Lab Results   Component Value Date    JÚNIOR 9.0 12/03/2020                Lab Results   Component Value Date    BILITOTAL 0.8 12/03/2020           Lab Results   Component Value Date    ALBUMIN 3.6 12/03/2020                    Lab Results   Component Value Date    ALT 28 12/03/2020           Lab Results   Component Value Date    AST 21 12/03/2020   Results reviewed, labs MET treatment parameters, ok to proceed with treatment.    Post Infusion Assessment:  Patient tolerated infusion without incident.  Site patent and intact, free from redness, edema or discomfort.  No evidence of extravasations.  Access discontinued per protocol.     Discharge Plan:   Discharge instructions reviewed with: Patient.  Patient and/or family verbalized understanding of discharge instructions and all questions answered.  AVS to patient via [employer].  Patient will return 12/24/2020 for next appointment.   Patient discharged in stable condition accompanied by: self.  Departure Mode: Ambulatory.    Mercy Carvalho RN

## 2020-12-04 ENCOUNTER — APPOINTMENT (OUTPATIENT)
Dept: RADIATION THERAPY | Facility: OUTPATIENT CENTER | Age: 75
End: 2020-12-04
Payer: MEDICARE

## 2020-12-07 ENCOUNTER — APPOINTMENT (OUTPATIENT)
Dept: RADIATION THERAPY | Facility: OUTPATIENT CENTER | Age: 75
End: 2020-12-07
Payer: MEDICARE

## 2020-12-08 ENCOUNTER — APPOINTMENT (OUTPATIENT)
Dept: RADIATION THERAPY | Facility: OUTPATIENT CENTER | Age: 75
End: 2020-12-08
Payer: MEDICARE

## 2020-12-09 ENCOUNTER — OFFICE VISIT (OUTPATIENT)
Dept: RADIATION THERAPY | Facility: OUTPATIENT CENTER | Age: 75
End: 2020-12-09

## 2020-12-09 DIAGNOSIS — C76.0 HEAD AND NECK CANCER (H): Primary | ICD-10-CM

## 2020-12-09 NOTE — LETTER
2020         RE: Newton Buchanan  [address]  [place] [address]        Dear Colleague,    Thank you for referring your patient, Newton Buchanan, to the [facility]. Please see a copy of my visit note below.    [facility] PHYSICIANS  SPECIALIZING IN BREAKTHROUGHS  Radiation Oncology    On Treatment Visit Note      Newton Buchanan      Date: 2020   MRN: [id number]   : 1945  Diagnosis: H&N cancer      Reason for Visit:  On Radiation Treatment Visit     Treatment Summary to Date  Treatment Site: R neck Current Dose: 3500/3750 cGy Fractions: 14/15           Subjective:   Doing well. Very mild ? mucositis. Mild xerostomia. Tolerating PO. Remains active. Patient has noticed mild reduction in size of treated R neck nodes.     Nursing ROS:   Nutrition Alteration  Diet Type: Patient's Preference  Skin  Skin Reaction: 0 - No changes     ENT and Mouth Exam  Mucositis - Current: 0 - None   Mucositis - Internal Severity: 0 - None   Esophagitis: 0 - None     Gastrointestinal  Nausea: 0 - None        Pain Assessment  0-10 Pain Scale: 0      Objective:   There were no vitals taken for this visit.  NAD  Mild mucositis in R OPX region.   + Right neck nodes, slightly reduced in size in start of RT.     Labs:  CBC RESULTS:   Recent Labs   Lab Test 20  1324   WBC 11.3*   RBC 4.44   HGB 13.3   HCT 39.0*   MCV 88   MCH 30.0   MCHC 34.1   RDW 12.5        ELECTROLYTES:  Recent Labs   Lab Test 10/26/20  1357      POTASSIUM 4.1   CHLORIDE 107   JÚNIOR 9.7   CO2 27   BUN 17   CR 0.88   GLC 99       Assessment:  Mr. Buchanan is a 75 year old male with a diagnosis of squamous cell carcinoma of the head and neck, p16 positive, clinical T0N1M1 (thorax) on systemic immunotherapy with local progression in the right neck.  He is undergoing palliative radiation therapy to the right neck.    Tolerating radiation therapy well.  All questions and concerns addressed.    Plan:   1. Continue  current therapy.  Patient weighed pros and cons of considering planned 15 fraction course vs. 20 fx course based upon tolerance. Wishing to continue additional 5 fractions, will start next week to allow mild tissue recovery.   2. Mucositis/ Esophagitis. Magic mouthwash PRN.   3. Xerostomia. Salt/soda risnes.       Mosaiq chart and setup information reviewed  Ports checked         Educational Topic Discussed  Education Instructions: reviewed salt/soda, nutrition/hydration      Ronen Johns MD

## 2020-12-09 NOTE — PROGRESS NOTES
[facility] PHYSICIANS  SPECIALIZING IN BREAKTHROUGHS  Radiation Oncology    On Treatment Visit Note      Newton Buchanan      Date: 2020   MRN: [id number]   : 1945  Diagnosis: H&N cancer      Reason for Visit:  On Radiation Treatment Visit     Treatment Summary to Date  Treatment Site: R neck Current Dose: 3500/3750 cGy Fractions: 14/15           Subjective:   Doing well. Very mild ? mucositis. Mild xerostomia. Tolerating PO. Remains active. Patient has noticed mild reduction in size of treated R neck nodes.     Nursing ROS:   Nutrition Alteration  Diet Type: Patient's Preference  Skin  Skin Reaction: 0 - No changes     ENT and Mouth Exam  Mucositis - Current: 0 - None   Mucositis - Internal Severity: 0 - None   Esophagitis: 0 - None     Gastrointestinal  Nausea: 0 - None        Pain Assessment  0-10 Pain Scale: 0      Objective:   There were no vitals taken for this visit.  NAD  Mild mucositis in R OPX region.   + Right neck nodes, slightly reduced in size in start of RT.     Labs:  CBC RESULTS:   Recent Labs   Lab Test 20  1324   WBC 11.3*   RBC 4.44   HGB 13.3   HCT 39.0*   MCV 88   MCH 30.0   MCHC 34.1   RDW 12.5        ELECTROLYTES:  Recent Labs   Lab Test 10/26/20  1357      POTASSIUM 4.1   CHLORIDE 107   JÚNIOR 9.7   CO2 27   BUN 17   CR 0.88   GLC 99       Assessment:  Mr. Buchanan is a 75 year old male with a diagnosis of squamous cell carcinoma of the head and neck, p16 positive, clinical T0N1M1 (thorax) on systemic immunotherapy with local progression in the right neck.  He is undergoing palliative radiation therapy to the right neck.    Tolerating radiation therapy well.  All questions and concerns addressed.    Plan:   1. Continue current therapy.  Patient weighed pros and cons of considering planned 15 fraction course vs. 20 fx course based upon tolerance. Wishing to continue additional 5 fractions, will start next week to allow mild tissue recovery.    2. Mucositis/ Esophagitis. Magic mouthwash PRN.   3. Xerostomia. Salt/soda risnes.       Mosaiq chart and setup information reviewed  Ports checked         Educational Topic Discussed  Education Instructions: reviewed salt/soda, nutrition/hydration      Ronen Johns MD

## 2020-12-10 ENCOUNTER — APPOINTMENT (OUTPATIENT)
Dept: RADIATION THERAPY | Facility: OUTPATIENT CENTER | Age: 75
End: 2020-12-10
Payer: MEDICARE

## 2020-12-14 ENCOUNTER — HEALTH MAINTENANCE LETTER (OUTPATIENT)
Age: 75
End: 2020-12-14

## 2020-12-16 ENCOUNTER — OFFICE VISIT (OUTPATIENT)
Dept: RADIATION THERAPY | Facility: OUTPATIENT CENTER | Age: 75
End: 2020-12-16
Payer: OTHER GOVERNMENT

## 2020-12-16 VITALS
RESPIRATION RATE: 18 BRPM | DIASTOLIC BLOOD PRESSURE: 82 MMHG | SYSTOLIC BLOOD PRESSURE: 141 MMHG | OXYGEN SATURATION: 97 % | HEART RATE: 65 BPM | WEIGHT: 191.6 LBS | BODY MASS INDEX: 25.99 KG/M2

## 2020-12-16 DIAGNOSIS — C76.0 HEAD AND NECK CANCER (H): Primary | ICD-10-CM

## 2020-12-16 NOTE — PROGRESS NOTES
[facility] PHYSICIANS  SPECIALIZING IN BREAKTHROUGHS  Radiation Oncology    On Treatment Visit Note      Newton Buchanan      Date: 2020   MRN: [id number]   : 1945  Diagnosis: H&N cancer      Reason for Visit:  On Radiation Treatment Visit     Treatment Summary to Date  Treatment Site: R neck Current Dose: 4000/5000 cGy Fractions:            Subjective:   Doing well. Moderate mucositis. Using ibuprofen PRN. Mild xerostomia. Tolerating PO. Remains active. Patient has noticed reduction in size of treated R neck nodes.     Nursing ROS:   Nutrition Alteration  Diet Type: Patient's Preference  Skin  Skin Reaction: 0 - No changes     ENT and Mouth Exam  Mucositis - Current: 0 - None   Mucositis - Internal Severity: 0 - None   Esophagitis: 0 - None     Gastrointestinal  Nausea: 0 - None        Pain Assessment  0-10 Pain Scale: 0      Objective:   BP (!) 141/82   Pulse 65   Resp 18   Wt 86.9 kg (191 lb 9.6 oz)   SpO2 97%   BMI 25.99 kg/m    NAD  Moderate mucositis in R OPX region.   + Right neck nodes, slightly reduced in size in start of RT.     Labs:  CBC RESULTS:   Recent Labs   Lab Test 20  1324   WBC 11.3*   RBC 4.44   HGB 13.3   HCT 39.0*   MCV 88   MCH 30.0   MCHC 34.1   RDW 12.5        ELECTROLYTES:  Recent Labs   Lab Test 10/26/20  1357      POTASSIUM 4.1   CHLORIDE 107   JÚNIOR 9.7   CO2 27   BUN 17   CR 0.88   GLC 99       Assessment:  Mr. Buchanan is a 75 year old male with a diagnosis of squamous cell carcinoma of the head and neck, p16 positive, clinical T0N1M1 (thorax) on systemic immunotherapy with local progression in the right neck.  He is undergoing palliative radiation therapy to the right neck.    Tolerating radiation therapy well.  All questions and concerns addressed.    Plan:   1. Continue current therapy. EOT next Tuesday. Will see patient then.  2. Mucositis/ Esophagitis. Magic mouthwash PRN.   3. Xerostomia. Salt/soda risnes.       Mosaiq chart  and setup information reviewed  Ports checked         Educational Topic Discussed  Education Instructions: reviewed salt/soda, nutrition/hydration      Ronen Johns MD

## 2020-12-16 NOTE — LETTER
2020         RE: Newton Buchanan  [address]  [place] [address]        Dear Colleague,    Thank you for referring your patient, Newton Buchanan, to the [facility]. Please see a copy of my visit note below.    [facility] PHYSICIANS  SPECIALIZING IN BREAKTHROUGHS  Radiation Oncology    On Treatment Visit Note      Newton Buchanan      Date: 2020   MRN: [id number]   : 1945  Diagnosis: H&N cancer      Reason for Visit:  On Radiation Treatment Visit     Treatment Summary to Date  Treatment Site: R neck Current Dose: 4000/5000 cGy Fractions:            Subjective:   Doing well. Moderate mucositis. Using ibuprofen PRN. Mild xerostomia. Tolerating PO. Remains active. Patient has noticed reduction in size of treated R neck nodes.     Nursing ROS:   Nutrition Alteration  Diet Type: Patient's Preference  Skin  Skin Reaction: 0 - No changes     ENT and Mouth Exam  Mucositis - Current: 0 - None   Mucositis - Internal Severity: 0 - None   Esophagitis: 0 - None     Gastrointestinal  Nausea: 0 - None        Pain Assessment  0-10 Pain Scale: 0      Objective:   BP (!) 141/82   Pulse 65   Resp 18   Wt 86.9 kg (191 lb 9.6 oz)   SpO2 97%   BMI 25.99 kg/m    NAD  Moderate mucositis in R OPX region.   + Right neck nodes, slightly reduced in size in start of RT.     Labs:  CBC RESULTS:   Recent Labs   Lab Test 20  1324   WBC 11.3*   RBC 4.44   HGB 13.3   HCT 39.0*   MCV 88   MCH 30.0   MCHC 34.1   RDW 12.5        ELECTROLYTES:  Recent Labs   Lab Test 10/26/20  1357      POTASSIUM 4.1   CHLORIDE 107   JÚNIOR 9.7   CO2 27   BUN 17   CR 0.88   GLC 99       Assessment:  Mr. Buchanan is a 75 year old male with a diagnosis of squamous cell carcinoma of the head and neck, p16 positive, clinical T0N1M1 (thorax) on systemic immunotherapy with local progression in the right neck.  He is undergoing palliative radiation therapy to the right neck.    Tolerating radiation  therapy well.  All questions and concerns addressed.    Plan:   1. Continue current therapy. EOT next Tuesday. Will see patient then.  2. Mucositis/ Esophagitis. Magic mouthwash PRN.   3. Xerostomia. Salt/soda risnes.       Mosaiq chart and setup information reviewed  Ports checked         Educational Topic Discussed  Education Instructions: reviewed salt/soda, nutrition/hydration      Ronen Johns MD

## 2020-12-17 ENCOUNTER — APPOINTMENT (OUTPATIENT)
Dept: RADIATION THERAPY | Facility: OUTPATIENT CENTER | Age: 75
End: 2020-12-17
Payer: OTHER GOVERNMENT

## 2020-12-18 ENCOUNTER — APPOINTMENT (OUTPATIENT)
Dept: RADIATION THERAPY | Facility: OUTPATIENT CENTER | Age: 75
End: 2020-12-18
Payer: OTHER GOVERNMENT

## 2020-12-21 ENCOUNTER — APPOINTMENT (OUTPATIENT)
Dept: RADIATION THERAPY | Facility: OUTPATIENT CENTER | Age: 75
End: 2020-12-21
Payer: OTHER GOVERNMENT

## 2020-12-22 ENCOUNTER — DOCUMENTATION ONLY (OUTPATIENT)
Dept: RADIATION THERAPY | Facility: OUTPATIENT CENTER | Age: 75
End: 2020-12-22

## 2020-12-22 ENCOUNTER — OFFICE VISIT (OUTPATIENT)
Dept: RADIATION THERAPY | Facility: OUTPATIENT CENTER | Age: 75
End: 2020-12-22
Payer: OTHER GOVERNMENT

## 2020-12-22 VITALS
WEIGHT: 191.2 LBS | BODY MASS INDEX: 25.93 KG/M2 | RESPIRATION RATE: 16 BRPM | HEART RATE: 62 BPM | OXYGEN SATURATION: 98 % | SYSTOLIC BLOOD PRESSURE: 145 MMHG | DIASTOLIC BLOOD PRESSURE: 77 MMHG

## 2020-12-22 DIAGNOSIS — C76.0 HEAD AND NECK CANCER (H): Primary | ICD-10-CM

## 2020-12-22 ASSESSMENT — PAIN SCALES - GENERAL: PAINLEVEL: NO PAIN (1)

## 2020-12-22 NOTE — LETTER
2020         RE: Newton Buchanan  [address]  [place] [address]        Dear Colleague,    Thank you for referring your patient, Newton Buchanan, to the [facility]. Please see a copy of my visit note below.    [facility] PHYSICIANS  SPECIALIZING IN BREAKTHROUGHS  Radiation Oncology    On Treatment Visit Note      Newton Buchanan      Date: 2020   MRN: [id number]   : 1945  Diagnosis: H&N cancer      Reason for Visit:  On Radiation Treatment Visit     Treatment Summary to Date  Treatment Site: R neck Current Dose: 5000/5000 cGy Fractions:            Subjective:   Doing well. Moderate mucositis. Using ibuprofen PRN. Mild xerostomia. Tolerating PO. Remains active. Patient has noticed reduction in size of treated R neck nodes.     Nursing ROS:   Nutrition Alteration  Diet Type: Patient's Preference  Skin  Skin Reaction: 0 - No changes     ENT and Mouth Exam  Mucositis - Current: 0 - None   Mucositis - Internal Severity: 0 - None   Esophagitis: 0 - None     Gastrointestinal  Nausea: 0 - None        Pain Assessment  0-10 Pain Scale: 0      Objective:   BP (!) 145/77 (BP Location: Left arm, Patient Position: Sitting, Cuff Size: Adult Regular)   Pulse 62   Resp 16   Wt 86.7 kg (191 lb 3.2 oz)   SpO2 98%   BMI 25.93 kg/m    NAD  Moderate mucositis in R OPX region.   + Right neck nodes, slightly reduced in size in start of RT.     Labs:  CBC RESULTS:   Recent Labs   Lab Test 20  1324   WBC 11.3*   RBC 4.44   HGB 13.3   HCT 39.0*   MCV 88   MCH 30.0   MCHC 34.1   RDW 12.5        ELECTROLYTES:  Recent Labs   Lab Test 10/26/20  1357      POTASSIUM 4.1   CHLORIDE 107   JÚNIOR 9.7   CO2 27   BUN 17   CR 0.88   GLC 99       Assessment:  Mr. Buchanan is a 75 year old male with a diagnosis of squamous cell carcinoma of the head and neck, p16 positive, clinical T0N1M1 (thorax) on systemic immunotherapy with local progression in the right neck.  He is undergoing  palliative radiation therapy to the right neck.    Tolerating radiation therapy well.  All questions and concerns addressed.    Plan:   1. Continue current therapy. EOT today. RTC in 1 month.  2. Mucositis/ Esophagitis. Magic mouthwash PRN.   3. Xerostomia. Salt/soda risnes.       Mosaiq chart and setup information reviewed  Ports checked         Educational Topic Discussed  Education Instructions: reviewed salt/soda, nutrition/hydration      Ronen Johns MD

## 2020-12-23 DIAGNOSIS — C76.0 HEAD AND NECK CANCER (H): Primary | ICD-10-CM

## 2020-12-23 RX ORDER — METHYLPREDNISOLONE SODIUM SUCCINATE 125 MG/2ML
125 INJECTION, POWDER, LYOPHILIZED, FOR SOLUTION INTRAMUSCULAR; INTRAVENOUS
Status: CANCELLED
Start: 2020-12-24

## 2020-12-23 RX ORDER — MEPERIDINE HYDROCHLORIDE 25 MG/ML
25 INJECTION INTRAMUSCULAR; INTRAVENOUS; SUBCUTANEOUS EVERY 30 MIN PRN
Status: CANCELLED | OUTPATIENT
Start: 2020-12-24

## 2020-12-23 RX ORDER — LORAZEPAM 2 MG/ML
0.5 INJECTION INTRAMUSCULAR EVERY 4 HOURS PRN
Status: CANCELLED
Start: 2020-12-24

## 2020-12-23 RX ORDER — ALBUTEROL SULFATE 90 UG/1
1-2 AEROSOL, METERED RESPIRATORY (INHALATION)
Status: CANCELLED
Start: 2020-12-24

## 2020-12-23 RX ORDER — DIPHENHYDRAMINE HYDROCHLORIDE 50 MG/ML
50 INJECTION INTRAMUSCULAR; INTRAVENOUS
Status: CANCELLED
Start: 2020-12-24

## 2020-12-23 RX ORDER — EPINEPHRINE 1 MG/ML
0.3 INJECTION, SOLUTION, CONCENTRATE INTRAVENOUS EVERY 5 MIN PRN
Status: CANCELLED | OUTPATIENT
Start: 2020-12-24

## 2020-12-23 RX ORDER — SODIUM CHLORIDE 9 MG/ML
1000 INJECTION, SOLUTION INTRAVENOUS CONTINUOUS PRN
Status: CANCELLED
Start: 2020-12-24

## 2020-12-23 RX ORDER — NALOXONE HYDROCHLORIDE 0.4 MG/ML
.1-.4 INJECTION, SOLUTION INTRAMUSCULAR; INTRAVENOUS; SUBCUTANEOUS
Status: CANCELLED | OUTPATIENT
Start: 2020-12-24

## 2020-12-23 RX ORDER — ALBUTEROL SULFATE 0.83 MG/ML
2.5 SOLUTION RESPIRATORY (INHALATION)
Status: CANCELLED | OUTPATIENT
Start: 2020-12-24

## 2020-12-23 NOTE — PROGRESS NOTES
[facility] PHYSICIANS  SPECIALIZING IN BREAKTHROUGHS  Radiation Oncology    On Treatment Visit Note      Newton Buchanan      Date: 2020   MRN: [id number]   : 1945  Diagnosis: H&N cancer      Reason for Visit:  On Radiation Treatment Visit     Treatment Summary to Date  Treatment Site: R neck Current Dose: 5000/5000 cGy Fractions:            Subjective:   Doing well. Moderate mucositis. Using ibuprofen PRN. Mild xerostomia. Tolerating PO. Remains active. Patient has noticed reduction in size of treated R neck nodes.     Nursing ROS:   Nutrition Alteration  Diet Type: Patient's Preference  Skin  Skin Reaction: 0 - No changes     ENT and Mouth Exam  Mucositis - Current: 0 - None   Mucositis - Internal Severity: 0 - None   Esophagitis: 0 - None     Gastrointestinal  Nausea: 0 - None        Pain Assessment  0-10 Pain Scale: 0      Objective:   BP (!) 145/77 (BP Location: Left arm, Patient Position: Sitting, Cuff Size: Adult Regular)   Pulse 62   Resp 16   Wt 86.7 kg (191 lb 3.2 oz)   SpO2 98%   BMI 25.93 kg/m    NAD  Moderate mucositis in R OPX region.   + Right neck nodes, slightly reduced in size in start of RT.     Labs:  CBC RESULTS:   Recent Labs   Lab Test 20  1324   WBC 11.3*   RBC 4.44   HGB 13.3   HCT 39.0*   MCV 88   MCH 30.0   MCHC 34.1   RDW 12.5        ELECTROLYTES:  Recent Labs   Lab Test 10/26/20  1357      POTASSIUM 4.1   CHLORIDE 107   JÚNIOR 9.7   CO2 27   BUN 17   CR 0.88   GLC 99       Assessment:  Mr. Buchanan is a 75 year old male with a diagnosis of squamous cell carcinoma of the head and neck, p16 positive, clinical T0N1M1 (thorax) on systemic immunotherapy with local progression in the right neck.  He is undergoing palliative radiation therapy to the right neck.    Tolerating radiation therapy well.  All questions and concerns addressed.    Plan:   1. Continue current therapy. EOT today. RTC in 1 month.  2. Mucositis/ Esophagitis. Magic  mouthwash PRN.   3. Xerostomia. Salt/soda risnes.       Mosaiq chart and setup information reviewed  Ports checked         Educational Topic Discussed  Education Instructions: reviewed salt/soda, nutrition/hydration      Ronen Johns MD

## 2020-12-23 NOTE — PROGRESS NOTES
Radiotherapy Treatment Summary              PATIENT: Newton Buchanan  MEDICAL RECORD NO: [id number]   : 1945    DIAGNOSIS: Metastatic head neck cancer, unknown primary site  INTENT OF RADIOTHERAPY: Palliative  PATHOLOGY:    Squamous cell carcinoma, p16+                              STAGE: T0N1M1  CONCURRENT SYSTEMIC THERAPY:   Keytruda     ONCOLOGIC HISTORY:    Mr. Buchanan is a 75 year old male with a diagnosis of squamous cell carcinoma of the head and neck, p16 positive, clinical T0N1M1 (thorax). He was treated with systemic immunotherapy with good response in the chest with local progression in the right neck.  He subsequently completed palliative radiation therapy to the right neck-OPX region.      SITE OF TREATMENT: Right neck-OPX    DATES  OF TREATMENT: 20 to 20    TOTAL DOSE OF TREATMENT: 5000 cGy in 20 fractions    DOSE PER FRACTION OF TREATMENT: 250 cGy       COMMENT/TOXICITY:                  Grade 2 skin  Grade 2 mucositis    PAIN MANAGEMENT:                           Ibuprofen PRN  Magic mouthwash PRN    FOLLOW UP PLAN:  1. RTC in 1 month.  Ronen Johns M.D.  Department of Radiation Oncology  [facility]

## 2020-12-24 ENCOUNTER — INFUSION THERAPY VISIT (OUTPATIENT)
Dept: INFUSION THERAPY | Facility: CLINIC | Age: 75
End: 2020-12-24
Attending: INTERNAL MEDICINE
Payer: MEDICARE

## 2020-12-24 ENCOUNTER — HOSPITAL ENCOUNTER (OUTPATIENT)
Dept: LAB | Facility: CLINIC | Age: 75
Discharge: HOME | End: 2020-12-24
Attending: INTERNAL MEDICINE | Admitting: INTERNAL MEDICINE
Payer: MEDICARE

## 2020-12-24 VITALS
WEIGHT: 195.4 LBS | RESPIRATION RATE: 16 BRPM | DIASTOLIC BLOOD PRESSURE: 58 MMHG | BODY MASS INDEX: 26.5 KG/M2 | HEART RATE: 58 BPM | TEMPERATURE: 97.7 F | SYSTOLIC BLOOD PRESSURE: 127 MMHG | OXYGEN SATURATION: 100 %

## 2020-12-24 DIAGNOSIS — C76.0 HEAD AND NECK CANCER (H): Primary | ICD-10-CM

## 2020-12-24 LAB
ALBUMIN SERPL-MCNC: 3.6 G/DL (ref 3.4–5)
ALP SERPL-CCNC: 62 U/L (ref 40–150)
ALT SERPL W P-5'-P-CCNC: 20 U/L (ref 0–70)
ANION GAP SERPL CALCULATED.3IONS-SCNC: 4 MMOL/L (ref 3–14)
AST SERPL W P-5'-P-CCNC: 17 U/L (ref 0–45)
BILIRUB SERPL-MCNC: 0.7 MG/DL (ref 0.2–1.3)
BUN SERPL-MCNC: 16 MG/DL (ref 7–30)
CALCIUM SERPL-MCNC: 8.8 MG/DL (ref 8.5–10.1)
CHLORIDE SERPL-SCNC: 108 MMOL/L (ref 94–109)
CO2 SERPL-SCNC: 28 MMOL/L (ref 20–32)
CREAT SERPL-MCNC: 0.9 MG/DL (ref 0.66–1.25)
GFR SERPL CREATININE-BSD FRML MDRD: 83 ML/MIN/{1.73_M2}
GLUCOSE SERPL-MCNC: 92 MG/DL (ref 70–99)
POTASSIUM SERPL-SCNC: 4.1 MMOL/L (ref 3.4–5.3)
PROT SERPL-MCNC: 7.2 G/DL (ref 6.8–8.8)
SODIUM SERPL-SCNC: 140 MMOL/L (ref 133–144)
TSH SERPL DL<=0.005 MIU/L-ACNC: 2.82 MU/L (ref 0.4–4)

## 2020-12-24 PROCEDURE — 96413 CHEMO IV INFUSION 1 HR: CPT

## 2020-12-24 PROCEDURE — 36415 COLL VENOUS BLD VENIPUNCTURE: CPT | Performed by: INTERNAL MEDICINE

## 2020-12-24 PROCEDURE — 80053 COMPREHEN METABOLIC PANEL: CPT | Performed by: INTERNAL MEDICINE

## 2020-12-24 PROCEDURE — 250N000011 HC RX IP 250 OP 636: Performed by: INTERNAL MEDICINE

## 2020-12-24 PROCEDURE — 258N000003 HC RX IP 258 OP 636: Performed by: INTERNAL MEDICINE

## 2020-12-24 PROCEDURE — 84443 ASSAY THYROID STIM HORMONE: CPT | Performed by: INTERNAL MEDICINE

## 2020-12-24 RX ADMIN — SODIUM CHLORIDE 200 MG: 9 INJECTION, SOLUTION INTRAVENOUS at 08:56

## 2020-12-24 RX ADMIN — SODIUM CHLORIDE 250 ML: 9 INJECTION, SOLUTION INTRAVENOUS at 08:58

## 2020-12-24 ASSESSMENT — PAIN SCALES - GENERAL: PAINLEVEL: NO PAIN (0)

## 2020-12-24 NOTE — PROGRESS NOTES
Infusion Nursing Note:  Newton Buchanan presents today for c9d1 Keytruda.    Patient seen by provider today: No   present during visit today: Not Applicable.    Note: N/A.    Intravenous Access:  Peripheral IV placed.    Treatment Conditions:  Lab Results   Component Value Date    HGB 13.3 06/27/2020     Lab Results   Component Value Date    WBC 11.3 06/27/2020      Lab Results   Component Value Date    ANEU 8.7 06/27/2020     Lab Results   Component Value Date     06/27/2020      Lab Results   Component Value Date     12/24/2020                   Lab Results   Component Value Date    POTASSIUM 4.1 12/24/2020           No results found for: MAG         Lab Results   Component Value Date    CR 0.90 12/24/2020                   Lab Results   Component Value Date    JÚNIOR 8.8 12/24/2020                Lab Results   Component Value Date    BILITOTAL 0.7 12/24/2020           Lab Results   Component Value Date    ALBUMIN 3.6 12/24/2020                    Lab Results   Component Value Date    ALT 20 12/24/2020           Lab Results   Component Value Date    AST 17 12/24/2020       Results reviewed, labs MET treatment parameters, ok to proceed with treatment.      Post Infusion Assessment:  Patient tolerated infusion without incident.  Blood return noted pre and post infusion.  Site patent and intact, free from redness, edema or discomfort.  No evidence of extravasations.  Access discontinued per protocol.       Discharge Plan:   Discharge instructions reviewed with: Patient.  Patient and/or family verbalized understanding of discharge instructions and all questions answered.  Copy of AVS reviewed with patient and/or family.  Patient will return 1/14/21 for next appointment.  Patient discharged in stable condition accompanied by: self.  Departure Mode: Ambulatory.    Samuel Rivera RN

## 2020-12-29 DIAGNOSIS — C10.9 SQUAMOUS CELL CARCINOMA OF OROPHARYNX (H): ICD-10-CM

## 2020-12-29 DIAGNOSIS — C76.0 HEAD AND NECK CANCER (H): Primary | ICD-10-CM

## 2021-01-11 ENCOUNTER — VIRTUAL VISIT (OUTPATIENT)
Dept: ONCOLOGY | Facility: CLINIC | Age: 76
End: 2021-01-11
Attending: INTERNAL MEDICINE
Payer: MEDICARE

## 2021-01-11 DIAGNOSIS — C76.0 HEAD AND NECK CANCER (H): Primary | ICD-10-CM

## 2021-01-11 PROCEDURE — 999N001193 HC VIDEO/TELEPHONE VISIT; NO CHARGE

## 2021-01-11 PROCEDURE — 99443 PR PHYSICIAN TELEPHONE EVALUATION 21-30 MIN: CPT | Performed by: INTERNAL MEDICINE

## 2021-01-11 NOTE — LETTER
1/11/2021         RE: Newton Buchanan  [address]  [place] [address]        Dear Colleague,    Thank you for referring your patient, Newton Buchanan, to the [facility]. Please see a copy of my visit note below.    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 Estimated body mass index is 26.5 kg/m  as calculated from the following:    Height as of 11/4/20: 1.829 m (6').    Weight as of 12/24/20: 88.6 kg (195 lb 6.4 oz). There is no height or weight on file to calculate BSA.  Data Unavailable Comment: Data Unavailable   No LMP for male patient.  Allergies reviewed: Yes  Medications reviewed: Yes    Medications: Medication refills not needed today.  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   Questions about COVID vaccine. He wants to travel to [place].    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 Has had FNAs in the past that were benign. He was followed by q 6 months.      He reported the neck mass was getting larger. Denies any pain/dysphagia/difficuly swallowing/sob/skin changes.   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      2/24/20 PET/CT found:  1. Hypermetabolic right cervical lymphadenopathy.  2. Numerous hypermetabolic  "bilateral lung metastases.  3. Hypermetabolic right hilar nodes, also likely metastatic disease     It was felt that with no obvious primary on PET/CT but given CK7 +, P16 positivity, and neck mass on presentation, suspicion is high that origin is still of H&N (right cervical LAD) with metastatic spread to lung with bilateral multiple lung nodules .  Pt made informed decision to proceed with single agent Keytruda due to his PD-L1 testing from [employer] with CPS of 70.  Started early May in [place].      He transferred his care to us in June 2020.      He has mixed PET response in 8/2020 in neck JOSÉ MIGUEL, and decreased lung nodules uptake.      Case was discussed at U conference, and he saw ENT, Dr. Aparicio -- \"He is still not a candidate for definitive chemoradiation given the distant disease\".      Clinical trail was recommended with N-803 (formerly Alt-803), which is an IL-15 superagonist in combination with pembrolizumab.  It is specifically for patients who have had a positive response to keytruda but then experience progression. This is based on 90% disease control in a similar cohort of lung cancer patients that we participated in a few years back.   It is a multi-disease cohort phase 2.        He met with Dr. Veras 9/16/2020 for trial discussion.   Felt his mixed response (resolution of lung diease, and mixed response in right neck LN) could be pseudoprogression from IO or true progression. [place] decision is to do 2 more keytruda and restage.     Interval history:  Mr. Buchanan is a 75 year old male is here for follow-up of squamous carcinoma presenting as neck mass.  He just had palliative radiation to neck completed last week.  He's noted that the neck nodes seem much smaller and overall felt he did fine with the treatment.  Of note, he has numerous lung nodules that were NOT in the radiation field.  He has remained on Pembrolizumab and prefers to continue this.  We discussed the need to assess where that stands " now.    Review Of Systems:  Review Of Systems  Skin: negative  Eyes: negative  Ears/Nose/Throat: very little dysphagia with radiaiton  Respiratory: No shortness of breath, dyspnea on exertion, cough, or hemoptysis  Cardiovascular: negative  Gastrointestinal: negative  Genitourinary: negative  Musculoskeletal: negative  Neurologic: negative  Psychiatric: negative  Hematologic/Lymphatic/Immunologic: negative  Endocrine: negative    Past medical, social, surgical, and family histories reviewed.    Patient Active Problem List   Diagnosis     Essential hypertension with goal blood pressure less than 140/90     Gastroesophageal reflux disease without esophagitis     CRYSTAL (obstructive sleep apnea)     Prostate cancer (H)     Head and neck cancer (H)     Oropharyngeal cancer (H)         Allergies:  Allergies as of 01/11/2021 - Reviewed 01/11/2021   Allergen Reaction Noted     Atorvastatin  11/22/2011       Current Medications:  Current Outpatient Medications   Medication Sig Dispense Refill     aspirin 81 MG tablet Take by mouth daily 30 tablet      fluticasone (FLONASE) 50 MCG/ACT nasal spray Spray 1-2 sprays in nostril       losartan (COZAAR) 25 MG tablet Take 1 tablet (25 mg) by mouth daily 90 tablet 3     pembrolizumab (KEYTRUDA) 25 MG/ML        pravastatin (PRAVACHOL) 20 MG tablet Take 20 mg by mouth daily        saccharomyces boulardii (FLORASTOR) 250 MG capsule Take 1 capsule by mouth 2 times daily       sildenafil (VIAGRA) 100 MG tablet Take by mouth daily as needed for erectile dysfunction 30 tablet      VITAMIN D, CHOLECALCIFEROL, PO Take 1,000 Units by mouth daily       magic mouthwash (ENTER INGREDIENTS IN COMMENTS) suspension Swallow one to two teaspoonfuls every six hours as needed. (Patient not taking: Reported on 1/11/2021) 300 mL 3     prochlorperazine (COMPAZINE) 10 MG tablet Take 10 mg by mouth every 6 hours as needed           Physical Exam:  There were no vitals taken for this visit.    Phone visit only.   No exam.    Laboratory/Imaging Studies  No visits with results within 2 Week(s) from this visit.   Latest known visit with results is:   Infusion Therapy Visit on 12/24/2020   Component Date Value Ref Range Status     Sodium 12/24/2020 140  133 - 144 mmol/L Final     Potassium 12/24/2020 4.1  3.4 - 5.3 mmol/L Final     Chloride 12/24/2020 108  94 - 109 mmol/L Final     Carbon Dioxide 12/24/2020 28  20 - 32 mmol/L Final     Anion Gap 12/24/2020 4  3 - 14 mmol/L Final     Glucose 12/24/2020 92  70 - 99 mg/dL Final     Urea Nitrogen 12/24/2020 16  7 - 30 mg/dL Final     Creatinine 12/24/2020 0.90  0.66 - 1.25 mg/dL Final     GFR Estimate 12/24/2020 83  >60 mL/min/[1.73_m2] Final    Comment: Non  GFR Calc  Starting 12/18/2018, serum creatinine based estimated GFR (eGFR) will be   calculated using the Chronic Kidney Disease Epidemiology Collaboration   (CKD-EPI) equation.       GFR Estimate If Black 12/24/2020 >90  >60 mL/min/[1.73_m2] Final    Comment:  GFR Calc  Starting 12/18/2018, serum creatinine based estimated GFR (eGFR) will be   calculated using the Chronic Kidney Disease Epidemiology Collaboration   (CKD-EPI) equation.       Calcium 12/24/2020 8.8  8.5 - 10.1 mg/dL Final     Bilirubin Total 12/24/2020 0.7  0.2 - 1.3 mg/dL Final     Albumin 12/24/2020 3.6  3.4 - 5.0 g/dL Final     Protein Total 12/24/2020 7.2  6.8 - 8.8 g/dL Final     Alkaline Phosphatase 12/24/2020 62  40 - 150 U/L Final     ALT 12/24/2020 20  0 - 70 U/L Final     AST 12/24/2020 17  0 - 45 U/L Final     TSH 12/24/2020 2.82  0.40 - 4.00 mU/L Final      No results found for this or any previous visit (from the past 744 hour(s)).      ASSESSMENT/PLAN:    HPV 16 positive squamous cell carcinoma, with neck and lung mets.      Recent palliative XRT to neck.    PET scan ordered.    Stay on Pembrolizumab pending imaging, but if lung disease is progressing, then change to treatment would be warranted.            Again,  thank you for allowing me to participate in the care of your patient.        Sincerely,        Mary Pfeiffer MD

## 2021-01-11 NOTE — LETTER
1/11/2021         RE: Newton Buchanan  [address]  [place] [address]        Dear Colleague,    Thank you for referring your patient, Newton Buchanan, to the [facility]. Please see a copy of my visit note below.    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 Estimated body mass index is 26.5 kg/m  as calculated from the following:    Height as of 11/4/20: 1.829 m (6').    Weight as of 12/24/20: 88.6 kg (195 lb 6.4 oz). There is no height or weight on file to calculate BSA.  Data Unavailable Comment: Data Unavailable   No LMP for male patient.  Allergies reviewed: Yes  Medications reviewed: Yes    Medications: Medication refills not needed today.  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   Questions about COVID vaccine. He wants to travel to [place].    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 Has had FNAs in the past that were benign. He was followed by q 6 months.      He reported the neck mass was getting larger. Denies any pain/dysphagia/difficuly swallowing/sob/skin changes.   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      2/24/20 PET/CT found:  1. Hypermetabolic right cervical lymphadenopathy.  2. Numerous hypermetabolic  "bilateral lung metastases.  3. Hypermetabolic right hilar nodes, also likely metastatic disease     It was felt that with no obvious primary on PET/CT but given CK7 +, P16 positivity, and neck mass on presentation, suspicion is high that origin is still of H&N (right cervical LAD) with metastatic spread to lung with bilateral multiple lung nodules .  Pt made informed decision to proceed with single agent Keytruda due to his PD-L1 testing from [employer] with CPS of 70.  Started early May in [place].      He transferred his care to us in June 2020.      He has mixed PET response in 8/2020 in neck JOSÉ MIGUEL, and decreased lung nodules uptake.      Case was discussed at U conference, and he saw ENT, Dr. Aparicio -- \"He is still not a candidate for definitive chemoradiation given the distant disease\".      Clinical trail was recommended with N-803 (formerly Alt-803), which is an IL-15 superagonist in combination with pembrolizumab.  It is specifically for patients who have had a positive response to keytruda but then experience progression. This is based on 90% disease control in a similar cohort of lung cancer patients that we participated in a few years back.   It is a multi-disease cohort phase 2.        He met with Dr. Veras 9/16/2020 for trial discussion.   Felt his mixed response (resolution of lung diease, and mixed response in right neck LN) could be pseudoprogression from IO or true progression. [place] decision is to do 2 more keytruda and restage.     Interval history:  Mr. Buchanan is a 75 year old male is here for follow-up of squamous carcinoma presenting as neck mass.  He just had palliative radiation to neck completed last week.  He's noted that the neck nodes seem much smaller and overall felt he did fine with the treatment.  Of note, he has numerous lung nodules that were NOT in the radiation field.  He has remained on Pembrolizumab and prefers to continue this.  We discussed the need to assess where that stands " now.    Review Of Systems:  Review Of Systems  Skin: negative  Eyes: negative  Ears/Nose/Throat: very little dysphagia with radiaiton  Respiratory: No shortness of breath, dyspnea on exertion, cough, or hemoptysis  Cardiovascular: negative  Gastrointestinal: negative  Genitourinary: negative  Musculoskeletal: negative  Neurologic: negative  Psychiatric: negative  Hematologic/Lymphatic/Immunologic: negative  Endocrine: negative    Past medical, social, surgical, and family histories reviewed.    Patient Active Problem List   Diagnosis     Essential hypertension with goal blood pressure less than 140/90     Gastroesophageal reflux disease without esophagitis     CRYSTAL (obstructive sleep apnea)     Prostate cancer (H)     Head and neck cancer (H)     Oropharyngeal cancer (H)         Allergies:  Allergies as of 01/11/2021 - Reviewed 01/11/2021   Allergen Reaction Noted     Atorvastatin  11/22/2011       Current Medications:  Current Outpatient Medications   Medication Sig Dispense Refill     aspirin 81 MG tablet Take by mouth daily 30 tablet      fluticasone (FLONASE) 50 MCG/ACT nasal spray Spray 1-2 sprays in nostril       losartan (COZAAR) 25 MG tablet Take 1 tablet (25 mg) by mouth daily 90 tablet 3     pembrolizumab (KEYTRUDA) 25 MG/ML        pravastatin (PRAVACHOL) 20 MG tablet Take 20 mg by mouth daily        saccharomyces boulardii (FLORASTOR) 250 MG capsule Take 1 capsule by mouth 2 times daily       sildenafil (VIAGRA) 100 MG tablet Take by mouth daily as needed for erectile dysfunction 30 tablet      VITAMIN D, CHOLECALCIFEROL, PO Take 1,000 Units by mouth daily       magic mouthwash (ENTER INGREDIENTS IN COMMENTS) suspension Swallow one to two teaspoonfuls every six hours as needed. (Patient not taking: Reported on 1/11/2021) 300 mL 3     prochlorperazine (COMPAZINE) 10 MG tablet Take 10 mg by mouth every 6 hours as needed           Physical Exam:  There were no vitals taken for this visit.    Phone visit only.   No exam.    Laboratory/Imaging Studies  No visits with results within 2 Week(s) from this visit.   Latest known visit with results is:   Infusion Therapy Visit on 12/24/2020   Component Date Value Ref Range Status     Sodium 12/24/2020 140  133 - 144 mmol/L Final     Potassium 12/24/2020 4.1  3.4 - 5.3 mmol/L Final     Chloride 12/24/2020 108  94 - 109 mmol/L Final     Carbon Dioxide 12/24/2020 28  20 - 32 mmol/L Final     Anion Gap 12/24/2020 4  3 - 14 mmol/L Final     Glucose 12/24/2020 92  70 - 99 mg/dL Final     Urea Nitrogen 12/24/2020 16  7 - 30 mg/dL Final     Creatinine 12/24/2020 0.90  0.66 - 1.25 mg/dL Final     GFR Estimate 12/24/2020 83  >60 mL/min/[1.73_m2] Final    Comment: Non  GFR Calc  Starting 12/18/2018, serum creatinine based estimated GFR (eGFR) will be   calculated using the Chronic Kidney Disease Epidemiology Collaboration   (CKD-EPI) equation.       GFR Estimate If Black 12/24/2020 >90  >60 mL/min/[1.73_m2] Final    Comment:  GFR Calc  Starting 12/18/2018, serum creatinine based estimated GFR (eGFR) will be   calculated using the Chronic Kidney Disease Epidemiology Collaboration   (CKD-EPI) equation.       Calcium 12/24/2020 8.8  8.5 - 10.1 mg/dL Final     Bilirubin Total 12/24/2020 0.7  0.2 - 1.3 mg/dL Final     Albumin 12/24/2020 3.6  3.4 - 5.0 g/dL Final     Protein Total 12/24/2020 7.2  6.8 - 8.8 g/dL Final     Alkaline Phosphatase 12/24/2020 62  40 - 150 U/L Final     ALT 12/24/2020 20  0 - 70 U/L Final     AST 12/24/2020 17  0 - 45 U/L Final     TSH 12/24/2020 2.82  0.40 - 4.00 mU/L Final      No results found for this or any previous visit (from the past 744 hour(s)).      ASSESSMENT/PLAN:    HPV 16 positive squamous cell carcinoma, with neck and lung mets.      Recent palliative XRT to neck.    PET scan ordered.    Stay on Pembrolizumab pending imaging, but if lung disease is progressing, then change to treatment would be warranted.            Again,  thank you for allowing me to participate in the care of your patient.        Sincerely,        Mary Pfeiffer MD

## 2021-01-11 NOTE — PROGRESS NOTES
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 Estimated body mass index is 26.5 kg/m  as calculated from the following:    Height as of 11/4/20: 1.829 m (6').    Weight as of 12/24/20: 88.6 kg (195 lb 6.4 oz). There is no height or weight on file to calculate BSA.  Data Unavailable Comment: Data Unavailable   No LMP for male patient.  Allergies reviewed: Yes  Medications reviewed: Yes    Medications: Medication refills not needed today.  Pharmacy name entered into [employer]: 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   Questions about COVID vaccine. He wants to travel to [place].    Sandy Carrillo, CMA

## 2021-01-11 NOTE — PROGRESS NOTES
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 Has had FNAs in the past that were benign. He was followed by q 6 months.      He reported the neck mass was getting larger. Denies any pain/dysphagia/difficuly swallowing/sob/skin changes.   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      2/24/20 PET/CT found:  1. Hypermetabolic right cervical lymphadenopathy.  2. Numerous hypermetabolic bilateral lung metastases.  3. Hypermetabolic right hilar nodes, also likely metastatic disease     It was felt that with no obvious primary on PET/CT but given CK7 +, P16 positivity, and neck mass on presentation, suspicion is high that origin is still of H&N (right cervical LAD) with metastatic spread to lung with bilateral multiple lung nodules .  Pt made informed decision to proceed with single agent Keytruda due to his PD-L1 testing from [employer] with CPS of 70.  Started early May in [place].      He transferred his care to us in June 2020.      He has mixed PET response in 8/2020 in neck JOSÉ MIGUEL, and decreased lung nodules uptake.      Case was discussed at U conference, and he saw ENT, Dr. Aparicio -- \"He is still not a candidate for definitive chemoradiation given the distant disease\".      Clinical trail was recommended with N-803 (formerly Alt-803), which is an IL-15 superagonist in combination with pembrolizumab.  It is specifically for patients who have had a positive response to keytruda but then experience progression. This is based on 90% disease control in a similar cohort of lung cancer patients that we participated in a few years back.   It is a multi-disease cohort phase 2.        He met with Dr. Veras 9/16/2020 for trial discussion.   Felt his mixed response (resolution of lung diease, and mixed " response in right neck LN) could be pseudoprogression from IO or true progression. [place] decision is to do 2 more keytruda and restage.     Interval history:  Mr. Buchanan is a 75 year old male is here for follow-up of squamous carcinoma presenting as neck mass.  He just had palliative radiation to neck completed last week.  He's noted that the neck nodes seem much smaller and overall felt he did fine with the treatment.  Of note, he has numerous lung nodules that were NOT in the radiation field.  He has remained on Pembrolizumab and prefers to continue this.  We discussed the need to assess where that stands now.    Review Of Systems:  Review Of Systems  Skin: negative  Eyes: negative  Ears/Nose/Throat: very little dysphagia with radiaiton  Respiratory: No shortness of breath, dyspnea on exertion, cough, or hemoptysis  Cardiovascular: negative  Gastrointestinal: negative  Genitourinary: negative  Musculoskeletal: negative  Neurologic: negative  Psychiatric: negative  Hematologic/Lymphatic/Immunologic: negative  Endocrine: negative    Past medical, social, surgical, and family histories reviewed.    Patient Active Problem List   Diagnosis     Essential hypertension with goal blood pressure less than 140/90     Gastroesophageal reflux disease without esophagitis     CRYSTAL (obstructive sleep apnea)     Prostate cancer (H)     Head and neck cancer (H)     Oropharyngeal cancer (H)         Allergies:  Allergies as of 01/11/2021 - Reviewed 01/11/2021   Allergen Reaction Noted     Atorvastatin  11/22/2011       Current Medications:  Current Outpatient Medications   Medication Sig Dispense Refill     aspirin 81 MG tablet Take by mouth daily 30 tablet      fluticasone (FLONASE) 50 MCG/ACT nasal spray Spray 1-2 sprays in nostril       losartan (COZAAR) 25 MG tablet Take 1 tablet (25 mg) by mouth daily 90 tablet 3     pembrolizumab (KEYTRUDA) 25 MG/ML        pravastatin (PRAVACHOL) 20 MG tablet Take 20 mg by mouth daily         saccharomyces boulardii (FLORASTOR) 250 MG capsule Take 1 capsule by mouth 2 times daily       sildenafil (VIAGRA) 100 MG tablet Take by mouth daily as needed for erectile dysfunction 30 tablet      VITAMIN D, CHOLECALCIFEROL, PO Take 1,000 Units by mouth daily       magic mouthwash (ENTER INGREDIENTS IN COMMENTS) suspension Swallow one to two teaspoonfuls every six hours as needed. (Patient not taking: Reported on 1/11/2021) 300 mL 3     prochlorperazine (COMPAZINE) 10 MG tablet Take 10 mg by mouth every 6 hours as needed           Physical Exam:  There were no vitals taken for this visit.    Phone visit only.  No exam.    Laboratory/Imaging Studies  No visits with results within 2 Week(s) from this visit.   Latest known visit with results is:   Infusion Therapy Visit on 12/24/2020   Component Date Value Ref Range Status     Sodium 12/24/2020 140  133 - 144 mmol/L Final     Potassium 12/24/2020 4.1  3.4 - 5.3 mmol/L Final     Chloride 12/24/2020 108  94 - 109 mmol/L Final     Carbon Dioxide 12/24/2020 28  20 - 32 mmol/L Final     Anion Gap 12/24/2020 4  3 - 14 mmol/L Final     Glucose 12/24/2020 92  70 - 99 mg/dL Final     Urea Nitrogen 12/24/2020 16  7 - 30 mg/dL Final     Creatinine 12/24/2020 0.90  0.66 - 1.25 mg/dL Final     GFR Estimate 12/24/2020 83  >60 mL/min/[1.73_m2] Final    Comment: Non  GFR Calc  Starting 12/18/2018, serum creatinine based estimated GFR (eGFR) will be   calculated using the Chronic Kidney Disease Epidemiology Collaboration   (CKD-EPI) equation.       GFR Estimate If Black 12/24/2020 >90  >60 mL/min/[1.73_m2] Final    Comment:  GFR Calc  Starting 12/18/2018, serum creatinine based estimated GFR (eGFR) will be   calculated using the Chronic Kidney Disease Epidemiology Collaboration   (CKD-EPI) equation.       Calcium 12/24/2020 8.8  8.5 - 10.1 mg/dL Final     Bilirubin Total 12/24/2020 0.7  0.2 - 1.3 mg/dL Final     Albumin 12/24/2020 3.6  3.4 - 5.0 g/dL  Final     Protein Total 12/24/2020 7.2  6.8 - 8.8 g/dL Final     Alkaline Phosphatase 12/24/2020 62  40 - 150 U/L Final     ALT 12/24/2020 20  0 - 70 U/L Final     AST 12/24/2020 17  0 - 45 U/L Final     TSH 12/24/2020 2.82  0.40 - 4.00 mU/L Final      No results found for this or any previous visit (from the past 744 hour(s)).      ASSESSMENT/PLAN:    HPV 16 positive squamous cell carcinoma, with neck and lung mets.      Recent palliative XRT to neck.    PET scan ordered.    Stay on Pembrolizumab pending imaging, but if lung disease is progressing, then change to treatment would be warranted.

## 2021-01-12 RX ORDER — SODIUM CHLORIDE 9 MG/ML
1000 INJECTION, SOLUTION INTRAVENOUS CONTINUOUS PRN
Status: CANCELLED
Start: 2021-01-14

## 2021-01-12 RX ORDER — MEPERIDINE HYDROCHLORIDE 25 MG/ML
25 INJECTION INTRAMUSCULAR; INTRAVENOUS; SUBCUTANEOUS EVERY 30 MIN PRN
Status: CANCELLED | OUTPATIENT
Start: 2021-01-14

## 2021-01-12 RX ORDER — NALOXONE HYDROCHLORIDE 0.4 MG/ML
.1-.4 INJECTION, SOLUTION INTRAMUSCULAR; INTRAVENOUS; SUBCUTANEOUS
Status: CANCELLED | OUTPATIENT
Start: 2021-01-14

## 2021-01-12 RX ORDER — EPINEPHRINE 1 MG/ML
0.3 INJECTION, SOLUTION, CONCENTRATE INTRAVENOUS EVERY 5 MIN PRN
Status: CANCELLED | OUTPATIENT
Start: 2021-01-14

## 2021-01-12 RX ORDER — DIPHENHYDRAMINE HYDROCHLORIDE 50 MG/ML
50 INJECTION INTRAMUSCULAR; INTRAVENOUS
Status: CANCELLED
Start: 2021-01-14

## 2021-01-12 RX ORDER — ALBUTEROL SULFATE 90 UG/1
1-2 AEROSOL, METERED RESPIRATORY (INHALATION)
Status: CANCELLED
Start: 2021-01-14

## 2021-01-12 RX ORDER — LORAZEPAM 2 MG/ML
0.5 INJECTION INTRAMUSCULAR EVERY 4 HOURS PRN
Status: CANCELLED
Start: 2021-01-14

## 2021-01-12 RX ORDER — METHYLPREDNISOLONE SODIUM SUCCINATE 125 MG/2ML
125 INJECTION, POWDER, LYOPHILIZED, FOR SOLUTION INTRAMUSCULAR; INTRAVENOUS
Status: CANCELLED
Start: 2021-01-14

## 2021-01-12 RX ORDER — ALBUTEROL SULFATE 0.83 MG/ML
2.5 SOLUTION RESPIRATORY (INHALATION)
Status: CANCELLED | OUTPATIENT
Start: 2021-01-14

## 2021-01-12 NOTE — PATIENT INSTRUCTIONS
HPV 16 positive squamous cell carcinoma, with neck and lung mets.       Recent palliative XRT to neck.     PET scan ordered.     Stay on Pembrolizumab pending imaging, but if lung disease is progressing, then change to treatment would be warranted.    Follow up next available appointment

## 2021-01-13 NOTE — PROGRESS NOTES
Per Dr. Pfeiffer patient should have follow up whenever next available appointment is open.    Violet Harris RN on 1/13/2021 at 9:16 AM

## 2021-01-14 ENCOUNTER — INFUSION THERAPY VISIT (OUTPATIENT)
Dept: INFUSION THERAPY | Facility: CLINIC | Age: 76
End: 2021-01-14
Attending: INTERNAL MEDICINE
Payer: MEDICARE

## 2021-01-14 ENCOUNTER — HOSPITAL ENCOUNTER (OUTPATIENT)
Dept: LAB | Facility: CLINIC | Age: 76
Discharge: HOME | End: 2021-01-14
Attending: INTERNAL MEDICINE | Admitting: INTERNAL MEDICINE
Payer: MEDICARE

## 2021-01-14 VITALS
TEMPERATURE: 96.8 F | BODY MASS INDEX: 26.31 KG/M2 | DIASTOLIC BLOOD PRESSURE: 61 MMHG | SYSTOLIC BLOOD PRESSURE: 141 MMHG | HEART RATE: 58 BPM | WEIGHT: 194 LBS

## 2021-01-14 DIAGNOSIS — C10.9 SQUAMOUS CELL CARCINOMA OF OROPHARYNX (H): ICD-10-CM

## 2021-01-14 DIAGNOSIS — C76.0 HEAD AND NECK CANCER (H): Primary | ICD-10-CM

## 2021-01-14 LAB
ALBUMIN SERPL-MCNC: 3.6 G/DL (ref 3.4–5)
ALP SERPL-CCNC: 65 U/L (ref 40–150)
ALT SERPL W P-5'-P-CCNC: 27 U/L (ref 0–70)
ANION GAP SERPL CALCULATED.3IONS-SCNC: 1 MMOL/L (ref 3–14)
AST SERPL W P-5'-P-CCNC: 16 U/L (ref 0–45)
BASOPHILS # BLD AUTO: 0 10E9/L (ref 0–0.2)
BASOPHILS NFR BLD AUTO: 0.4 %
BILIRUB SERPL-MCNC: 0.6 MG/DL (ref 0.2–1.3)
BUN SERPL-MCNC: 13 MG/DL (ref 7–30)
CALCIUM SERPL-MCNC: 9.3 MG/DL (ref 8.5–10.1)
CHLORIDE SERPL-SCNC: 105 MMOL/L (ref 94–109)
CO2 SERPL-SCNC: 33 MMOL/L (ref 20–32)
CREAT SERPL-MCNC: 0.9 MG/DL (ref 0.66–1.25)
DIFFERENTIAL METHOD BLD: NORMAL
EOSINOPHIL # BLD AUTO: 0.2 10E9/L (ref 0–0.7)
EOSINOPHIL NFR BLD AUTO: 3 %
ERYTHROCYTE [DISTWIDTH] IN BLOOD BY AUTOMATED COUNT: 12 % (ref 10–15)
GFR SERPL CREATININE-BSD FRML MDRD: 83 ML/MIN/{1.73_M2}
GLUCOSE SERPL-MCNC: 91 MG/DL (ref 70–99)
HCT VFR BLD AUTO: 40 % (ref 40–53)
HGB BLD-MCNC: 13.3 G/DL (ref 13.3–17.7)
IMM GRANULOCYTES # BLD: 0 10E9/L (ref 0–0.4)
IMM GRANULOCYTES NFR BLD: 0.4 %
LYMPHOCYTES # BLD AUTO: 0.9 10E9/L (ref 0.8–5.3)
LYMPHOCYTES NFR BLD AUTO: 12.5 %
MCH RBC QN AUTO: 30.2 PG (ref 26.5–33)
MCHC RBC AUTO-ENTMCNC: 33.3 G/DL (ref 31.5–36.5)
MCV RBC AUTO: 91 FL (ref 78–100)
MONOCYTES # BLD AUTO: 0.7 10E9/L (ref 0–1.3)
MONOCYTES NFR BLD AUTO: 9.6 %
NEUTROPHILS # BLD AUTO: 5.3 10E9/L (ref 1.6–8.3)
NEUTROPHILS NFR BLD AUTO: 74.1 %
NRBC # BLD AUTO: 0 10*3/UL
NRBC BLD AUTO-RTO: 0 /100
PLATELET # BLD AUTO: 250 10E9/L (ref 150–450)
POTASSIUM SERPL-SCNC: 4 MMOL/L (ref 3.4–5.3)
PROT SERPL-MCNC: 6.9 G/DL (ref 6.8–8.8)
RBC # BLD AUTO: 4.41 10E12/L (ref 4.4–5.9)
SODIUM SERPL-SCNC: 139 MMOL/L (ref 133–144)
TSH SERPL DL<=0.005 MIU/L-ACNC: 2.18 MU/L (ref 0.4–4)
WBC # BLD AUTO: 7.1 10E9/L (ref 4–11)

## 2021-01-14 PROCEDURE — 80053 COMPREHEN METABOLIC PANEL: CPT | Performed by: INTERNAL MEDICINE

## 2021-01-14 PROCEDURE — 250N000011 HC RX IP 250 OP 636: Performed by: INTERNAL MEDICINE

## 2021-01-14 PROCEDURE — 84443 ASSAY THYROID STIM HORMONE: CPT | Performed by: INTERNAL MEDICINE

## 2021-01-14 PROCEDURE — 36415 COLL VENOUS BLD VENIPUNCTURE: CPT | Performed by: INTERNAL MEDICINE

## 2021-01-14 PROCEDURE — 258N000003 HC RX IP 258 OP 636: Performed by: INTERNAL MEDICINE

## 2021-01-14 PROCEDURE — 96413 CHEMO IV INFUSION 1 HR: CPT

## 2021-01-14 PROCEDURE — 85025 COMPLETE CBC W/AUTO DIFF WBC: CPT | Performed by: INTERNAL MEDICINE

## 2021-01-14 RX ADMIN — SODIUM CHLORIDE 200 MG: 9 INJECTION, SOLUTION INTRAVENOUS at 09:01

## 2021-01-14 NOTE — PROGRESS NOTES
Infusion Nursing Note:  Newton Buchanan presents today FOR Keytruda.    Patient seen by provider today: No   present during visit today: Not Applicable.    Note: N/A.      Intravenous Access:  Peripheral IV placed.    Treatment Conditions:  Lab Results   Component Value Date    HGB 13.3 01/14/2021     Lab Results   Component Value Date    WBC 7.1 01/14/2021      Lab Results   Component Value Date    ANEU 5.3 01/14/2021     Lab Results   Component Value Date     01/14/2021      Lab Results   Component Value Date     01/14/2021                   Lab Results   Component Value Date    POTASSIUM 4.0 01/14/2021           No results found for: MAG         Lab Results   Component Value Date    CR 0.90 01/14/2021                   Lab Results   Component Value Date    JÚNIOR 9.3 01/14/2021                Lab Results   Component Value Date    BILITOTAL 0.6 01/14/2021           Lab Results   Component Value Date    ALBUMIN 3.6 01/14/2021                    Lab Results   Component Value Date    ALT 27 01/14/2021           Lab Results   Component Value Date    AST 16 01/14/2021       Results reviewed, labs MET treatment parameters, ok to proceed with treatment.      Post Infusion Assessment:  Patient tolerated infusion without incident.  Blood return noted pre and post infusion.  Site patent and intact, free from redness, edema or discomfort.  No evidence of extravasations.  Access discontinued per protocol.       Discharge Plan:   Patient discharged in stable condition accompanied by: self.    Natalie Llanos RN

## 2021-01-18 ENCOUNTER — VIRTUAL VISIT (OUTPATIENT)
Dept: RADIATION THERAPY | Facility: OUTPATIENT CENTER | Age: 76
End: 2021-01-18
Payer: OTHER GOVERNMENT

## 2021-01-18 DIAGNOSIS — C76.0 HEAD AND NECK CANCER (H): Primary | ICD-10-CM

## 2021-01-18 NOTE — LETTER
2021         RE: Newton Buchanan  [address]  [place] [address]        Dear Colleague,    Thank you for referring your patient, Newton Buchanan, to the [facility]. Please see a copy of my visit note below.       Department of Radiation Oncology  Radiation Therapy Center  [facility]  [place] [address]  [phone]       Radiation Oncology Follow-up Visit  2021      Newton Buchanan  MRN: [id number]   : 1945     DIAGNOSIS: Metastatic head neck cancer, unknown primary site  INTENT OF RADIOTHERAPY: Palliative  PATHOLOGY:    Squamous cell carcinoma, p16+                              STAGE: T0N1M1  CONCURRENT SYSTEMIC THERAPY:   Keytruda      ONCOLOGIC HISTORY:    Mr. Buchanan is a 75 year old male with a diagnosis of squamous cell carcinoma of the head and neck, p16 positive, clinical T0N1M1 (thorax). He was treated with systemic immunotherapy with good response in the chest with local progression in the right neck.  He subsequently completed palliative radiation therapy to the right neck-OPX region.        SITE OF TREATMENT: Right neck-OPX     DATES  OF TREATMENT: 20 to 20     TOTAL DOSE OF TREATMENT: 5000 cGy in 20 fractions     DOSE PER FRACTION OF TREATMENT: 250 cGy    INTERVAL SINCE COMPLETION OF RADIATION THERAPY:   1 month    SUBJECTIVE:   Mr. Buchanan returns for follow up.     Post treatment had mild increase in dermatitis and mucositis. Now essentially resolved. Tolerating all PO. Mild fatigue, but active.     Has resumed systemic therapy with Keytruda.    Re-staging PET on 21.    PHYSICAL EXAM:  There were no vitals taken for this visit.  No physical exam    LABS AND IMAGING:  Reviewed.    IMPRESSION:   Mr. Buchanan is a 75 year old male with a diagnosis of squamous cell carcinoma of the head and neck, p16 positive, clinical T0N1M1 (thorax). He was treated with systemic immunotherapy with good response in the chest with local  progression in the right neck.  He subsequently completed palliative radiation therapy to the right neck-OPX region (50 Gy in 20 fractions, 12/22/20).        PLAN:   1. Acute toxicities now much improved.     2. Re-staging PET on 1/21/21. Will see Dr. Pfeiffer on 1/25/21 to discuss systemic therapy plan.     3. RTC as needed.     Due to the concerns around COVID-19 and adhering to social distancing we conduct this visit over the telephone. Telephone call lasted 15 minutes.         Ronen Johns M.D.  Department of Radiation Oncology  [facility]

## 2021-01-18 NOTE — NURSING NOTE
Newton is a 75 year old who is being evaluated via a billable telephone visit.      What phone number would you like to be contacted at? cell  How would you like to obtain your AVS? MyChart   FOLLOW-UP VISIT    Patient Name: Newton Buchanan      : 1945     Age: 75 year old        ______________________________________________________________________________     Chief Complaint   Patient presents with     Radiation Therapy     Follow up telephone call/head and neck cancer     There were no vitals taken for this visit.     Date Radiation Completed: 2020    Pain  Denies    Meds  Current Med List Reviewed: Yes  Medication Note:     Labs  TSH   Date Value Ref Range Status   2021 2.18 0.40 - 4.00 mU/L Final   ]    Imaging  None    Skin: healed well  Oral Products: regular OTC  Mucositis: No  Dry mouth: mild/manageable  Dental evaluation: No  PEG tube: N/A  Speech therapy: No  Lymphedema: No  Energy Level:normal  Appetite: normal  Intake: normal  Weight:  Wt Readings from Last 5 Encounters:   21 88 kg (194 lb)   20 88.6 kg (195 lb 6.4 oz)   20 86.7 kg (191 lb 3.2 oz)   20 86.9 kg (191 lb 9.6 oz)   20 88.5 kg (195 lb 3.2 oz)       Appointments:     DATE  Oncologist: Kentrell F/u next week after PET   ENT:     Primary:      Other Notes:   Phone call duration: 8 minutes

## 2021-01-18 NOTE — PROGRESS NOTES
Department of Radiation Oncology  Radiation Therapy Center  [facility]  [place] [address]  [phone]       Radiation Oncology Follow-up Visit  2021      Newton Buchanan  MRN: [id number]   : 1945     DIAGNOSIS: Metastatic head neck cancer, unknown primary site  INTENT OF RADIOTHERAPY: Palliative  PATHOLOGY:    Squamous cell carcinoma, p16+                              STAGE: T0N1M1  CONCURRENT SYSTEMIC THERAPY:   Keytruda      ONCOLOGIC HISTORY:    Mr. Buchanan is a 75 year old male with a diagnosis of squamous cell carcinoma of the head and neck, p16 positive, clinical T0N1M1 (thorax). He was treated with systemic immunotherapy with good response in the chest with local progression in the right neck.  He subsequently completed palliative radiation therapy to the right neck-OPX region.        SITE OF TREATMENT: Right neck-OPX     DATES  OF TREATMENT: 20 to 20     TOTAL DOSE OF TREATMENT: 5000 cGy in 20 fractions     DOSE PER FRACTION OF TREATMENT: 250 cGy    INTERVAL SINCE COMPLETION OF RADIATION THERAPY:   1 month    SUBJECTIVE:   Mr. Buchanan returns for follow up.     Post treatment had mild increase in dermatitis and mucositis. Now essentially resolved. Tolerating all PO. Mild fatigue, but active.     Has resumed systemic therapy with Keytruda.    Re-staging PET on 21.    PHYSICAL EXAM:  There were no vitals taken for this visit.  No physical exam    LABS AND IMAGING:  Reviewed.    IMPRESSION:   Mr. Buchanan is a 75 year old male with a diagnosis of squamous cell carcinoma of the head and neck, p16 positive, clinical T0N1M1 (thorax). He was treated with systemic immunotherapy with good response in the chest with local progression in the right neck.  He subsequently completed palliative radiation therapy to the right neck-OPX region (50 Gy in 20 fractions, 20).        PLAN:   1. Acute toxicities now much improved.     2. Re-staging PET on  1/21/21. Will see Dr. Pfeiffer on 1/25/21 to discuss systemic therapy plan.     3. RTC as needed.     Due to the concerns around COVID-19 and adhering to social distancing we conduct this visit over the telephone. Telephone call lasted 15 minutes.         Ronen Johns M.D.  Department of Radiation Oncology  [facility]

## 2021-01-20 ENCOUNTER — OFFICE VISIT (OUTPATIENT)
Dept: FAMILY MEDICINE | Facility: CLINIC | Age: 76
End: 2021-01-20
Payer: MEDICARE

## 2021-01-20 ENCOUNTER — CARE COORDINATION (OUTPATIENT)
Dept: SLEEP MEDICINE | Facility: CLINIC | Age: 76
End: 2021-01-20

## 2021-01-20 VITALS
SYSTOLIC BLOOD PRESSURE: 138 MMHG | HEART RATE: 68 BPM | DIASTOLIC BLOOD PRESSURE: 70 MMHG | WEIGHT: 191 LBS | BODY MASS INDEX: 25.87 KG/M2 | RESPIRATION RATE: 20 BRPM | TEMPERATURE: 98 F | HEIGHT: 72 IN

## 2021-01-20 DIAGNOSIS — I10 ESSENTIAL HYPERTENSION WITH GOAL BLOOD PRESSURE LESS THAN 140/90: ICD-10-CM

## 2021-01-20 DIAGNOSIS — C76.0 HEAD AND NECK CANCER (H): ICD-10-CM

## 2021-01-20 DIAGNOSIS — Z00.00 ENCOUNTER FOR SUBSEQUENT ANNUAL WELLNESS VISIT IN MEDICARE PATIENT: Primary | ICD-10-CM

## 2021-01-20 DIAGNOSIS — G47.33 OSA (OBSTRUCTIVE SLEEP APNEA): ICD-10-CM

## 2021-01-20 DIAGNOSIS — Z11.59 NEED FOR HEPATITIS C SCREENING TEST: ICD-10-CM

## 2021-01-20 DIAGNOSIS — E78.5 HYPERLIPIDEMIA LDL GOAL <100: ICD-10-CM

## 2021-01-20 DIAGNOSIS — C61 PROSTATE CANCER (H): ICD-10-CM

## 2021-01-20 LAB
CHOLEST SERPL-MCNC: 183 MG/DL
HDLC SERPL-MCNC: 59 MG/DL
LDLC SERPL CALC-MCNC: 100 MG/DL
NONHDLC SERPL-MCNC: 124 MG/DL
PSA SERPL-MCNC: 0.06 UG/L (ref 0–4)
TRIGL SERPL-MCNC: 118 MG/DL

## 2021-01-20 PROCEDURE — 80061 LIPID PANEL: CPT | Performed by: FAMILY MEDICINE

## 2021-01-20 PROCEDURE — G0438 PPPS, INITIAL VISIT: HCPCS | Performed by: FAMILY MEDICINE

## 2021-01-20 PROCEDURE — 84153 ASSAY OF PSA TOTAL: CPT | Performed by: FAMILY MEDICINE

## 2021-01-20 PROCEDURE — 36415 COLL VENOUS BLD VENIPUNCTURE: CPT | Performed by: FAMILY MEDICINE

## 2021-01-20 PROCEDURE — 86803 HEPATITIS C AB TEST: CPT | Performed by: FAMILY MEDICINE

## 2021-01-20 ASSESSMENT — ENCOUNTER SYMPTOMS
NERVOUS/ANXIOUS: 0
SORE THROAT: 0
PARESTHESIAS: 0
FEVER: 0
COUGH: 0
MYALGIAS: 0
CHILLS: 0
CONSTIPATION: 0
JOINT SWELLING: 0
HEMATURIA: 0
PALPITATIONS: 0
ABDOMINAL PAIN: 0
HEMATOCHEZIA: 0
WEAKNESS: 0
DIARRHEA: 0
HEADACHES: 0
DIZZINESS: 0
DYSURIA: 0
NAUSEA: 0
EYE PAIN: 0
FREQUENCY: 0
SHORTNESS OF BREATH: 1
ARTHRALGIAS: 1
HEARTBURN: 0

## 2021-01-20 ASSESSMENT — ACTIVITIES OF DAILY LIVING (ADL): CURRENT_FUNCTION: NO ASSISTANCE NEEDED

## 2021-01-20 ASSESSMENT — MIFFLIN-ST. JEOR: SCORE: 1640.4

## 2021-01-20 NOTE — PATIENT INSTRUCTIONS
ASSESSMENT/PLAN:                                                    Lifestyle recommendations:regular exercise, at least 150 minutes per week (average 30 minutes 5 times a week)  dietary calcium should be 1200 mg a day.  Dairy products work the best but calcium supplements can also be used  vitamin D at least 400 to 800 IU daily  continue keeping at a healthy weight (ideal BMI-body mass index is <25)  The following exams/tests were recommended and discussed for health maintenance:  Colon cancer screening recommended 5 years  Prostate cancer screening-PSA test with history of cancer.     (Z00.00) Encounter for subsequent annual wellness visit in Medicare patient  (primary encounter diagnosis)    (C61) Prostate cancer (H)  Comment:   Plan: PSA, tumor marker        Blood test for prostate cancer.     (C76.0) Head and neck cancer (H)  Comment: seeing oncology  Plan: follow-up with oncology as planned.     (G47.33) CRYSTAL (obstructive sleep apnea)  Comment: uses CPAP  Plan: No change in current treatment plan.     (I10) Essential hypertension with goal blood pressure less than 140/90  Comment: doing well  Plan: No change in current treatment plan.  Refills as needed.    (E78.5) Hyperlipidemia LDL goal <100  Comment: has been doing well  Plan: Lipid panel reflex to direct LDL Non-fasting        Non-fasting blood tests today.     (Z11.59) Need for hepatitis C screening test  Comment:   Plan: Hepatitis C Screen Reflex to HCV RNA Quant and         Genotype    Tetanus vaccine is recommended for adults every 10 years.  You are due for the vaccine now.  Medicare does not cover unless there is a wound.  Check at pharmacy for this vaccine, they can check on your cost and give you the vaccine.  The vaccine may be less expensive at a pharmacy.         
DIFFUSE

## 2021-01-20 NOTE — PROGRESS NOTES
Faxed signed physician's order for cpap supplies, along with original order 11/5/2020, to [facility].  [phone].  Copy sent to HIM for scan to [facility].

## 2021-01-20 NOTE — PROGRESS NOTES
"  SUBJECTIVE:   Newton Buchanan is a 75 year old male who presents for Preventive Visit.  Chief Complaint   Patient presents with     Physical      He has been part time in [place] and part time in [place].  More in [place] lately.   Plans to sell his house in [place].    He has been on Keytruda.   He has had cancer right neck.  Radiation treatments effective.  Has decreased size of right neck mass.     Some pain in both shoulders.     He has peripheral neuropathy in feet.  Numbness which comes and goes.  Not painful.  He has not had this checked out in the past.     He has had osteoporosis in the past.   He was treated with IV drug in the past but unable to do the shot currently due to his cancer.    Uses CPAP nightly.     Patient has been advised of split billing requirements and indicates understanding: Yes   Are you in the first 12 months of your Medicare coverage?  No    Healthy Habits:     In general, how would you rate your overall health?  Fair    Frequency of exercise:  2-3 days/week    Duration of exercise:  15-30 minutes    Do you usually eat at least 4 servings of fruit and vegetables a day, include whole grains    & fiber and avoid regularly eating high fat or \"junk\" foods?  Yes    Taking medications regularly:  Yes    Medication side effects:  None    Ability to successfully perform activities of daily living:  No assistance needed    Home Safety:  No safety concerns identified    Hearing Impairment:  No hearing concerns    In the past 6 months, have you been bothered by leaking of urine?  No    In general, how would you rate your overall mental or emotional health?  Good      PHQ-2 Total Score: 1    Additional concerns today:  Yes  Exercise:walks.  \"Not enough\"    Do you feel safe in your environment? Yes    Have you ever done Advance Care Planning? (For example, a Health Directive, POLST, or a discussion with a medical provider or your loved ones about your wishes): No, advance care planning information given to patient " to review.  Patient plans to discuss their wishes with loved ones or provider.      Fall risk  Fallen 2 or more times in the past year?: No  Any fall with injury in the past year?: No    Cognitive Screening   1) Repeat 3 items (Leader, Season, Table)    2) Clock draw: NORMAL  3) 3 item recall:   Recalls 2 objects   Results: NORMAL clock, 1-2 items recalled: COGNITIVE IMPAIRMENT LESS LIKELY    Mini-CogTM Copyright HEBERT Delgado. Licensed by the author for use in [facility]; reprinted with permission (coleen@[email or web address]). All rights reserved.      Sleep apnea: yes    Social History     Tobacco Use     Smoking status: Never Smoker     Smokeless tobacco: Never Used   Substance Use Topics     Alcohol use: Yes     Alcohol/week: 0.0 standard drinks     Comment: weekly     If you drink alcohol do you typically have >3 drinks per day or >7 drinks per week? Yes  Alcohol daily.  Wine with dinner.  2-3 glasses wine.     Alcohol Use 1/20/2021   Prescreen: >3 drinks/day or >7 drinks/week? Yes   AUDIT SCORE  4     AUDIT - Alcohol Use Disorders Identification Test - Reproduced from the World Health Organization Audit 2001 (Second Edition) 1/20/2021   1.  How often do you have a drink containing alcohol? 4 or more times a week   2.  How many drinks containing alcohol do you have on a typical day when you are drinking? 1 or 2   3.  How often do you have five or more drinks on one occasion? Never   4.  How often during the last year have you found that you were not able to stop drinking once you had started? Never   5.  How often during the last year have you failed to do what was normally expected of you because of drinking? Never   6.  How often during the last year have you needed a first drink in the morning to get yourself going after a heavy drinking session? Never   7.  How often during the last year have you had a feeling of guilt or remorse after drinking? Never   8.  How often during the last year have you been unable to  remember what happened the night before because of your drinking? Never   9.  Have you or someone else been injured because of your drinking? No   10. Has a relative, friend, doctor or other health care worker been concerned about your drinking or suggested you cut down? No   TOTAL SCORE 4     Current providers sharing in care for this patient include:   Patient Care Team:  [facility], [facility] as PCP - Geetha Lerma RN as Specialty Care Coordinator (Oncology)  Taisha Aparicio MD as Assigned Surgical Provider  Ronen Johns MD as MD (Radiation Oncology)  Marcial Blunt MD as Assigned Sleep Provider  Ronen Johns MD as Assigned Cancer Care Provider  Oncology-radiation and general oncology.   Sleep    The following health maintenance items are reviewed in Epic and correct as of today:  Health Maintenance   Topic Date Due     ADVANCE CARE PLANNING  1945     HEPATITIS C SCREENING  10/23/1964     DTAP/TDAP/TD IMMUNIZATION (1 - Tdap) 10/23/1971     MEDICARE ANNUAL WELLNESS VISIT  10/23/2011     Pneumococcal Vaccine: 65+ Years (1 of 1 - PPSV23) 10/23/2011     AORTIC ANEURYSM SCREENING (SYSTEM ASSIGNED)  10/23/2011     INFLUENZA VACCINE (1) 09/01/2020     LIPID  01/04/2021     ZOSTER IMMUNIZATION (3 of 3) 03/04/2021     FALL RISK ASSESSMENT  10/27/2021     COLORECTAL CANCER SCREENING  04/13/2027     PHQ-2  Completed     Pneumococcal Vaccine: Pediatrics (0 to 5 Years) and At-Risk Patients (6 to 64 Years)  Aged Out     IPV IMMUNIZATION  Aged Out     MENINGITIS IMMUNIZATION  Aged Out     HEPATITIS B IMMUNIZATION  Aged Out     Patient Active Problem List    Diagnosis Date Noted     Oropharyngeal cancer (H)      Priority: Medium     Head and neck cancer (H) 06/14/2020     Priority: Medium     2/27/2020 [place] [facility] notes:Stage IV metastatic squamous cell carcinoma   There is no obvious primary on PET/CT but given CK7 +, P16 positivity, suspicion is high that origin is still  of H&N (right cervical LAD) with metastatic spread to lung with bilateral multiple lung nodules.  We discussed that treatment would be palliative and not curative- goals would be to reduce tumor burden, decrease symptoms and improve quality of life.   10/26/2020  Transferred care to Dr. Gustavo CORRAL Oncology notes:Presumed  H&N (right cervical LAD) with metastatic spread to lung with bilateral multiple lung nodules. He is first-line palliative treatment with Keytruda was started when he was in [place] early May 2020.  1/18/21 Oncology notes Dr. Johns:He was treated with systemic immunotherapy with good response in the chest with local progression in the right neck.  He subsequently completed palliative radiation therapy to the right neck-OPX region (50 Gy in 20 fractions, 12/22/20).          Essential hypertension with goal blood pressure less than 140/90 06/08/2016     Priority: Medium     Gastroesophageal reflux disease without esophagitis 06/08/2016     Priority: Medium     CRYSTAL (obstructive sleep apnea) 06/08/2016     Priority: Medium     Uses CPAP.  Uses prn nasal sprays for nasal congestion at times so he can use his CPAP.       Prostate cancer (H) 06/08/2016     Priority: Medium     2008:Robotic prostate removal.  6/8/2016:He reports PSA has been oK.           Family history:  CV disease: no  Prostate cancer: personal history.   Colon cancer: no    Multivitamin or Vit D use: Vit D, Vit C    Vaccines:current tetanus     Past Colon cancer screening:February, 2020.  Recommended 5 year follow-up.     Review of Systems   Constitutional: Negative for chills and fever.   HENT: Positive for congestion. Negative for ear pain, hearing loss and sore throat.    Eyes: Negative for pain and visual disturbance.   Respiratory: Positive for shortness of breath. Negative for cough.    Cardiovascular: Negative for chest pain, palpitations and peripheral edema.   Gastrointestinal: Negative for abdominal pain, constipation, diarrhea,  "heartburn, hematochezia and nausea.   Genitourinary: Positive for impotence. Negative for discharge, dysuria, frequency, genital sores, hematuria and urgency.   Musculoskeletal: Positive for arthralgias. Negative for joint swelling and myalgias.   Skin: Negative for rash.   Neurological: Negative for dizziness, weakness, headaches and paresthesias.   Psychiatric/Behavioral: Negative for mood changes. The patient is not nervous/anxious.    Takes Flonase prn.  Some allergy symptoms.   CPAP aggravates.   Sometimes dyspnea on exertion.   He has had pulmonary function tests in the past.  Negative for COPD when tested in the past.  He feels his symptoms are \"minor'.   Lipids checked in March.  LDL = 64    OBJECTIVE:                                                    OBJECTIVE:Blood pressure 138/70, pulse 68, temperature 98  F (36.7  C), temperature source Tympanic, resp. rate 20, height 1.822 m (5' 11.75\"), weight 86.6 kg (191 lb). BMI=Body mass index is 26.09 kg/m .  GENERAL APPEARANCE ADULT: Alert, no acute distress  EYES: PERRL, EOM normal, conjunctiva and lids normal  HENT: Ears and TMs normal, oral mucosa and posterior oropharynx normal  NECK: No adenopathy,masses or thyromegaly  RESP: lungs clear to auscultation   CV: normal rate, regular rhythm, no murmur or gallop  ABDOMEN: soft, no organomegaly, masses or tenderness  LYMPHATICS: No cervical, supraclavicular or inguinal adenopathy, No axillary adenopathy  MS: extremities normal, no peripheral edema  Normal non-painful range of motion.     ASSESSMENT/PLAN:                                                    Lifestyle recommendations:regular exercise, at least 150 minutes per week (average 30 minutes 5 times a week)  dietary calcium should be 1200 mg a day.  Dairy products work the best but calcium supplements can also be used  vitamin D at least 400 to 800 IU daily  continue keeping at a healthy weight (ideal BMI-body mass index is <25)  The following exams/tests " were recommended and discussed for health maintenance:  Colon cancer screening recommended 5 years  Prostate cancer screening-PSA test with history of cancer.   Consider cutting back on alcohol.     (Z00.00) Encounter for subsequent annual wellness visit in Medicare patient  (primary encounter diagnosis)    (C61) Prostate cancer (H)  Comment:   Plan: PSA, tumor marker        Blood test for prostate cancer.     (C76.0) Head and neck cancer (H)  Comment: seeing oncology  Plan: follow-up with oncology as planned.     (G47.33) CRYSTAL (obstructive sleep apnea)  Comment: uses CPAP  Plan: No change in current treatment plan.     (I10) Essential hypertension with goal blood pressure less than 140/90  Comment: doing well  Plan: No change in current treatment plan.  Refills as needed.    (E78.5) Hyperlipidemia LDL goal <100  Comment: has been doing well  Plan: Lipid panel reflex to direct LDL Non-fasting        Non-fasting blood tests today.     (Z11.59) Need for hepatitis C screening test  Comment:   Plan: Hepatitis C Screen Reflex to HCV RNA Quant and         Genotype     Tetanus vaccine is recommended for adults every 10 years.  You are due for the vaccine now.  Medicare does not cover unless there is a wound.  Check at pharmacy for this vaccine, they can check on your cost and give you the vaccine.  The vaccine may be less expensive at a pharmacy.              Patient has been advised of split billing requirements and indicates understanding: Yes  COUNSELING:  Reviewed preventive health counseling, as reflected in patient instructions  Special attention given to:       Regular exercise       Hepatitis C screening       Colon cancer screening       Prostate cancer screening    Estimated body mass index is 26.31 kg/m  as calculated from the following:    Height as of 11/4/20: 1.829 m (6').    Weight as of 1/14/21: 88 kg (194 lb).        He reports that he has never smoked. He has never used smokeless tobacco.      Appropriate  preventive services were discussed with this patient, including applicable screening as appropriate for cardiovascular disease, diabetes, osteopenia/osteoporosis, and glaucoma.  As appropriate for age/gender, discussed screening for colorectal cancer, prostate cancer, breast cancer, and cervical cancer. Checklist reviewing preventive services available has been given to the patient.    Reviewed patients plan of care and provided an AVS. The Basic Care Plan (routine screening as documented in Health Maintenance) for Newton meets the Care Plan requirement. This Care Plan has been established and reviewed with the Patient.    Counseling Resources:  ATP IV Guidelines  Pooled Cohorts Equation Calculator  Breast Cancer Risk Calculator  Breast Cancer: Medication to Reduce Risk  FRAX Risk Assessment  ICSI Preventive Guidelines  Dietary Guidelines for Americans, 2010  CIBDO's MyPlate  ASA Prophylaxis  Lung CA Screening    Marcial Cutler MD  [facility]    Identified Health Risks:

## 2021-01-20 NOTE — NURSING NOTE
"Chief Complaint   Patient presents with     Physical       Initial /70   Pulse 68   Temp 98  F (36.7  C) (Tympanic)   Resp 20   Ht 1.822 m (5' 11.75\")   Wt 86.6 kg (191 lb)   BMI 26.09 kg/m   Estimated body mass index is 26.09 kg/m  as calculated from the following:    Height as of this encounter: 1.822 m (5' 11.75\").    Weight as of this encounter: 86.6 kg (191 lb).    Patient presents to the clinic using     Health Maintenance that is potentially due pending provider review:          Is there anyone who you would like to be able to receive your results?   If yes have patient fill out VAL    "

## 2021-01-21 ENCOUNTER — HOSPITAL ENCOUNTER (OUTPATIENT)
Dept: PET IMAGING | Facility: CLINIC | Age: 76
Discharge: HOME | End: 2021-01-21
Attending: INTERNAL MEDICINE | Admitting: INTERNAL MEDICINE
Payer: MEDICARE

## 2021-01-21 DIAGNOSIS — C76.0 HEAD AND NECK CANCER (H): ICD-10-CM

## 2021-01-21 LAB — HCV AB SERPL QL IA: NONREACTIVE

## 2021-01-21 PROCEDURE — 78816 PET IMAGE W/CT FULL BODY: CPT | Mod: PS,MG,KX

## 2021-01-21 PROCEDURE — 250N000011 HC RX IP 250 OP 636: Performed by: INTERNAL MEDICINE

## 2021-01-21 PROCEDURE — A9552 F18 FDG: HCPCS | Performed by: INTERNAL MEDICINE

## 2021-01-21 PROCEDURE — 78816 PET IMAGE W/CT FULL BODY: CPT | Mod: 26 | Performed by: RADIOLOGY

## 2021-01-21 PROCEDURE — G1004 CDSM NDSC: HCPCS | Mod: GC | Performed by: RADIOLOGY

## 2021-01-21 PROCEDURE — 343N000001 HC RX 343: Performed by: INTERNAL MEDICINE

## 2021-01-21 RX ORDER — IOPAMIDOL 755 MG/ML
10-135 INJECTION, SOLUTION INTRAVASCULAR ONCE
Status: COMPLETED | OUTPATIENT
Start: 2021-01-21 | End: 2021-01-21

## 2021-01-21 RX ADMIN — IOPAMIDOL 119 ML: 755 INJECTION, SOLUTION INTRAVENOUS at 08:54

## 2021-01-21 RX ADMIN — FLUDEOXYGLUCOSE F-18 13 MCI.: 500 INJECTION, SOLUTION INTRAVENOUS at 08:54

## 2021-01-21 NOTE — RESULT ENCOUNTER NOTE
"Giancarlo Glez,  Hepatitis C test is negative.    Lipid tests including total cholesterol, triglycerides, HDL (\"good cholesterol\") and LDL (\"bad cholesterol\") are normal.    LDL is borderline.  PSA test (for prostate cancer screening) is normal.   PLAN: No new changes in treatment recommended.   DELANEY CAVAZOS MD"

## 2021-01-25 ENCOUNTER — VIRTUAL VISIT (OUTPATIENT)
Dept: ONCOLOGY | Facility: CLINIC | Age: 76
End: 2021-01-25
Attending: INTERNAL MEDICINE
Payer: MEDICARE

## 2021-01-25 DIAGNOSIS — C10.9 SQUAMOUS CELL CARCINOMA OF OROPHARYNX (H): Primary | ICD-10-CM

## 2021-01-25 DIAGNOSIS — C76.0 HEAD AND NECK CANCER (H): ICD-10-CM

## 2021-01-25 PROCEDURE — 999N001193 HC VIDEO/TELEPHONE VISIT; NO CHARGE

## 2021-01-25 PROCEDURE — 99443 PR PHYSICIAN TELEPHONE EVALUATION 21-30 MIN: CPT | Performed by: INTERNAL MEDICINE

## 2021-01-25 RX ORDER — EPINEPHRINE 1 MG/ML
0.3 INJECTION, SOLUTION, CONCENTRATE INTRAVENOUS EVERY 5 MIN PRN
Status: CANCELLED | OUTPATIENT
Start: 2021-02-04

## 2021-01-25 RX ORDER — ALBUTEROL SULFATE 90 UG/1
1-2 AEROSOL, METERED RESPIRATORY (INHALATION)
Status: CANCELLED
Start: 2021-02-04

## 2021-01-25 RX ORDER — NALOXONE HYDROCHLORIDE 0.4 MG/ML
.1-.4 INJECTION, SOLUTION INTRAMUSCULAR; INTRAVENOUS; SUBCUTANEOUS
Status: CANCELLED | OUTPATIENT
Start: 2021-02-04

## 2021-01-25 RX ORDER — ALBUTEROL SULFATE 0.83 MG/ML
2.5 SOLUTION RESPIRATORY (INHALATION)
Status: CANCELLED | OUTPATIENT
Start: 2021-02-04

## 2021-01-25 RX ORDER — DIPHENHYDRAMINE HYDROCHLORIDE 50 MG/ML
50 INJECTION INTRAMUSCULAR; INTRAVENOUS
Status: CANCELLED
Start: 2021-02-04

## 2021-01-25 RX ORDER — METHYLPREDNISOLONE SODIUM SUCCINATE 125 MG/2ML
125 INJECTION, POWDER, LYOPHILIZED, FOR SOLUTION INTRAMUSCULAR; INTRAVENOUS
Status: CANCELLED
Start: 2021-02-04

## 2021-01-25 RX ORDER — SODIUM CHLORIDE 9 MG/ML
1000 INJECTION, SOLUTION INTRAVENOUS CONTINUOUS PRN
Status: CANCELLED
Start: 2021-02-04

## 2021-01-25 RX ORDER — MEPERIDINE HYDROCHLORIDE 25 MG/ML
25 INJECTION INTRAMUSCULAR; INTRAVENOUS; SUBCUTANEOUS EVERY 30 MIN PRN
Status: CANCELLED | OUTPATIENT
Start: 2021-02-04

## 2021-01-25 RX ORDER — LORAZEPAM 2 MG/ML
0.5 INJECTION INTRAMUSCULAR EVERY 4 HOURS PRN
Status: CANCELLED
Start: 2021-02-04

## 2021-01-25 NOTE — LETTER
"    1/25/2021         RE: Newton Buchanan  [address]  [place] [address]        Dear Colleague,    Thank you for referring your patient, Newton Buchanan, to the [facility]. Please see a copy of my visit note below.    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 Buchanan is a 75 year old male is here for follow-up of HPV positive throat cancer.    Earache and skin reaction have abated, but he's concerned about a \"lump by the end of the jaw\" below the right ear.  This corresponds, probably, to a focus identified on most recent PET scan.    Review Of Systems:  Review Of Systems  Skin: negative  Eyes: negative  Ears/Nose/Throat: per HPI  Respiratory: Exertional dyspnea, uses CPAP  Cardiovascular: negative  Gastrointestinal: had bowel upset last year, ? c diff; using probiotics  Genitourinary: negative  Musculoskeletal: negative  Neurologic: negative  Psychiatric: negative  Hematologic/Lymphatic/Immunologic: negative  Endocrine: negative    Past medical, social, surgical, and family histories reviewed.  Patient is trying to sell a house in [place] and is keen to get COVID vaccine because he's not able to travel until vaccinated.    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 Has had FNAs in the past that were benign. He was followed by q 6 months.      He reported the neck mass was getting larger. Denies any pain/dysphagia/difficuly swallowing/sob/skin changes.   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      2/24/20 PET/CT found:  1. Hypermetabolic right cervical lymphadenopathy.  2. Numerous hypermetabolic bilateral lung metastases.  3. Hypermetabolic right hilar nodes, also likely metastatic disease     It was " "felt that with no obvious primary on PET/CT but given CK7 +, P16 positivity, and neck mass on presentation, suspicion is high that origin is still of H&N (right cervical LAD) with metastatic spread to lung with bilateral multiple lung nodules .  Pt made informed decision to proceed with single agent Keytruda due to his PD-L1 testing from [facility] [employer] with CPS of 70.  Started early May in [place].      He transferred his care to us in June 2020.      He has mixed PET response in 8/2020 in neck JOSÉ MIGUEL, and decreased lung nodules uptake.      Case was discussed at U conference, and he saw ENT, Dr. Aparicio -- \"He is still not a candidate for definitive chemoradiation given the distant disease\".      Clinical trail was recommended with N-803 (formerly Alt-803), which is an IL-15 superagonist in combination with pembrolizumab.  It is specifically for patients who have had a positive response to keytruda but then experience progression. This is based on 90% disease control in a similar cohort of lung cancer patients that we participated in a few years back.   It is a multi-disease cohort phase 2.        He met with Dr. Veras 9/16/2020 for trial discussion.   Felt his mixed response (resolution of lung diease, and mixed response in right neck LN) could be pseudoprogression from IO or true progression. [place] decision is to do 2 more keytruda and restage.      Interval history:  Mr. Buchanan is a 75 year old male is here for follow-up of squamous carcinoma presenting as neck mass.  He just had palliative radiation to neck completed last week.  He's noted that the neck nodes seem much smaller and overall felt he did fine with the treatment.  Of note, he has numerous lung nodules that were NOT in the radiation field.  He has remained on Pembrolizumab and prefers to continue this.  We discussed the need to assess where that stands now.    Allergies:  Allergies as of 01/25/2021 - Reviewed 01/20/2021   Allergen Reaction Noted     " Atorvastatin  11/22/2011     Patient Active Problem List   Diagnosis     Essential hypertension with goal blood pressure less than 140/90     Gastroesophageal reflux disease without esophagitis     CRYSTAL (obstructive sleep apnea)     Prostate cancer (H)     Head and neck cancer (H)     Oropharyngeal cancer (H)       Current Medications:  Current Outpatient Medications   Medication Sig Dispense Refill     aspirin 81 MG tablet Take by mouth daily 30 tablet      fluticasone (FLONASE) 50 MCG/ACT nasal spray Spray 1-2 sprays in nostril       losartan (COZAAR) 25 MG tablet Take 1 tablet (25 mg) by mouth daily 90 tablet 3     pembrolizumab (KEYTRUDA) 25 MG/ML        pravastatin (PRAVACHOL) 20 MG tablet Take 20 mg by mouth daily        saccharomyces boulardii (FLORASTOR) 250 MG capsule Take 1 capsule by mouth 2 times daily       sildenafil (VIAGRA) 100 MG tablet Take by mouth daily as needed for erectile dysfunction 30 tablet      VITAMIN D, CHOLECALCIFEROL, PO Take 1,000 Units by mouth daily          Physical Exam:  There were no vitals taken for this visit.  GENERAL: Healthy, alert and no distress  EYES: Eyes grossly normal to inspection.  No discharge or erythema, or obvious scleral/conjunctival abnormalities.  RESP: No audible wheeze, cough, or visible cyanosis.  No visible retractions or increased work of breathing.    SKIN: Visible skin clear. No significant rash, abnormal pigmentation or lesions.  NEURO: Cranial nerves grossly intact.  Mentation and speech appropriate for age.  PSYCH: Mentation appears normal, affect normal/bright, judgement and insight intact, normal speech and appearance well-groomed.    Laboratory/Imaging Studies  Office Visit on 01/20/2021   Component Date Value Ref Range Status     PSA 01/20/2021 0.06  0 - 4 ug/L Final    Assay Method:  Chemiluminescence using Siemens Vista analyzer     Hepatitis C Antibody 01/20/2021 Nonreactive  NR^Nonreactive Final    Comment: Assay performance characteristics  have not been established for newborns,   infants, and children       Cholesterol 01/20/2021 183  <200 mg/dL Final     Triglycerides 01/20/2021 118  <150 mg/dL Final    Non Fasting     HDL Cholesterol 01/20/2021 59  >39 mg/dL Final     LDL Cholesterol Calculated 01/20/2021 100* <100 mg/dL Final    Comment: Above desirable:  100-129 mg/dl  Borderline High:  130-159 mg/dL  High:             160-189 mg/dL  Very high:       >189 mg/dl       Non HDL Cholesterol 01/20/2021 124  <130 mg/dL Final   Infusion Therapy Visit on 01/14/2021   Component Date Value Ref Range Status     Sodium 01/14/2021 139  133 - 144 mmol/L Final     Potassium 01/14/2021 4.0  3.4 - 5.3 mmol/L Final     Chloride 01/14/2021 105  94 - 109 mmol/L Final     Carbon Dioxide 01/14/2021 33* 20 - 32 mmol/L Final     Anion Gap 01/14/2021 1* 3 - 14 mmol/L Final     Glucose 01/14/2021 91  70 - 99 mg/dL Final     Urea Nitrogen 01/14/2021 13  7 - 30 mg/dL Final     Creatinine 01/14/2021 0.90  0.66 - 1.25 mg/dL Final     GFR Estimate 01/14/2021 83  >60 mL/min/[1.73_m2] Final    Comment: Non  GFR Calc  Starting 12/18/2018, serum creatinine based estimated GFR (eGFR) will be   calculated using the Chronic Kidney Disease Epidemiology Collaboration   (CKD-EPI) equation.       GFR Estimate If Black 01/14/2021 >90  >60 mL/min/[1.73_m2] Final    Comment:  GFR Calc  Starting 12/18/2018, serum creatinine based estimated GFR (eGFR) will be   calculated using the Chronic Kidney Disease Epidemiology Collaboration   (CKD-EPI) equation.       Calcium 01/14/2021 9.3  8.5 - 10.1 mg/dL Final     Bilirubin Total 01/14/2021 0.6  0.2 - 1.3 mg/dL Final     Albumin 01/14/2021 3.6  3.4 - 5.0 g/dL Final     Protein Total 01/14/2021 6.9  6.8 - 8.8 g/dL Final     Alkaline Phosphatase 01/14/2021 65  40 - 150 U/L Final     ALT 01/14/2021 27  0 - 70 U/L Final     AST 01/14/2021 16  0 - 45 U/L Final     TSH 01/14/2021 2.18  0.40 - 4.00 mU/L Final     WBC  01/14/2021 7.1  4.0 - 11.0 10e9/L Final     RBC Count 01/14/2021 4.41  4.4 - 5.9 10e12/L Final     Hemoglobin 01/14/2021 13.3  13.3 - 17.7 g/dL Final     Hematocrit 01/14/2021 40.0  40.0 - 53.0 % Final     MCV 01/14/2021 91  78 - 100 fl Final     MCH 01/14/2021 30.2  26.5 - 33.0 pg Final     MCHC 01/14/2021 33.3  31.5 - 36.5 g/dL Final     RDW 01/14/2021 12.0  10.0 - 15.0 % Final     Platelet Count 01/14/2021 250  150 - 450 10e9/L Final     Diff Method 01/14/2021 Automated Method   Final     % Neutrophils 01/14/2021 74.1  % Final     % Lymphocytes 01/14/2021 12.5  % Final     % Monocytes 01/14/2021 9.6  % Final     % Eosinophils 01/14/2021 3.0  % Final     % Basophils 01/14/2021 0.4  % Final     % Immature Granulocytes 01/14/2021 0.4  % Final     Nucleated RBCs 01/14/2021 0  0 /100 Final     Absolute Neutrophil 01/14/2021 5.3  1.6 - 8.3 10e9/L Final     Absolute Lymphocytes 01/14/2021 0.9  0.8 - 5.3 10e9/L Final     Absolute Monocytes 01/14/2021 0.7  0.0 - 1.3 10e9/L Final     Absolute Eosinophils 01/14/2021 0.2  0.0 - 0.7 10e9/L Final     Absolute Basophils 01/14/2021 0.0  0.0 - 0.2 10e9/L Final     Abs Immature Granulocytes 01/14/2021 0.0  0 - 0.4 10e9/L Final     Absolute Nucleated RBC 01/14/2021 0.0   Final     Recent Results (from the past 744 hour(s))   PET Oncology Whole Body    Narrative    Combined Report of:    PET and CT on  1/21/2021 10:32 AM :    1. PET of the neck, chest, abdomen, and pelvis.  2. PET CT Fusion for Attenuation Correction and Anatomical  Localization:    3. Diagnostic CT scan of the chest, abdomen, and pelvis with  intravenous contrast for interpretation.  3. CT of the chest, abdomen and pelvis obtained for diagnostic  interpretation.  4. 3D MIP and PET-CT fused images were processed on an independent  workstation and archived to PACS and reviewed by a radiologist.    Technique:    1. PET: The patient received 13 mCi of F-18-FDG; the serum glucose was  92 prior to administration, body  weight was 88 kg. Images were  evaluated in the axial, sagittal, and coronal planes as well as the  rotational whole body MIP. Images were acquired from the Vertex to the  Feet.    UPTAKE WAS MEASURED AT 64 MINUTES.     BACKGROUND:  Liver SUV max= 4.06,   Aorta Blood SUV Max: 2.66.     2. CT: Volumetric acquisition for clinical interpretation of the  chest, abdomen, and pelvis acquired at 3 mm sections . The chest,  abdomen, and pelvis were evaluated at 5 mm sections in bone, soft  tissue, and lung windows.      The patient received 119 cc. Of Isovue 370 intravenously for the  examination.      3. 3D MIP and PET-CT fused images were processed on an independent  workstation and archived to PACS and reviewed by a radiologist.    INDICATION: Head and neck cancer (H)    ADDITIONAL INFORMATION OBTAINED FROM EMR: 75-year-old male metastatic  squamous cell carcinoma the neck. PET/CT on 2/24/2020 demonstrated  right cervical lymphadenopathy, bilateral pulmonary metastases, and  right hilar lymphadenopathy. Patient treated with chemotherapy and  radiation therapy.      COMPARISON: 11/5/2020.    FINDINGS:     HEAD/NECK: In comparison to 11/5/2020 dated PET/CT there is interval  marked decrease in the FDG avidity of the right level 2 lymph nodes.  Previously the max SUV was 12.4, currently 4.1. Size-wise they have  decreased as well, probably for example the largest right level 2A  lymphadenopathy previously measured 3.2 x 2.8 cm, currently 2.0 x 1.9  cm. Right level 2B lymphadenopathy FDG uptake has resolved and it now  measures about 3 mm.    There is a new mild-moderately FDG avid 1.0 cm focus in the  superficial lobe of the right parotid gland with max SUV of 3.9.    There is FDG uptake and mucosal enhancement of bilateral  glossotonsillar sulcus, more extensive on the left (max SUV 6.2)  extending to left tongue base and left lateral oropharyngeal wall.  Findings are probably treatment related and direct visualization  and  continued follow-up is recommended.    Bilateral submandibular gland atrophy. Right parotid gland is  partially fatty replaced. Thyroid gland is normal.   The fascial spaces in the neck are intact bilaterally.  The major  vascular structures in the neck appear unremarkable.    Evaluation of the osseous structures demonstrate no worrisome lytic or  sclerotic lesion. . Because of Thickening of the left ethmoid air  cells. The visualized mastoid air cells are clear.. Orbits appear  unremarkable. No ventriculomegaly, abnormal intracranial, enhancement,  evidence of intracranial hemorrhage. Gray-white differentiation is  preserved.    CHEST:  There is no suspicious FDG uptake in the chest.     Esophagus appears unremarkable. Tracheobronchial tree appears patent.    Lung nodule(s) described on series 9:  -Stable 8 mm solid pulmonary nodule of the right upper lobe (Image:  62)  -Stable 3 mm pulmonary nodule of the posterior right lower lobe  without significant FDG ability (Image: 89)  -Stable 12 x 8 mm pulmonary nodule of the left upper lobe (Image: 76)  -Stable 12 x 8 mm pulmonary nodule the left upper lobe (Image: 57)    Additional pulmonary nodules appear stable. No new or enlarging  pulmonary nodules.      No pleural effusion.  Heart size within normal limits.. No significant  pericardial effusion.. There are no pathologically enlarged  mediastinal, hilar, supraclavicular or axillary lymph nodes.    ABDOMEN AND PELVIS:  There is no suspicious FDG uptake in the abdomen or pelvis.    There are no suspicious hepatic lesions. No opaque gallbladder  calculi. No intrahepatic or extrahepatic biliary dilatation. Pancreas  unremarkable. Spleen size within normal limits. No suspicious adrenal  mass lesions. Mildly attenuating focus at the inferior pole of the  right kidney likely representing hemorrhagic or proteinaceous cyst.  Additional subcentimeter hypoattenuating focus of the left kidney  likely represents simple  cysts is too small for evaluation by CT. No  evidence of hydronephrosis. Visualized ureters and urinary bladder is  unremarkable.  Postoperative changes of prostatectomy. No free fluid.  Diverticulosis without evidence of diverticulitis. No evidence of  bowel obstruction. Appendix unremarkable. Abdominal vasculature is  unremarkable on this noncontrast exam. No suspicious or enlarged  mesenteric, retroperitoneal and pelvic lymph nodes.     LOWER EXTREMITIES: No abnormal masses or hypermetabolic lesions.    BONES: There is no abnormal FDG uptake in the skeleton..     No suspicious osseous lesion.  Degenerative changes of the  thoracolumbar spine. Degenerative changes of the shoulders        Impression    IMPRESSION: In this patient with history of metastatic squamous cell  carcinoma of the neck treated with chemotherapy and radiation therapy;    1. Since 11/5/2020, interval marked decrease in the FDG avidity and  mild decrease in size of the right level 2a lymphadenopathy suggesting  partial response. Given presence of mild residual uptake presence of  residual tumor can not be excluded. Continued follow up is  recommended.  2. Interval resolution of right level IIb lymph node.   3. Interval development of a nonspecific focus of mild-moderate FDG  uptake in the right parotid gland superficial lobe. Attention on  follow up is recommended. If clinically indicated to rule out a new  metastatic focus, ultrasound and FNA can be considered.  4. Unchanged FDG avid mucosal enhancing areas of the glossotonsillar  sulci bilaterally more extensive on the left, left tongue base and  left lateral oropharyngeal wall. These are likely radiation treatment  related/inflammatory etiology. Continued follow-up is recommended.  5. No change in scattered subcentimeter pulmonary nodules.    I have personally reviewed the examination and initial interpretation  and I agree with the findings.    SRIDEVI BETANCOURT MD       "    ASSESSMENT/PLAN:    Emerging lesion in the jaw/parotid; seen on PET and appreciable by patient.     Patient and I agree that we'll proceed with ultrasound, possible FNA biopsy.    Remain on Keytruda for now, next cycle early February. I can follow up patient in about 5-6 weeks after US.      TELEPHONE Visit - Oncology Rooming Note - [phone]    January 25, 2021 12:44 PM   Newton Buchanan is a 75 year old male who presents for:    Chief Complaint   Patient presents with     Oncology Clinic Visit     Return Head and neck cancer, review PET scan     Initial Vitals: There were no vitals taken for this visit. Estimated body mass index is 26.09 kg/m  as calculated from the following:    Height as of 1/20/21: 1.822 m (5' 11.75\").    Weight as of 1/20/21: 86.6 kg (191 lb). There is no height or weight on file to calculate BSA.  Data Unavailable Comment: Data Unavailable   No LMP for male patient.  Allergies reviewed: Yes  Medications reviewed: Yes    Medications: Medication refills not needed today.  Pharmacy name entered into [employer]: [facility][id number] - [address] AT [address]        [facility]                  Again, thank you for allowing me to participate in the care of your patient.        Sincerely,        Mary Pfeiffer MD    "

## 2021-01-25 NOTE — PROGRESS NOTES
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 Buchanan is a 75 year old male is here for follow-up of HPV positive throat cancer.    Earache and skin reaction have abated, but he's concerned about a \"lump by the end of the jaw\" below the right ear.  This corresponds, probably, to a focus identified on most recent PET scan.    Review Of Systems:  Review Of Systems  Skin: negative  Eyes: negative  Ears/Nose/Throat: per HPI  Respiratory: Exertional dyspnea, uses CPAP  Cardiovascular: negative  Gastrointestinal: had bowel upset last year, ? c diff; using probiotics  Genitourinary: negative  Musculoskeletal: negative  Neurologic: negative  Psychiatric: negative  Hematologic/Lymphatic/Immunologic: negative  Endocrine: negative    Past medical, social, surgical, and family histories reviewed.  Patient is trying to sell a house in [place] and is keen to get COVID vaccine because he's not able to travel until vaccinated.    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 Has had FNAs in the past that were benign. He was followed by q 6 months.      He reported the neck mass was getting larger. Denies any pain/dysphagia/difficuly swallowing/sob/skin changes.   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      2/24/20 PET/CT found:  1. Hypermetabolic right cervical lymphadenopathy.  2. Numerous hypermetabolic bilateral lung metastases.  3. Hypermetabolic right hilar nodes, also likely metastatic disease     It was felt that with no obvious primary on PET/CT but given CK7 +, P16 positivity, and neck mass on presentation, suspicion is high that origin is still of H&N (right cervical LAD) with metastatic spread to lung with bilateral multiple lung nodules " ".  Pt made informed decision to proceed with single agent Keytruda due to his PD-L1 testing from [facility] with CPS of 70.  Started early May in [place].      He transferred his care to us in June 2020.      He has mixed PET response in 8/2020 in neck JOSÉ MIGUEL, and decreased lung nodules uptake.      Case was discussed at U conference, and he saw ENT, Dr. Aparicio -- \"He is still not a candidate for definitive chemoradiation given the distant disease\".      Clinical trail was recommended with N-803 (formerly Alt-803), which is an IL-15 superagonist in combination with pembrolizumab.  It is specifically for patients who have had a positive response to keytruda but then experience progression. This is based on 90% disease control in a similar cohort of lung cancer patients that we participated in a few years back.   It is a multi-disease cohort phase 2.        He met with Dr. Veras 9/16/2020 for trial discussion.   Felt his mixed response (resolution of lung diease, and mixed response in right neck LN) could be pseudoprogression from IO or true progression. Lee decision is to do 2 more keytruda and restage.      Interval history:  Mr. Buchanan is a 75 year old male is here for follow-up of squamous carcinoma presenting as neck mass.  He just had palliative radiation to neck completed last week.  He's noted that the neck nodes seem much smaller and overall felt he did fine with the treatment.  Of note, he has numerous lung nodules that were NOT in the radiation field.  He has remained on Pembrolizumab and prefers to continue this.  We discussed the need to assess where that stands now.    Allergies:  Allergies as of 01/25/2021 - Reviewed 01/20/2021   Allergen Reaction Noted     Atorvastatin  11/22/2011     Patient Active Problem List   Diagnosis     Essential hypertension with goal blood pressure less than 140/90     Gastroesophageal reflux disease without esophagitis     CRYSTAL (obstructive sleep apnea)     Prostate cancer (H) "     Head and neck cancer (H)     Oropharyngeal cancer (H)       Current Medications:  Current Outpatient Medications   Medication Sig Dispense Refill     aspirin 81 MG tablet Take by mouth daily 30 tablet      fluticasone (FLONASE) 50 MCG/ACT nasal spray Spray 1-2 sprays in nostril       losartan (COZAAR) 25 MG tablet Take 1 tablet (25 mg) by mouth daily 90 tablet 3     pembrolizumab (KEYTRUDA) 25 MG/ML        pravastatin (PRAVACHOL) 20 MG tablet Take 20 mg by mouth daily        saccharomyces boulardii (FLORASTOR) 250 MG capsule Take 1 capsule by mouth 2 times daily       sildenafil (VIAGRA) 100 MG tablet Take by mouth daily as needed for erectile dysfunction 30 tablet      VITAMIN D, CHOLECALCIFEROL, PO Take 1,000 Units by mouth daily          Physical Exam:  There were no vitals taken for this visit.  GENERAL: Healthy, alert and no distress  EYES: Eyes grossly normal to inspection.  No discharge or erythema, or obvious scleral/conjunctival abnormalities.  RESP: No audible wheeze, cough, or visible cyanosis.  No visible retractions or increased work of breathing.    SKIN: Visible skin clear. No significant rash, abnormal pigmentation or lesions.  NEURO: Cranial nerves grossly intact.  Mentation and speech appropriate for age.  PSYCH: Mentation appears normal, affect normal/bright, judgement and insight intact, normal speech and appearance well-groomed.    Laboratory/Imaging Studies  Office Visit on 01/20/2021   Component Date Value Ref Range Status     PSA 01/20/2021 0.06  0 - 4 ug/L Final    Assay Method:  Chemiluminescence using Siemens Vista analyzer     Hepatitis C Antibody 01/20/2021 Nonreactive  NR^Nonreactive Final    Comment: Assay performance characteristics have not been established for newborns,   infants, and children       Cholesterol 01/20/2021 183  <200 mg/dL Final     Triglycerides 01/20/2021 118  <150 mg/dL Final    Non Fasting     HDL Cholesterol 01/20/2021 59  >39 mg/dL Final     LDL Cholesterol  Calculated 01/20/2021 100* <100 mg/dL Final    Comment: Above desirable:  100-129 mg/dl  Borderline High:  130-159 mg/dL  High:             160-189 mg/dL  Very high:       >189 mg/dl       Non HDL Cholesterol 01/20/2021 124  <130 mg/dL Final   Infusion Therapy Visit on 01/14/2021   Component Date Value Ref Range Status     Sodium 01/14/2021 139  133 - 144 mmol/L Final     Potassium 01/14/2021 4.0  3.4 - 5.3 mmol/L Final     Chloride 01/14/2021 105  94 - 109 mmol/L Final     Carbon Dioxide 01/14/2021 33* 20 - 32 mmol/L Final     Anion Gap 01/14/2021 1* 3 - 14 mmol/L Final     Glucose 01/14/2021 91  70 - 99 mg/dL Final     Urea Nitrogen 01/14/2021 13  7 - 30 mg/dL Final     Creatinine 01/14/2021 0.90  0.66 - 1.25 mg/dL Final     GFR Estimate 01/14/2021 83  >60 mL/min/[1.73_m2] Final    Comment: Non  GFR Calc  Starting 12/18/2018, serum creatinine based estimated GFR (eGFR) will be   calculated using the Chronic Kidney Disease Epidemiology Collaboration   (CKD-EPI) equation.       GFR Estimate If Black 01/14/2021 >90  >60 mL/min/[1.73_m2] Final    Comment:  GFR Calc  Starting 12/18/2018, serum creatinine based estimated GFR (eGFR) will be   calculated using the Chronic Kidney Disease Epidemiology Collaboration   (CKD-EPI) equation.       Calcium 01/14/2021 9.3  8.5 - 10.1 mg/dL Final     Bilirubin Total 01/14/2021 0.6  0.2 - 1.3 mg/dL Final     Albumin 01/14/2021 3.6  3.4 - 5.0 g/dL Final     Protein Total 01/14/2021 6.9  6.8 - 8.8 g/dL Final     Alkaline Phosphatase 01/14/2021 65  40 - 150 U/L Final     ALT 01/14/2021 27  0 - 70 U/L Final     AST 01/14/2021 16  0 - 45 U/L Final     TSH 01/14/2021 2.18  0.40 - 4.00 mU/L Final     WBC 01/14/2021 7.1  4.0 - 11.0 10e9/L Final     RBC Count 01/14/2021 4.41  4.4 - 5.9 10e12/L Final     Hemoglobin 01/14/2021 13.3  13.3 - 17.7 g/dL Final     Hematocrit 01/14/2021 40.0  40.0 - 53.0 % Final     MCV 01/14/2021 91  78 - 100 fl Final     MCH  01/14/2021 30.2  26.5 - 33.0 pg Final     MCHC 01/14/2021 33.3  31.5 - 36.5 g/dL Final     RDW 01/14/2021 12.0  10.0 - 15.0 % Final     Platelet Count 01/14/2021 250  150 - 450 10e9/L Final     Diff Method 01/14/2021 Automated Method   Final     % Neutrophils 01/14/2021 74.1  % Final     % Lymphocytes 01/14/2021 12.5  % Final     % Monocytes 01/14/2021 9.6  % Final     % Eosinophils 01/14/2021 3.0  % Final     % Basophils 01/14/2021 0.4  % Final     % Immature Granulocytes 01/14/2021 0.4  % Final     Nucleated RBCs 01/14/2021 0  0 /100 Final     Absolute Neutrophil 01/14/2021 5.3  1.6 - 8.3 10e9/L Final     Absolute Lymphocytes 01/14/2021 0.9  0.8 - 5.3 10e9/L Final     Absolute Monocytes 01/14/2021 0.7  0.0 - 1.3 10e9/L Final     Absolute Eosinophils 01/14/2021 0.2  0.0 - 0.7 10e9/L Final     Absolute Basophils 01/14/2021 0.0  0.0 - 0.2 10e9/L Final     Abs Immature Granulocytes 01/14/2021 0.0  0 - 0.4 10e9/L Final     Absolute Nucleated RBC 01/14/2021 0.0   Final     Recent Results (from the past 744 hour(s))   PET Oncology Whole Body    Narrative    Combined Report of:    PET and CT on  1/21/2021 10:32 AM :    1. PET of the neck, chest, abdomen, and pelvis.  2. PET CT Fusion for Attenuation Correction and Anatomical  Localization:    3. Diagnostic CT scan of the chest, abdomen, and pelvis with  intravenous contrast for interpretation.  3. CT of the chest, abdomen and pelvis obtained for diagnostic  interpretation.  4. 3D MIP and PET-CT fused images were processed on an independent  workstation and archived to PACS and reviewed by a radiologist.    Technique:    1. PET: The patient received 13 mCi of F-18-FDG; the serum glucose was  92 prior to administration, body weight was 88 kg. Images were  evaluated in the axial, sagittal, and coronal planes as well as the  rotational whole body MIP. Images were acquired from the Vertex to the  Feet.    UPTAKE WAS MEASURED AT 64 MINUTES.     BACKGROUND:  Liver SUV max= 4.06,    Aorta Blood SUV Max: 2.66.     2. CT: Volumetric acquisition for clinical interpretation of the  chest, abdomen, and pelvis acquired at 3 mm sections . The chest,  abdomen, and pelvis were evaluated at 5 mm sections in bone, soft  tissue, and lung windows.      The patient received 119 cc. Of Isovue 370 intravenously for the  examination.      3. 3D MIP and PET-CT fused images were processed on an independent  workstation and archived to PACS and reviewed by a radiologist.    INDICATION: Head and neck cancer (H)    ADDITIONAL INFORMATION OBTAINED FROM EMR: 75-year-old male metastatic  squamous cell carcinoma the neck. PET/CT on 2/24/2020 demonstrated  right cervical lymphadenopathy, bilateral pulmonary metastases, and  right hilar lymphadenopathy. Patient treated with chemotherapy and  radiation therapy.      COMPARISON: 11/5/2020.    FINDINGS:     HEAD/NECK: In comparison to 11/5/2020 dated PET/CT there is interval  marked decrease in the FDG avidity of the right level 2 lymph nodes.  Previously the max SUV was 12.4, currently 4.1. Size-wise they have  decreased as well, probably for example the largest right level 2A  lymphadenopathy previously measured 3.2 x 2.8 cm, currently 2.0 x 1.9  cm. Right level 2B lymphadenopathy FDG uptake has resolved and it now  measures about 3 mm.    There is a new mild-moderately FDG avid 1.0 cm focus in the  superficial lobe of the right parotid gland with max SUV of 3.9.    There is FDG uptake and mucosal enhancement of bilateral  glossotonsillar sulcus, more extensive on the left (max SUV 6.2)  extending to left tongue base and left lateral oropharyngeal wall.  Findings are probably treatment related and direct visualization and  continued follow-up is recommended.    Bilateral submandibular gland atrophy. Right parotid gland is  partially fatty replaced. Thyroid gland is normal.   The fascial spaces in the neck are intact bilaterally.  The major  vascular structures in the  neck appear unremarkable.    Evaluation of the osseous structures demonstrate no worrisome lytic or  sclerotic lesion. . Because of Thickening of the left ethmoid air  cells. The visualized mastoid air cells are clear.. Orbits appear  unremarkable. No ventriculomegaly, abnormal intracranial, enhancement,  evidence of intracranial hemorrhage. Gray-white differentiation is  preserved.    CHEST:  There is no suspicious FDG uptake in the chest.     Esophagus appears unremarkable. Tracheobronchial tree appears patent.    Lung nodule(s) described on series 9:  -Stable 8 mm solid pulmonary nodule of the right upper lobe (Image:  62)  -Stable 3 mm pulmonary nodule of the posterior right lower lobe  without significant FDG ability (Image: 89)  -Stable 12 x 8 mm pulmonary nodule of the left upper lobe (Image: 76)  -Stable 12 x 8 mm pulmonary nodule the left upper lobe (Image: 57)    Additional pulmonary nodules appear stable. No new or enlarging  pulmonary nodules.      No pleural effusion.  Heart size within normal limits.. No significant  pericardial effusion.. There are no pathologically enlarged  mediastinal, hilar, supraclavicular or axillary lymph nodes.    ABDOMEN AND PELVIS:  There is no suspicious FDG uptake in the abdomen or pelvis.    There are no suspicious hepatic lesions. No opaque gallbladder  calculi. No intrahepatic or extrahepatic biliary dilatation. Pancreas  unremarkable. Spleen size within normal limits. No suspicious adrenal  mass lesions. Mildly attenuating focus at the inferior pole of the  right kidney likely representing hemorrhagic or proteinaceous cyst.  Additional subcentimeter hypoattenuating focus of the left kidney  likely represents simple cysts is too small for evaluation by CT. No  evidence of hydronephrosis. Visualized ureters and urinary bladder is  unremarkable.  Postoperative changes of prostatectomy. No free fluid.  Diverticulosis without evidence of diverticulitis. No evidence  of  bowel obstruction. Appendix unremarkable. Abdominal vasculature is  unremarkable on this noncontrast exam. No suspicious or enlarged  mesenteric, retroperitoneal and pelvic lymph nodes.     LOWER EXTREMITIES: No abnormal masses or hypermetabolic lesions.    BONES: There is no abnormal FDG uptake in the skeleton..     No suspicious osseous lesion.  Degenerative changes of the  thoracolumbar spine. Degenerative changes of the shoulders        Impression    IMPRESSION: In this patient with history of metastatic squamous cell  carcinoma of the neck treated with chemotherapy and radiation therapy;    1. Since 11/5/2020, interval marked decrease in the FDG avidity and  mild decrease in size of the right level 2a lymphadenopathy suggesting  partial response. Given presence of mild residual uptake presence of  residual tumor can not be excluded. Continued follow up is  recommended.  2. Interval resolution of right level IIb lymph node.   3. Interval development of a nonspecific focus of mild-moderate FDG  uptake in the right parotid gland superficial lobe. Attention on  follow up is recommended. If clinically indicated to rule out a new  metastatic focus, ultrasound and FNA can be considered.  4. Unchanged FDG avid mucosal enhancing areas of the glossotonsillar  sulci bilaterally more extensive on the left, left tongue base and  left lateral oropharyngeal wall. These are likely radiation treatment  related/inflammatory etiology. Continued follow-up is recommended.  5. No change in scattered subcentimeter pulmonary nodules.    I have personally reviewed the examination and initial interpretation  and I agree with the findings.    SRIDEVI BETANCOURT MD          ASSESSMENT/PLAN:    Emerging lesion in the jaw/parotid; seen on PET and appreciable by patient.     Patient and I agree that we'll proceed with ultrasound, possible FNA biopsy.    Remain on Keytruda for now, next cycle early February. I can follow up patient in about 5-6  weeks after US.

## 2021-01-25 NOTE — PATIENT INSTRUCTIONS
Emerging lesion in the jaw/parotid; seen on PET and appreciable by patient.      Patient and I agree that we'll proceed with ultrasound, possible FNA biopsy.     Remain on Keytruda for now, next cycle early February. I can follow up patient in about 5-6 weeks after US.

## 2021-01-25 NOTE — PROGRESS NOTES
"TELEPHONE Visit - Oncology Rooming Note - [phone]    January 25, 2021 12:44 PM   Newton Buchanan is a 75 year old male who presents for:    Chief Complaint   Patient presents with     Oncology Clinic Visit     Return Head and neck cancer, review PET scan     Initial Vitals: There were no vitals taken for this visit. Estimated body mass index is 26.09 kg/m  as calculated from the following:    Height as of 1/20/21: 1.822 m (5' 11.75\").    Weight as of 1/20/21: 86.6 kg (191 lb). There is no height or weight on file to calculate BSA.  Data Unavailable Comment: Data Unavailable   No LMP for male patient.  Allergies reviewed: Yes  Medications reviewed: Yes    Medications: Medication refills not needed today.  Pharmacy name entered into [facility]: [id number] [address] AT [address]        Sandy Carrillo CMA              "

## 2021-01-25 NOTE — LETTER
"    1/25/2021         RE: Newton Buchanan  [address]  [place] [address]        Dear Colleague,    Thank you for referring your patient, Newton Buchanan, to the [facility]. Please see a copy of my visit note below.    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 Buchanan is a 75 year old male is here for follow-up of HPV positive throat cancer.    Earache and skin reaction have abated, but he's concerned about a \"lump by the end of the jaw\" below the right ear.  This corresponds, probably, to a focus identified on most recent PET scan.    Review Of Systems:  Review Of Systems  Skin: negative  Eyes: negative  Ears/Nose/Throat: per HPI  Respiratory: Exertional dyspnea, uses CPAP  Cardiovascular: negative  Gastrointestinal: had bowel upset last year, ? c diff; using probiotics  Genitourinary: negative  Musculoskeletal: negative  Neurologic: negative  Psychiatric: negative  Hematologic/Lymphatic/Immunologic: negative  Endocrine: negative    Past medical, social, surgical, and family histories reviewed.  Patient is trying to sell a house in [place] and is keen to get COVID vaccine because he's not able to travel until vaccinated.    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 Has had FNAs in the past that were benign. He was followed by q 6 months.      He reported the neck mass was getting larger. Denies any pain/dysphagia/difficuly swallowing/sob/skin changes.   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      2/24/20 PET/CT found:  1. Hypermetabolic right cervical lymphadenopathy.  2. Numerous hypermetabolic bilateral lung metastases.  3. Hypermetabolic right hilar nodes, also likely metastatic disease     It was " "felt that with no obvious primary on PET/CT but given CK7 +, P16 positivity, and neck mass on presentation, suspicion is high that origin is still of H&N (right cervical LAD) with metastatic spread to lung with bilateral multiple lung nodules .  Pt made informed decision to proceed with single agent Keytruda due to his PD-L1 testing from [facility] with CPS of 70.  Started early May in [place].      He transferred his care to us in June 2020.      He has mixed PET response in 8/2020 in neck JOSÉ MIGUEL, and decreased lung nodules uptake.      Case was discussed at U conference, and he saw ENT, Dr. Aparicio -- \"He is still not a candidate for definitive chemoradiation given the distant disease\".      Clinical trail was recommended with N-803 (formerly Alt-803), which is an IL-15 superagonist in combination with pembrolizumab.  It is specifically for patients who have had a positive response to keytruda but then experience progression. This is based on 90% disease control in a similar cohort of lung cancer patients that we participated in a few years back.   It is a multi-disease cohort phase 2.        He met with Dr. Veras 9/16/2020 for trial discussion.   Felt his mixed response (resolution of lung diease, and mixed response in right neck LN) could be pseudoprogression from IO or true progression. [place] decision is to do 2 more keytruda and restage.      Interval history:  Mr. Buchanan is a 75 year old male is here for follow-up of squamous carcinoma presenting as neck mass.  He just had palliative radiation to neck completed last week.  He's noted that the neck nodes seem much smaller and overall felt he did fine with the treatment.  Of note, he has numerous lung nodules that were NOT in the radiation field.  He has remained on Pembrolizumab and prefers to continue this.  We discussed the need to assess where that stands now.    Allergies:  Allergies as of 01/25/2021 - Reviewed 01/20/2021   Allergen Reaction Noted     " Atorvastatin  11/22/2011     Patient Active Problem List   Diagnosis     Essential hypertension with goal blood pressure less than 140/90     Gastroesophageal reflux disease without esophagitis     CRYSTAL (obstructive sleep apnea)     Prostate cancer (H)     Head and neck cancer (H)     Oropharyngeal cancer (H)       Current Medications:  Current Outpatient Medications   Medication Sig Dispense Refill     aspirin 81 MG tablet Take by mouth daily 30 tablet      fluticasone (FLONASE) 50 MCG/ACT nasal spray Spray 1-2 sprays in nostril       losartan (COZAAR) 25 MG tablet Take 1 tablet (25 mg) by mouth daily 90 tablet 3     pembrolizumab (KEYTRUDA) 25 MG/ML        pravastatin (PRAVACHOL) 20 MG tablet Take 20 mg by mouth daily        saccharomyces boulardii (FLORASTOR) 250 MG capsule Take 1 capsule by mouth 2 times daily       sildenafil (VIAGRA) 100 MG tablet Take by mouth daily as needed for erectile dysfunction 30 tablet      VITAMIN D, CHOLECALCIFEROL, PO Take 1,000 Units by mouth daily          Physical Exam:  There were no vitals taken for this visit.  GENERAL: Healthy, alert and no distress  EYES: Eyes grossly normal to inspection.  No discharge or erythema, or obvious scleral/conjunctival abnormalities.  RESP: No audible wheeze, cough, or visible cyanosis.  No visible retractions or increased work of breathing.    SKIN: Visible skin clear. No significant rash, abnormal pigmentation or lesions.  NEURO: Cranial nerves grossly intact.  Mentation and speech appropriate for age.  PSYCH: Mentation appears normal, affect normal/bright, judgement and insight intact, normal speech and appearance well-groomed.    Laboratory/Imaging Studies  Office Visit on 01/20/2021   Component Date Value Ref Range Status     PSA 01/20/2021 0.06  0 - 4 ug/L Final    Assay Method:  Chemiluminescence using Siemens Vista analyzer     Hepatitis C Antibody 01/20/2021 Nonreactive  NR^Nonreactive Final    Comment: Assay performance characteristics  have not been established for newborns,   infants, and children       Cholesterol 01/20/2021 183  <200 mg/dL Final     Triglycerides 01/20/2021 118  <150 mg/dL Final    Non Fasting     HDL Cholesterol 01/20/2021 59  >39 mg/dL Final     LDL Cholesterol Calculated 01/20/2021 100* <100 mg/dL Final    Comment: Above desirable:  100-129 mg/dl  Borderline High:  130-159 mg/dL  High:             160-189 mg/dL  Very high:       >189 mg/dl       Non HDL Cholesterol 01/20/2021 124  <130 mg/dL Final   Infusion Therapy Visit on 01/14/2021   Component Date Value Ref Range Status     Sodium 01/14/2021 139  133 - 144 mmol/L Final     Potassium 01/14/2021 4.0  3.4 - 5.3 mmol/L Final     Chloride 01/14/2021 105  94 - 109 mmol/L Final     Carbon Dioxide 01/14/2021 33* 20 - 32 mmol/L Final     Anion Gap 01/14/2021 1* 3 - 14 mmol/L Final     Glucose 01/14/2021 91  70 - 99 mg/dL Final     Urea Nitrogen 01/14/2021 13  7 - 30 mg/dL Final     Creatinine 01/14/2021 0.90  0.66 - 1.25 mg/dL Final     GFR Estimate 01/14/2021 83  >60 mL/min/[1.73_m2] Final    Comment: Non  GFR Calc  Starting 12/18/2018, serum creatinine based estimated GFR (eGFR) will be   calculated using the Chronic Kidney Disease Epidemiology Collaboration   (CKD-EPI) equation.       GFR Estimate If Black 01/14/2021 >90  >60 mL/min/[1.73_m2] Final    Comment:  GFR Calc  Starting 12/18/2018, serum creatinine based estimated GFR (eGFR) will be   calculated using the Chronic Kidney Disease Epidemiology Collaboration   (CKD-EPI) equation.       Calcium 01/14/2021 9.3  8.5 - 10.1 mg/dL Final     Bilirubin Total 01/14/2021 0.6  0.2 - 1.3 mg/dL Final     Albumin 01/14/2021 3.6  3.4 - 5.0 g/dL Final     Protein Total 01/14/2021 6.9  6.8 - 8.8 g/dL Final     Alkaline Phosphatase 01/14/2021 65  40 - 150 U/L Final     ALT 01/14/2021 27  0 - 70 U/L Final     AST 01/14/2021 16  0 - 45 U/L Final     TSH 01/14/2021 2.18  0.40 - 4.00 mU/L Final     WBC  01/14/2021 7.1  4.0 - 11.0 10e9/L Final     RBC Count 01/14/2021 4.41  4.4 - 5.9 10e12/L Final     Hemoglobin 01/14/2021 13.3  13.3 - 17.7 g/dL Final     Hematocrit 01/14/2021 40.0  40.0 - 53.0 % Final     MCV 01/14/2021 91  78 - 100 fl Final     MCH 01/14/2021 30.2  26.5 - 33.0 pg Final     MCHC 01/14/2021 33.3  31.5 - 36.5 g/dL Final     RDW 01/14/2021 12.0  10.0 - 15.0 % Final     Platelet Count 01/14/2021 250  150 - 450 10e9/L Final     Diff Method 01/14/2021 Automated Method   Final     % Neutrophils 01/14/2021 74.1  % Final     % Lymphocytes 01/14/2021 12.5  % Final     % Monocytes 01/14/2021 9.6  % Final     % Eosinophils 01/14/2021 3.0  % Final     % Basophils 01/14/2021 0.4  % Final     % Immature Granulocytes 01/14/2021 0.4  % Final     Nucleated RBCs 01/14/2021 0  0 /100 Final     Absolute Neutrophil 01/14/2021 5.3  1.6 - 8.3 10e9/L Final     Absolute Lymphocytes 01/14/2021 0.9  0.8 - 5.3 10e9/L Final     Absolute Monocytes 01/14/2021 0.7  0.0 - 1.3 10e9/L Final     Absolute Eosinophils 01/14/2021 0.2  0.0 - 0.7 10e9/L Final     Absolute Basophils 01/14/2021 0.0  0.0 - 0.2 10e9/L Final     Abs Immature Granulocytes 01/14/2021 0.0  0 - 0.4 10e9/L Final     Absolute Nucleated RBC 01/14/2021 0.0   Final     Recent Results (from the past 744 hour(s))   PET Oncology Whole Body    Narrative    Combined Report of:    PET and CT on  1/21/2021 10:32 AM :    1. PET of the neck, chest, abdomen, and pelvis.  2. PET CT Fusion for Attenuation Correction and Anatomical  Localization:    3. Diagnostic CT scan of the chest, abdomen, and pelvis with  intravenous contrast for interpretation.  3. CT of the chest, abdomen and pelvis obtained for diagnostic  interpretation.  4. 3D MIP and PET-CT fused images were processed on an independent  workstation and archived to PACS and reviewed by a radiologist.    Technique:    1. PET: The patient received 13 mCi of F-18-FDG; the serum glucose was  92 prior to administration, body  weight was 88 kg. Images were  evaluated in the axial, sagittal, and coronal planes as well as the  rotational whole body MIP. Images were acquired from the Vertex to the  Feet.    UPTAKE WAS MEASURED AT 64 MINUTES.     BACKGROUND:  Liver SUV max= 4.06,   Aorta Blood SUV Max: 2.66.     2. CT: Volumetric acquisition for clinical interpretation of the  chest, abdomen, and pelvis acquired at 3 mm sections . The chest,  abdomen, and pelvis were evaluated at 5 mm sections in bone, soft  tissue, and lung windows.      The patient received 119 cc. Of Isovue 370 intravenously for the  examination.      3. 3D MIP and PET-CT fused images were processed on an independent  workstation and archived to PACS and reviewed by a radiologist.    INDICATION: Head and neck cancer (H)    ADDITIONAL INFORMATION OBTAINED FROM EMR: 75-year-old male metastatic  squamous cell carcinoma the neck. PET/CT on 2/24/2020 demonstrated  right cervical lymphadenopathy, bilateral pulmonary metastases, and  right hilar lymphadenopathy. Patient treated with chemotherapy and  radiation therapy.      COMPARISON: 11/5/2020.    FINDINGS:     HEAD/NECK: In comparison to 11/5/2020 dated PET/CT there is interval  marked decrease in the FDG avidity of the right level 2 lymph nodes.  Previously the max SUV was 12.4, currently 4.1. Size-wise they have  decreased as well, probably for example the largest right level 2A  lymphadenopathy previously measured 3.2 x 2.8 cm, currently 2.0 x 1.9  cm. Right level 2B lymphadenopathy FDG uptake has resolved and it now  measures about 3 mm.    There is a new mild-moderately FDG avid 1.0 cm focus in the  superficial lobe of the right parotid gland with max SUV of 3.9.    There is FDG uptake and mucosal enhancement of bilateral  glossotonsillar sulcus, more extensive on the left (max SUV 6.2)  extending to left tongue base and left lateral oropharyngeal wall.  Findings are probably treatment related and direct visualization  and  continued follow-up is recommended.    Bilateral submandibular gland atrophy. Right parotid gland is  partially fatty replaced. Thyroid gland is normal.   The fascial spaces in the neck are intact bilaterally.  The major  vascular structures in the neck appear unremarkable.    Evaluation of the osseous structures demonstrate no worrisome lytic or  sclerotic lesion. . Because of Thickening of the left ethmoid air  cells. The visualized mastoid air cells are clear.. Orbits appear  unremarkable. No ventriculomegaly, abnormal intracranial, enhancement,  evidence of intracranial hemorrhage. Gray-white differentiation is  preserved.    CHEST:  There is no suspicious FDG uptake in the chest.     Esophagus appears unremarkable. Tracheobronchial tree appears patent.    Lung nodule(s) described on series 9:  -Stable 8 mm solid pulmonary nodule of the right upper lobe (Image:  62)  -Stable 3 mm pulmonary nodule of the posterior right lower lobe  without significant FDG ability (Image: 89)  -Stable 12 x 8 mm pulmonary nodule of the left upper lobe (Image: 76)  -Stable 12 x 8 mm pulmonary nodule the left upper lobe (Image: 57)    Additional pulmonary nodules appear stable. No new or enlarging  pulmonary nodules.      No pleural effusion.  Heart size within normal limits.. No significant  pericardial effusion.. There are no pathologically enlarged  mediastinal, hilar, supraclavicular or axillary lymph nodes.    ABDOMEN AND PELVIS:  There is no suspicious FDG uptake in the abdomen or pelvis.    There are no suspicious hepatic lesions. No opaque gallbladder  calculi. No intrahepatic or extrahepatic biliary dilatation. Pancreas  unremarkable. Spleen size within normal limits. No suspicious adrenal  mass lesions. Mildly attenuating focus at the inferior pole of the  right kidney likely representing hemorrhagic or proteinaceous cyst.  Additional subcentimeter hypoattenuating focus of the left kidney  likely represents simple  cysts is too small for evaluation by CT. No  evidence of hydronephrosis. Visualized ureters and urinary bladder is  unremarkable.  Postoperative changes of prostatectomy. No free fluid.  Diverticulosis without evidence of diverticulitis. No evidence of  bowel obstruction. Appendix unremarkable. Abdominal vasculature is  unremarkable on this noncontrast exam. No suspicious or enlarged  mesenteric, retroperitoneal and pelvic lymph nodes.     LOWER EXTREMITIES: No abnormal masses or hypermetabolic lesions.    BONES: There is no abnormal FDG uptake in the skeleton..     No suspicious osseous lesion.  Degenerative changes of the  thoracolumbar spine. Degenerative changes of the shoulders        Impression    IMPRESSION: In this patient with history of metastatic squamous cell  carcinoma of the neck treated with chemotherapy and radiation therapy;    1. Since 11/5/2020, interval marked decrease in the FDG avidity and  mild decrease in size of the right level 2a lymphadenopathy suggesting  partial response. Given presence of mild residual uptake presence of  residual tumor can not be excluded. Continued follow up is  recommended.  2. Interval resolution of right level IIb lymph node.   3. Interval development of a nonspecific focus of mild-moderate FDG  uptake in the right parotid gland superficial lobe. Attention on  follow up is recommended. If clinically indicated to rule out a new  metastatic focus, ultrasound and FNA can be considered.  4. Unchanged FDG avid mucosal enhancing areas of the glossotonsillar  sulci bilaterally more extensive on the left, left tongue base and  left lateral oropharyngeal wall. These are likely radiation treatment  related/inflammatory etiology. Continued follow-up is recommended.  5. No change in scattered subcentimeter pulmonary nodules.    I have personally reviewed the examination and initial interpretation  and I agree with the findings.    SRIDEVI BETANCOURT MD       "    ASSESSMENT/PLAN:    Emerging lesion in the jaw/parotid; seen on PET and appreciable by patient.     Patient and I agree that we'll proceed with ultrasound, possible FNA biopsy.    Remain on Keytruda for now, next cycle early February. I can follow up patient in about 5-6 weeks after US.      TELEPHONE Visit - Oncology Rooming Note - [phone]    January 25, 2021 12:44 PM   Newton Buchanan is a 75 year old male who presents for:    Chief Complaint   Patient presents with     Oncology Clinic Visit     Return Head and neck cancer, review PET scan     Initial Vitals: There were no vitals taken for this visit. Estimated body mass index is 26.09 kg/m  as calculated from the following:    Height as of 1/20/21: 1.822 m (5' 11.75\").    Weight as of 1/20/21: 86.6 kg (191 lb). There is no height or weight on file to calculate BSA.  Data Unavailable Comment: Data Unavailable   No LMP for male patient.  Allergies reviewed: Yes  Medications reviewed: Yes    Medications: Medication refills not needed today.  Pharmacy name entered into [employer]: [id number] - [address] AT [address]        [facility]                  Again, thank you for allowing me to participate in the care of your patient.        Sincerely,        Mary Pfeiffer MD    "

## 2021-02-04 ENCOUNTER — INFUSION THERAPY VISIT (OUTPATIENT)
Dept: INFUSION THERAPY | Facility: CLINIC | Age: 76
End: 2021-02-04
Attending: INTERNAL MEDICINE
Payer: MEDICARE

## 2021-02-04 ENCOUNTER — HOSPITAL ENCOUNTER (OUTPATIENT)
Dept: LAB | Facility: CLINIC | Age: 76
Discharge: HOME | End: 2021-02-04
Attending: INTERNAL MEDICINE | Admitting: INTERNAL MEDICINE
Payer: MEDICARE

## 2021-02-04 VITALS
HEART RATE: 62 BPM | DIASTOLIC BLOOD PRESSURE: 72 MMHG | SYSTOLIC BLOOD PRESSURE: 143 MMHG | TEMPERATURE: 97.3 F | RESPIRATION RATE: 18 BRPM

## 2021-02-04 DIAGNOSIS — C10.9 SQUAMOUS CELL CARCINOMA OF OROPHARYNX (H): ICD-10-CM

## 2021-02-04 DIAGNOSIS — C76.0 HEAD AND NECK CANCER (H): Primary | ICD-10-CM

## 2021-02-04 DIAGNOSIS — C76.0 HEAD AND NECK CANCER (H): ICD-10-CM

## 2021-02-04 LAB
ALBUMIN SERPL-MCNC: 3.7 G/DL (ref 3.4–5)
ALP SERPL-CCNC: 56 U/L (ref 40–150)
ALT SERPL W P-5'-P-CCNC: 30 U/L (ref 0–70)
ANION GAP SERPL CALCULATED.3IONS-SCNC: 2 MMOL/L (ref 3–14)
AST SERPL W P-5'-P-CCNC: 18 U/L (ref 0–45)
BILIRUB SERPL-MCNC: 0.8 MG/DL (ref 0.2–1.3)
BUN SERPL-MCNC: 17 MG/DL (ref 7–30)
CALCIUM SERPL-MCNC: 8.8 MG/DL (ref 8.5–10.1)
CHLORIDE SERPL-SCNC: 106 MMOL/L (ref 94–109)
CO2 SERPL-SCNC: 31 MMOL/L (ref 20–32)
CREAT SERPL-MCNC: 0.88 MG/DL (ref 0.66–1.25)
GFR SERPL CREATININE-BSD FRML MDRD: 84 ML/MIN/{1.73_M2}
GLUCOSE SERPL-MCNC: 100 MG/DL (ref 70–99)
POTASSIUM SERPL-SCNC: 3.9 MMOL/L (ref 3.4–5.3)
PROT SERPL-MCNC: 7.3 G/DL (ref 6.8–8.8)
SARS-COV-2 RNA RESP QL NAA+PROBE: NORMAL
SODIUM SERPL-SCNC: 139 MMOL/L (ref 133–144)
SPECIMEN SOURCE: NORMAL
TSH SERPL DL<=0.005 MIU/L-ACNC: 2.57 MU/L (ref 0.4–4)

## 2021-02-04 PROCEDURE — 96413 CHEMO IV INFUSION 1 HR: CPT

## 2021-02-04 PROCEDURE — 36415 COLL VENOUS BLD VENIPUNCTURE: CPT | Performed by: INTERNAL MEDICINE

## 2021-02-04 PROCEDURE — U0003 INFECTIOUS AGENT DETECTION BY NUCLEIC ACID (DNA OR RNA); SEVERE ACUTE RESPIRATORY SYNDROME CORONAVIRUS 2 (SARS-COV-2) (CORONAVIRUS DISEASE [COVID-19]), AMPLIFIED PROBE TECHNIQUE, MAKING USE OF HIGH THROUGHPUT TECHNOLOGIES AS DESCRIBED BY CMS-2020-01-R: HCPCS | Performed by: INTERNAL MEDICINE

## 2021-02-04 PROCEDURE — 84443 ASSAY THYROID STIM HORMONE: CPT | Performed by: INTERNAL MEDICINE

## 2021-02-04 PROCEDURE — 258N000003 HC RX IP 258 OP 636: Performed by: INTERNAL MEDICINE

## 2021-02-04 PROCEDURE — 80053 COMPREHEN METABOLIC PANEL: CPT | Performed by: INTERNAL MEDICINE

## 2021-02-04 PROCEDURE — U0005 INFEC AGEN DETEC AMPLI PROBE: HCPCS | Performed by: INTERNAL MEDICINE

## 2021-02-04 PROCEDURE — 250N000011 HC RX IP 250 OP 636: Performed by: INTERNAL MEDICINE

## 2021-02-04 RX ADMIN — SODIUM CHLORIDE 200 MG: 9 INJECTION, SOLUTION INTRAVENOUS at 08:45

## 2021-02-04 RX ADMIN — SODIUM CHLORIDE 250 ML: 9 INJECTION, SOLUTION INTRAVENOUS at 08:47

## 2021-02-04 NOTE — PROGRESS NOTES
Infusion Nursing Note:  Newton Buchanan presents today for Keytruda C11D1.    Patient seen by provider today: No   present during visit today: Not Applicable.    Note: N/A.    Intravenous Access:  Peripheral IV placed.    Treatment Conditions:  Lab Results   Component Value Date     02/04/2021                   Lab Results   Component Value Date    POTASSIUM 3.9 02/04/2021           No results found for: MAG         Lab Results   Component Value Date    CR 0.88 02/04/2021                   Lab Results   Component Value Date    JÚNIOR 8.8 02/04/2021                Lab Results   Component Value Date    BILITOTAL 0.8 02/04/2021           Lab Results   Component Value Date    ALBUMIN 3.7 02/04/2021                    Lab Results   Component Value Date    ALT 30 02/04/2021           Lab Results   Component Value Date    AST 18 02/04/2021   Results reviewed, labs MET treatment parameters, ok to proceed with treatment.    Post Infusion Assessment:  Patient tolerated infusion without incident.  Site patent and intact, free from redness, edema or discomfort.  No evidence of extravasations.  Access discontinued per protocol.     Discharge Plan:   Discharge instructions reviewed with: Patient.  Patient and/or family verbalized understanding of discharge instructions and all questions answered.  AVS to patient via [employer].  Patient will return 2/25/2021 for next appointment.   Patient discharged in stable condition accompanied by: self.  Departure Mode: Ambulatory.    Mercy Carvalho RN

## 2021-02-05 LAB
LABORATORY COMMENT REPORT: NORMAL
SARS-COV-2 RNA RESP QL NAA+PROBE: NEGATIVE
SPECIMEN SOURCE: NORMAL

## 2021-02-08 ENCOUNTER — HOSPITAL ENCOUNTER (OUTPATIENT)
Dept: ULTRASOUND IMAGING | Facility: CLINIC | Age: 76
Discharge: HOME | End: 2021-02-08
Attending: INTERNAL MEDICINE | Admitting: INTERNAL MEDICINE
Payer: MEDICARE

## 2021-02-08 DIAGNOSIS — C10.9 SQUAMOUS CELL CARCINOMA OF OROPHARYNX (H): ICD-10-CM

## 2021-02-08 PROCEDURE — 76536 US EXAM OF HEAD AND NECK: CPT

## 2021-02-08 NOTE — DISCHARGE INSTRUCTIONS
Biopsy Care Instructions      Site Care:    You may have some discomfort and may see some bruising at the needle site.    You will be given an ice pack which should be kept in place over the dressing until bedtime tonight.Always keep a gauze pad between your skin and the ice pack.    Activity:       You may go back to your normal routine.     No heavy lifting for 24 hours.    Diet and Medications:       You may go back to your regular diet.     Tylenol is the best choice to relieve your discomfort, the first 24 hours. If you have no bleeding on the first day, Ibuprofen (such as Advil, or Motrin), may be taken on the second day after for pain.     Do not take aspirin for 72 hours following your biopsy.    Questions:     If you have any questions about your procedure performed in Ultrasound, you may contact us at [phone].    Results:       The pathology report on your biopsy is usually available within 72 hours. The Radiologist or your personal physician will call you with the results.     You will receive information about any further follow up from your physician.    Call your doctor if you have:       More than slight bleeding or significant pain, or experience swelling.     If your physician is unavailable, please call the Nurse Advice Line at [phone], or [facility] Emergency Department at [phone].      Patient:______________________________  Time:_________  Date:_____________    Instructor:____________________________   Time:_________  Date:_____________

## 2021-02-08 NOTE — IP AVS SNAPSHOT
[facility]  [address] [facility] [address]  Phone: [phone]                                    After Visit Summary   2/8/2021    Newton Buchanan    MRN: [id number]           After Visit Summary Signature Page    I have received my discharge instructions, and my questions have been answered. I have discussed any challenges I see with this plan with the nurse or doctor.    ..........................................................................................................................................  Patient/Patient Representative Signature      ..........................................................................................................................................  Patient Representative Print Name and Relationship to Patient    ..................................................               ................................................  Date                                   Time    ..........................................................................................................................................  Reviewed by Signature/Title    ...................................................              ..............................................  Date                                               Time          22EPIC Rev 08/18

## 2021-02-25 ENCOUNTER — INFUSION THERAPY VISIT (OUTPATIENT)
Dept: INFUSION THERAPY | Facility: CLINIC | Age: 76
End: 2021-02-25
Attending: INTERNAL MEDICINE
Payer: MEDICARE

## 2021-02-25 ENCOUNTER — HOSPITAL ENCOUNTER (OUTPATIENT)
Dept: LAB | Facility: CLINIC | Age: 76
Discharge: HOME | End: 2021-02-25
Attending: INTERNAL MEDICINE | Admitting: INTERNAL MEDICINE
Payer: MEDICARE

## 2021-02-25 VITALS
RESPIRATION RATE: 16 BRPM | WEIGHT: 196.6 LBS | SYSTOLIC BLOOD PRESSURE: 142 MMHG | BODY MASS INDEX: 26.85 KG/M2 | OXYGEN SATURATION: 97 % | TEMPERATURE: 96.9 F | HEART RATE: 98 BPM | DIASTOLIC BLOOD PRESSURE: 74 MMHG

## 2021-02-25 DIAGNOSIS — C76.0 HEAD AND NECK CANCER (H): Primary | ICD-10-CM

## 2021-02-25 LAB
ALBUMIN SERPL-MCNC: 3.7 G/DL (ref 3.4–5)
ALP SERPL-CCNC: 62 U/L (ref 40–150)
ALT SERPL W P-5'-P-CCNC: 29 U/L (ref 0–70)
ANION GAP SERPL CALCULATED.3IONS-SCNC: 2 MMOL/L (ref 3–14)
AST SERPL W P-5'-P-CCNC: 24 U/L (ref 0–45)
BILIRUB SERPL-MCNC: 1.1 MG/DL (ref 0.2–1.3)
BUN SERPL-MCNC: 12 MG/DL (ref 7–30)
CALCIUM SERPL-MCNC: 8.9 MG/DL (ref 8.5–10.1)
CHLORIDE SERPL-SCNC: 106 MMOL/L (ref 94–109)
CO2 SERPL-SCNC: 30 MMOL/L (ref 20–32)
CREAT SERPL-MCNC: 0.81 MG/DL (ref 0.66–1.25)
GFR SERPL CREATININE-BSD FRML MDRD: 87 ML/MIN/{1.73_M2}
GLUCOSE SERPL-MCNC: 81 MG/DL (ref 70–99)
POTASSIUM SERPL-SCNC: 3.9 MMOL/L (ref 3.4–5.3)
PROT SERPL-MCNC: 7.4 G/DL (ref 6.8–8.8)
SODIUM SERPL-SCNC: 138 MMOL/L (ref 133–144)
TSH SERPL DL<=0.005 MIU/L-ACNC: 2.15 MU/L (ref 0.4–4)

## 2021-02-25 PROCEDURE — 250N000011 HC RX IP 250 OP 636: Performed by: INTERNAL MEDICINE

## 2021-02-25 PROCEDURE — 36415 COLL VENOUS BLD VENIPUNCTURE: CPT | Performed by: INTERNAL MEDICINE

## 2021-02-25 PROCEDURE — 258N000003 HC RX IP 258 OP 636: Performed by: INTERNAL MEDICINE

## 2021-02-25 PROCEDURE — 80053 COMPREHEN METABOLIC PANEL: CPT | Performed by: INTERNAL MEDICINE

## 2021-02-25 PROCEDURE — 84443 ASSAY THYROID STIM HORMONE: CPT | Performed by: INTERNAL MEDICINE

## 2021-02-25 PROCEDURE — 96413 CHEMO IV INFUSION 1 HR: CPT

## 2021-02-25 RX ORDER — NALOXONE HYDROCHLORIDE 0.4 MG/ML
.1-.4 INJECTION, SOLUTION INTRAMUSCULAR; INTRAVENOUS; SUBCUTANEOUS
Status: CANCELLED | OUTPATIENT
Start: 2021-02-25

## 2021-02-25 RX ORDER — ALBUTEROL SULFATE 90 UG/1
1-2 AEROSOL, METERED RESPIRATORY (INHALATION)
Status: CANCELLED
Start: 2021-02-25

## 2021-02-25 RX ORDER — SODIUM CHLORIDE 9 MG/ML
1000 INJECTION, SOLUTION INTRAVENOUS CONTINUOUS PRN
Status: CANCELLED
Start: 2021-02-25

## 2021-02-25 RX ORDER — LORAZEPAM 2 MG/ML
0.5 INJECTION INTRAMUSCULAR EVERY 4 HOURS PRN
Status: CANCELLED
Start: 2021-02-25

## 2021-02-25 RX ORDER — MEPERIDINE HYDROCHLORIDE 25 MG/ML
25 INJECTION INTRAMUSCULAR; INTRAVENOUS; SUBCUTANEOUS EVERY 30 MIN PRN
Status: CANCELLED | OUTPATIENT
Start: 2021-02-25

## 2021-02-25 RX ORDER — ALBUTEROL SULFATE 0.83 MG/ML
2.5 SOLUTION RESPIRATORY (INHALATION)
Status: CANCELLED | OUTPATIENT
Start: 2021-02-25

## 2021-02-25 RX ORDER — METHYLPREDNISOLONE SODIUM SUCCINATE 125 MG/2ML
125 INJECTION, POWDER, LYOPHILIZED, FOR SOLUTION INTRAMUSCULAR; INTRAVENOUS
Status: CANCELLED
Start: 2021-02-25

## 2021-02-25 RX ORDER — DIPHENHYDRAMINE HYDROCHLORIDE 50 MG/ML
50 INJECTION INTRAMUSCULAR; INTRAVENOUS
Status: CANCELLED
Start: 2021-02-25

## 2021-02-25 RX ORDER — EPINEPHRINE 1 MG/ML
0.3 INJECTION, SOLUTION INTRAMUSCULAR; SUBCUTANEOUS EVERY 5 MIN PRN
Status: CANCELLED | OUTPATIENT
Start: 2021-02-25

## 2021-02-25 RX ADMIN — SODIUM CHLORIDE 250 ML: 9 INJECTION, SOLUTION INTRAVENOUS at 08:53

## 2021-02-25 RX ADMIN — SODIUM CHLORIDE 200 MG: 9 INJECTION, SOLUTION INTRAVENOUS at 09:15

## 2021-02-25 ASSESSMENT — PAIN SCALES - GENERAL: PAINLEVEL: MILD PAIN (2)

## 2021-02-25 NOTE — PROGRESS NOTES
Infusion Nursing Note:  Newton Buchanan presents today for c12d1 Keytruda.    Patient seen by provider today: No   present during visit today: Not Applicable.    Note: N/A.  Intravenous Access:  Peripheral IV placed.    Treatment Conditions:  Lab Results   Component Value Date    HGB 13.3 01/14/2021     Lab Results   Component Value Date    WBC 7.1 01/14/2021      Lab Results   Component Value Date    ANEU 5.3 01/14/2021     Lab Results   Component Value Date     01/14/2021      Lab Results   Component Value Date     02/25/2021                   Lab Results   Component Value Date    POTASSIUM 3.9 02/25/2021           No results found for: MAG         Lab Results   Component Value Date    CR 0.81 02/25/2021                   Lab Results   Component Value Date    JÚNIOR 8.9 02/25/2021                Lab Results   Component Value Date    BILITOTAL 1.1 02/25/2021           Lab Results   Component Value Date    ALBUMIN 3.7 02/25/2021                    Lab Results   Component Value Date    ALT 29 02/25/2021           Lab Results   Component Value Date    AST 24 02/25/2021       Results reviewed, labs MET treatment parameters, ok to proceed with treatment.      Post Infusion Assessment:  Patient tolerated injection without incident.  Blood return noted pre and post infusion.  Site patent and intact, free from redness, edema or discomfort.  No evidence of extravasations.  Access discontinued per protocol.       Discharge Plan:   Discharge instructions reviewed with: Patient.  Patient and/or family verbalized understanding of discharge instructions and all questions answered.  Copy of AVS reviewed with patient and/or family.  Patient will return 3/18/21 for next appointment.  Patient discharged in stable condition accompanied by: self.  Departure Mode: Ambulatory.    Samuel Rivera RN

## 2021-03-16 NOTE — PROGRESS NOTES
[facility] Hematology and Oncology Progress Note    Patient: Newton Buchanan  MRN: [id number]        Reason for Visit    1. 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 He completed palliative RT to right neck 3 months ago with improvement in the adenopathy. He's continued on Keytruda since last visit with excellent tolerance.     In January, he palpated right parotid lump which was PET avid. US with biopsy was planned, but no correlative lesion noted so biopsy aborted. This has resolved.     No other new symptoms.    He is returning to [place] in the next few weeks and will be there 3-4 months, selling his home there as he is transitioning his permanent residency to [place]. 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 FNAs benign. Followed.   -2/2020: progressive right neck swelling, no other symptoms.   -2/11/20 FNA biosy right neck mass: metastatic squamous cell cancer, HPV16+  -PET: hypermetabolic right cervical adenopathy, numerous bilateral lung mets, right hilar nodes.   -No evident primary site identified, but highly suspicious of H/N given IHC staining and radiographical findings/spread  -Neogenomic testing - CPS 70    Treatment:  5/2020 - present: Keytruda (initiated in [place], then transferred care to [place])  -mixed response on PET 8/2020 - could be pseudoprogression, continued  -11/2020: local progression in right neck    12/2020: palliative RT to right neck (3750 cGy/15)    1/2021: palpable right jaw/parotid lesion, PET+. Remainder of right neck disease  improved after RT and lung mets stable. US-guided biopsy right parotid lesion attempted, but no lesion seen to biopsy so cancelled.      Physical Exam    GENERAL: Alert and oriented to time place and person. Seated comfortably. In no distress.  HEAD: Atraumatic and normocephalic. No alopecia.  EYES: COSMO, EOMI. No erythema. No icterus.  ORAL CAVITY: Moist. No mucosal lesion or tonsillar enlargement.  NECK: Radiation fibrosis in right neck, minimal. Right parotid nodule resolved since last visit.  LYMPH NODES: No palpable discrete supraclavicular, cervical lymphadenopathy.   CHEST: clear to auscultation bilaterally. Resonant to percussion throughout bilaterally. Symmetrical breath movements bilaterally.  CVS: S1 and S2 are heard. Regular rate and rhythm. No murmur or gallop or rub heard.  ABDOMEN: Soft. Not tender. Not distended. No palpable hepatomegaly or splenomegaly. No other mass palpable. Bowel sounds present.  EXTREMITIES: Warm. No peripheral edema.  SKIN: no rash, or bruising or purpura.   NEURO: No gross deficit noted. Non-antalgic gait.      Lab Results    CMP WNL  TSH pending    Imaging    None  (Last PET 1/21/21; US right parotid negative 2/8/21)    Assessment/Plan  1. 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   Labwork unremarkable.  No clinical evidence of progression. Right cervical nodes improved after RT.    Plan:  -Proceed with Keytruda cycle today.  -Continue Keytruda every 3 weeks, as tolerated, in [place] (will establish with Dr. Menon in [place])  -If here, we would plan a repeat PET scan in 9-12 weeks (about 4 months since his January scan). This can be done in [place]; or, if otherwise clinically stable, could be deferred until his return to [place].   -His plan is to return to [place] in 3-4 months, but no specific date. He will notify us 3-4 weeks ahead of his known return to establish follow-up and ongoing care. Since Dr. Chen has  left the practice, he will need to re-establish with a new primary oncologist.    2.   Right parotid lesion, resolved  This was likely inflammatory post-RT.    Billing  Total time:35 min; to include face to face, review of EMR, ordering/documetentation and coordination of care    Signed by: Janay Fierro NP

## 2021-03-18 ENCOUNTER — INFUSION THERAPY VISIT (OUTPATIENT)
Dept: INFUSION THERAPY | Facility: CLINIC | Age: 76
End: 2021-03-18
Attending: NURSE PRACTITIONER
Payer: MEDICARE

## 2021-03-18 ENCOUNTER — HOSPITAL ENCOUNTER (OUTPATIENT)
Dept: LAB | Facility: CLINIC | Age: 76
Discharge: HOME | End: 2021-03-18
Attending: NURSE PRACTITIONER | Admitting: NURSE PRACTITIONER
Payer: MEDICARE

## 2021-03-18 ENCOUNTER — VIRTUAL VISIT (OUTPATIENT)
Dept: ONCOLOGY | Facility: CLINIC | Age: 76
End: 2021-03-18
Attending: INTERNAL MEDICINE
Payer: MEDICARE

## 2021-03-18 VITALS
BODY MASS INDEX: 26.36 KG/M2 | HEIGHT: 72 IN | RESPIRATION RATE: 18 BRPM | OXYGEN SATURATION: 97 % | TEMPERATURE: 97.3 F | WEIGHT: 194.6 LBS | HEART RATE: 63 BPM | SYSTOLIC BLOOD PRESSURE: 145 MMHG | DIASTOLIC BLOOD PRESSURE: 63 MMHG

## 2021-03-18 VITALS — SYSTOLIC BLOOD PRESSURE: 129 MMHG | DIASTOLIC BLOOD PRESSURE: 60 MMHG | HEART RATE: 58 BPM

## 2021-03-18 DIAGNOSIS — C76.0 HEAD AND NECK CANCER (H): Primary | ICD-10-CM

## 2021-03-18 LAB
ALBUMIN SERPL-MCNC: 3.8 G/DL (ref 3.4–5)
ALP SERPL-CCNC: 60 U/L (ref 40–150)
ALT SERPL W P-5'-P-CCNC: 27 U/L (ref 0–70)
ANION GAP SERPL CALCULATED.3IONS-SCNC: 5 MMOL/L (ref 3–14)
AST SERPL W P-5'-P-CCNC: 19 U/L (ref 0–45)
BILIRUB SERPL-MCNC: 0.7 MG/DL (ref 0.2–1.3)
BUN SERPL-MCNC: 19 MG/DL (ref 7–30)
CALCIUM SERPL-MCNC: 9.2 MG/DL (ref 8.5–10.1)
CHLORIDE SERPL-SCNC: 103 MMOL/L (ref 94–109)
CO2 SERPL-SCNC: 30 MMOL/L (ref 20–32)
CREAT SERPL-MCNC: 0.88 MG/DL (ref 0.66–1.25)
GFR SERPL CREATININE-BSD FRML MDRD: 84 ML/MIN/{1.73_M2}
GLUCOSE SERPL-MCNC: 77 MG/DL (ref 70–99)
POTASSIUM SERPL-SCNC: 4.1 MMOL/L (ref 3.4–5.3)
PROT SERPL-MCNC: 7.5 G/DL (ref 6.8–8.8)
SODIUM SERPL-SCNC: 138 MMOL/L (ref 133–144)
TSH SERPL DL<=0.005 MIU/L-ACNC: 1.41 MU/L (ref 0.4–4)

## 2021-03-18 PROCEDURE — G0463 HOSPITAL OUTPT CLINIC VISIT: HCPCS | Mod: 25

## 2021-03-18 PROCEDURE — 36415 COLL VENOUS BLD VENIPUNCTURE: CPT | Performed by: NURSE PRACTITIONER

## 2021-03-18 PROCEDURE — 80053 COMPREHEN METABOLIC PANEL: CPT | Performed by: NURSE PRACTITIONER

## 2021-03-18 PROCEDURE — 258N000003 HC RX IP 258 OP 636: Performed by: NURSE PRACTITIONER

## 2021-03-18 PROCEDURE — 99214 OFFICE O/P EST MOD 30 MIN: CPT | Performed by: NURSE PRACTITIONER

## 2021-03-18 PROCEDURE — 250N000011 HC RX IP 250 OP 636: Performed by: NURSE PRACTITIONER

## 2021-03-18 PROCEDURE — 96413 CHEMO IV INFUSION 1 HR: CPT

## 2021-03-18 PROCEDURE — 84443 ASSAY THYROID STIM HORMONE: CPT | Performed by: NURSE PRACTITIONER

## 2021-03-18 RX ORDER — EPINEPHRINE 1 MG/ML
0.3 INJECTION, SOLUTION, CONCENTRATE INTRAVENOUS EVERY 5 MIN PRN
Status: CANCELLED | OUTPATIENT
Start: 2021-03-18

## 2021-03-18 RX ORDER — MEPERIDINE HYDROCHLORIDE 25 MG/ML
25 INJECTION INTRAMUSCULAR; INTRAVENOUS; SUBCUTANEOUS EVERY 30 MIN PRN
Status: CANCELLED | OUTPATIENT
Start: 2021-03-18

## 2021-03-18 RX ORDER — ALBUTEROL SULFATE 0.83 MG/ML
2.5 SOLUTION RESPIRATORY (INHALATION)
Status: CANCELLED | OUTPATIENT
Start: 2021-03-18

## 2021-03-18 RX ORDER — SODIUM CHLORIDE 9 MG/ML
1000 INJECTION, SOLUTION INTRAVENOUS CONTINUOUS PRN
Status: CANCELLED
Start: 2021-03-18

## 2021-03-18 RX ORDER — DIPHENHYDRAMINE HYDROCHLORIDE 50 MG/ML
50 INJECTION INTRAMUSCULAR; INTRAVENOUS
Status: CANCELLED
Start: 2021-03-18

## 2021-03-18 RX ORDER — ALBUTEROL SULFATE 90 UG/1
1-2 AEROSOL, METERED RESPIRATORY (INHALATION)
Status: CANCELLED
Start: 2021-03-18

## 2021-03-18 RX ORDER — LORAZEPAM 2 MG/ML
0.5 INJECTION INTRAMUSCULAR EVERY 4 HOURS PRN
Status: CANCELLED
Start: 2021-03-18

## 2021-03-18 RX ORDER — NALOXONE HYDROCHLORIDE 0.4 MG/ML
.1-.4 INJECTION, SOLUTION INTRAMUSCULAR; INTRAVENOUS; SUBCUTANEOUS
Status: CANCELLED | OUTPATIENT
Start: 2021-03-18

## 2021-03-18 RX ORDER — METHYLPREDNISOLONE SODIUM SUCCINATE 125 MG/2ML
125 INJECTION, POWDER, LYOPHILIZED, FOR SOLUTION INTRAMUSCULAR; INTRAVENOUS
Status: CANCELLED
Start: 2021-03-18

## 2021-03-18 RX ADMIN — SODIUM CHLORIDE 200 MG: 9 INJECTION, SOLUTION INTRAVENOUS at 10:54

## 2021-03-18 RX ADMIN — SODIUM CHLORIDE 250 ML: 9 INJECTION, SOLUTION INTRAVENOUS at 11:22

## 2021-03-18 ASSESSMENT — PAIN SCALES - GENERAL: PAINLEVEL: NO PAIN (0)

## 2021-03-18 ASSESSMENT — MIFFLIN-ST. JEOR: SCORE: 1655.83

## 2021-03-18 NOTE — PROGRESS NOTES
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    Weight as of this encounter: 88.3 kg (194 lb 9.6 oz). Body surface area is 2.11 meters squared.  No Pain (0) Comment: Data Unavailable   No LMP for male patient.  Allergies reviewed: Yes  Medications reviewed: Yes    Medications: Medication refills not needed today.  Pharmacy name entered into Podclass:    [id number] - [place], [place] - [address] AT [facility] OF [address] [place] [address] [facility], [place] - [address] [facility]    Clinical concerns: 6 week return Squamous cell carcinoma of oropharynx, review US & Labs.  What is the status of tumor.       Sandy Carrillo CMA              "

## 2021-03-18 NOTE — PROGRESS NOTES
Infusion Nursing Note:  Newton Buchanan presents today for Keytruda.    Patient seen by provider today: Yes   present during visit today: Not Applicable.    Note: Labs drawn peripherally.  Patient declined the covid-19 test.    Intravenous Access:  Peripheral IV placed.    Treatment Conditions:  Lab Results   Component Value Date     03/18/2021                   Lab Results   Component Value Date    POTASSIUM 4.1 03/18/2021           No results found for: MAG         Lab Results   Component Value Date    CR 0.88 03/18/2021                   Lab Results   Component Value Date    JÚNIOR 9.2 03/18/2021                Lab Results   Component Value Date    BILITOTAL 0.7 03/18/2021           Lab Results   Component Value Date    ALBUMIN 3.8 03/18/2021                    Lab Results   Component Value Date    ALT 27 03/18/2021           Lab Results   Component Value Date    AST 19 03/18/2021       Results reviewed, labs MET treatment parameters, ok to proceed with treatment.      Post Infusion Assessment:  Blood return noted pre and post infusion.  Site patent and intact, free from redness, edema or discomfort.  No evidence of extravasations.  Access discontinued per protocol.       Discharge Plan:   Patient discharged in stable condition accompanied by: self.  Departure Mode: Ambulatory.    Shannan Melchor RN

## 2021-03-18 NOTE — PATIENT INSTRUCTIONS
Treatment today per plan    No f/u appts beyond today for now  Pt will be returning to [place] for next 3 mths, established with Oncologist there to continue treatments in interim  He will call us when his return date is known to re-establish care/treatments here    Ensure medical records and last 2 PET scans are sent to new Oncologist; has appt there 3/30:  [facility]  Dr. Menon (oncologist)  [place]

## 2021-03-18 NOTE — LETTER
3/18/2021         RE: Newton Buchanan  [address]  [place] [address]        Dear Colleague,    Thank you for referring your patient, Newton Buchanan, to the [facility]. Please see a copy of my visit note below.    [facility] Hematology and Oncology Progress Note    Patient: Newton Buchanan  MRN: [id number]        Reason for Visit    1. 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 He completed palliative RT to right neck 3 months ago with improvement in the adenopathy. He's continued on Keytruda since last visit with excellent tolerance.     In January, he palpated right parotid lump which was PET avid. US with biopsy was planned, but no correlative lesion noted so biopsy aborted. This has resolved.     No other new symptoms.    He is returning to [place] in the next few weeks and will be there 3-4 months, selling his home there as he is transitioning his permanent residency to [place]. 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 FNAs benign. Followed.   -2/2020: progressive right neck swelling, no other symptoms.   -2/11/20 FNA biosy right neck mass: metastatic squamous cell cancer, HPV16+  -PET: hypermetabolic right cervical adenopathy, numerous bilateral lung mets, right hilar nodes.   -No evident primary site identified, but highly suspicious of H/N given IHC staining and radiographical findings/spread  -Neogenomic testing - CPS 70    Treatment:  5/2020 - present: Keytruda (initiated in [place], then transferred care to [place])  -mixed  response on PET 8/2020 - could be pseudoprogression, continued  -11/2020: local progression in right neck    12/2020: palliative RT to right neck (3750 cGy/15)    1/2021: palpable right jaw/parotid lesion, PET+. Remainder of right neck disease improved after RT and lung mets stable. US-guided biopsy right parotid lesion attempted, but no lesion seen to biopsy so cancelled.      Physical Exam    GENERAL: Alert and oriented to time place and person. Seated comfortably. In no distress.  HEAD: Atraumatic and normocephalic. No alopecia.  EYES: COSMO, EOMI. No erythema. No icterus.  ORAL CAVITY: Moist. No mucosal lesion or tonsillar enlargement.  NECK: Radiation fibrosis in right neck, minimal. Right parotid nodule resolved since last visit.  LYMPH NODES: No palpable discrete supraclavicular, cervical lymphadenopathy.   CHEST: clear to auscultation bilaterally. Resonant to percussion throughout bilaterally. Symmetrical breath movements bilaterally.  CVS: S1 and S2 are heard. Regular rate and rhythm. No murmur or gallop or rub heard.  ABDOMEN: Soft. Not tender. Not distended. No palpable hepatomegaly or splenomegaly. No other mass palpable. Bowel sounds present.  EXTREMITIES: Warm. No peripheral edema.  SKIN: no rash, or bruising or purpura.   NEURO: No gross deficit noted. Non-antalgic gait.      Lab Results    CMP WNL  TSH pending    Imaging    None  (Last PET 1/21/21; US right parotid negative 2/8/21)    Assessment/Plan  1. 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   Labwork unremarkable.  No clinical evidence of progression. Right cervical nodes improved after RT.    Plan:  -Proceed with Keytruda cycle today.  -Continue Keytruda every 3 weeks, as tolerated, in [place] (will establish with Dr. Menon in [place])  -If here, we would plan a repeat PET scan in 9-12 weeks (about 4 months since his January scan). This can be done in [place]; or, if otherwise  "clinically stable, could be deferred until his return to [place].   -His plan is to return to [place] in 3-4 months, but no specific date. He will notify us 3-4 weeks ahead of his known return to establish follow-up and ongoing care. Since Dr. Chen has left the practice, he will need to re-establish with a new primary oncologist.    2.   Right parotid lesion, resolved  This was likely inflammatory post-RT.    Billing  Total time:35 min; to include face to face, review of EMR, ordering/documetentation and coordination of care    Signed by: Janay Fierro NP    Oncology Rooming Note    March 18, 2021 9:53 AM   Newton Buchanan is a 75 year old male who presents for:    Chief Complaint   Patient presents with     Oncology Clinic Visit     6 week return Squamous cell carcinoma of oropharynx, review US & Labs      Initial Vitals: BP (!) 145/63 (BP Location: Left arm, Patient Position: Sitting, Cuff Size: Adult Large)   Pulse 63   Temp 97.3  F (36.3  C) (Oral)   Resp 18   Ht 1.821 m (5' 11.69\")   Wt 88.3 kg (194 lb 9.6 oz)   SpO2 97%   BMI 26.62 kg/m   Estimated body mass index is 26.62 kg/m  as calculated from the following:    Height as of this encounter: 1.821 m (5' 11.69\").    Weight as of this encounter: 88.3 kg (194 lb 9.6 oz). Body surface area is 2.11 meters squared.  No Pain (0) Comment: Data Unavailable   No LMP for male patient.  Allergies reviewed: Yes  Medications reviewed: Yes    Medications: Medication refills not needed today.  Pharmacy name entered into Sqrrl:    [id number] - [place], [place] - [address] AT [facility] OF [facility] [address]  [facility] - [place], [place] - [address] 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  What is the status of tumor.       Sandy Carrillo, [facility]                  Again, thank you for allowing me to participate in the care of your patient.        Sincerely,        Janay Fierro, " NP

## 2021-07-05 ENCOUNTER — ONCOLOGY VISIT (OUTPATIENT)
Dept: ONCOLOGY | Facility: CLINIC | Age: 76
End: 2021-07-05
Attending: INTERNAL MEDICINE
Payer: MEDICARE

## 2021-07-05 ENCOUNTER — INFUSION THERAPY VISIT (OUTPATIENT)
Dept: INFUSION THERAPY | Facility: CLINIC | Age: 76
End: 2021-07-05
Attending: INTERNAL MEDICINE
Payer: MEDICARE

## 2021-07-05 ENCOUNTER — HOSPITAL ENCOUNTER (OUTPATIENT)
Dept: LAB | Facility: CLINIC | Age: 76
Discharge: HOME | End: 2021-07-05
Attending: INTERNAL MEDICINE | Admitting: INTERNAL MEDICINE
Payer: MEDICARE

## 2021-07-05 VITALS — SYSTOLIC BLOOD PRESSURE: 114 MMHG | DIASTOLIC BLOOD PRESSURE: 62 MMHG | HEART RATE: 56 BPM

## 2021-07-05 VITALS
SYSTOLIC BLOOD PRESSURE: 137 MMHG | WEIGHT: 192.1 LBS | BODY MASS INDEX: 26.02 KG/M2 | OXYGEN SATURATION: 95 % | HEIGHT: 72 IN | RESPIRATION RATE: 20 BRPM | DIASTOLIC BLOOD PRESSURE: 71 MMHG | HEART RATE: 65 BPM | TEMPERATURE: 96.8 F

## 2021-07-05 DIAGNOSIS — C10.9 SQUAMOUS CELL CARCINOMA OF OROPHARYNX (H): ICD-10-CM

## 2021-07-05 DIAGNOSIS — C76.0 HEAD AND NECK CANCER (H): Primary | ICD-10-CM

## 2021-07-05 DIAGNOSIS — C76.0 HEAD AND NECK CANCER (H): ICD-10-CM

## 2021-07-05 DIAGNOSIS — C10.9 SQUAMOUS CELL CARCINOMA OF OROPHARYNX (H): Primary | ICD-10-CM

## 2021-07-05 LAB
ALBUMIN SERPL-MCNC: 3.7 G/DL (ref 3.4–5)
ALP SERPL-CCNC: 48 U/L (ref 40–150)
ALT SERPL W P-5'-P-CCNC: 32 U/L (ref 0–70)
ANION GAP SERPL CALCULATED.3IONS-SCNC: 7 MMOL/L (ref 3–14)
AST SERPL W P-5'-P-CCNC: 23 U/L (ref 0–45)
BILIRUB SERPL-MCNC: 0.9 MG/DL (ref 0.2–1.3)
BUN SERPL-MCNC: 14 MG/DL (ref 7–30)
CALCIUM SERPL-MCNC: 9.2 MG/DL (ref 8.5–10.1)
CHLORIDE SERPL-SCNC: 108 MMOL/L (ref 94–109)
CO2 SERPL-SCNC: 26 MMOL/L (ref 20–32)
CREAT SERPL-MCNC: 0.86 MG/DL (ref 0.66–1.25)
GFR SERPL CREATININE-BSD FRML MDRD: 85 ML/MIN/{1.73_M2}
GLUCOSE SERPL-MCNC: 108 MG/DL (ref 70–99)
POTASSIUM SERPL-SCNC: 4.1 MMOL/L (ref 3.4–5.3)
PROT SERPL-MCNC: 7 G/DL (ref 6.8–8.8)
SODIUM SERPL-SCNC: 141 MMOL/L (ref 133–144)
TSH SERPL DL<=0.005 MIU/L-ACNC: 1.82 MU/L (ref 0.4–4)

## 2021-07-05 PROCEDURE — G0463 HOSPITAL OUTPT CLINIC VISIT: HCPCS | Mod: 25

## 2021-07-05 PROCEDURE — 250N000011 HC RX IP 250 OP 636: Performed by: INTERNAL MEDICINE

## 2021-07-05 PROCEDURE — 80053 COMPREHEN METABOLIC PANEL: CPT | Performed by: INTERNAL MEDICINE

## 2021-07-05 PROCEDURE — 96413 CHEMO IV INFUSION 1 HR: CPT

## 2021-07-05 PROCEDURE — 99215 OFFICE O/P EST HI 40 MIN: CPT | Performed by: INTERNAL MEDICINE

## 2021-07-05 PROCEDURE — 84443 ASSAY THYROID STIM HORMONE: CPT | Performed by: INTERNAL MEDICINE

## 2021-07-05 PROCEDURE — 258N000003 HC RX IP 258 OP 636: Performed by: INTERNAL MEDICINE

## 2021-07-05 PROCEDURE — 36415 COLL VENOUS BLD VENIPUNCTURE: CPT | Performed by: INTERNAL MEDICINE

## 2021-07-05 RX ORDER — NALOXONE HYDROCHLORIDE 0.4 MG/ML
.1-.4 INJECTION, SOLUTION INTRAMUSCULAR; INTRAVENOUS; SUBCUTANEOUS
Status: CANCELLED | OUTPATIENT
Start: 2021-07-05

## 2021-07-05 RX ORDER — LORAZEPAM 2 MG/ML
0.5 INJECTION INTRAMUSCULAR EVERY 4 HOURS PRN
Status: CANCELLED
Start: 2021-07-05

## 2021-07-05 RX ORDER — ALBUTEROL SULFATE 90 UG/1
1-2 AEROSOL, METERED RESPIRATORY (INHALATION)
Status: CANCELLED
Start: 2021-07-05

## 2021-07-05 RX ORDER — SODIUM CHLORIDE 9 MG/ML
1000 INJECTION, SOLUTION INTRAVENOUS CONTINUOUS PRN
Status: CANCELLED
Start: 2021-07-05

## 2021-07-05 RX ORDER — EPINEPHRINE 1 MG/ML
0.3 INJECTION, SOLUTION, CONCENTRATE INTRAVENOUS EVERY 5 MIN PRN
Status: CANCELLED | OUTPATIENT
Start: 2021-07-05

## 2021-07-05 RX ORDER — MEPERIDINE HYDROCHLORIDE 25 MG/ML
25 INJECTION INTRAMUSCULAR; INTRAVENOUS; SUBCUTANEOUS EVERY 30 MIN PRN
Status: CANCELLED | OUTPATIENT
Start: 2021-07-05

## 2021-07-05 RX ORDER — ALBUTEROL SULFATE 0.83 MG/ML
2.5 SOLUTION RESPIRATORY (INHALATION)
Status: CANCELLED | OUTPATIENT
Start: 2021-07-05

## 2021-07-05 RX ORDER — METHYLPREDNISOLONE SODIUM SUCCINATE 125 MG/2ML
125 INJECTION, POWDER, LYOPHILIZED, FOR SOLUTION INTRAMUSCULAR; INTRAVENOUS
Status: CANCELLED
Start: 2021-07-05

## 2021-07-05 RX ORDER — DIPHENHYDRAMINE HYDROCHLORIDE 50 MG/ML
50 INJECTION INTRAMUSCULAR; INTRAVENOUS
Status: CANCELLED
Start: 2021-07-05

## 2021-07-05 RX ADMIN — SODIUM CHLORIDE 250 ML: 9 INJECTION, SOLUTION INTRAVENOUS at 14:30

## 2021-07-05 RX ADMIN — SODIUM CHLORIDE 200 MG: 9 INJECTION, SOLUTION INTRAVENOUS at 14:40

## 2021-07-05 ASSESSMENT — PAIN SCALES - GENERAL: PAINLEVEL: NO PAIN (0)

## 2021-07-05 ASSESSMENT — MIFFLIN-ST. JEOR: SCORE: 1644.49

## 2021-07-05 NOTE — LETTER
7/5/2021         RE: Newton Buchanan  [address]  [place] [address]        Dear Colleague,    Thank you for referring your patient, Newton Buchanan, to the [facility]. Please see a copy of my visit note below.    Heme/onc visit note  July 5, 2021  Oncology team:    Reason for visit: oncology follow up    Cancer history:      Mr. 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 He completed palliative RT to right neck 3 months ago with improvement in the adenopathy. He's continued on Keytruda since last visit with excellent tolerance.   In January, he palpated right parotid lump which was PET avid. US with biopsy was planned, but no correlative lesion noted so biopsy aborted. This has resolved.    No other new symptoms.   He is returning to [place] in the next few weeks and will be there 3-4 months, selling his home there as he is transitioning his permanent residency to [place]. He has established an Oncologist there to continue his care/treatments      Outside record of Margie Menon MD - 06/08/2021  Was reviewed as below    Newton Buchanan is a 75 year old male Seen at request of Dr. Aristides Blake MD In consultation for Stage IV metastatic squamous cell carcinoma     Patient had noted swelling over past couple years of right neck  Has had FNAs in the past that were benign     2/3/20 CT Neck/Chest   IMPRESSION:  1. Right cervical lymphadenopathy, with areas suspicious for  necrosis, highly concerning for metastatic involvement by  malignancy. Recommend direct visualization of the upper  aerodigestive tract.  2. Numerous bilateral lung nodules, suspicious for metastases.  3. Details above.  Saw Dr. Blake     2/11/20 Biopsy of right neck mass   metastatic squamous cell carcinoma - HPV16+. ?    2/11/21 PD 1 70%     2/24/20 PET/CT     IMPRESSION:   1. Hypermetabolic right cervical  lymphadenopathy.  2. Numerous hypermetabolic bilateral lung metastases.  3. Hypermetabolic right hilar nodes, also likely metastatic  disease     Was hospitalized with C. dificile   Once recovered- started palliative treatment     5/20   Keytruda #1   5/29/20 Keytruda #2     Transferred care to [place]     Patient brought records and additional records reviewed in Care Everywhere    Saw Dr. Shelley Chen, Oncology     6/19/20 Keytruda #3  7/9/20 Keytruda #4  8/3/20 Keytruda #5  8/24/20 Keytruda #6  9/14/20 Keytruda #7   10/5/20 Keytruda #8  10/26/20 Keytruda #9     11/19/20-12/10/20 XRT to right neck     12/2/20 Keytruda #10    12/16-12/22/20 Additional XRT     12/24/20 Keytruda #11     1/11/20 New Oncologist Dr. Srinivasan    1/14/21 Keytruda #12     1/22/21 PET/CT   IMPRESSION: In this patient with history of metastatic squamous cell  carcinoma of the neck treated with chemotherapy and radiation therapy;    1. Since 11/5/2020, interval marked decrease in the FDG avidity and  mild decrease in size of the right level 2a lymphadenopathy suggesting  partial response. Given presence of mild residual uptake presence of  residual tumor can not be excluded. Continued follow up is  recommended.  2. Interval resolution of right level IIb lymph node.   3. Interval development of a nonspecific focus of mild-moderate FDG  uptake in the right parotid gland superficial lobe. Attention on  follow up is recommended. If clinically indicated to rule out a new  metastatic focus, ultrasound and FNA can be considered.  4. Unchanged FDG avid mucosal enhancing areas of the glossotonsillar  sulci bilaterally more extensive on the left, left tongue base and  left lateral oropharyngeal wall. These are likely radiation treatment  related/inflammatory etiology. Continued follow-up is recommended.  5. No change in scattered subcentimeter pulmonary nodules.    2/4/21 Keytruda #13     2/8/21 Parotid ultrasound   IMPRESSION: No right parotid lesion is  identified on ultrasound today  and therefore the ultrasound-guided needle core biopsy is canceled.    2/25/21 Keytruda #14    3/18/21 Keytruda #15     Patient in [place] for several months to sell home then will be going back to [place]  Here to continue care while in [place]     4/8/21  Keytruda #3    4/13/21 PET/CT   IMPRESSION:  1. Decreased activity and prominence of right cervical  lymphadenopathy and multiple pulmonary nodules.   2. Interim dominant of hypermetabolic right axillary nodule.  3. Details above.    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 FNAs benign. Followed.   -2/2020: progressive right neck swelling, no other symptoms.   -2/11/20 FNA biosy right neck mass: metastatic squamous cell cancer, HPV16+  -PET: hypermetabolic right cervical adenopathy, numerous bilateral lung mets, right hilar nodes.   -No evident primary site identified, but highly suspicious of H/N given IHC staining and radiographical findings/spread  -Neogenomic testing - CPS 70     Treatment:  5/2020 - present: Keytruda (initiated in [place], then transferred care to [place])  -mixed response on PET 8/2020 - could be pseudoprogression, continued  -11/2020: local progression in right neck     12/2020: palliative RT to right neck (3750 cGy/15)     1/2021: palpable right jaw/parotid lesion, PET+. Remainder of right neck disease improved after RT  and lung mets stable. US-guided biopsy right parotid lesion attempted, but no lesion seen to biopsy so cancelled      Interval history: 7/5/21  Patient denies new symptoms. ROS as below.    Past medical history:  Patient Active Problem List   Diagnosis     Essential hypertension with goal blood pressure less than 140/90     Gastroesophageal reflux disease without esophagitis     CRYSTAL (obstructive sleep apnea)     Prostate cancer (H)     Head and neck cancer (H)     Oropharyngeal cancer (H)       Medications:  aspirin 81 MG tablet, Take by mouth daily  fluticasone (FLONASE) 50 MCG/ACT nasal spray, Spray 1-2 sprays in nostril  losartan (COZAAR) 25 MG tablet, Take 1 tablet (25 mg) by mouth daily  pembrolizumab (KEYTRUDA) 25 MG/ML,   pravastatin (PRAVACHOL) 20 MG tablet, Take 20 mg by mouth daily   saccharomyces boulardii (FLORASTOR) 250 MG capsule, Take 1 capsule by mouth 2 times daily  sildenafil (VIAGRA) 100 MG tablet, Take by mouth daily as needed for erectile dysfunction  VITAMIN D, CHOLECALCIFEROL, PO, Take 1,000 Units by mouth daily    No current facility-administered medications on file prior to visit.       Allergy:     Allergies   Allergen Reactions     Atorvastatin      Other reaction(s): Confusion  unusual behavior and dizziness           Review of systems:    - CONSTITUTIONAL: Denies weight loss, fever and chills.    - HEENT: Denies changes in vision and hearing.    - ?RESPIRATORY: Denies SOB and cough.?    - CV: Denies palpitations and CP. ?    - GI: Denies abdominal pain, nausea, vomiting and diarrhea.?    - : Denies dysuria and urinary frequency.?    - MSK: Denies myalgia and joint pain.?    - SKIN: Denies rash and pruritus.    - ?NEUROLOGICAL: Denies headache and syncope.?    - PSYCHIATRIC: Denies recent changes in mood. Denies anxiety and depression.    - LYMPHATIC: no palpable lumps in the neck, axilla or groin areas.       Physical exam  There were no vitals taken for this visit.  Wt Readings from  "Last 4 Encounters:   03/18/21 88.3 kg (194 lb 9.6 oz)   02/25/21 89.2 kg (196 lb 9.6 oz)   01/20/21 86.6 kg (191 lb)   01/14/21 88 kg (194 lb)     /71 (BP Location: Right arm, Patient Position: Sitting, Cuff Size: Adult Regular)   Pulse 65   Temp 96.8  F (36  C) (Oral)   Resp 20   Ht 1.821 m (5' 11.69\")   Wt 87.1 kg (192 lb 1.6 oz)   SpO2 95%   BMI 26.28 kg/m        Constitutional:       General: He is not in acute distress.     Appearance: Normal appearance. He is not toxic-appearing.   HENT:      Head: Normocephalic and atraumatic.      Right Ear: External ear normal.      Left Ear: External ear normal.   Eyes:      Extraocular Movements: Extraocular movements intact.      Conjunctiva/sclera: Conjunctivae normal.      Pupils: Pupils are equal, round, and reactive to light.   Neck:      Musculoskeletal: Normal range of motion and neck supple.   Cardiovascular:      Rate and Rhythm: Normal rate and regular rhythm.      Heart sounds: Normal heart sounds. No murmur. No gallop.    Pulmonary:      Effort: Pulmonary effort is normal.      Breath sounds: Normal breath sounds.   Abdominal:      General: Abdomen is flat. There is no distension.      Palpations: Abdomen is soft. There is no mass.      Tenderness: There is no abdominal tenderness. There is no guarding or rebound.      Hernia: No hernia is present.   Musculoskeletal: Normal range of motion.         General: No swelling or deformity.      Right lower leg: No edema.      Left lower leg: No edema.   Lymphadenopathy:      Cervical: No cervical adenopathy.   Skin:     General: Skin is warm and dry.         Labs:  Infusion Therapy Visit on 03/18/2021   Component Date Value Ref Range Status     Sodium 03/18/2021 138  133 - 144 mmol/L Final     Potassium 03/18/2021 4.1  3.4 - 5.3 mmol/L Final     Chloride 03/18/2021 103  94 - 109 mmol/L Final     Carbon Dioxide 03/18/2021 30  20 - 32 mmol/L Final     Anion Gap 03/18/2021 5  3 - 14 mmol/L Final     Glucose " 03/18/2021 77  70 - 99 mg/dL Final     Urea Nitrogen 03/18/2021 19  7 - 30 mg/dL Final     Creatinine 03/18/2021 0.88  0.66 - 1.25 mg/dL Final     GFR Estimate 03/18/2021 84  >60 mL/min/[1.73_m2] Final    Comment: Non  GFR Calc  Starting 12/18/2018, serum creatinine based estimated GFR (eGFR) will be   calculated using the Chronic Kidney Disease Epidemiology Collaboration   (CKD-EPI) equation.       GFR Estimate If Black 03/18/2021 >90  >60 mL/min/[1.73_m2] Final    Comment:  GFR Calc  Starting 12/18/2018, serum creatinine based estimated GFR (eGFR) will be   calculated using the Chronic Kidney Disease Epidemiology Collaboration   (CKD-EPI) equation.       Calcium 03/18/2021 9.2  8.5 - 10.1 mg/dL Final     Bilirubin Total 03/18/2021 0.7  0.2 - 1.3 mg/dL Final     Albumin 03/18/2021 3.8  3.4 - 5.0 g/dL Final     Protein Total 03/18/2021 7.5  6.8 - 8.8 g/dL Final     Alkaline Phosphatase 03/18/2021 60  40 - 150 U/L Final     ALT 03/18/2021 27  0 - 70 U/L Final     AST 03/18/2021 19  0 - 45 U/L Final     TSH 03/18/2021 1.41  0.40 - 4.00 mU/L Final           Assessment and plan:  Newton Buchanan is a 75 year old male with Stage IV metastatic squamous cell carcinoma:      High PD-1 70% on biopsy.  Started palliative Keytruda 5/20.  Moved to [place] 6/20 and had continued Keytruda there. Patient brought records and Care Everywhere records were reviewed.    Did receive XRT to right neck 12/20.    Patient in [place] 4/21 to help sell home.  PET/CT 4/21 showed stable/improved disease.     Restarted palliative Keytruda here and tolerating well.     Clinically stable today. CBC, CMP in safe range. Plan to proceed with Keytruda this week.    Patient will have next infusion 7/5/21 in [place] and will be back mid-late July 2021-   Next infusion due 7/26/21  Message sent to infusion scheduling to coordinate dates for patient when he returns to [place].     PET/CT scheduled 7/13/21 as per  "Margie Menon MD - 06/08/2021     Patient lives partially between [place] and [place].     As per patient, patient received cycle 6 on 6/10/21 Keytruda regimen.    Counting the number of cycles of Keytruda from the first cycle, it would be today cycle #20     Plan for cycle 7 Keytruda regimen.    7/26/21 cycle 8 of Keytruda scheduled in [place].        The total time of this encounter amounted to 40 minutes.This time included face to face time spent with the patient, prep work, ordering tests, and performing post visit documentation.  The patient is ready to learn, no apparent learning barriers were identified.  Diagnosis and treatment plans were explained to the patient. The patient expressed understanding of the content. The patient asked appropriate questions. The patient questions were answered to his satisfaction.        KATARZYNA KHOURY MD      Oncology Rooming Note    July 5, 2021 1:22 PM   Newton Buchanan is a 75 year old male who presents for:    Chief Complaint   Patient presents with     Oncology Clinic Visit     Return Head and neck cancer, Prostate CA, review labs & treatment plan     Initial Vitals: /71 (BP Location: Right arm, Patient Position: Sitting, Cuff Size: Adult Regular)   Pulse 65   Temp 96.8  F (36  C) (Oral)   Resp 20   Ht 1.821 m (5' 11.69\")   Wt 87.1 kg (192 lb 1.6 oz)   SpO2 95%   BMI 26.28 kg/m   Estimated body mass index is 26.28 kg/m  as calculated from the following:    Height as of this encounter: 1.821 m (5' 11.69\").    Weight as of this encounter: 87.1 kg (192 lb 1.6 oz). Body surface area is 2.1 meters squared.  No Pain (0) Comment: Data Unavailable   No LMP for male patient.  Allergies reviewed: Yes  Medications reviewed: Yes    Medications: Medication refills not needed today.  Pharmacy name entered into [employer]:    [id number] - [place], [place] - [address] AT [facility] OF [address] [place] [address] HOME DELIVERY - [facility], [place] - [address] [place] [address] " ROAD    Clinical concerns:  Return Head and neck cancer, Prostate CA, review labs & treatment plan.       Sandy Carrillo [facility]                  Again, thank you for allowing me to participate in the care of your patient.        Sincerely,        Vance Garcia MD

## 2021-07-05 NOTE — PATIENT INSTRUCTIONS
PET/CT scheduled 7/13/21 as per Margie Menon MD - 06/08/2021     Counting the number of cycles of Keytruda from the first cycle, it would be today cycle #20     7/26/21 cycle #21  of Keytruda scheduled in [place].    No next appointment visit to be made today until patient calls back cancer center in [facility] then the labs and chemo to be entered in the future

## 2021-07-05 NOTE — PROGRESS NOTES
Infusion Nursing Note:  Newton Buchanan presents today for Keytruda C20D1.    Patient seen by provider today: Yes: Dr. Garcia; therefore, assessment deferred.   present during visit today: Not Applicable.    Note: N/A.    Intravenous Access:  Peripheral IV placed.    Treatment Conditions:  Lab Results   Component Value Date     07/05/2021                   Lab Results   Component Value Date    POTASSIUM 4.1 07/05/2021           No results found for: MAG         Lab Results   Component Value Date    CR 0.86 07/05/2021                   Lab Results   Component Value Date    JÚNIOR 9.2 07/05/2021                Lab Results   Component Value Date    BILITOTAL 0.9 07/05/2021           Lab Results   Component Value Date    ALBUMIN 3.7 07/05/2021                    Lab Results   Component Value Date    ALT 32 07/05/2021           Lab Results   Component Value Date    AST 23 07/05/2021   Results reviewed, labs MET treatment parameters, ok to proceed with treatment.    Post Infusion Assessment:  Patient tolerated infusion without incident.  Site patent and intact, free from redness, edema or discomfort.  No evidence of extravasations.  Access discontinued per protocol.     Discharge Plan:   Discharge instructions reviewed with: Patient.  Patient and/or family verbalized understanding of discharge instructions and all questions answered.  AVS to patient via [employer].  Patient will receive next Keytruda dose in [place] and call when he needs to schedule his next appt here.   Patient discharged in stable condition accompanied by: self.  Departure Mode: Ambulatory.    Mercy Carvalho RN

## 2021-07-05 NOTE — PROGRESS NOTES
"Oncology Rooming Note    July 5, 2021 1:22 PM   Newton Buchanan is a 75 year old male who presents for:    Chief Complaint   Patient presents with     Oncology Clinic Visit     Return Head and neck cancer, Prostate CA, review labs & treatment plan     Initial Vitals: /71 (BP Location: Right arm, Patient Position: Sitting, Cuff Size: Adult Regular)   Pulse 65   Temp 96.8  F (36  C) (Oral)   Resp 20   Ht 1.821 m (5' 11.69\")   Wt 87.1 kg (192 lb 1.6 oz)   SpO2 95%   BMI 26.28 kg/m   Estimated body mass index is 26.28 kg/m  as calculated from the following:    Height as of this encounter: 1.821 m (5' 11.69\").    Weight as of this encounter: 87.1 kg (192 lb 1.6 oz). Body surface area is 2.1 meters squared.  No Pain (0) Comment: Data Unavailable   No LMP for male patient.  Allergies reviewed: Yes  Medications reviewed: Yes    Medications: Medication refills not needed today.  Pharmacy name entered into [facility]:    [facility] #[id number] - [place], [place] - [address] AT [facility] OF [address] [place] [address] HOME DELIVERY - [facility], [place] - [address] [facility]    Clinical concerns:  Return Head and neck cancer, Prostate CA, review labs & treatment plan.       Sandy Carrillo [facility]              "

## 2021-07-05 NOTE — PROGRESS NOTES
Heme/onc visit note  July 5, 2021  Oncology team:    Reason for visit: oncology follow up    Cancer history:      Mr. 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 He completed palliative RT to right neck 3 months ago with improvement in the adenopathy. He's continued on Keytruda since last visit with excellent tolerance.   In January, he palpated right parotid lump which was PET avid. US with biopsy was planned, but no correlative lesion noted so biopsy aborted. This has resolved.    No other new symptoms.   He is returning to [place] in the next few weeks and will be there 3-4 months, selling his home there as he is transitioning his permanent residency to [place]. He has established an Oncologist there to continue his care/treatments      Outside record of Margie Menon MD - 06/08/2021  Was reviewed as below    Newton Buchanan is a 75 year old male Seen at request of Dr. Aristides Blake MD In consultation for Stage IV metastatic squamous cell carcinoma     Patient had noted swelling over past couple years of right neck  Has had FNAs in the past that were benign     2/3/20 CT Neck/Chest   IMPRESSION:  1. Right cervical lymphadenopathy, with areas suspicious for  necrosis, highly concerning for metastatic involvement by  malignancy. Recommend direct visualization of the upper  aerodigestive tract.  2. Numerous bilateral lung nodules, suspicious for metastases.  3. Details above.  Saw Dr. Blake     2/11/20 Biopsy of right neck mass   metastatic squamous cell carcinoma - HPV16+. ?    2/11/21 PD 1 70%     2/24/20 PET/CT     IMPRESSION:   1. Hypermetabolic right cervical lymphadenopathy.  2. Numerous hypermetabolic bilateral lung metastases.  3. Hypermetabolic right hilar nodes, also likely metastatic  disease     Was hospitalized with C. dificile   Once recovered- started palliative treatment     5/20   Keytruda #1   5/29/20  Keytruda #2     Transferred care to [place]     Patient brought records and additional records reviewed in Care Everywhere    Saw Dr. Shelley Chen, Oncology     6/19/20 Keytruda #3  7/9/20 Keytruda #4  8/3/20 Keytruda #5  8/24/20 Keytruda #6  9/14/20 Keytruda #7   10/5/20 Keytruda #8  10/26/20 Keytruda #9     11/19/20-12/10/20 XRT to right neck     12/2/20 Keytruda #10    12/16-12/22/20 Additional XRT     12/24/20 Keytruda #11     1/11/20 New Oncologist Dr. Srinivasan    1/14/21 Keytruda #12     1/22/21 PET/CT   IMPRESSION: In this patient with history of metastatic squamous cell  carcinoma of the neck treated with chemotherapy and radiation therapy;    1. Since 11/5/2020, interval marked decrease in the FDG avidity and  mild decrease in size of the right level 2a lymphadenopathy suggesting  partial response. Given presence of mild residual uptake presence of  residual tumor can not be excluded. Continued follow up is  recommended.  2. Interval resolution of right level IIb lymph node.   3. Interval development of a nonspecific focus of mild-moderate FDG  uptake in the right parotid gland superficial lobe. Attention on  follow up is recommended. If clinically indicated to rule out a new  metastatic focus, ultrasound and FNA can be considered.  4. Unchanged FDG avid mucosal enhancing areas of the glossotonsillar  sulci bilaterally more extensive on the left, left tongue base and  left lateral oropharyngeal wall. These are likely radiation treatment  related/inflammatory etiology. Continued follow-up is recommended.  5. No change in scattered subcentimeter pulmonary nodules.    2/4/21 Keytruda #13     2/8/21 Parotid ultrasound   IMPRESSION: No right parotid lesion is identified on ultrasound today  and therefore the ultrasound-guided needle core biopsy is canceled.    2/25/21 Keytruda #14    3/18/21 Keytruda #15     Patient in [place] for several months to sell home then will be going back to [place]  Here to continue  care while in [place]     4/8/21  Keytruda #3    4/13/21 PET/CT   IMPRESSION:  1. Decreased activity and prominence of right cervical  lymphadenopathy and multiple pulmonary nodules.   2. Interim dominant of hypermetabolic right axillary nodule.  3. Details above.    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 FNAs benign. Followed.   -2/2020: progressive right neck swelling, no other symptoms.   -2/11/20 FNA biosy right neck mass: metastatic squamous cell cancer, HPV16+  -PET: hypermetabolic right cervical adenopathy, numerous bilateral lung mets, right hilar nodes.   -No evident primary site identified, but highly suspicious of H/N given IHC staining and radiographical findings/spread  -Neogenomic testing - CPS 70     Treatment:  5/2020 - present: Keytruda (initiated in [place], then transferred care to [place])  -mixed response on PET 8/2020 - could be pseudoprogression, continued  -11/2020: local progression in right neck     12/2020: palliative RT to right neck (3750 cGy/15)     1/2021: palpable right jaw/parotid lesion, PET+. Remainder of right neck disease improved after RT and lung mets stable. US-guided biopsy right parotid lesion attempted, but no lesion seen to biopsy so cancelled      Interval history: 7/5/21  Patient denies new symptoms. ROS as below.    Past medical history:  Patient Active Problem List   Diagnosis      Essential hypertension with goal blood pressure less than 140/90     Gastroesophageal reflux disease without esophagitis     CRYSTAL (obstructive sleep apnea)     Prostate cancer (H)     Head and neck cancer (H)     Oropharyngeal cancer (H)       Medications:  aspirin 81 MG tablet, Take by mouth daily  fluticasone (FLONASE) 50 MCG/ACT nasal spray, Spray 1-2 sprays in nostril  losartan (COZAAR) 25 MG tablet, Take 1 tablet (25 mg) by mouth daily  pembrolizumab (KEYTRUDA) 25 MG/ML,   pravastatin (PRAVACHOL) 20 MG tablet, Take 20 mg by mouth daily   saccharomyces boulardii (FLORASTOR) 250 MG capsule, Take 1 capsule by mouth 2 times daily  sildenafil (VIAGRA) 100 MG tablet, Take by mouth daily as needed for erectile dysfunction  VITAMIN D, CHOLECALCIFEROL, PO, Take 1,000 Units by mouth daily    No current facility-administered medications on file prior to visit.       Allergy:     Allergies   Allergen Reactions     Atorvastatin      Other reaction(s): Confusion  unusual behavior and dizziness           Review of systems:    - CONSTITUTIONAL: Denies weight loss, fever and chills.    - HEENT: Denies changes in vision and hearing.    - ?RESPIRATORY: Denies SOB and cough.?    - CV: Denies palpitations and CP. ?    - GI: Denies abdominal pain, nausea, vomiting and diarrhea.?    - : Denies dysuria and urinary frequency.?    - MSK: Denies myalgia and joint pain.?    - SKIN: Denies rash and pruritus.    - ?NEUROLOGICAL: Denies headache and syncope.?    - PSYCHIATRIC: Denies recent changes in mood. Denies anxiety and depression.    - LYMPHATIC: no palpable lumps in the neck, axilla or groin areas.       Physical exam  There were no vitals taken for this visit.  Wt Readings from Last 4 Encounters:   03/18/21 88.3 kg (194 lb 9.6 oz)   02/25/21 89.2 kg (196 lb 9.6 oz)   01/20/21 86.6 kg (191 lb)   01/14/21 88 kg (194 lb)     /71 (BP Location: Right arm, Patient Position: Sitting, Cuff Size: Adult Regular)   Pulse 65   Temp  "96.8  F (36  C) (Oral)   Resp 20   Ht 1.821 m (5' 11.69\")   Wt 87.1 kg (192 lb 1.6 oz)   SpO2 95%   BMI 26.28 kg/m        Constitutional:       General: He is not in acute distress.     Appearance: Normal appearance. He is not toxic-appearing.   HENT:      Head: Normocephalic and atraumatic.      Right Ear: External ear normal.      Left Ear: External ear normal.   Eyes:      Extraocular Movements: Extraocular movements intact.      Conjunctiva/sclera: Conjunctivae normal.      Pupils: Pupils are equal, round, and reactive to light.   Neck:      Musculoskeletal: Normal range of motion and neck supple.   Cardiovascular:      Rate and Rhythm: Normal rate and regular rhythm.      Heart sounds: Normal heart sounds. No murmur. No gallop.    Pulmonary:      Effort: Pulmonary effort is normal.      Breath sounds: Normal breath sounds.   Abdominal:      General: Abdomen is flat. There is no distension.      Palpations: Abdomen is soft. There is no mass.      Tenderness: There is no abdominal tenderness. There is no guarding or rebound.      Hernia: No hernia is present.   Musculoskeletal: Normal range of motion.         General: No swelling or deformity.      Right lower leg: No edema.      Left lower leg: No edema.   Lymphadenopathy:      Cervical: No cervical adenopathy.   Skin:     General: Skin is warm and dry.         Labs:  Infusion Therapy Visit on 03/18/2021   Component Date Value Ref Range Status     Sodium 03/18/2021 138  133 - 144 mmol/L Final     Potassium 03/18/2021 4.1  3.4 - 5.3 mmol/L Final     Chloride 03/18/2021 103  94 - 109 mmol/L Final     Carbon Dioxide 03/18/2021 30  20 - 32 mmol/L Final     Anion Gap 03/18/2021 5  3 - 14 mmol/L Final     Glucose 03/18/2021 77  70 - 99 mg/dL Final     Urea Nitrogen 03/18/2021 19  7 - 30 mg/dL Final     Creatinine 03/18/2021 0.88  0.66 - 1.25 mg/dL Final     GFR Estimate 03/18/2021 84  >60 mL/min/[1.73_m2] Final    Comment: Non  GFR Calc  Starting " 12/18/2018, serum creatinine based estimated GFR (eGFR) will be   calculated using the Chronic Kidney Disease Epidemiology Collaboration   (CKD-EPI) equation.       GFR Estimate If Black 03/18/2021 >90  >60 mL/min/[1.73_m2] Final    Comment:  GFR Calc  Starting 12/18/2018, serum creatinine based estimated GFR (eGFR) will be   calculated using the Chronic Kidney Disease Epidemiology Collaboration   (CKD-EPI) equation.       Calcium 03/18/2021 9.2  8.5 - 10.1 mg/dL Final     Bilirubin Total 03/18/2021 0.7  0.2 - 1.3 mg/dL Final     Albumin 03/18/2021 3.8  3.4 - 5.0 g/dL Final     Protein Total 03/18/2021 7.5  6.8 - 8.8 g/dL Final     Alkaline Phosphatase 03/18/2021 60  40 - 150 U/L Final     ALT 03/18/2021 27  0 - 70 U/L Final     AST 03/18/2021 19  0 - 45 U/L Final     TSH 03/18/2021 1.41  0.40 - 4.00 mU/L Final           Assessment and plan:  Newton Buchanan is a 75 year old male with Stage IV metastatic squamous cell carcinoma:      High PD-1 70% on biopsy.  Started palliative Keytruda 5/20.  Moved to [place] 6/20 and had continued Keytruda there. Patient brought records and Care Everywhere records were reviewed.    Did receive XRT to right neck 12/20.    Patient in [place] 4/21 to help sell home.  PET/CT 4/21 showed stable/improved disease.     Restarted palliative Keytruda here and tolerating well.     Clinically stable today. CBC, CMP in safe range. Plan to proceed with Keytruda this week.    Patient will have next infusion 7/5/21 in [place] and will be back mid-late July 2021-   Next infusion due 7/26/21  Message sent to infusion scheduling to coordinate dates for patient when he returns to [place].     PET/CT scheduled 7/13/21 as per Margie Menon MD - 06/08/2021     Patient lives partially between [place] and [place].     As per patient, patient received cycle 6 on 6/10/21 Keytruda regimen.    Counting the number of cycles of Keytruda from the first cycle, it would be today cycle #20      Plan for cycle 7 Keytruda regimen.    7/26/21 cycle 8 of Keytruda scheduled in [place].        The total time of this encounter amounted to 40 minutes.This time included face to face time spent with the patient, prep work, ordering tests, and performing post visit documentation.  The patient is ready to learn, no apparent learning barriers were identified.  Diagnosis and treatment plans were explained to the patient. The patient expressed understanding of the content. The patient asked appropriate questions. The patient questions were answered to his satisfaction.        KATARZYNA KHOURY MD

## 2021-07-28 ENCOUNTER — MYC MEDICAL ADVICE (OUTPATIENT)
Dept: ONCOLOGY | Facility: CLINIC | Age: 76
End: 2021-07-28

## 2021-08-14 DIAGNOSIS — C76.0 HEAD AND NECK CANCER (H): Primary | ICD-10-CM

## 2021-08-14 RX ORDER — ALBUTEROL SULFATE 90 UG/1
1-2 AEROSOL, METERED RESPIRATORY (INHALATION)
Status: CANCELLED
Start: 2021-08-16

## 2021-08-14 RX ORDER — NALOXONE HYDROCHLORIDE 0.4 MG/ML
.1-.4 INJECTION, SOLUTION INTRAMUSCULAR; INTRAVENOUS; SUBCUTANEOUS
Status: CANCELLED | OUTPATIENT
Start: 2021-08-16

## 2021-08-14 RX ORDER — SODIUM CHLORIDE 9 MG/ML
1000 INJECTION, SOLUTION INTRAVENOUS CONTINUOUS PRN
Status: CANCELLED
Start: 2021-08-16

## 2021-08-14 RX ORDER — ALBUTEROL SULFATE 0.83 MG/ML
2.5 SOLUTION RESPIRATORY (INHALATION)
Status: CANCELLED | OUTPATIENT
Start: 2021-08-16

## 2021-08-14 RX ORDER — LORAZEPAM 2 MG/ML
0.5 INJECTION INTRAMUSCULAR EVERY 4 HOURS PRN
Status: CANCELLED
Start: 2021-08-16

## 2021-08-14 RX ORDER — METHYLPREDNISOLONE SODIUM SUCCINATE 125 MG/2ML
125 INJECTION, POWDER, LYOPHILIZED, FOR SOLUTION INTRAMUSCULAR; INTRAVENOUS
Status: CANCELLED
Start: 2021-08-16

## 2021-08-14 RX ORDER — EPINEPHRINE 1 MG/ML
0.3 INJECTION, SOLUTION INTRAMUSCULAR; SUBCUTANEOUS EVERY 5 MIN PRN
Status: CANCELLED | OUTPATIENT
Start: 2021-08-16

## 2021-08-14 RX ORDER — DIPHENHYDRAMINE HYDROCHLORIDE 50 MG/ML
50 INJECTION INTRAMUSCULAR; INTRAVENOUS
Status: CANCELLED
Start: 2021-08-16

## 2021-08-14 RX ORDER — MEPERIDINE HYDROCHLORIDE 25 MG/ML
25 INJECTION INTRAMUSCULAR; INTRAVENOUS; SUBCUTANEOUS EVERY 30 MIN PRN
Status: CANCELLED | OUTPATIENT
Start: 2021-08-16

## 2021-08-17 ENCOUNTER — INFUSION THERAPY VISIT (OUTPATIENT)
Dept: INFUSION THERAPY | Facility: CLINIC | Age: 76
End: 2021-08-17
Attending: INTERNAL MEDICINE
Payer: MEDICARE

## 2021-08-17 ENCOUNTER — APPOINTMENT (OUTPATIENT)
Dept: LAB | Facility: CLINIC | Age: 76
End: 2021-08-17
Payer: MEDICARE

## 2021-08-17 VITALS
HEART RATE: 67 BPM | RESPIRATION RATE: 14 BRPM | DIASTOLIC BLOOD PRESSURE: 63 MMHG | SYSTOLIC BLOOD PRESSURE: 98 MMHG | TEMPERATURE: 97.9 F

## 2021-08-17 DIAGNOSIS — C76.0 HEAD AND NECK CANCER (H): Primary | ICD-10-CM

## 2021-08-17 LAB
ALBUMIN SERPL-MCNC: 3.8 G/DL (ref 3.4–5)
ALP SERPL-CCNC: 46 U/L (ref 40–150)
ALT SERPL W P-5'-P-CCNC: 25 U/L (ref 0–70)
ANION GAP SERPL CALCULATED.3IONS-SCNC: 8 MMOL/L (ref 3–14)
AST SERPL W P-5'-P-CCNC: 15 U/L (ref 0–45)
BILIRUB SERPL-MCNC: 1 MG/DL (ref 0.2–1.3)
BUN SERPL-MCNC: 14 MG/DL (ref 7–30)
CALCIUM SERPL-MCNC: 9 MG/DL (ref 8.5–10.1)
CHLORIDE BLD-SCNC: 105 MMOL/L (ref 94–109)
CO2 SERPL-SCNC: 25 MMOL/L (ref 20–32)
CREAT SERPL-MCNC: 0.9 MG/DL (ref 0.66–1.25)
GFR SERPL CREATININE-BSD FRML MDRD: 84 ML/MIN/1.73M2
GLUCOSE BLD-MCNC: 114 MG/DL (ref 70–99)
POTASSIUM BLD-SCNC: 3.8 MMOL/L (ref 3.4–5.3)
PROT SERPL-MCNC: 7.1 G/DL (ref 6.8–8.8)
SODIUM SERPL-SCNC: 138 MMOL/L (ref 133–144)
TSH SERPL DL<=0.005 MIU/L-ACNC: 1.61 MU/L (ref 0.4–4)

## 2021-08-17 PROCEDURE — 82040 ASSAY OF SERUM ALBUMIN: CPT | Performed by: INTERNAL MEDICINE

## 2021-08-17 PROCEDURE — 84443 ASSAY THYROID STIM HORMONE: CPT | Performed by: INTERNAL MEDICINE

## 2021-08-17 PROCEDURE — 96413 CHEMO IV INFUSION 1 HR: CPT

## 2021-08-17 PROCEDURE — 250N000011 HC RX IP 250 OP 636: Performed by: INTERNAL MEDICINE

## 2021-08-17 PROCEDURE — 258N000003 HC RX IP 258 OP 636: Performed by: INTERNAL MEDICINE

## 2021-08-17 PROCEDURE — 36415 COLL VENOUS BLD VENIPUNCTURE: CPT | Performed by: INTERNAL MEDICINE

## 2021-08-17 RX ADMIN — SODIUM CHLORIDE 250 ML: 9 INJECTION, SOLUTION INTRAVENOUS at 13:53

## 2021-08-17 RX ADMIN — SODIUM CHLORIDE 200 MG: 9 INJECTION, SOLUTION INTRAVENOUS at 14:10

## 2021-08-17 ASSESSMENT — PAIN SCALES - GENERAL: PAINLEVEL: NO PAIN (0)

## 2021-08-17 NOTE — PROGRESS NOTES
Infusion Nursing Note:  Newton Buchanan presents today for Keytruda.    Patient seen by provider today: No   present during visit today: Not Applicable.    Note: Labs drawn peripherally.      Intravenous Access:  Peripheral IV placed.    Treatment Conditions:  Lab Results   Component Value Date     08/17/2021     07/05/2021                   Lab Results   Component Value Date    POTASSIUM 3.8 08/17/2021    POTASSIUM 4.1 07/05/2021           No results found for: MAG         Lab Results   Component Value Date    CR 0.90 08/17/2021    CR 0.86 07/05/2021                   Lab Results   Component Value Date    JÚNIOR 9.0 08/17/2021    JÚNIOR 9.2 07/05/2021                Lab Results   Component Value Date    BILITOTAL 1.0 08/17/2021    BILITOTAL 0.9 07/05/2021           Lab Results   Component Value Date    ALBUMIN 3.8 08/17/2021    ALBUMIN 3.7 07/05/2021                    Lab Results   Component Value Date    ALT 25 08/17/2021    ALT 32 07/05/2021           Lab Results   Component Value Date    AST 15 08/17/2021    AST 23 07/05/2021       Results reviewed, labs MET treatment parameters, ok to proceed with treatment.      Post Infusion Assessment:  Patient tolerated infusion without incident.  Blood return noted pre and post infusion.  Site patent and intact, free from redness, edema or discomfort.  No evidence of extravasations.  Access discontinued per protocol.       Discharge Plan:   Patient discharged in stable condition accompanied by: self.  Departure Mode: Ambulatory.      Shannan Melchor RN

## 2021-10-02 ENCOUNTER — HEALTH MAINTENANCE LETTER (OUTPATIENT)
Age: 76
End: 2021-10-02

## 2021-11-10 ENCOUNTER — LAB (OUTPATIENT)
Dept: LAB | Facility: CLINIC | Age: 76
End: 2021-11-10
Payer: MEDICARE

## 2021-11-10 DIAGNOSIS — Z79.899 OTHER LONG TERM (CURRENT) DRUG THERAPY: ICD-10-CM

## 2021-11-10 DIAGNOSIS — C10.9 SQUAMOUS CELL CARCINOMA OF OROPHARYNX (H): Primary | ICD-10-CM

## 2021-11-10 LAB
ALBUMIN SERPL-MCNC: 3.4 G/DL (ref 3.4–5)
ALP SERPL-CCNC: 46 U/L (ref 40–150)
ALT SERPL W P-5'-P-CCNC: 31 U/L (ref 0–70)
ANION GAP SERPL CALCULATED.3IONS-SCNC: 7 MMOL/L (ref 3–14)
AST SERPL W P-5'-P-CCNC: 24 U/L (ref 0–45)
BILIRUB SERPL-MCNC: 0.7 MG/DL (ref 0.2–1.3)
BUN SERPL-MCNC: 17 MG/DL (ref 7–30)
CALCIUM SERPL-MCNC: 9.3 MG/DL (ref 8.5–10.1)
CHLORIDE BLD-SCNC: 107 MMOL/L (ref 94–109)
CO2 SERPL-SCNC: 27 MMOL/L (ref 20–32)
CREAT SERPL-MCNC: 1.02 MG/DL (ref 0.66–1.25)
GFR SERPL CREATININE-BSD FRML MDRD: 72 ML/MIN/1.73M2
GLUCOSE BLD-MCNC: 86 MG/DL (ref 70–99)
HOLD SPECIMEN: NORMAL
POTASSIUM BLD-SCNC: 4 MMOL/L (ref 3.4–5.3)
PROT SERPL-MCNC: 7 G/DL (ref 6.8–8.8)
SODIUM SERPL-SCNC: 141 MMOL/L (ref 133–144)
TSH SERPL DL<=0.005 MIU/L-ACNC: 1.47 MU/L (ref 0.4–4)

## 2021-11-10 PROCEDURE — 82040 ASSAY OF SERUM ALBUMIN: CPT

## 2021-11-10 PROCEDURE — 84443 ASSAY THYROID STIM HORMONE: CPT

## 2021-11-10 PROCEDURE — 36415 COLL VENOUS BLD VENIPUNCTURE: CPT

## 2021-11-11 ENCOUNTER — VIRTUAL VISIT (OUTPATIENT)
Dept: ONCOLOGY | Facility: CLINIC | Age: 76
End: 2021-11-11
Attending: NURSE PRACTITIONER
Payer: MEDICARE

## 2021-11-11 ENCOUNTER — INFUSION THERAPY VISIT (OUTPATIENT)
Dept: INFUSION THERAPY | Facility: CLINIC | Age: 76
End: 2021-11-11
Attending: NURSE PRACTITIONER
Payer: MEDICARE

## 2021-11-11 VITALS
HEART RATE: 52 BPM | SYSTOLIC BLOOD PRESSURE: 123 MMHG | DIASTOLIC BLOOD PRESSURE: 63 MMHG | RESPIRATION RATE: 16 BRPM | TEMPERATURE: 98.4 F | WEIGHT: 183.5 LBS | BODY MASS INDEX: 25.1 KG/M2

## 2021-11-11 DIAGNOSIS — C76.0 HEAD AND NECK CANCER (H): Primary | ICD-10-CM

## 2021-11-11 PROCEDURE — 99442 PR PHYSICIAN TELEPHONE EVALUATION 11-20 MIN: CPT | Mod: 95 | Performed by: NURSE PRACTITIONER

## 2021-11-11 PROCEDURE — 250N000011 HC RX IP 250 OP 636: Performed by: NURSE PRACTITIONER

## 2021-11-11 PROCEDURE — 258N000003 HC RX IP 258 OP 636: Performed by: NURSE PRACTITIONER

## 2021-11-11 PROCEDURE — 96413 CHEMO IV INFUSION 1 HR: CPT

## 2021-11-11 RX ORDER — METHYLPREDNISOLONE SODIUM SUCCINATE 125 MG/2ML
125 INJECTION, POWDER, LYOPHILIZED, FOR SOLUTION INTRAMUSCULAR; INTRAVENOUS
Status: CANCELLED
Start: 2021-12-23

## 2021-11-11 RX ORDER — NALOXONE HYDROCHLORIDE 0.4 MG/ML
.1-.4 INJECTION, SOLUTION INTRAMUSCULAR; INTRAVENOUS; SUBCUTANEOUS
Status: CANCELLED | OUTPATIENT
Start: 2021-12-23

## 2021-11-11 RX ORDER — SODIUM CHLORIDE 9 MG/ML
1000 INJECTION, SOLUTION INTRAVENOUS CONTINUOUS PRN
Status: CANCELLED
Start: 2021-11-11

## 2021-11-11 RX ORDER — DIPHENHYDRAMINE HYDROCHLORIDE 50 MG/ML
50 INJECTION INTRAMUSCULAR; INTRAVENOUS
Status: CANCELLED
Start: 2021-12-23

## 2021-11-11 RX ORDER — LORAZEPAM 2 MG/ML
0.5 INJECTION INTRAMUSCULAR EVERY 4 HOURS PRN
Status: CANCELLED
Start: 2021-12-02

## 2021-11-11 RX ORDER — NALOXONE HYDROCHLORIDE 0.4 MG/ML
.1-.4 INJECTION, SOLUTION INTRAMUSCULAR; INTRAVENOUS; SUBCUTANEOUS
Status: CANCELLED | OUTPATIENT
Start: 2021-12-02

## 2021-11-11 RX ORDER — EPINEPHRINE 1 MG/ML
0.3 INJECTION, SOLUTION INTRAMUSCULAR; SUBCUTANEOUS EVERY 5 MIN PRN
Status: CANCELLED | OUTPATIENT
Start: 2021-11-11

## 2021-11-11 RX ORDER — LORAZEPAM 2 MG/ML
0.5 INJECTION INTRAMUSCULAR EVERY 4 HOURS PRN
Status: CANCELLED
Start: 2021-11-11

## 2021-11-11 RX ORDER — ALBUTEROL SULFATE 90 UG/1
1-2 AEROSOL, METERED RESPIRATORY (INHALATION)
Status: CANCELLED
Start: 2021-12-02

## 2021-11-11 RX ORDER — SODIUM CHLORIDE 9 MG/ML
1000 INJECTION, SOLUTION INTRAVENOUS CONTINUOUS PRN
Status: CANCELLED
Start: 2021-12-23

## 2021-11-11 RX ORDER — EPINEPHRINE 1 MG/ML
0.3 INJECTION, SOLUTION INTRAMUSCULAR; SUBCUTANEOUS EVERY 5 MIN PRN
Status: CANCELLED | OUTPATIENT
Start: 2021-12-23

## 2021-11-11 RX ORDER — DIPHENHYDRAMINE HYDROCHLORIDE 50 MG/ML
50 INJECTION INTRAMUSCULAR; INTRAVENOUS
Status: CANCELLED
Start: 2021-12-02

## 2021-11-11 RX ORDER — ALBUTEROL SULFATE 0.83 MG/ML
2.5 SOLUTION RESPIRATORY (INHALATION)
Status: CANCELLED | OUTPATIENT
Start: 2021-12-02

## 2021-11-11 RX ORDER — MEPERIDINE HYDROCHLORIDE 25 MG/ML
25 INJECTION INTRAMUSCULAR; INTRAVENOUS; SUBCUTANEOUS EVERY 30 MIN PRN
Status: CANCELLED | OUTPATIENT
Start: 2021-12-02

## 2021-11-11 RX ORDER — METHYLPREDNISOLONE SODIUM SUCCINATE 125 MG/2ML
125 INJECTION, POWDER, LYOPHILIZED, FOR SOLUTION INTRAMUSCULAR; INTRAVENOUS
Status: CANCELLED
Start: 2021-11-11

## 2021-11-11 RX ORDER — DIPHENHYDRAMINE HYDROCHLORIDE 50 MG/ML
50 INJECTION INTRAMUSCULAR; INTRAVENOUS
Status: CANCELLED
Start: 2021-11-11

## 2021-11-11 RX ORDER — EPINEPHRINE 1 MG/ML
0.3 INJECTION, SOLUTION INTRAMUSCULAR; SUBCUTANEOUS EVERY 5 MIN PRN
Status: CANCELLED | OUTPATIENT
Start: 2021-12-02

## 2021-11-11 RX ORDER — ALBUTEROL SULFATE 90 UG/1
1-2 AEROSOL, METERED RESPIRATORY (INHALATION)
Status: CANCELLED
Start: 2021-11-11

## 2021-11-11 RX ORDER — ALBUTEROL SULFATE 0.83 MG/ML
2.5 SOLUTION RESPIRATORY (INHALATION)
Status: CANCELLED | OUTPATIENT
Start: 2021-12-23

## 2021-11-11 RX ORDER — ALBUTEROL SULFATE 0.83 MG/ML
2.5 SOLUTION RESPIRATORY (INHALATION)
Status: CANCELLED | OUTPATIENT
Start: 2021-11-11

## 2021-11-11 RX ORDER — MEPERIDINE HYDROCHLORIDE 25 MG/ML
25 INJECTION INTRAMUSCULAR; INTRAVENOUS; SUBCUTANEOUS EVERY 30 MIN PRN
Status: CANCELLED | OUTPATIENT
Start: 2021-12-23

## 2021-11-11 RX ORDER — METHYLPREDNISOLONE SODIUM SUCCINATE 125 MG/2ML
125 INJECTION, POWDER, LYOPHILIZED, FOR SOLUTION INTRAMUSCULAR; INTRAVENOUS
Status: CANCELLED
Start: 2021-12-02

## 2021-11-11 RX ORDER — MEPERIDINE HYDROCHLORIDE 25 MG/ML
25 INJECTION INTRAMUSCULAR; INTRAVENOUS; SUBCUTANEOUS EVERY 30 MIN PRN
Status: CANCELLED | OUTPATIENT
Start: 2021-11-11

## 2021-11-11 RX ORDER — LORAZEPAM 2 MG/ML
0.5 INJECTION INTRAMUSCULAR EVERY 4 HOURS PRN
Status: CANCELLED
Start: 2021-12-23

## 2021-11-11 RX ORDER — NALOXONE HYDROCHLORIDE 0.4 MG/ML
.1-.4 INJECTION, SOLUTION INTRAMUSCULAR; INTRAVENOUS; SUBCUTANEOUS
Status: CANCELLED | OUTPATIENT
Start: 2021-11-11

## 2021-11-11 RX ORDER — ALBUTEROL SULFATE 90 UG/1
1-2 AEROSOL, METERED RESPIRATORY (INHALATION)
Status: CANCELLED
Start: 2021-12-23

## 2021-11-11 RX ORDER — SODIUM CHLORIDE 9 MG/ML
1000 INJECTION, SOLUTION INTRAVENOUS CONTINUOUS PRN
Status: CANCELLED
Start: 2021-12-02

## 2021-11-11 RX ADMIN — SODIUM CHLORIDE 200 MG: 9 INJECTION, SOLUTION INTRAVENOUS at 14:24

## 2021-11-11 RX ADMIN — SODIUM CHLORIDE 250 ML: 9 INJECTION, SOLUTION INTRAVENOUS at 14:24

## 2021-11-11 NOTE — PROGRESS NOTES
Newton is a 76 year old who is being evaluated via a billable video visit.      How would you like to obtain your AVS? [employer]  If the video visit is dropped, the invitation should be resent by: Text to cell phone: [phone]  Will anyone else be joining your video visit? No

## 2021-11-11 NOTE — LETTER
"    11/11/2021         RE: Newton Buchanan  [id number]  [place] [address]        Dear Colleague,    Thank you for referring your patient, Newton Buchanan, to the [facility]. Please see a copy of my visit note below.    Newton is a 76 year old who is being evaluated via a billable video visit.      How would you like to obtain your AVS? MyChart  If the video visit is dropped, the invitation should be resent by: Text to cell phone: [phone]  Will anyone else be joining your video visit? No  {If patient encounters technical issues they should call [phone] :[id number]}    Video Start Time: {video visit start/end time for provider to select:429158}  Video-Visit Details    Type of service:  Video Visit    Video End Time:{video visit start/end time for provider to select:033469}    Originating Location (pt. 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 FNAs benign. Followed.   -2/2020: progressive right neck swelling, no other symptoms.   -2/11/20 FNA biosy right neck mass: metastatic squamous cell cancer, HPV16+  -PET: hypermetabolic right cervical adenopathy, numerous bilateral lung mets, right hilar nodes.   -No evident primary site identified, but highly suspicious of H/N given IHC staining and radiographical findings/spread  High PD-1 70% on biopsy    Treatment:  5/2020 - present: Keytruda (initiated in [place], then transferred care to [place])  Patient traveled back in [facility] between [place] and [place]    Did receive XRT to " right neck 12/20.   Patient in [place] 4/21 to help sell home.  PET/CT 4/21 showed stable/improved disease.   PET/CT 7/21 -stable/improved disease  PET-10/10/2021-stable disease      Interval History:  Patient reports he sold his house in [place] is permanently living in [place] now  Reports feeling well.  Reports he has been tolerating Keytruda well.  Denies fever chills sweats cough shortness of breath chest pain nausea vomiting diarrhea abdominal pain bleeding    Review of Systems:  14 point ROS of systems including Constitutional, Eyes, Respiratory, Cardiovascular, Gastroenterology, Genitourinary, Integumentary, Muscularskeletal, Psychiatric were all negative except for pertinent positives noted in my HPI.      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   PET/CT 7/2021-stable/improved disease  1010/2021-PET scan  stable  He has been tolerating Keytruda well.  Patient is clinically doing well  Patient was getting treatment here and in [place]  He is permanently back in [place]  Continue with Keytruda every 3 weeks  Schedule appointment in January with covering MD for Dr. Chen- to reestablish care with a new primary oncologist        Call clinic with any changes in health condition or questions        GERALD El [facility]     Chart documentation with Dragon Voice recognition Software. Although reviewed after completion, some words and grammatical errors may remain.            Again, thank you for allowing me to participate in the care of your patient.         Sincerely,        GERALD El CNP

## 2021-11-11 NOTE — PROGRESS NOTES
"The patient has been notified of following:      \"This telephone visit will be conducted via a call between you and your physician/provider. We have found that certain health care needs can be provided without the need for a physical exam.  This service lets us provide the care you need with a short phone conversation during the COVID-19 pandemic.  If a prescription is necessary we can send it directly to your pharmacy.  If lab work is needed we can place an order for that and you can then stop by our lab to have the test done at a later time.     Telephone visits are billed at different rates depending on your insurance coverage. During this emergency period, for some insurers they may be billed the same as an in-person visit.  Please reach out to your insurance provider with any questions.     If during the course of the call the physician/provider feels a telephone visit is not appropriate, you will not be charged for this service.\"     Patient has given verbal consent for Telephone visit?  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 FNAs benign. Followed.   -2/2020: progressive right neck swelling, no other symptoms.   -2/11/20 FNA biosy right neck mass: metastatic squamous cell cancer, HPV16+  -PET: hypermetabolic right cervical adenopathy, numerous bilateral lung mets, right hilar nodes.   -No evident primary site identified, but highly suspicious of H/N given IHC staining and radiographical findings/spread  High PD-1 70% on biopsy    Treatment:  5/2020 - present: Keytruda (initiated in [place], then transferred care to [place])  Patient traveled back in [facility] between [place] and " [place]    Did receive XRT to right neck 12/20.   Patient in [place] 4/21 to help sell home.  PET/CT 4/21 showed stable/improved disease.   PET/CT 7/21 -stable/improved disease  PET-10/10/2021-stable disease      Interval History:  Patient reports he sold his house in [place] is permanently living in [place] now  Reports feeling well.  Reports he has been tolerating Keytruda well.  Denies fever chills sweats cough shortness of breath chest pain nausea vomiting diarrhea abdominal pain bleeding    Review of Systems:  14 point ROS of systems including Constitutional, Eyes, Respiratory, Cardiovascular, Gastroenterology, Genitourinary, Integumentary, Muscularskeletal, Psychiatric were all negative except for pertinent positives noted in my HPI.      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   PET/CT 7/2021-stable/improved disease  1010/2021-PET scan  stable  He has been tolerating Keytruda well.  Patient is clinically doing well  Patient was getting treatment here and in [place]  He is permanently back in [place]  Continue with Keytruda every 3 weeks  Schedule appointment in January with covering MD for Dr. Chen- to reestablish care with a new primary oncologist        Call clinic with any changes in health condition or questions        GERALD El CNP  [facility]     Chart documentation with Dragon Voice recognition Software. Although reviewed after completion, some words and grammatical errors may remain.

## 2021-11-11 NOTE — LETTER
"    11/11/2021         RE: Newton Buchanan  [address]  [place] [address]        Dear Colleague,    Thank you for referring your patient, Newton Buchanan, to the [facility]. Please see a copy of my visit note below.    Newton is a 76 year old who is being evaluated via a billable video visit.      How would you like to obtain your AVS? MyChart  If the video visit is dropped, the invitation should be resent by: Text to cell phone: [phone]  Will anyone else be joining your video visit? No  {If patient encounters technical issues they should call [phone] :[id number]}    Video Start Time: {video visit start/end time for provider to select:988576}  Video-Visit Details    Type of service:  Video Visit    Video End Time:{video visit start/end time for provider to select:370881}    Originating Location (pt. 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 FNAs benign. Followed.   -2/2020: progressive right neck swelling, no other symptoms.   -2/11/20 FNA biosy right neck mass: metastatic squamous cell cancer, HPV16+  -PET: hypermetabolic right cervical adenopathy, numerous bilateral lung mets, right hilar nodes.   -No evident primary site identified, but highly suspicious of H/N given IHC staining and radiographical findings/spread  High PD-1 70% on biopsy    Treatment:  5/2020 - present: Keytruda (initiated in [place], then transferred care to [place])  Patient traveled back in [facility] between [place] and [place]    Did receive XRT to " right neck 12/20.   Patient in [place] 4/21 to help sell home.  PET/CT 4/21 showed stable/improved disease.   PET/CT 7/21 -stable/improved disease  PET-10/10/2021-stable disease      Interval History:  Patient reports he sold his house in [place] is permanently living in [place] now  Reports feeling well.  Reports he has been tolerating Keytruda well.  Denies fever chills sweats cough shortness of breath chest pain nausea vomiting diarrhea abdominal pain bleeding    Review of Systems:  14 point ROS of systems including Constitutional, Eyes, Respiratory, Cardiovascular, Gastroenterology, Genitourinary, Integumentary, Muscularskeletal, Psychiatric were all negative except for pertinent positives noted in my HPI.      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   PET/CT 7/2021-stable/improved disease  1010/2021-PET scan  stable  He has been tolerating Keytruda well.  Patient is clinically doing well  Patient was getting treatment here and in [place]  He is permanently back in [place]  Continue with Keytruda every 3 weeks  Schedule appointment in January with covering MD for Dr. Chen- to reestablish care with a new primary oncologist        Call clinic with any changes in health condition or questions        GERALD El [facility]     Chart documentation with Dragon Voice recognition Software. Although reviewed after completion, some words and grammatical errors may remain.            Again, thank you for allowing me to participate in the care of your patient.         Sincerely,        GERALD El CNP

## 2021-11-11 NOTE — PROGRESS NOTES
Infusion Nursing Note:  Newton Buchanan presents today for C20 D1 Keytruda.    Patient seen by provider today: No   present during visit today: Not Applicable.    Note: Pt denies any current health concerns.      Intravenous Access:  Peripheral IV placed.    Treatment Conditions:  Lab Results   Component Value Date     11/10/2021    POTASSIUM 4.0 11/10/2021    CR 1.02 11/10/2021    JÚNIOR 9.3 11/10/2021    BILITOTAL 0.7 11/10/2021    ALBUMIN 3.4 11/10/2021    ALT 31 11/10/2021    AST 24 11/10/2021     Results reviewed, labs MET treatment parameters, ok to proceed with treatment.      Post Infusion Assessment:  Patient tolerated infusion without incident.  Blood return noted pre and post infusion.  No evidence of extravasations.  Access discontinued per protocol.       Discharge Plan:   Discharge instructions reviewed with: Patient.  Patient and/or family verbalized understanding of discharge instructions and all questions answered.  Patient discharged in stable condition accompanied by: self.  Departure Mode: Ambulatory.      Miley Escobar RN

## 2021-12-02 ENCOUNTER — INFUSION THERAPY VISIT (OUTPATIENT)
Dept: INFUSION THERAPY | Facility: CLINIC | Age: 76
End: 2021-12-02
Attending: NURSE PRACTITIONER
Payer: MEDICARE

## 2021-12-02 ENCOUNTER — LAB (OUTPATIENT)
Dept: LAB | Facility: CLINIC | Age: 76
End: 2021-12-02
Payer: MEDICARE

## 2021-12-02 VITALS — TEMPERATURE: 98.2 F | SYSTOLIC BLOOD PRESSURE: 152 MMHG | DIASTOLIC BLOOD PRESSURE: 76 MMHG

## 2021-12-02 DIAGNOSIS — C76.0 HEAD AND NECK CANCER (H): Primary | ICD-10-CM

## 2021-12-02 LAB
ALBUMIN SERPL-MCNC: 3.3 G/DL (ref 3.4–5)
ALP SERPL-CCNC: 52 U/L (ref 40–150)
ALT SERPL W P-5'-P-CCNC: 29 U/L (ref 0–70)
ANION GAP SERPL CALCULATED.3IONS-SCNC: 3 MMOL/L (ref 3–14)
AST SERPL W P-5'-P-CCNC: 20 U/L (ref 0–45)
BILIRUB SERPL-MCNC: 0.7 MG/DL (ref 0.2–1.3)
BUN SERPL-MCNC: 13 MG/DL (ref 7–30)
CALCIUM SERPL-MCNC: 9.2 MG/DL (ref 8.5–10.1)
CHLORIDE BLD-SCNC: 110 MMOL/L (ref 94–109)
CO2 SERPL-SCNC: 30 MMOL/L (ref 20–32)
CREAT SERPL-MCNC: 0.84 MG/DL (ref 0.66–1.25)
GFR SERPL CREATININE-BSD FRML MDRD: 86 ML/MIN/1.73M2
GLUCOSE BLD-MCNC: 88 MG/DL (ref 70–99)
HOLD SPECIMEN: NORMAL
POTASSIUM BLD-SCNC: 4.1 MMOL/L (ref 3.4–5.3)
PROT SERPL-MCNC: 7 G/DL (ref 6.8–8.8)
SODIUM SERPL-SCNC: 143 MMOL/L (ref 133–144)
TSH SERPL DL<=0.005 MIU/L-ACNC: 3.23 MU/L (ref 0.4–4)

## 2021-12-02 PROCEDURE — 250N000011 HC RX IP 250 OP 636: Performed by: NURSE PRACTITIONER

## 2021-12-02 PROCEDURE — 96413 CHEMO IV INFUSION 1 HR: CPT

## 2021-12-02 PROCEDURE — 84443 ASSAY THYROID STIM HORMONE: CPT | Performed by: NURSE PRACTITIONER

## 2021-12-02 PROCEDURE — 258N000003 HC RX IP 258 OP 636: Performed by: NURSE PRACTITIONER

## 2021-12-02 PROCEDURE — 36415 COLL VENOUS BLD VENIPUNCTURE: CPT | Performed by: NURSE PRACTITIONER

## 2021-12-02 PROCEDURE — 82040 ASSAY OF SERUM ALBUMIN: CPT | Performed by: NURSE PRACTITIONER

## 2021-12-02 RX ADMIN — SODIUM CHLORIDE 200 MG: 9 INJECTION, SOLUTION INTRAVENOUS at 09:14

## 2021-12-02 RX ADMIN — SODIUM CHLORIDE 250 ML: 9 INJECTION, SOLUTION INTRAVENOUS at 09:14

## 2021-12-02 NOTE — PROGRESS NOTES
Infusion Nursing Note:  Newton Buchanan presents today for Keytruda.    Patient seen by provider today: No   present during visit today: Not Applicable.    Note: N/A.      Intravenous Access:  Peripheral IV placed.    Treatment Conditions:  Lab Results   Component Value Date     12/02/2021    POTASSIUM 4.1 12/02/2021    CR 0.84 12/02/2021    JÚNIOR 9.2 12/02/2021    BILITOTAL 0.7 12/02/2021    ALBUMIN 3.3 (L) 12/02/2021    ALT 29 12/02/2021    AST 20 12/02/2021     Results reviewed, labs MET treatment parameters, ok to proceed with treatment.      Post Infusion Assessment:  Patient tolerated infusion without incident.  Blood return noted pre and post infusion.  Site patent and intact, free from redness, edema or discomfort.  No evidence of extravasations.  Access discontinued per protocol.       Discharge Plan:   Copy of AVS reviewed with patient and/or family.  Patient will return 12/23/21 for next appointment.  Patient discharged in stable condition accompanied by: self.  Departure Mode: Ambulatory.      Violet Harris RN

## 2021-12-23 ENCOUNTER — LAB (OUTPATIENT)
Dept: LAB | Facility: CLINIC | Age: 76
End: 2021-12-23
Payer: MEDICARE

## 2021-12-23 ENCOUNTER — VIRTUAL VISIT (OUTPATIENT)
Dept: ONCOLOGY | Facility: CLINIC | Age: 76
End: 2021-12-23
Attending: NURSE PRACTITIONER
Payer: MEDICARE

## 2021-12-23 ENCOUNTER — INFUSION THERAPY VISIT (OUTPATIENT)
Dept: INFUSION THERAPY | Facility: CLINIC | Age: 76
End: 2021-12-23
Attending: NURSE PRACTITIONER
Payer: MEDICARE

## 2021-12-23 VITALS
BODY MASS INDEX: 26.35 KG/M2 | WEIGHT: 192.6 LBS | SYSTOLIC BLOOD PRESSURE: 157 MMHG | DIASTOLIC BLOOD PRESSURE: 78 MMHG | TEMPERATURE: 98.2 F

## 2021-12-23 DIAGNOSIS — C76.0 HEAD AND NECK CANCER (H): Primary | ICD-10-CM

## 2021-12-23 DIAGNOSIS — C10.9 SQUAMOUS CELL CARCINOMA OF OROPHARYNX (H): ICD-10-CM

## 2021-12-23 LAB
ALBUMIN SERPL-MCNC: 3.4 G/DL (ref 3.4–5)
ALP SERPL-CCNC: 52 U/L (ref 40–150)
ALT SERPL W P-5'-P-CCNC: 31 U/L (ref 0–70)
ANION GAP SERPL CALCULATED.3IONS-SCNC: 5 MMOL/L (ref 3–14)
AST SERPL W P-5'-P-CCNC: 18 U/L (ref 0–45)
BILIRUB SERPL-MCNC: 0.7 MG/DL (ref 0.2–1.3)
BUN SERPL-MCNC: 17 MG/DL (ref 7–30)
CALCIUM SERPL-MCNC: 9.4 MG/DL (ref 8.5–10.1)
CHLORIDE BLD-SCNC: 110 MMOL/L (ref 94–109)
CO2 SERPL-SCNC: 26 MMOL/L (ref 20–32)
CREAT SERPL-MCNC: 0.85 MG/DL (ref 0.66–1.25)
GFR SERPL CREATININE-BSD FRML MDRD: >90 ML/MIN/1.73M2
GLUCOSE BLD-MCNC: 92 MG/DL (ref 70–99)
HOLD SPECIMEN: NORMAL
POTASSIUM BLD-SCNC: 4.2 MMOL/L (ref 3.4–5.3)
PROT SERPL-MCNC: 7.3 G/DL (ref 6.8–8.8)
SODIUM SERPL-SCNC: 141 MMOL/L (ref 133–144)
TSH SERPL DL<=0.005 MIU/L-ACNC: 1.93 MU/L (ref 0.4–4)

## 2021-12-23 PROCEDURE — 99442 PR PHYSICIAN TELEPHONE EVALUATION 11-20 MIN: CPT | Mod: 95 | Performed by: NURSE PRACTITIONER

## 2021-12-23 PROCEDURE — 84443 ASSAY THYROID STIM HORMONE: CPT | Performed by: NURSE PRACTITIONER

## 2021-12-23 PROCEDURE — 36415 COLL VENOUS BLD VENIPUNCTURE: CPT | Performed by: NURSE PRACTITIONER

## 2021-12-23 PROCEDURE — 258N000003 HC RX IP 258 OP 636: Performed by: NURSE PRACTITIONER

## 2021-12-23 PROCEDURE — 250N000011 HC RX IP 250 OP 636: Performed by: NURSE PRACTITIONER

## 2021-12-23 PROCEDURE — 96413 CHEMO IV INFUSION 1 HR: CPT

## 2021-12-23 PROCEDURE — 82040 ASSAY OF SERUM ALBUMIN: CPT | Performed by: NURSE PRACTITIONER

## 2021-12-23 RX ADMIN — SODIUM CHLORIDE 200 MG: 9 INJECTION, SOLUTION INTRAVENOUS at 13:14

## 2021-12-23 RX ADMIN — SODIUM CHLORIDE 250 ML: 9 INJECTION, SOLUTION INTRAVENOUS at 13:15

## 2021-12-23 NOTE — LETTER
"    12/23/2021         RE: Newton Buchanan  [address]  [place] [address]        Dear Colleague,    Thank you for referring your patient, Newton Buchanan, to the [facility]. Please see a copy of my visit note below.    Newton is a 76 year old who is being evaluated via a billable video visit.      How would you like to obtain your AVS? MyChart  If the video visit is dropped, the invitation should be resent by: Text to cell phone: [phone]  Will anyone else be joining your video visit? Jami DANG        The patient has been notified of following:      \"This telephone visit will be conducted via a call between you and your physician/provider. We have found that certain health care needs can be provided without the need for a physical exam.  This service lets us provide the care you need with a short phone conversation during the COVID-19 pandemic.  If a prescription is necessary we can send it directly to your pharmacy.  If lab work is needed we can place an order for that and you can then stop by our lab to have the test done at a later time.     Telephone visits are billed at different rates depending on your insurance coverage. During this emergency period, for some insurers they may be billed the same as an in-person visit.  Please reach out to your insurance provider with any questions.     If during the course of the call the physician/provider feels a telephone visit is not appropriate, you will not be charged for this service.\"     Patient has given verbal consent for Telephone visit?  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 FNAs benign. " Followed.   -2/2020: progressive right neck swelling, no other symptoms.   -2/11/20 FNA biosy right neck mass: metastatic squamous cell cancer, HPV16+  -PET: hypermetabolic right cervical adenopathy, numerous bilateral lung mets, right hilar nodes.   -No evident primary site identified, but highly suspicious of H/N given IHC staining and radiographical findings/spread  High PD-1 70% on biopsy    Treatment:  5/2020 - present: Keytruda (initiated in [place], then transferred care to [place])  Patient traveled back in [facility] between [place] and [place]    Did receive XRT to right neck 12/20.   Patient in [place] 4/21 to help sell home.  PET/CT 4/21 showed stable/improved disease.   PET/CT 7/21 -stable/improved disease  PET-10/10/2021-stable disease      Interval History:  Patient reports he continues to feel well.  Denies adenopathy denies dysphagia denies fever chills sweats cough shortness of breath chest pain nausea vomiting diarrhea abdominal pain or bleeding-.  Overall reports feeling well      Review of Systems:  14 point ROS of systems including Constitutional, Eyes, Respiratory, Cardiovascular, Gastroenterology, Genitourinary, Integumentary, Muscularskeletal, Psychiatric were all negative except for pertinent positives noted in my HPI.      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   PET/CT 7/2021-stable/improved disease  10/10/2021-PET scan -stable  He has been tolerating Keytruda well.  Patient is clinically doing well  Patient was getting  treatment here and in [place]  He is permanently back in [place]  Continue with Keytruda every 3 weeks  Schedule appointment in January with covering MD for Dr. Chen- to reestablish care with a new primary oncologist  -Schedule for PET scan in January      Call clinic with any changes in health condition or questions        GERALD El CNP  [facility]     Chart documentation with Dragon Voice recognition Software. Although reviewed after completion, some words and grammatical errors may remain.            Again, thank you for allowing me to participate in the care of your patient.        Sincerely,        GERALD El CNP

## 2021-12-23 NOTE — LETTER
"    12/23/2021         RE: Newton Buchaann  [address]  [place] [address]        Dear Colleague,    Thank you for referring your patient, Newton Buchanan, to the [facility]. Please see a copy of my visit note below.    Newton is a 76 year old who is being evaluated via a billable video visit.      How would you like to obtain your AVS? MyChart  If the video visit is dropped, the invitation should be resent by: Text to cell phone: [phone]  Will anyone else be joining your video visit? Jami DANG        The patient has been notified of following:      \"This telephone visit will be conducted via a call between you and your physician/provider. We have found that certain health care needs can be provided without the need for a physical exam.  This service lets us provide the care you need with a short phone conversation during the COVID-19 pandemic.  If a prescription is necessary we can send it directly to your pharmacy.  If lab work is needed we can place an order for that and you can then stop by our lab to have the test done at a later time.     Telephone visits are billed at different rates depending on your insurance coverage. During this emergency period, for some insurers they may be billed the same as an in-person visit.  Please reach out to your insurance provider with any questions.     If during the course of the call the physician/provider feels a telephone visit is not appropriate, you will not be charged for this service.\"     Patient has given verbal consent for Telephone visit?  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 FNAs benign. " Followed.   -2/2020: progressive right neck swelling, no other symptoms.   -2/11/20 FNA biosy right neck mass: metastatic squamous cell cancer, HPV16+  -PET: hypermetabolic right cervical adenopathy, numerous bilateral lung mets, right hilar nodes.   -No evident primary site identified, but highly suspicious of H/N given IHC staining and radiographical findings/spread  High PD-1 70% on biopsy    Treatment:  5/2020 - present: Keytruda (initiated in [place], then transferred care to [place])  Patient traveled back in [facility] between [place] and [place]    Did receive XRT to right neck 12/20.   Patient in [place] 4/21 to help sell home.  PET/CT 4/21 showed stable/improved disease.   PET/CT 7/21 -stable/improved disease  PET-10/10/2021-stable disease      Interval History:  Patient reports he continues to feel well.  Denies adenopathy denies dysphagia denies fever chills sweats cough shortness of breath chest pain nausea vomiting diarrhea abdominal pain or bleeding-.  Overall reports feeling well      Review of Systems:  14 point ROS of systems including Constitutional, Eyes, Respiratory, Cardiovascular, Gastroenterology, Genitourinary, Integumentary, Muscularskeletal, Psychiatric were all negative except for pertinent positives noted in my HPI.      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   PET/CT 7/2021-stable/improved disease  10/10/2021-PET scan -stable  He has been tolerating Keytruda well.  Patient is clinically doing well  Patient was getting  treatment here and in [place]  He is permanently back in [place]  Continue with Keytruda every 3 weeks  Schedule appointment in January with covering MD for Dr. Chen- to reestablish care with a new primary oncologist  -Schedule for PET scan in January      Call clinic with any changes in health condition or questions        GERALD El CNP  [facility]     Chart documentation with Dragon Voice recognition Software. Although reviewed after completion, some words and grammatical errors may remain.            Again, thank you for allowing me to participate in the care of your patient.        Sincerely,        GERALD El CNP

## 2021-12-23 NOTE — PROGRESS NOTES
Newton is a 76 year old who is being evaluated via a billable video visit.      How would you like to obtain your AVS? MyChart  If the video visit is dropped, the invitation should be resent by: Text to cell phone: [phone]  Will anyone else be joining your video visit? Jami DANG

## 2021-12-23 NOTE — PROGRESS NOTES
"The patient has been notified of following:      \"This telephone visit will be conducted via a call between you and your physician/provider. We have found that certain health care needs can be provided without the need for a physical exam.  This service lets us provide the care you need with a short phone conversation during the COVID-19 pandemic.  If a prescription is necessary we can send it directly to your pharmacy.  If lab work is needed we can place an order for that and you can then stop by our lab to have the test done at a later time.     Telephone visits are billed at different rates depending on your insurance coverage. During this emergency period, for some insurers they may be billed the same as an in-person visit.  Please reach out to your insurance provider with any questions.     If during the course of the call the physician/provider feels a telephone visit is not appropriate, you will not be charged for this service.\"     Patient has given verbal consent for Telephone visit?  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 FNAs benign. Followed.   -2/2020: progressive right neck swelling, no other symptoms.   -2/11/20 FNA biosy right neck mass: metastatic squamous cell cancer, HPV16+  -PET: hypermetabolic right cervical adenopathy, numerous bilateral lung mets, right hilar nodes.   -No evident primary site identified, but highly suspicious of H/N given IHC staining and radiographical findings/spread  High PD-1 70% on biopsy    Treatment:  5/2020 - present: Keytruda (initiated in [place], then transferred care to [place])  Patient traveled back in [facility] between [place] and " [place]    Did receive XRT to right neck 12/20.   Patient in [place] 4/21 to help sell home.  PET/CT 4/21 showed stable/improved disease.   PET/CT 7/21 -stable/improved disease  PET-10/10/2021-stable disease      Interval History:  Patient reports he continues to feel well.  Denies adenopathy denies dysphagia denies fever chills sweats cough shortness of breath chest pain nausea vomiting diarrhea abdominal pain or bleeding-.  Overall reports feeling well      Review of Systems:  14 point ROS of systems including Constitutional, Eyes, Respiratory, Cardiovascular, Gastroenterology, Genitourinary, Integumentary, Muscularskeletal, Psychiatric were all negative except for pertinent positives noted in my HPI.      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   PET/CT 7/2021-stable/improved disease  10/10/2021-PET scan -stable  He has been tolerating Keytruda well.  Patient is clinically doing well  Patient was getting treatment here and in [place]  He is permanently back in [place]  Continue with Keytruda every 3 weeks  Schedule appointment in January with covering MD for Dr. Chen- to reestablish care with a new primary oncologist  -Schedule for PET scan in January      Call clinic with any changes in health condition or questions        GERALD El [facility] [place]     Chart documentation with Dragon Voice recognition Software. Although reviewed after completion, some words and grammatical errors may remain.

## 2021-12-23 NOTE — PROGRESS NOTES
Infusion Nursing Note:  Newotn Buchanan presents today for Keytruda.    Patient seen by provider today: No   present during visit today: Not Applicable.    Note: N/A.      Intravenous Access:  Peripheral IV placed.    Treatment Conditions:  Lab Results   Component Value Date     12/23/2021    POTASSIUM 4.2 12/23/2021    CR 0.85 12/23/2021    JÚNIOR 9.4 12/23/2021    BILITOTAL 0.7 12/23/2021    ALBUMIN 3.4 12/23/2021    ALT 31 12/23/2021    AST 18 12/23/2021     Results reviewed, labs MET treatment parameters, ok to proceed with treatment.      Post Infusion Assessment:  Patient tolerated infusion without incident.  Blood return noted pre and post infusion.  Site patent and intact, free from redness, edema or discomfort.  No evidence of extravasations.  Access discontinued per protocol.       Discharge Plan:   Copy of AVS reviewed with patient and/or family.  Patient will return 1/13/22 for next appointment.  Patient discharged in stable condition accompanied by: self.  Departure Mode: Ambulatory.      Violet Harris RN

## 2022-01-11 DIAGNOSIS — C76.0 HEAD AND NECK CANCER (H): Primary | ICD-10-CM

## 2022-01-11 RX ORDER — ALBUTEROL SULFATE 0.83 MG/ML
2.5 SOLUTION RESPIRATORY (INHALATION)
Status: CANCELLED | OUTPATIENT
Start: 2022-01-13

## 2022-01-11 RX ORDER — DIPHENHYDRAMINE HYDROCHLORIDE 50 MG/ML
50 INJECTION INTRAMUSCULAR; INTRAVENOUS
Status: CANCELLED
Start: 2022-01-13

## 2022-01-11 RX ORDER — EPINEPHRINE 1 MG/ML
0.3 INJECTION, SOLUTION INTRAMUSCULAR; SUBCUTANEOUS EVERY 5 MIN PRN
Status: CANCELLED | OUTPATIENT
Start: 2022-01-13

## 2022-01-11 RX ORDER — MEPERIDINE HYDROCHLORIDE 25 MG/ML
25 INJECTION INTRAMUSCULAR; INTRAVENOUS; SUBCUTANEOUS EVERY 30 MIN PRN
Status: CANCELLED | OUTPATIENT
Start: 2022-01-13

## 2022-01-11 RX ORDER — SODIUM CHLORIDE 9 MG/ML
1000 INJECTION, SOLUTION INTRAVENOUS CONTINUOUS PRN
Status: CANCELLED
Start: 2022-01-13

## 2022-01-11 RX ORDER — NALOXONE HYDROCHLORIDE 0.4 MG/ML
.1-.4 INJECTION, SOLUTION INTRAMUSCULAR; INTRAVENOUS; SUBCUTANEOUS
Status: CANCELLED | OUTPATIENT
Start: 2022-01-13

## 2022-01-11 RX ORDER — LORAZEPAM 2 MG/ML
0.5 INJECTION INTRAMUSCULAR EVERY 4 HOURS PRN
Status: CANCELLED
Start: 2022-01-13

## 2022-01-11 RX ORDER — METHYLPREDNISOLONE SODIUM SUCCINATE 125 MG/2ML
125 INJECTION, POWDER, LYOPHILIZED, FOR SOLUTION INTRAMUSCULAR; INTRAVENOUS
Status: CANCELLED
Start: 2022-01-13

## 2022-01-11 RX ORDER — ALBUTEROL SULFATE 90 UG/1
1-2 AEROSOL, METERED RESPIRATORY (INHALATION)
Status: CANCELLED
Start: 2022-01-13

## 2022-01-13 ENCOUNTER — INFUSION THERAPY VISIT (OUTPATIENT)
Dept: INFUSION THERAPY | Facility: CLINIC | Age: 77
End: 2022-01-13
Attending: NURSE PRACTITIONER
Payer: MEDICARE

## 2022-01-13 ENCOUNTER — APPOINTMENT (OUTPATIENT)
Dept: LAB | Facility: CLINIC | Age: 77
End: 2022-01-13
Payer: MEDICARE

## 2022-01-13 VITALS — TEMPERATURE: 98.4 F | DIASTOLIC BLOOD PRESSURE: 76 MMHG | SYSTOLIC BLOOD PRESSURE: 138 MMHG | HEART RATE: 56 BPM

## 2022-01-13 DIAGNOSIS — C76.0 HEAD AND NECK CANCER (H): Primary | ICD-10-CM

## 2022-01-13 LAB
ALBUMIN SERPL-MCNC: 3.4 G/DL (ref 3.4–5)
ALP SERPL-CCNC: 52 U/L (ref 40–150)
ALT SERPL W P-5'-P-CCNC: 34 U/L (ref 0–70)
ANION GAP SERPL CALCULATED.3IONS-SCNC: 4 MMOL/L (ref 3–14)
AST SERPL W P-5'-P-CCNC: 18 U/L (ref 0–45)
BILIRUB SERPL-MCNC: 0.7 MG/DL (ref 0.2–1.3)
BUN SERPL-MCNC: 16 MG/DL (ref 7–30)
CALCIUM SERPL-MCNC: 9.3 MG/DL (ref 8.5–10.1)
CHLORIDE BLD-SCNC: 105 MMOL/L (ref 94–109)
CO2 SERPL-SCNC: 30 MMOL/L (ref 20–32)
CREAT SERPL-MCNC: 1.03 MG/DL (ref 0.66–1.25)
GFR SERPL CREATININE-BSD FRML MDRD: 76 ML/MIN/1.73M2
GLUCOSE BLD-MCNC: 93 MG/DL (ref 70–99)
HOLD SPECIMEN: NORMAL
POTASSIUM BLD-SCNC: 4.1 MMOL/L (ref 3.4–5.3)
PROT SERPL-MCNC: 6.9 G/DL (ref 6.8–8.8)
SODIUM SERPL-SCNC: 139 MMOL/L (ref 133–144)
TSH SERPL DL<=0.005 MIU/L-ACNC: 2.51 MU/L (ref 0.4–4)

## 2022-01-13 PROCEDURE — 36415 COLL VENOUS BLD VENIPUNCTURE: CPT | Performed by: NURSE PRACTITIONER

## 2022-01-13 PROCEDURE — 96413 CHEMO IV INFUSION 1 HR: CPT

## 2022-01-13 PROCEDURE — 250N000011 HC RX IP 250 OP 636: Performed by: NURSE PRACTITIONER

## 2022-01-13 PROCEDURE — 84443 ASSAY THYROID STIM HORMONE: CPT | Performed by: NURSE PRACTITIONER

## 2022-01-13 PROCEDURE — 80053 COMPREHEN METABOLIC PANEL: CPT | Performed by: NURSE PRACTITIONER

## 2022-01-13 PROCEDURE — 258N000003 HC RX IP 258 OP 636: Performed by: NURSE PRACTITIONER

## 2022-01-13 RX ADMIN — SODIUM CHLORIDE 250 ML: 9 INJECTION, SOLUTION INTRAVENOUS at 13:59

## 2022-01-13 RX ADMIN — SODIUM CHLORIDE 200 MG: 9 INJECTION, SOLUTION INTRAVENOUS at 13:58

## 2022-01-13 NOTE — PROGRESS NOTES
Infusion Nursing Note:  Newton Buchanan presents today for Keytruda C20D1.    Patient seen by provider today: No   present during visit today: Not Applicable.    Note: N/A.    Intravenous Access:  Peripheral IV placed.    Treatment Conditions:  Lab Results   Component Value Date     01/13/2022    POTASSIUM 4.1 01/13/2022    CR 1.03 01/13/2022    JÚNIOR 9.3 01/13/2022    BILITOTAL 0.7 01/13/2022    ALBUMIN 3.4 01/13/2022    ALT 34 01/13/2022    AST 18 01/13/2022   Results reviewed, labs MET treatment parameters, ok to proceed with treatment.    Post Infusion Assessment:  Patient tolerated infusion without incident.  Blood return noted pre and post infusion.  Site patent and intact, free from redness, edema or discomfort.  No evidence of extravasations.  Access discontinued per protocol.     Discharge Plan:   Discharge instructions reviewed with: Patient.  Patient and/or family verbalized understanding of discharge instructions and all questions answered.  AVS to patient via LOANZT.  Patient will return 2/3/2022 for next appointment.   Patient discharged in stable condition accompanied by: self.  Departure Mode: Ambulatory.    Mercy Carvalho RN

## 2022-01-21 ASSESSMENT — SLEEP AND FATIGUE QUESTIONNAIRES
HOW LIKELY ARE YOU TO NOD OFF OR FALL ASLEEP WHILE SITTING QUIETLY AFTER LUNCH WITHOUT ALCOHOL: SLIGHT CHANCE OF DOZING
HOW LIKELY ARE YOU TO NOD OFF OR FALL ASLEEP WHILE LYING DOWN TO REST IN THE AFTERNOON WHEN CIRCUMSTANCES PERMIT: SLIGHT CHANCE OF DOZING
HOW LIKELY ARE YOU TO NOD OFF OR FALL ASLEEP WHILE SITTING AND READING: SLIGHT CHANCE OF DOZING
HOW LIKELY ARE YOU TO NOD OFF OR FALL ASLEEP WHILE SITTING INACTIVE IN A PUBLIC PLACE: SLIGHT CHANCE OF DOZING
HOW LIKELY ARE YOU TO NOD OFF OR FALL ASLEEP WHEN YOU ARE A PASSENGER IN A CAR FOR AN HOUR WITHOUT A BREAK: SLIGHT CHANCE OF DOZING
HOW LIKELY ARE YOU TO NOD OFF OR FALL ASLEEP WHILE WATCHING TV: WOULD NEVER DOZE
HOW LIKELY ARE YOU TO NOD OFF OR FALL ASLEEP WHILE SITTING AND TALKING TO SOMEONE: WOULD NEVER DOZE
HOW LIKELY ARE YOU TO NOD OFF OR FALL ASLEEP IN A CAR, WHILE STOPPED FOR A FEW MINUTES IN TRAFFIC: WOULD NEVER DOZE

## 2022-01-21 NOTE — PROGRESS NOTES
"Newton Buchanan is a 76 year old male who is being evaluated via a billable video visit.       The patient has been notified of following:      \"This video visit will be conducted via a call between you and your physician/provider. We have found that certain health care needs can be provided without the need for an in-person physical exam.  This service lets us provide the care you need with a video conversation.  If a prescription is necessary we can send it directly to your pharmacy.  If lab work is needed we can place an order for that and you can then stop by our lab to have the test done at a later time.     Video visits are billed at different rates depending on your insurance coverage.  Please reach out to your insurance provider with any questions.     If during the course of the call the physician/provider feels a video visit is not appropriate, you will not be charged for this service.\"     Patient has given verbal consent for Video visit? Yes  How would you like to obtain your AVS? Mail a copy  If you are dropped from the video visit, the video invite should be resent to: Text to cell phone: -  Will anyone else be joining your video visit? No  If patient encounters technical issues they should call [phone]      Video-Visit Details     Type of service:  Video Visit     Video Start Time: 3pm  Video End Time: 3:46 PM    Originating Location (pt. Location): Home     Distant Location (provider location):  [facility]      Platform used for Video Visit: [employer]    Virtual visit for severe obstructive sleep apnea managed with CPAP.     Assessment:  -Severe obstructive sleep apnea without sleep associated hypoxemia  -Comorbid oropharyngeal cancer     Plan:  -Appears to be well controlled with excellent compliance on CPAP auto titrate 5-12 cm H2O.  -We will place new CPAP supply order and otherwise plan to follow-up in 1 year.    SUBJECTIVE:  Newton Buchanan is a 76 year old " "male.    Past medical history of severe obstructive sleep apnea, oropharyngeal cancer, hypertension, prostate cancer.    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  Potential radiation exposure, but no known exposure to asbestos.     He was diagnosed with obstructive sleep apnea roughly 10 years ago.  Prior to moving back to [place], he follows with Dr. Palmer in [place].  He did have a repeat split-night sleep study performed November 15, 2017 with AHI 64.2, RDI 75.8, mean SPO2 94%, cristina SPO2 90%.  Treated with CPAP auto titrate 5-12 cm H2O.     Overall, he feels he continues to sleep very well and feels that his CPAP is a \"life saver\".  Uses on a nightly basis.  Denies any known residual snoring or observed apnea.     He denies any abnormal nocturnal behaviors, nocturnal injurious behaviors.     CPAP download was reviewed over the past 90 days.  Excellent compliance and average AHI appears to be between 2-3.    A/P to continue CPAP auto 5-12.    Today -overall, he is to do very well with his CPAP, he is sleeping well, feels well rested.  He was curious about the inspire device, we discussed that I would not have that behind my list given that he is doing well with CPAP, that based on his 27 PSG that his AHI is greater than 60, as well as his history of lung cancer and head neck cancer with need for repeat PET and CT scanning, with potential need for MRI scans in the future.    Reviewed most recent visits with oncology in regards to HPV+ tonsillar cancer with lung mets.  PET/CT x3 in 2021 with stable / improved disease.  Continue Keytruda.    CPAP download reviewed from 12/21/2021 - 1/19/2022 on auto-titrate 5-12 cm H2O.  Average daily usage 9 hours 29 minutes.  AHI 2.3.      Insomnia Severity Index    Difficulty falling asleep 0    Difficulty " staying asleep 2    Problems waking up too early 0    How SATISFIED/DISSATISFIED are you with your CURRENT sleep pattern? 2    How NOTICEABLE to others do you think your sleep problem is in terms of impairing the quality of your life? 1    How WORRIED/DISTRESSED are you about your current sleep problem? 1    To what extent do you consider your sleep problem to INTERFERE with your daily functioning (e.g. daytime fatigue, mood, ability to function at work/daily chores, concentration, memory, mood, etc.) CURRENTLY? 2    Total Score 8        Past medical history:    Patient Active Problem List    Diagnosis Date Noted     Oropharyngeal cancer (H)      Priority: Medium     Head and neck cancer (H) 02/11/2020     Priority: Medium     2/27/2020 [place] [facility] notes:Stage IV metastatic squamous cell carcinoma   There is no obvious primary on PET/CT but given CK7 +, P16 positivity, suspicion is high that origin is still of H&N (right cervical LAD) with metastatic spread to lung with bilateral multiple lung nodules.  We discussed that treatment would be palliative and not curative- goals would be to reduce tumor burden, decrease symptoms and improve quality of life.   10/26/2020  Transferred care to Dr. Gustavo CORRAL Oncology notes:Presumed  H&N (right cervical LAD) with metastatic spread to lung with bilateral multiple lung nodules. He is first-line palliative treatment with Keytruda was started when he was in [place] early May 2020.  1/18/21 Oncology notes Dr. Johns:He was treated with systemic immunotherapy with good response in the chest with local progression in the right neck.  He subsequently completed palliative radiation therapy to the right neck-OPX region (50 Gy in 20 fractions, 12/22/20).          Essential hypertension with goal blood pressure less than 140/90 06/08/2016     Priority: Medium     Gastroesophageal reflux disease without esophagitis 06/08/2016     Priority: Medium     CRYSTAL (obstructive sleep apnea)  06/08/2016     Priority: Medium     Uses CPAP.  Uses prn nasal sprays for nasal congestion at times so he can use his CPAP.       Prostate cancer (H) 06/08/2016     Priority: Medium     2008:Robotic prostate removal.  6/8/2016:He reports PSA has been oK.          10 point ROS of systems including Constitutional, Eyes, Respiratory, Cardiovascular, Gastroenterology, Genitourinary, Integumentary, Muscularskeletal, Psychiatric were all negative except for pertinent positives noted in my HPI.    Current Outpatient Medications   Medication Sig Dispense Refill     aspirin 81 MG tablet Take by mouth daily 30 tablet      fluticasone (FLONASE) 50 MCG/ACT nasal spray Spray 1-2 sprays in nostril       losartan (COZAAR) 25 MG tablet Take 1 tablet (25 mg) by mouth daily 90 tablet 3     pembrolizumab (KEYTRUDA) 25 MG/ML        pravastatin (PRAVACHOL) 20 MG tablet Take 20 mg by mouth daily        saccharomyces boulardii (FLORASTOR) 250 MG capsule Take 1 capsule by mouth 2 times daily       sildenafil (VIAGRA) 100 MG tablet Take by mouth daily as needed for erectile dysfunction 30 tablet      VITAMIN D, CHOLECALCIFEROL, PO Take 1,000 Units by mouth daily         OBJECTIVE:  There were no vitals taken for this visit.    Physical Exam     ---  This note was written with the assistance of the Dragon voice-dictation technology software. The final document, although reviewed, may contain errors. For corrections, please contact the office.    Marcial Blunt MD    Sleep Medicine  [facility]  ([phone])  [facility]  ([phone])    Time spent on the date of service:  16 minutes.

## 2022-01-24 ENCOUNTER — VIRTUAL VISIT (OUTPATIENT)
Dept: SLEEP MEDICINE | Facility: CLINIC | Age: 77
End: 2022-01-24
Payer: MEDICARE

## 2022-01-24 VITALS — HEIGHT: 72 IN | BODY MASS INDEX: 25.73 KG/M2 | WEIGHT: 190 LBS

## 2022-01-24 DIAGNOSIS — I10 ESSENTIAL HYPERTENSION WITH GOAL BLOOD PRESSURE LESS THAN 140/90: ICD-10-CM

## 2022-01-24 DIAGNOSIS — C10.9 OROPHARYNGEAL CANCER (H): ICD-10-CM

## 2022-01-24 DIAGNOSIS — G47.33 OSA (OBSTRUCTIVE SLEEP APNEA): Primary | ICD-10-CM

## 2022-01-24 PROCEDURE — 99213 OFFICE O/P EST LOW 20 MIN: CPT | Mod: 95 | Performed by: FAMILY MEDICINE

## 2022-01-24 ASSESSMENT — MIFFLIN-ST. JEOR: SCORE: 1634.83

## 2022-01-24 ASSESSMENT — PAIN SCALES - GENERAL: PAINLEVEL: NO PAIN (0)

## 2022-01-24 NOTE — PATIENT INSTRUCTIONS
Your BMI is Body mass index is 25.77 kg/m .  Weight management is a personal decision.  If you are interested in exploring weight loss strategies, the following discussion covers the approaches that may be successful. Body mass index (BMI) is one way to tell whether you are at a healthy weight, overweight, or obese. It measures your weight in relation to your height.  A BMI of 18.5 to 24.9 is in the healthy range. A person with a BMI of 25 to 29.9 is considered overweight, and someone with a BMI of 30 or greater is considered obese. More than two-thirds of American adults are considered overweight or obese.  Being overweight or obese increases the risk for further weight gain. Excess weight may lead to heart disease and diabetes.  Creating and following plans for healthy eating and physical activity may help you improve your health.  Weight control is part of healthy lifestyle and includes exercise, emotional health, and healthy eating habits. Careful eating habits lifelong are the mainstay of weight control. Though there are significant health benefits from weight loss, long-term weight loss with diet alone may be very difficult to achieve- studies show long-term success with dietary management in less than 10% of people. Attaining a healthy weight may be especially difficult to achieve in those with severe obesity. In some cases, medications, devices and surgical management might be considered.  What can you do?  If you are overweight or obese and are interested in methods for weight loss, you should discuss this with your provider.     Consider reducing daily calorie intake by 500 calories.     Keep a food journal.     Avoiding skipping meals, consider cutting portions instead.    Diet combined with exercise helps maintain muscle while optimizing fat loss. Strength training is particularly important for building and maintaining muscle mass. Exercise helps reduce stress, increase energy, and improves fitness.  Increasing exercise without diet control, however, may not burn enough calories to loose weight.       Start walking three days a week 10-20 minutes at a time    Work towards walking thirty minutes five days a week     Eventually, increase the speed of your walking for 1-2 minutes at time    And look into health and wellness programs that may be available through your health insurance provider, employer, local community center, or teresita club.

## 2022-01-24 NOTE — PROGRESS NOTES
"Newton is a 76 year old who is being evaluated via a billable video visit.      How would you like to obtain your AVS? MyChart  If the video visit is dropped, the invitation should be resent by: Text to cell phone: [phone]  Will anyone else be joining your video visit? No  {If patient encounters technical issues they should call [phone] :[id number]}    Video Start Time: {video visit start/end time for provider to select:152948}  Video-Visit Details    Type of service:  Video Visit    Video End Time:{video visit start/end time for provider to select:152948}    Originating Location (pt. Location): {video visit patient location:503930::\"Home\"}    Distant Location (provider location):  [facility]     Platform used for Video Visit: {Virtual Visit Platforms:591766::\"Dragonplay\"}  "

## 2022-01-27 ENCOUNTER — TELEPHONE (OUTPATIENT)
Dept: SLEEP MEDICINE | Facility: CLINIC | Age: 77
End: 2022-01-27
Payer: MEDICARE

## 2022-01-27 NOTE — PROGRESS NOTES
AVS MAILED, DME ORDERS FAXED TO [facility] AT [phone].

## 2022-01-31 ENCOUNTER — HOSPITAL ENCOUNTER (OUTPATIENT)
Dept: PET IMAGING | Facility: HOSPITAL | Age: 77
Discharge: HOME | End: 2022-01-31
Attending: NURSE PRACTITIONER | Admitting: NURSE PRACTITIONER
Payer: MEDICARE

## 2022-01-31 DIAGNOSIS — C76.0 HEAD AND NECK CANCER (H): ICD-10-CM

## 2022-01-31 DIAGNOSIS — C10.9 SQUAMOUS CELL CARCINOMA OF OROPHARYNX (H): ICD-10-CM

## 2022-01-31 LAB — GLUCOSE BLDC GLUCOMTR-MCNC: 92 MG/DL (ref 70–99)

## 2022-01-31 PROCEDURE — 82962 GLUCOSE BLOOD TEST: CPT | Mod: GZ

## 2022-01-31 PROCEDURE — G1004 CDSM NDSC: HCPCS | Mod: PS

## 2022-01-31 PROCEDURE — 78816 PET IMAGE W/CT FULL BODY: CPT | Mod: 26 | Performed by: RADIOLOGY

## 2022-01-31 PROCEDURE — G1004 CDSM NDSC: HCPCS | Mod: GC | Performed by: RADIOLOGY

## 2022-01-31 PROCEDURE — 343N000001 HC RX 343: Performed by: NURSE PRACTITIONER

## 2022-01-31 PROCEDURE — A9552 F18 FDG: HCPCS | Performed by: NURSE PRACTITIONER

## 2022-01-31 PROCEDURE — 74177 CT ABD & PELVIS W/CONTRAST: CPT | Mod: 26 | Performed by: RADIOLOGY

## 2022-01-31 PROCEDURE — 71260 CT THORAX DX C+: CPT | Mod: 26 | Performed by: RADIOLOGY

## 2022-01-31 RX ADMIN — FLUDEOXYGLUCOSE F-18 11.42 MCI.: 500 INJECTION, SOLUTION INTRAVENOUS at 10:05

## 2022-02-03 ENCOUNTER — INFUSION THERAPY VISIT (OUTPATIENT)
Dept: INFUSION THERAPY | Facility: CLINIC | Age: 77
End: 2022-02-03
Attending: INTERNAL MEDICINE
Payer: MEDICARE

## 2022-02-03 ENCOUNTER — ONCOLOGY VISIT (OUTPATIENT)
Dept: ONCOLOGY | Facility: CLINIC | Age: 77
End: 2022-02-03
Attending: INTERNAL MEDICINE
Payer: MEDICARE

## 2022-02-03 ENCOUNTER — LAB (OUTPATIENT)
Dept: LAB | Facility: CLINIC | Age: 77
End: 2022-02-03
Payer: MEDICARE

## 2022-02-03 VITALS
OXYGEN SATURATION: 98 % | TEMPERATURE: 97.9 F | WEIGHT: 195 LBS | BODY MASS INDEX: 26.45 KG/M2 | HEART RATE: 67 BPM | DIASTOLIC BLOOD PRESSURE: 76 MMHG | SYSTOLIC BLOOD PRESSURE: 155 MMHG | RESPIRATION RATE: 12 BRPM

## 2022-02-03 DIAGNOSIS — J35.8 TONSILLAR MASS: ICD-10-CM

## 2022-02-03 DIAGNOSIS — C76.0 HEAD AND NECK CANCER (H): Primary | ICD-10-CM

## 2022-02-03 DIAGNOSIS — C61 PROSTATE CANCER (H): ICD-10-CM

## 2022-02-03 DIAGNOSIS — C76.0 HEAD AND NECK CANCER (H): ICD-10-CM

## 2022-02-03 DIAGNOSIS — C10.9 SQUAMOUS CELL CARCINOMA OF OROPHARYNX (H): Primary | ICD-10-CM

## 2022-02-03 LAB
ALBUMIN SERPL-MCNC: 3.8 G/DL (ref 3.4–5)
ALP SERPL-CCNC: 49 U/L (ref 40–150)
ALT SERPL W P-5'-P-CCNC: 32 U/L (ref 0–70)
ANION GAP SERPL CALCULATED.3IONS-SCNC: 3 MMOL/L (ref 3–14)
AST SERPL W P-5'-P-CCNC: 19 U/L (ref 0–45)
BILIRUB SERPL-MCNC: 0.8 MG/DL (ref 0.2–1.3)
BUN SERPL-MCNC: 17 MG/DL (ref 7–30)
CALCIUM SERPL-MCNC: 9.4 MG/DL (ref 8.5–10.1)
CHLORIDE BLD-SCNC: 107 MMOL/L (ref 94–109)
CO2 SERPL-SCNC: 31 MMOL/L (ref 20–32)
CREAT SERPL-MCNC: 0.94 MG/DL (ref 0.66–1.25)
GFR SERPL CREATININE-BSD FRML MDRD: 85 ML/MIN/1.73M2
GLUCOSE BLD-MCNC: 102 MG/DL (ref 70–99)
POTASSIUM BLD-SCNC: 4.1 MMOL/L (ref 3.4–5.3)
PROT SERPL-MCNC: 7.2 G/DL (ref 6.8–8.8)
SODIUM SERPL-SCNC: 141 MMOL/L (ref 133–144)
TSH SERPL DL<=0.005 MIU/L-ACNC: 1.52 MU/L (ref 0.4–4)

## 2022-02-03 PROCEDURE — 258N000003 HC RX IP 258 OP 636: Performed by: INTERNAL MEDICINE

## 2022-02-03 PROCEDURE — 96413 CHEMO IV INFUSION 1 HR: CPT

## 2022-02-03 PROCEDURE — 84443 ASSAY THYROID STIM HORMONE: CPT | Performed by: INTERNAL MEDICINE

## 2022-02-03 PROCEDURE — 80053 COMPREHEN METABOLIC PANEL: CPT | Performed by: INTERNAL MEDICINE

## 2022-02-03 PROCEDURE — 99214 OFFICE O/P EST MOD 30 MIN: CPT | Performed by: INTERNAL MEDICINE

## 2022-02-03 PROCEDURE — 36415 COLL VENOUS BLD VENIPUNCTURE: CPT | Performed by: INTERNAL MEDICINE

## 2022-02-03 PROCEDURE — 84153 ASSAY OF PSA TOTAL: CPT

## 2022-02-03 PROCEDURE — G0463 HOSPITAL OUTPT CLINIC VISIT: HCPCS | Mod: 25

## 2022-02-03 PROCEDURE — 250N000011 HC RX IP 250 OP 636: Performed by: INTERNAL MEDICINE

## 2022-02-03 RX ORDER — ALBUTEROL SULFATE 0.83 MG/ML
2.5 SOLUTION RESPIRATORY (INHALATION)
Status: CANCELLED | OUTPATIENT
Start: 2022-02-03

## 2022-02-03 RX ORDER — EPINEPHRINE 1 MG/ML
0.3 INJECTION, SOLUTION, CONCENTRATE INTRAVENOUS EVERY 5 MIN PRN
Status: CANCELLED | OUTPATIENT
Start: 2022-06-16

## 2022-02-03 RX ORDER — EPINEPHRINE 1 MG/ML
0.3 INJECTION, SOLUTION, CONCENTRATE INTRAVENOUS EVERY 5 MIN PRN
Status: CANCELLED | OUTPATIENT
Start: 2022-02-03

## 2022-02-03 RX ORDER — NALOXONE HYDROCHLORIDE 0.4 MG/ML
.1-.4 INJECTION, SOLUTION INTRAMUSCULAR; INTRAVENOUS; SUBCUTANEOUS
Status: CANCELLED | OUTPATIENT
Start: 2022-02-03

## 2022-02-03 RX ORDER — MEPERIDINE HYDROCHLORIDE 25 MG/ML
25 INJECTION INTRAMUSCULAR; INTRAVENOUS; SUBCUTANEOUS EVERY 30 MIN PRN
Status: CANCELLED | OUTPATIENT
Start: 2022-05-26

## 2022-02-03 RX ORDER — MEPERIDINE HYDROCHLORIDE 25 MG/ML
25 INJECTION INTRAMUSCULAR; INTRAVENOUS; SUBCUTANEOUS EVERY 30 MIN PRN
Status: CANCELLED | OUTPATIENT
Start: 2022-02-03

## 2022-02-03 RX ORDER — LORAZEPAM 2 MG/ML
0.5 INJECTION INTRAMUSCULAR EVERY 4 HOURS PRN
Status: CANCELLED
Start: 2022-03-17

## 2022-02-03 RX ORDER — SODIUM CHLORIDE 9 MG/ML
1000 INJECTION, SOLUTION INTRAVENOUS CONTINUOUS PRN
Status: CANCELLED
Start: 2022-03-17

## 2022-02-03 RX ORDER — ALBUTEROL SULFATE 0.83 MG/ML
2.5 SOLUTION RESPIRATORY (INHALATION)
Status: CANCELLED | OUTPATIENT
Start: 2022-02-24

## 2022-02-03 RX ORDER — NALOXONE HYDROCHLORIDE 0.4 MG/ML
.1-.4 INJECTION, SOLUTION INTRAMUSCULAR; INTRAVENOUS; SUBCUTANEOUS
Status: CANCELLED | OUTPATIENT
Start: 2022-05-26

## 2022-02-03 RX ORDER — MEPERIDINE HYDROCHLORIDE 25 MG/ML
25 INJECTION INTRAMUSCULAR; INTRAVENOUS; SUBCUTANEOUS EVERY 30 MIN PRN
Status: CANCELLED | OUTPATIENT
Start: 2022-06-16

## 2022-02-03 RX ORDER — LORAZEPAM 2 MG/ML
0.5 INJECTION INTRAMUSCULAR EVERY 4 HOURS PRN
Status: CANCELLED
Start: 2022-05-26

## 2022-02-03 RX ORDER — ALBUTEROL SULFATE 0.83 MG/ML
2.5 SOLUTION RESPIRATORY (INHALATION)
Status: CANCELLED | OUTPATIENT
Start: 2022-05-26

## 2022-02-03 RX ORDER — ALBUTEROL SULFATE 90 UG/1
1-2 AEROSOL, METERED RESPIRATORY (INHALATION)
Status: CANCELLED
Start: 2022-02-24

## 2022-02-03 RX ORDER — DIPHENHYDRAMINE HYDROCHLORIDE 50 MG/ML
50 INJECTION INTRAMUSCULAR; INTRAVENOUS
Status: CANCELLED
Start: 2022-03-17

## 2022-02-03 RX ORDER — METHYLPREDNISOLONE SODIUM SUCCINATE 125 MG/2ML
125 INJECTION, POWDER, LYOPHILIZED, FOR SOLUTION INTRAMUSCULAR; INTRAVENOUS
Status: CANCELLED
Start: 2022-03-17

## 2022-02-03 RX ORDER — LORAZEPAM 2 MG/ML
0.5 INJECTION INTRAMUSCULAR EVERY 4 HOURS PRN
Status: CANCELLED
Start: 2022-07-07

## 2022-02-03 RX ORDER — LORAZEPAM 2 MG/ML
0.5 INJECTION INTRAMUSCULAR EVERY 4 HOURS PRN
Status: CANCELLED
Start: 2022-06-16

## 2022-02-03 RX ORDER — MEPERIDINE HYDROCHLORIDE 25 MG/ML
25 INJECTION INTRAMUSCULAR; INTRAVENOUS; SUBCUTANEOUS EVERY 30 MIN PRN
Status: CANCELLED | OUTPATIENT
Start: 2022-02-24

## 2022-02-03 RX ORDER — NALOXONE HYDROCHLORIDE 0.4 MG/ML
.1-.4 INJECTION, SOLUTION INTRAMUSCULAR; INTRAVENOUS; SUBCUTANEOUS
Status: CANCELLED | OUTPATIENT
Start: 2022-06-16

## 2022-02-03 RX ORDER — ALBUTEROL SULFATE 0.83 MG/ML
2.5 SOLUTION RESPIRATORY (INHALATION)
Status: CANCELLED | OUTPATIENT
Start: 2022-03-17

## 2022-02-03 RX ORDER — LORAZEPAM 2 MG/ML
0.5 INJECTION INTRAMUSCULAR EVERY 4 HOURS PRN
Status: CANCELLED
Start: 2022-02-03

## 2022-02-03 RX ORDER — LORAZEPAM 2 MG/ML
0.5 INJECTION INTRAMUSCULAR EVERY 4 HOURS PRN
Status: CANCELLED
Start: 2022-02-24

## 2022-02-03 RX ORDER — NALOXONE HYDROCHLORIDE 0.4 MG/ML
.1-.4 INJECTION, SOLUTION INTRAMUSCULAR; INTRAVENOUS; SUBCUTANEOUS
Status: CANCELLED | OUTPATIENT
Start: 2022-07-07

## 2022-02-03 RX ORDER — METHYLPREDNISOLONE SODIUM SUCCINATE 125 MG/2ML
125 INJECTION, POWDER, LYOPHILIZED, FOR SOLUTION INTRAMUSCULAR; INTRAVENOUS
Status: CANCELLED
Start: 2022-05-26

## 2022-02-03 RX ORDER — ALBUTEROL SULFATE 90 UG/1
1-2 AEROSOL, METERED RESPIRATORY (INHALATION)
Status: CANCELLED
Start: 2022-05-26

## 2022-02-03 RX ORDER — ALBUTEROL SULFATE 90 UG/1
1-2 AEROSOL, METERED RESPIRATORY (INHALATION)
Status: CANCELLED
Start: 2022-03-17

## 2022-02-03 RX ORDER — DIPHENHYDRAMINE HYDROCHLORIDE 50 MG/ML
50 INJECTION INTRAMUSCULAR; INTRAVENOUS
Status: CANCELLED
Start: 2022-06-16

## 2022-02-03 RX ORDER — ALBUTEROL SULFATE 0.83 MG/ML
2.5 SOLUTION RESPIRATORY (INHALATION)
Status: CANCELLED | OUTPATIENT
Start: 2022-06-16

## 2022-02-03 RX ORDER — ALBUTEROL SULFATE 90 UG/1
1-2 AEROSOL, METERED RESPIRATORY (INHALATION)
Status: CANCELLED
Start: 2022-06-16

## 2022-02-03 RX ORDER — DIPHENHYDRAMINE HYDROCHLORIDE 50 MG/ML
50 INJECTION INTRAMUSCULAR; INTRAVENOUS
Status: CANCELLED
Start: 2022-07-07

## 2022-02-03 RX ORDER — NALOXONE HYDROCHLORIDE 0.4 MG/ML
.1-.4 INJECTION, SOLUTION INTRAMUSCULAR; INTRAVENOUS; SUBCUTANEOUS
Status: CANCELLED | OUTPATIENT
Start: 2022-02-24

## 2022-02-03 RX ORDER — METHYLPREDNISOLONE SODIUM SUCCINATE 125 MG/2ML
125 INJECTION, POWDER, LYOPHILIZED, FOR SOLUTION INTRAMUSCULAR; INTRAVENOUS
Status: CANCELLED
Start: 2022-02-24

## 2022-02-03 RX ORDER — METHYLPREDNISOLONE SODIUM SUCCINATE 125 MG/2ML
125 INJECTION, POWDER, LYOPHILIZED, FOR SOLUTION INTRAMUSCULAR; INTRAVENOUS
Status: CANCELLED
Start: 2022-02-03

## 2022-02-03 RX ORDER — METHYLPREDNISOLONE SODIUM SUCCINATE 125 MG/2ML
125 INJECTION, POWDER, LYOPHILIZED, FOR SOLUTION INTRAMUSCULAR; INTRAVENOUS
Status: CANCELLED
Start: 2022-06-16

## 2022-02-03 RX ORDER — SODIUM CHLORIDE 9 MG/ML
1000 INJECTION, SOLUTION INTRAVENOUS CONTINUOUS PRN
Status: CANCELLED
Start: 2022-02-03

## 2022-02-03 RX ORDER — SODIUM CHLORIDE 9 MG/ML
1000 INJECTION, SOLUTION INTRAVENOUS CONTINUOUS PRN
Status: CANCELLED
Start: 2022-05-26

## 2022-02-03 RX ORDER — SODIUM CHLORIDE 9 MG/ML
1000 INJECTION, SOLUTION INTRAVENOUS CONTINUOUS PRN
Status: CANCELLED
Start: 2022-06-16

## 2022-02-03 RX ORDER — EPINEPHRINE 1 MG/ML
0.3 INJECTION, SOLUTION, CONCENTRATE INTRAVENOUS EVERY 5 MIN PRN
Status: CANCELLED | OUTPATIENT
Start: 2022-05-26

## 2022-02-03 RX ORDER — DIPHENHYDRAMINE HYDROCHLORIDE 50 MG/ML
50 INJECTION INTRAMUSCULAR; INTRAVENOUS
Status: CANCELLED
Start: 2022-05-26

## 2022-02-03 RX ORDER — DIPHENHYDRAMINE HYDROCHLORIDE 50 MG/ML
50 INJECTION INTRAMUSCULAR; INTRAVENOUS
Status: CANCELLED
Start: 2022-02-24

## 2022-02-03 RX ORDER — MEPERIDINE HYDROCHLORIDE 25 MG/ML
25 INJECTION INTRAMUSCULAR; INTRAVENOUS; SUBCUTANEOUS EVERY 30 MIN PRN
Status: CANCELLED | OUTPATIENT
Start: 2022-03-17

## 2022-02-03 RX ORDER — EPINEPHRINE 1 MG/ML
0.3 INJECTION, SOLUTION, CONCENTRATE INTRAVENOUS EVERY 5 MIN PRN
Status: CANCELLED | OUTPATIENT
Start: 2022-02-24

## 2022-02-03 RX ORDER — ALBUTEROL SULFATE 90 UG/1
1-2 AEROSOL, METERED RESPIRATORY (INHALATION)
Status: CANCELLED
Start: 2022-07-07

## 2022-02-03 RX ORDER — METHYLPREDNISOLONE SODIUM SUCCINATE 125 MG/2ML
125 INJECTION, POWDER, LYOPHILIZED, FOR SOLUTION INTRAMUSCULAR; INTRAVENOUS
Status: CANCELLED
Start: 2022-07-07

## 2022-02-03 RX ORDER — ALBUTEROL SULFATE 0.83 MG/ML
2.5 SOLUTION RESPIRATORY (INHALATION)
Status: CANCELLED | OUTPATIENT
Start: 2022-07-07

## 2022-02-03 RX ORDER — MEPERIDINE HYDROCHLORIDE 25 MG/ML
25 INJECTION INTRAMUSCULAR; INTRAVENOUS; SUBCUTANEOUS EVERY 30 MIN PRN
Status: CANCELLED | OUTPATIENT
Start: 2022-07-07

## 2022-02-03 RX ORDER — ALBUTEROL SULFATE 90 UG/1
1-2 AEROSOL, METERED RESPIRATORY (INHALATION)
Status: CANCELLED
Start: 2022-02-03

## 2022-02-03 RX ORDER — EPINEPHRINE 1 MG/ML
0.3 INJECTION, SOLUTION, CONCENTRATE INTRAVENOUS EVERY 5 MIN PRN
Status: CANCELLED | OUTPATIENT
Start: 2022-07-07

## 2022-02-03 RX ORDER — DIPHENHYDRAMINE HYDROCHLORIDE 50 MG/ML
50 INJECTION INTRAMUSCULAR; INTRAVENOUS
Status: CANCELLED
Start: 2022-02-03

## 2022-02-03 RX ORDER — SODIUM CHLORIDE 9 MG/ML
1000 INJECTION, SOLUTION INTRAVENOUS CONTINUOUS PRN
Status: CANCELLED
Start: 2022-07-07

## 2022-02-03 RX ORDER — SODIUM CHLORIDE 9 MG/ML
1000 INJECTION, SOLUTION INTRAVENOUS CONTINUOUS PRN
Status: CANCELLED
Start: 2022-02-24

## 2022-02-03 RX ORDER — NALOXONE HYDROCHLORIDE 0.4 MG/ML
.1-.4 INJECTION, SOLUTION INTRAMUSCULAR; INTRAVENOUS; SUBCUTANEOUS
Status: CANCELLED | OUTPATIENT
Start: 2022-03-17

## 2022-02-03 RX ORDER — EPINEPHRINE 1 MG/ML
0.3 INJECTION, SOLUTION, CONCENTRATE INTRAVENOUS EVERY 5 MIN PRN
Status: CANCELLED | OUTPATIENT
Start: 2022-03-17

## 2022-02-03 RX ADMIN — SODIUM CHLORIDE 200 MG: 9 INJECTION, SOLUTION INTRAVENOUS at 14:14

## 2022-02-03 RX ADMIN — SODIUM CHLORIDE 250 ML: 9 INJECTION, SOLUTION INTRAVENOUS at 14:16

## 2022-02-03 ASSESSMENT — PAIN SCALES - GENERAL: PAINLEVEL: NO PAIN (0)

## 2022-02-03 NOTE — LETTER
"    2/3/2022         RE: Newton Buchanan  [address]  [place] [address]        Dear Colleague,    Thank you for referring your patient, Newton Buchanan, to the [facility]. Please see a copy of my visit note below.    Oncology Rooming Note    February 3, 2022 1:02 PM   Newton Buchanan is a 76 year old male who presents for:    Chief Complaint   Patient presents with     Oncology Clinic Visit     Head and neck cancer (H) - Labs, provider and infusion     Initial Vitals: BP (!) 155/76 (BP Location: Right arm, Patient Position: Sitting, Cuff Size: Adult Regular)   Pulse 67   Temp 97.9  F (36.6  C) (Oral)   Resp 12   Wt 88.5 kg (195 lb)   SpO2 98%   BMI 26.45 kg/m   Estimated body mass index is 26.45 kg/m  as calculated from the following:    Height as of 1/24/22: 1.829 m (6').    Weight as of this encounter: 88.5 kg (195 lb). Body surface area is 2.12 meters squared.  No Pain (0) Comment: Data Unavailable   No LMP for male patient.  Allergies reviewed: Yes  Medications reviewed: Yes    Medications: Medication refills not needed today.  Pharmacy name entered into [employer]:    [id number] - [place] [id number] [facility] AT [address] HOME DELIVERY - [place], [place] - [address] [place] [address]    Clinical concerns:  None      Meli Cornejo CMA              The patient is being seen for the following issue/s:  Encounter Diagnoses   Name Primary?     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 FNAs benign. Followed.   -2/2020: progressive right neck swelling, no other symptoms.   -2/11/20 FNA biosy right neck mass: metastatic squamous cell cancer, HPV16+  -PET: hypermetabolic right cervical adenopathy, numerous bilateral lung mets, right " "hilar nodes.   -No evident primary site identified, but highly suspicious of H/N given IHC staining and radiographical findings/spread  High PD-1 70% on biopsy    Treatment:  5/2020 - present: Keytruda (initiated in [place], then transferred care to [place])  Patient traveled back in [facility] between [place] and [place]    Did receive XRT to right neck 12/20.   Patient in [place] 4/21 to help sell home.  PET/CT 4/21 showed stable/improved disease.   PET/CT 7/21 -stable/improved disease  PET-10/10/2021-stable disease\"    He just had another PET scan in January 2022.  There is slightly increased asymmetric FDG uptake by the left palatine tonsil and left tongue base.  There was no evidence of metastatic disease elsewhere.    PHYSICAL EXAMINATION:    General: Todays' vital signs reviewed in the EMR.    ECOG PS is 0  The patient is in no acute distress.  HEENT: No oropharyngeal exudates.  Cor: Regular rate and rhythm.  Chest: Clear to auscultation bilaterally.    ASSESSMENT:  Encounter Diagnoses   Name Primary?     Squamous cell carcinoma of oropharynx (H) Yes     Head and neck cancer (H)      Tonsillar mass      Your CMP from today came back normal.  Your TSH from today came back normal.  Your recent PET/CT from last month revealed some uptake in the left tonsil.  I reviewed this finding the PET image with you today.    PLAN:    I have referred you to ENT to get their input on the uptake that was seen in your left tonsil (inflammatory versus cancer-related?).    Continue Keytruda every 3 weeks.    Return for your next office visit with me on 5/19/2022 after you return from [place].  Please see your oncologist in [place] if your travel plans coincide with treatment days for Keytruda.        Again, thank you for allowing me to participate in the care of your patient.        Sincerely,        Alan Ruiz MD    "

## 2022-02-03 NOTE — PROGRESS NOTES
"The patient is being seen for the following issue/s:  Encounter Diagnoses   Name Primary?     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 FNAs benign. Followed.   -2/2020: progressive right neck swelling, no other symptoms.   -2/11/20 FNA biosy right neck mass: metastatic squamous cell cancer, HPV16+  -PET: hypermetabolic right cervical adenopathy, numerous bilateral lung mets, right hilar nodes.   -No evident primary site identified, but highly suspicious of H/N given IHC staining and radiographical findings/spread  High PD-1 70% on biopsy    Treatment:  5/2020 - present: Keytruda (initiated in [place], then transferred care to [place])  Patient traveled back in for between [place] and [place]    Did receive XRT to right neck 12/20.   Patient in [place] 4/21 to help sell home.  PET/CT 4/21 showed stable/improved disease.   PET/CT 7/21 -stable/improved disease  PET-10/10/2021-stable disease\"    He just had another PET scan in January 2022.  There is slightly increased asymmetric FDG uptake by the left palatine tonsil and left tongue base.  There was no evidence of metastatic disease elsewhere.    PHYSICAL EXAMINATION:    General: Todays' vital signs reviewed in the EMR.    ECOG PS is 0  The patient is in no acute distress.  HEENT: No oropharyngeal exudates.  Cor: Regular rate and rhythm.  Chest: Clear to auscultation bilaterally.    ASSESSMENT:  Encounter Diagnoses   Name Primary?     Squamous cell carcinoma of oropharynx (H) Yes     Head and neck cancer (H)      Tonsillar mass      Your CMP from today came back normal.  Your TSH from today came back normal.  Your recent PET/CT from last month revealed some uptake in the left tonsil.  I reviewed this finding the PET image with you today.    PLAN:    I " have referred you to ENT to get their input on the uptake that was seen in your left tonsil (inflammatory versus cancer-related?).    Continue Keytruda every 3 weeks.    Return for your next office visit with me on 5/19/2022 after you return from [place].  Please see your oncologist in [place] if your travel plans coincide with treatment days for Keytruda.

## 2022-02-03 NOTE — PROGRESS NOTES
Oncology Rooming Note    February 3, 2022 1:02 PM   Newton Buchanan is a 76 year old male who presents for:    Chief Complaint   Patient presents with     Oncology Clinic Visit     Head and neck cancer (H) - Labs, provider and infusion     Initial Vitals: BP (!) 155/76 (BP Location: Right arm, Patient Position: Sitting, Cuff Size: Adult Regular)   Pulse 67   Temp 97.9  F (36.6  C) (Oral)   Resp 12   Wt 88.5 kg (195 lb)   SpO2 98%   BMI 26.45 kg/m   Estimated body mass index is 26.45 kg/m  as calculated from the following:    Height as of 1/24/22: 1.829 m (6').    Weight as of this encounter: 88.5 kg (195 lb). Body surface area is 2.12 meters squared.  No Pain (0) Comment: Data Unavailable   No LMP for male patient.  Allergies reviewed: Yes  Medications reviewed: Yes    Medications: Medication refills not needed today.  Pharmacy name entered into [facility]:    [facility][id number] - [place], [place] - [address] AT [address] HOME DELIVERY - [place], [place] - [address] [place] [address]    Clinical concerns:  None      Meli Cornejo, CMA

## 2022-02-03 NOTE — PROGRESS NOTES
Infusion Nursing Note:  Newton Buchanan presents today for Keytruda.    Patient seen by provider today: No   present during visit today: Not Applicable.    Note: N/A.      Intravenous Access:  Peripheral IV placed.    Treatment Conditions:  Lab Results   Component Value Date     02/03/2022    POTASSIUM 4.1 02/03/2022    CR 0.94 02/03/2022    JÚNIOR 9.4 02/03/2022    BILITOTAL 0.8 02/03/2022    ALBUMIN 3.8 02/03/2022    ALT 32 02/03/2022    AST 19 02/03/2022     Results reviewed, labs MET treatment parameters, ok to proceed with treatment.      Post Infusion Assessment:  Patient tolerated infusion without incident.  Blood return noted pre and post infusion.  Site patent and intact, free from redness, edema or discomfort.  No evidence of extravasations.  Access discontinued per protocol.       Discharge Plan:   Copy of AVS reviewed with patient and/or family.  Patient will return 2/24/22 for next appointment.  Patient discharged in stable condition accompanied by: self.  Departure Mode: Ambulatory.      Violet Harris RN

## 2022-02-03 NOTE — PATIENT INSTRUCTIONS
Your CMP from today came back normal.  Your TSH from today came back normal.    Your recent PET/CT from last month revealed some uptake in the left tonsil.  I reviewed this finding the PET image with you today.    I have referred you to ENT to get their input on the uptake that was seen in your left tonsil (inflammatory versus cancer-related?).    Continue Keytruda every 3 weeks.    Return for your next office visit with me on 5/19/2022 after you return from [place].  Please see your oncologist in [place] if your travel plans coincide with treatment days for Keytruda.

## 2022-02-04 ASSESSMENT — ENCOUNTER SYMPTOMS
SHORTNESS OF BREATH: 1
NERVOUS/ANXIOUS: 0
PALPITATIONS: 0
CONSTIPATION: 0
ABDOMINAL PAIN: 0
COUGH: 0
HEARTBURN: 0
MYALGIAS: 0
HEADACHES: 0
DYSURIA: 0
JOINT SWELLING: 0
DIARRHEA: 0
HEMATURIA: 0
CHILLS: 0
NAUSEA: 0
FREQUENCY: 0
PARESTHESIAS: 0
HEMATOCHEZIA: 0
SORE THROAT: 0
EYE PAIN: 0
ARTHRALGIAS: 1
WEAKNESS: 0
FEVER: 0
DIZZINESS: 0

## 2022-02-04 ASSESSMENT — ACTIVITIES OF DAILY LIVING (ADL): CURRENT_FUNCTION: NO ASSISTANCE NEEDED

## 2022-02-07 ENCOUNTER — OFFICE VISIT (OUTPATIENT)
Dept: FAMILY MEDICINE | Facility: CLINIC | Age: 77
End: 2022-02-07
Payer: MEDICARE

## 2022-02-07 VITALS
TEMPERATURE: 97.9 F | SYSTOLIC BLOOD PRESSURE: 132 MMHG | WEIGHT: 192 LBS | DIASTOLIC BLOOD PRESSURE: 64 MMHG | HEART RATE: 70 BPM | HEIGHT: 72 IN | BODY MASS INDEX: 26.01 KG/M2 | OXYGEN SATURATION: 99 % | RESPIRATION RATE: 16 BRPM

## 2022-02-07 DIAGNOSIS — K21.9 GASTROESOPHAGEAL REFLUX DISEASE WITHOUT ESOPHAGITIS: ICD-10-CM

## 2022-02-07 DIAGNOSIS — Z00.00 ENCOUNTER FOR SUBSEQUENT ANNUAL WELLNESS VISIT IN MEDICARE PATIENT: Primary | ICD-10-CM

## 2022-02-07 DIAGNOSIS — C61 PROSTATE CANCER (H): ICD-10-CM

## 2022-02-07 DIAGNOSIS — C10.9 OROPHARYNGEAL CANCER (H): ICD-10-CM

## 2022-02-07 DIAGNOSIS — I10 ESSENTIAL HYPERTENSION WITH GOAL BLOOD PRESSURE LESS THAN 140/90: ICD-10-CM

## 2022-02-07 DIAGNOSIS — M20.61 DEFORMITY OF TOE OF RIGHT FOOT: ICD-10-CM

## 2022-02-07 DIAGNOSIS — G47.33 OSA (OBSTRUCTIVE SLEEP APNEA): ICD-10-CM

## 2022-02-07 LAB — PSA SERPL-MCNC: 0.04 UG/L (ref 0–4)

## 2022-02-07 PROCEDURE — G0439 PPPS, SUBSEQ VISIT: HCPCS | Performed by: FAMILY MEDICINE

## 2022-02-07 ASSESSMENT — ENCOUNTER SYMPTOMS
HEMATURIA: 0
DIARRHEA: 0
ARTHRALGIAS: 1
PARESTHESIAS: 0
SORE THROAT: 0
HEMATOCHEZIA: 0
NERVOUS/ANXIOUS: 0
JOINT SWELLING: 0
COUGH: 0
EYE PAIN: 0
PALPITATIONS: 0
CHILLS: 0
HEADACHES: 0
ABDOMINAL PAIN: 0
WEAKNESS: 0
DYSURIA: 0
NAUSEA: 0
DIZZINESS: 0
MYALGIAS: 0
CONSTIPATION: 0
FEVER: 0
HEARTBURN: 0
FREQUENCY: 0
SHORTNESS OF BREATH: 1

## 2022-02-07 ASSESSMENT — MIFFLIN-ST. JEOR: SCORE: 1643.91

## 2022-02-07 ASSESSMENT — ACTIVITIES OF DAILY LIVING (ADL): CURRENT_FUNCTION: NO ASSISTANCE NEEDED

## 2022-02-07 ASSESSMENT — PAIN SCALES - GENERAL: PAINLEVEL: NO PAIN (0)

## 2022-02-07 NOTE — PATIENT INSTRUCTIONS
ASSESSMENT/PLAN:                                                    Lifestyle recommendations:cut down on alcohol use  regular exercise, at least 150 minutes per week (average 30 minutes 5 times a week)  The following exams/tests were recommended and discussed for health maintenance:  Colon cancer screening recommended 2025  Prostate cancer screening-PSA test due with history of prostate cancer.     (Z00.00) Encounter for subsequent annual wellness visit in Medicare patient  (primary encounter diagnosis)    (C61) Prostate cancer (H)  Comment: doing well  Plan: PSA, tumor marker        No change in current treatment plan.      (G47.33) CRYSTAL (obstructive sleep apnea)  Comment: uses CPAP nightly.   Plan: No change in current treatment plan.      (C10.9) Oropharyngeal cancer (H)  Comment: doing well at this time.   Plan: follow-up with oncology and ENT as planned.     (K21.9) Gastroesophageal reflux disease without esophagitis  Comment: stable with no medications.   Plan: No change in current treatment plan.     (I10) Essential hypertension with goal blood pressure less than 140/90  Comment: doing well  Plan: No change in current treatment plan.  Refills as needed.   Monitor BP periodically.  This can be done at home, in clinic, at our pharmacy, at a store or by neighbor or relative with a blood pressure cuff.  You should be sitting and relaxed for several minutes when taking blood pressure.   Goal BP (most readings) should be less than 140/90    Normal blood pressure is 120/80.    If your blood pressure is consistently at or above the goal, some changes should be made with lifestyle or medication to keep blood pressure under good control.    High blood pressure over time can lead to eye and kidney damage and increase risk for heart attacks and strokes.   Let us know if readings are often high, so we can help get your blood pressure under good control.      The National [place] on Alcohol Abuse and Alcoholism (NIAAA)  "in the [place] has estimated consumption amounts of alcohol that increase health risks [7]:     Men under age 65    More than 14 standard drinks per week on average    More than 4 drinks on any day     Women and adults 65 years and older    More than 7 standard drinks per week on average   More than 3 drinks on any day     Specifying these thresholds is an inexact science based on epidemiological evidence. Amounts are based on a  standard drink , which is defined as 12 grams of ethanol, 5 ounces of wine, 12 ounces of beer, or 1.5 ounces of 80 proof spirits. The number and size of drinks that define risky amounts varies internationally.    Smaller amounts of regular alcohol use can constitute risky use in specific groups (eg, pregnant women, or people who experience alcohol-associated injuries or infection with a sexually transmitted diseases).     Binge drinking -- Binge drinking has been defined by the NIAAA as \"drinking so much within about two hours that blood alcohol concentration (JAGUAR) levels reach 0.08g/dL\" [7]. In women, this typically occurs after about four standard drinks, and, in men, after about five standard drinks. Binge drinking is associated with acute injuries due to intoxication and may be associated with an increased cardiovascular risk [8].    From Up To Date.  Literature review current through: Feb 2016.  This topic last updated: Nov 20, 2014.    "

## 2022-02-07 NOTE — RESULT ENCOUNTER NOTE
Giancarlo Glez,  Your PSA remains low.   TSH is normal indicating that the level of thyroid hormone is in the normal range.   The blood chemistries (Basic metabolic panel) are all normal including electrolytes (salt balances in the blood), liver and kidney tests.   Glucose (blood sugar) is borderline.   please No new changes in treatment recommended.  DELANEY CAVAZOS MD

## 2022-02-07 NOTE — NURSING NOTE
Chief Complaint   Patient presents with     Physical       Initial /64   Pulse 70   Temp 97.9  F (36.6  C) (Tympanic)   Resp 16   Ht 1.829 m (6')   Wt 87.1 kg (192 lb)   SpO2 99%   BMI 26.04 kg/m   Estimated body mass index is 26.04 kg/m  as calculated from the following:    Height as of this encounter: 1.829 m (6').    Weight as of this encounter: 87.1 kg (192 lb).    Patient presents to the clinic using     Health Maintenance that is potentially due pending provider review:          Is there anyone who you would like to be able to receive your results?   If yes have patient fill out VAL

## 2022-02-07 NOTE — PROGRESS NOTES
"SUBJECTIVE:   Newton Buchanan is a 76 year old male who presents for Preventive Visit.  Chief Complaint   Patient presents with     Physical      2/3 oncology visit  1/24 sleep consultation virtual visit .  No changes made.   1/20/21:wellness visit.     He remains on Keytruda for head and neck cancer. PET abnormal.  He has ENT appointment coming up.     Issues today:  Want check for moles.  Spot left forehead unchanged for a long time.   He notes depression in both temples.   Some itching in back.   Some shoulder pain.  Sleeps on side.  They get sore at night.   Can have pain in fingers right third and left 4th fingers.  Present in AM and better during the day.   Bowels erratic.  Some gas.  Seems diet related.   Has seen podiatry for toe. Had toe fracture.  Toe curves.  Wants podiatry referral.     Living in [place] now full time.     Patient has been advised of split billing requirements and indicates understanding: Yes  Are you in the first 12 months of your Medicare coverage?  No    Healthy Habits:     In general, how would you rate your overall health?  Fair    Frequency of exercise:  2-3 days/week    Duration of exercise:  Less than 15 minutes    Do you usually eat at least 4 servings of fruit and vegetables a day, include whole grains    & fiber and avoid regularly eating high fat or \"junk\" foods?  No    Taking medications regularly:  Yes    Medication side effects:  None    Ability to successfully perform activities of daily living:  No assistance needed    Home Safety:  No safety concerns identified    Hearing Impairment:  Need to ask people to speak up or repeat themselves    In the past 6 months, have you been bothered by leaking of urine? Yes    In general, how would you rate your overall mental or emotional health?  Good      PHQ-2 Total Score: 1    Additional concerns today:  Yes  Exercise:not in winter.     Do you feel safe in your environment? Yes    Have you ever done Advance Care Planning? (For " "example, a Health Directive, POLST, or a discussion with a medical provider or your loved ones about your wishes): copy given    Fall risk  Fallen 2 or more times in the past year?: No  Any fall with injury in the past year?: No    Cognitive Screening   1) Repeat 3 items (Leader, Season, Table)    2) Clock draw: NORMAL  3) 3 item recall: Recalls 3 objects  Results: 3 items recalled: COGNITIVE IMPAIRMENT LESS LIKELY    Mini-CogTM Copyright HEBERT Delgado. Licensed by the author for use in [facility]; reprinted with permission (coleen@[facility]). All rights reserved.      Do you have sleep apnea, excessive snoring or daytime drowsiness?: yes    Social History     Tobacco Use     Smoking status: Never Smoker     Smokeless tobacco: Never Used   Substance Use Topics     Alcohol use: Yes     Alcohol/week: 0.0 standard drinks     Comment: weekly     If you drink alcohol do you typically have >3 drinks per day or >7 drinks per week? yes  Alcohol:daily drinks a bottle.  \"I am aware of that\".  \"I don't want to be drinking that much\".      Alcohol Use 2/4/2022   Prescreen: >3 drinks/day or >7 drinks/week? Yes   Prescreen: >3 drinks/day or >7 drinks/week? -   AUDIT SCORE  6     AUDIT - Alcohol Use Disorders Identification Test - Reproduced from the World Health Organization Audit 2001 (Second Edition) 2/4/2022   1.  How often do you have a drink containing alcohol? 4 or more times a week   2.  How many drinks containing alcohol do you have on a typical day when you are drinking? 3 or 4   3.  How often do you have five or more drinks on one occasion? Less than monthly   4.  How often during the last year have you found that you were not able to stop drinking once you had started? Never   5.  How often during the last year have you failed to do what was normally expected of you because of drinking? Never   6.  How often during the last year have you needed a first drink in the morning to get yourself going after a heavy " drinking session? Never   7.  How often during the last year have you had a feeling of guilt or remorse after drinking? Never   8.  How often during the last year have you been unable to remember what happened the night before because of your drinking? Never   9.  Have you or someone else been injured because of your drinking? No   10. Has a relative, friend, doctor or other health care worker been concerned about your drinking or suggested you cut down? No   TOTAL SCORE 6     Current providers sharing in care for this patient include:   Patient Care Team:  [facility] as PCP - General  Geetha Grossman RN as Specialty Care Coordinator (Oncology)  Taisha Aparicio MD as Assigned Surgical Provider  Ronen Johns MD as MD (Radiation Oncology)  Marcial Blunt MD as Assigned Sleep Provider  Ronen Johns MD as Assigned Cancer Care Provider  Marcial Cutler MD as Assigned PCP  Oncology.   Sleep.     The following health maintenance items are reviewed in Epic and correct as of today:  Health Maintenance Due   Topic Date Due     ANNUAL REVIEW OF HM ORDERS  Never done     AORTIC ANEURYSM SCREENING (SYSTEM ASSIGNED)  Never done     FALL RISK ASSESSMENT  01/20/2022     ZOSTER IMMUNIZATION (3 of 3) 03/04/2021     Patient Active Problem List    Diagnosis Date Noted     Oropharyngeal cancer (H)      Priority: Medium     Head and neck cancer (H) 02/11/2020     Priority: Medium     2/27/2020 [place] [facility] notes:Stage IV metastatic squamous cell carcinoma   There is no obvious primary on PET/CT but given CK7 +, P16 positivity, suspicion is high that origin is still of H&N (right cervical LAD) with metastatic spread to lung with bilateral multiple lung nodules.  We discussed that treatment would be palliative and not curative- goals would be to reduce tumor burden, decrease symptoms and improve quality of life.   10/26/2020  Transferred care to Dr. Gustavo CORRAL Oncology notes:Presumed  H&N  "(right cervical LAD) with metastatic spread to lung with bilateral multiple lung nodules. He is first-line palliative treatment with Keytruda was started when he was in [place] early May 2020.  21 Oncology notes Dr. Johns:He was treated with systemic immunotherapy with good response in the chest with local progression in the right neck.  He subsequently completed palliative radiation therapy to the right neck-OPX region (50 Gy in 20 fractions, 20).    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 FNAs benign. Followed.   -2020: progressive right neck swelling, no other symptoms.   -20 FNA biosy right neck mass: metastatic squamous cell cancer, HPV16+  -PET: hypermetabolic right cervical adenopathy, numerous bilateral lung mets, right hilar nodes.   -No evident primary site identified, but highly suspicious of H/N given IHC staining and radiographical findings/spread  High PD-1 70% on biopsy         Essential hypertension with goal blood pressure less than 140/90 2016     Priority: Medium     Gastroesophageal reflux disease without esophagitis 2016     Priority: Medium     CRYSTAL (obstructive sleep apnea) 2016     Priority: Medium     Uses CPAP.  Uses prn nasal sprays for nasal congestion at times so he can use his CPAP.       Prostate cancer (H) 2016     Priority: Medium     2008:Robotic prostate removal.  2016:He reports PSA has been oK.           Family history:  CV disease: no  Prostate cancer: personal history.   Colon cancer: no    Multivitamin or Vit D use: Vit D, Vit C    Vaccines:current tetanus.  Has had COVID-19 virus vaccine and booster.      Past Colon cancer screenin.  Was recommended 5 year follow-up.     Review of Systems   Constitutional: Negative for chills and fever.   HENT: Positive for congestion. Negative for ear pain, hearing loss and sore throat.    Eyes: Negative " for pain and visual disturbance.   Respiratory: Positive for shortness of breath. Negative for cough.    Cardiovascular: Negative for chest pain, palpitations and peripheral edema.   Gastrointestinal: Negative for abdominal pain, constipation, diarrhea, heartburn, hematochezia and nausea.   Genitourinary: Positive for impotence. Negative for dysuria, frequency, genital sores, hematuria, penile discharge and urgency.   Musculoskeletal: Positive for arthralgias. Negative for joint swelling and myalgias.   Skin: Negative for rash.   Neurological: Negative for dizziness, weakness, headaches and paresthesias.   Psychiatric/Behavioral: Negative for mood changes. The patient is not nervous/anxious.    Some dyspnea on exertion.  Unchanged in the past year.   BP at home has been up to low 150s.  Usually 130.     OBJECTIVE:                                                    OBJECTIVE:Blood pressure 132/64, pulse 70, temperature 97.9  F (36.6  C), temperature source Tympanic, resp. rate 16, height 1.829 m (6'), weight 87.1 kg (192 lb), SpO2 99 %. BMI=Body mass index is 26.04 kg/m .  GENERAL APPEARANCE ADULT: Alert, no acute distress  EYES: PERRL, EOM normal, conjunctiva and lids normal  HENT: Ears and TMs normal, oral mucosa and posterior oropharynx normal  NECK: No adenopathy,masses or thyromegaly  RESP: lungs clear to auscultation   CV: normal rate, regular rhythm, no murmur or gallop  ABDOMEN: soft, no organomegaly, masses or tenderness  MS: extremities normal, no peripheral edema  Right second toe-hammer toe.   SKIN: Various seborrheic keratoses on trunk and upper left forehead.     ASSESSMENT/PLAN:                                                    Lifestyle recommendations:cut down on alcohol use  regular exercise, at least 150 minutes per week (average 30 minutes 5 times a week)  The following exams/tests were recommended and discussed for health maintenance:  Colon cancer screening recommended 2025  Prostate cancer  screening-PSA test due with history of prostate cancer.     (Z00.00) Encounter for subsequent annual wellness visit in Medicare patient  (primary encounter diagnosis)    (C61) Prostate cancer (H)  Comment: doing well  Plan: PSA, tumor marker        No change in current treatment plan.      (G47.33) CRYSTAL (obstructive sleep apnea)  Comment: uses CPAP nightly.   Plan: No change in current treatment plan.      (C10.9) Oropharyngeal cancer (H)  Comment: doing well at this time.   Plan: follow-up with oncology and ENT as planned.     (K21.9) Gastroesophageal reflux disease without esophagitis  Comment: stable with no medications.   Plan: No change in current treatment plan.     (I10) Essential hypertension with goal blood pressure less than 140/90  Comment: doing well  Plan: No change in current treatment plan.  Refills as needed.   Monitor BP periodically.  This can be done at home, in clinic, at our pharmacy, at a store or by neighbor or relative with a blood pressure cuff.  You should be sitting and relaxed for several minutes when taking blood pressure.   Goal BP (most readings) should be less than 140/90    Normal blood pressure is 120/80.    If your blood pressure is consistently at or above the goal, some changes should be made with lifestyle or medication to keep blood pressure under good control.    High blood pressure over time can lead to eye and kidney damage and increase risk for heart attacks and strokes.   Let us know if readings are often high, so we can help get your blood pressure under good control.          Patient has been advised of split billing requirements and indicates understanding: Yes    COUNSELING:  Reviewed preventive health counseling, as reflected in patient instructions  Special attention given to:       Regular exercise       Alcohol Use        Colon cancer screening       Prostate cancer screening    Estimated body mass index is 26.04 kg/m  as calculated from the following:    Height as  of this encounter: 1.829 m (6').    Weight as of this encounter: 87.1 kg (192 lb).        He reports that he has never smoked. He has never used smokeless tobacco.      Appropriate preventive services were discussed with this patient, including applicable screening as appropriate for cardiovascular disease, diabetes, osteopenia/osteoporosis, and glaucoma.  As appropriate for age/gender, discussed screening for colorectal cancer, prostate cancer, breast cancer, and cervical cancer. Checklist reviewing preventive services available has been given to the patient.    Reviewed patients plan of care and provided an AVS. The Basic Care Plan (routine screening as documented in Health Maintenance) for Newton meets the Care Plan requirement. This Care Plan has been established and reviewed with the Patient.    Counseling Resources:  ATP IV Guidelines  Pooled Cohorts Equation Calculator  Breast Cancer Risk Calculator  Breast Cancer: Medication to Reduce Risk  FRAX Risk Assessment  ICSI Preventive Guidelines  Dietary Guidelines for Americans, 2010  USDA's MyPlate  ASA Prophylaxis  Lung CA Screening    Marcial Cutler MD  [facility]    Identified Health Risks:

## 2022-02-10 ENCOUNTER — ANCILLARY PROCEDURE (OUTPATIENT)
Dept: GENERAL RADIOLOGY | Facility: CLINIC | Age: 77
End: 2022-02-10
Attending: PODIATRIST
Payer: MEDICARE

## 2022-02-10 ENCOUNTER — OFFICE VISIT (OUTPATIENT)
Dept: PODIATRY | Facility: CLINIC | Age: 77
End: 2022-02-10
Payer: MEDICARE

## 2022-02-10 VITALS
HEART RATE: 62 BPM | SYSTOLIC BLOOD PRESSURE: 156 MMHG | WEIGHT: 192 LBS | HEIGHT: 72 IN | BODY MASS INDEX: 26.01 KG/M2 | DIASTOLIC BLOOD PRESSURE: 80 MMHG

## 2022-02-10 DIAGNOSIS — M19.071 OSTEOARTHRITIS OF FIRST METATARSOPHALANGEAL (MTP) JOINT OF RIGHT FOOT: ICD-10-CM

## 2022-02-10 DIAGNOSIS — M20.41 HAMMERTOE OF SECOND TOE OF RIGHT FOOT: Primary | ICD-10-CM

## 2022-02-10 DIAGNOSIS — M20.11 HALLUX VALGUS, RIGHT: ICD-10-CM

## 2022-02-10 DIAGNOSIS — M20.61 DEFORMITY OF TOE OF RIGHT FOOT: ICD-10-CM

## 2022-02-10 PROCEDURE — 99204 OFFICE O/P NEW MOD 45 MIN: CPT | Performed by: PODIATRIST

## 2022-02-10 PROCEDURE — 73630 X-RAY EXAM OF FOOT: CPT | Mod: RT | Performed by: RADIOLOGY

## 2022-02-10 ASSESSMENT — MIFFLIN-ST. JEOR: SCORE: 1643.91

## 2022-02-10 NOTE — LETTER
2/10/2022         RE: Newton Buchanan  [address]  [place] [address]        Dear Colleague,    Thank you for referring your patient, Newton Buchanan, to the [facility] [place]. Please see a copy of my visit note below.    PATIENT HISTORY:  Newton Buchanan is a 76 year old male who presents to clinic in consultation at the request of  Marcial Cutler M.D. with a chief complaint of deformity of second toe right foot.  The patient is seen by themselves.  The patient relates the pain is primarily located around the second toe.  Patient describes injury as he stubbed the toe against the leg of a chair or a table that has been going on for 1 year(s).  The patient has previously tried seen another provider in [place], and a toe device with little relief.  Denies any prior history of similar issues..  The patient is retired.  Any previous notes and studies that pertain to the patient's condition were reviewed.    Pertinent medical, surgical and family history was reviewed in Epic chart and include oropharynx cancer, gastroesophageal reflux disease, prostate cancer    Medications:   Current Outpatient Medications:      aspirin 81 MG tablet, Take by mouth daily, Disp: 30 tablet, Rfl:      fluticasone (FLONASE) 50 MCG/ACT nasal spray, Spray 1-2 sprays in nostril, Disp: , Rfl:      losartan (COZAAR) 25 MG tablet, Take 1 tablet (25 mg) by mouth daily, Disp: 90 tablet, Rfl: 3     pembrolizumab (KEYTRUDA) 25 MG/ML, , Disp: , Rfl:      pravastatin (PRAVACHOL) 20 MG tablet, Take 20 mg by mouth daily , Disp: , Rfl:      saccharomyces boulardii (FLORASTOR) 250 MG capsule, Take 1 capsule by mouth 2 times daily, Disp: , Rfl:      sildenafil (VIAGRA) 100 MG tablet, Take by mouth daily as needed for erectile dysfunction, Disp: 30 tablet, Rfl:      VITAMIN D, CHOLECALCIFEROL, PO, Take 1,000 Units by mouth daily, Disp: , Rfl:      Allergies:    Allergies   Allergen Reactions     Atorvastatin      Other  reaction(s): Confusion  unusual behavior and dizziness         Vitals: BP (!) 156/80   Pulse 62   Ht 1.829 m (6')   Wt 87.1 kg (192 lb)   BMI 26.04 kg/m    BMI= Body mass index is 26.04 kg/m .    LOWER EXTREMITY PHYSICAL EXAM    Dermatologic: Skin is intact to right lower extremity without significant lesions, rash or abrasion.        Vascular: DP & PT pulses are intact & regular on the right.   CFT and skin temperature is normal to the right lower extremity.     Neurologic: Lower extremity sensation is intact to light touch.  No evidence of weakness in the right lower extremity.        Musculoskeletal: Patient is ambulatory without assistive device or brace.  No gross ankle deformity noted.  No foot or ankle joint effusion is noted.  Noted moderate bunion deformity with hammertoe deformity of the second toe on the right foot.  Limited range of motion of the first metatarsophalangeal joint on the right.  No surrounding erythema noted.  Noted pain on palpation over the dorsal aspect of the second toe and bunion on the right foot.    Diagnostics:  Radiographs included three views of the right foot demonstrating a moderate bunion deformity with an elevated first metatarsal angle and hallux abductus angle.  Noted degenerative changes to the first metatarsophalangeal joint.  Noted contracted second toe hammertoe deformity.  No cortical erosions or periosteal elevation.  All joint margins appear stable.  There is no apparent fracture or tumor formation noted.  There is no evidence of foreign body.  The images were independently reviewed by myself along with the patient explaining the findings.      ASSESSMENT / PLAN:     ICD-10-CM    1. Hammertoe of second toe of right foot  M20.41    2. Hallux valgus, right  M20.11    3. 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  We discussed the underlying contributing factors to the  condition as well as treatment options along with expected length of recovery.  At this time, the patient was educated on the importance of offloading supportive, roomy shoes and other devices.  The patient was instructed to perform warm soaks with Epson salt after which to also apply over-the-counter Voltaren gel to deeply massage the injured tissue.    The patient will return in four weeks for reevaluation if the symptoms do not resolve.    I also discussed with patient details of procedure(s), possible risks and complications, alternative treatment options and post-operative course detailed below.  Patient is aware that surgery is elective and can be avoided if desired.  Likely surgical procedures include arthrodesis of the first metatarsophalangeal joint with hammertoe correction of the second toe on the right foot.  The patient was educated today about risks associated with surgery.  Risks of surgery include, but are not limited to: infection, painful scar, nerve injury, numbness, stiffness, over-correction, under-correction, non-union, need for repeat surgery, recurrence of condition, ongoing pain, delayed wound healing, blood clot in the legs or lungs, amputation or other unforeseen side effects from undergoing surgery.       The patient was informed that metal screws and occasional  metal plates are used to stabilize the fractures and or osteotomies.  The hardware typically remains in the foot unless it becomes bothersome to the patient.  If this happens the hardware may need to be removed.  The patient was instructed to not smoke or use nicotine patches before and after the surgery as this could result in poor outcomes due to slower healing potentially.  Risks of DVT/PE were discussed in relation to anticipated level of immobilization, inactivity, injury, surgery, medications and personal risk factors.  Perioperative education was provided regarding signs and symptoms of a blood clot.  The patient was  encouraged to be vigilant regarding symptoms and pursue risk reduction measures.  Based on patient history, procedure and post-operative plan, risk outweighs benefit of chemical prophylaxis therefore mechanical prophylaxis was encouraged.  Lower extremity range of motion exercises are encouraged.   Postoperative pain regimens were discussed in great detail the patient.  The risks and benefits of non-narcotic and narcotic pain medications, as well as synergistic agents was also discussed.  The patient will be prescribed Oxycodone for more severe breakthrough pain not relieved by scheduled Tylenol.  The patient was also encouraged to rest, elevate and ice postoperatively to help with swelling and pain control.  The patient was in agreement with this plan.  The patient understands that they would need to remain non- weightbearing with use of knee walker post-operatively.The recovery process was discussed including impact to work, walking, shoes and daily activities.  We discussed that the patient should anticipate up to 12 months for maximum recovery after surgery.  There is moderate risk involved.        After detailed discussed patient wishes to continue with the proposed surgical intervention.  The procedure will be performed under monitored anesthesia care with popliteal block for anesthesia.  The patient will obtain a preoperative history and physical by the primary care provider along with a Covid test approximately 4 days before the scheduled surgery.  Consents will be reviewed and signed on the day of surgery.        Newton verbalized agreement with and understanding of the rational for the diagnosis and treatment plan.  All questions were answered to best of my ability and the patient's satisfaction. The patient was advised to contact the clinic with any questions that may arise after the clinic visit.      Disclaimer: This note consists of symbols derived from keyboarding, dictation and/or voice recognition  software. As a result, there may be errors in the script that have gone undetected. Please consider this when interpreting information found in this chart.       VINNIE Bland D.P.M., F.A.C.F.A.S.        Again, thank you for allowing me to participate in the care of your patient.        Sincerely,        Kiko Bland DPM

## 2022-02-10 NOTE — PATIENT INSTRUCTIONS
Calluses of the Foot    I.  Calluses on the toes   A.  Tips of the toes    1.  Due to pressure on the tips of the toes when wearing shoes, sandals or barefoot.    2.  Related to hammertoe deformity of the toes.    3.  Treated with wearing soft cushioned toe caps or shoe liners to better offload the pressure to the tips of the toes    4.  Correcting the deformity of the toe is another option if cushions do not help   B.  Top of sides of the knuckles of the toes    1.  Due to pressure from adjacent toes or shoes on the knuckles of the toes.    2.  Can be related to hammertoe deformities    3.  Treated with wearing roomy shoes with soft top-cover material in order not to rub on the skin    4.  Silicone toe caps can also be used to protect rubbing from the knuckles of the adjacent toes.    5.  Correcting the deformity of the toe is another option if offloading measures do not work.  II. Calluses on the bottom of the foot   A.   Pressure points    1.  Caused by direct pressure overlying prominent bones such as metatarsal head on the balls of the foot.    2.  Can be related to either hammertoe deformities or fat pad atrophy    3.  Can be treated with additional cushion utilizing silicone shoe liners to better offload the pressure and supplement for fat pad atrophy.    4.  Surgical correction of hammertoe deformities is another option if offloading cushion treatment does not work.   B.   Shear forces    1.  Caused by sliding forces along the skin on the bottom of the forefoot including the great toe.    2.  This is not due to a rotational force as the foot pushes off against the ground.    3.  Treated with wearing a silicone shoe liner that can move with the skin reducing the amount of shear force causing the callus formation.   C.   IPK lesions or porokeratosis    1.  These are small hard callus lesions that are related to plugged sweat ducts.    2.  These ducts travel through the epidermis and dermis residing in the  subcutaneous tissue    3.  When the ducts get clogged, they can harden up resulting in callused skin around them.      4.  Treatment includes sharp excavation of the hyperkeratotic callus tissue core as far as can be tolerated, preferably without bleeding.    5.   Other treatment options   A.  Application of salicylic acid to soften the hyperkeratotic skin   B.  Cantharone which causes a blister in attempt to pull off the hyperkeratotic skin lesion.    Helpful products:    Soles  Silicone Shoe Inserts    Amazon: https://www.[employer]/Soles-Silicone-Orthopedic-Comfortable-Hypoallergenic/dp/X30BLAQ2MY/ref=sr_1_2?dchild=1&keywords=silicone+sole&evw=9770713211&sr=8-2    Dr. Garrett's Gels Toe Caps      Sold at [employer] in [facility]    Amazon: https://www.[employer]/Errol-All-Mini-Size/dp/R37QNYPI57/ref=sr_1_2?crid=S6WGMRFHPWWH&dchild=1&keywords=dr+harriets+all+gel+toe+cap&trl=5857510991&sprefix=.+Harriet%27s+gels+toe%2Caps%2C171&sr=8-2      Surgery Scheduling   You have elected to proceed with surgery for hammertoe correction of the second toe and arthrodesis of the first metatarsophalangeal joint on the right foot.  Dr. Bland performs his surgeries on Tuesdays at [facility].   To schedule your surgery date please call [phone].  If no one answers, please leave a message with a good time for our staff to call you back.    - Please have a date in mind for your surgery, you can feel free to leave that date on the message, and we will schedule and call back to confirm.   - You can expect to receive a call back the same day or on the next business day from Dr. Bland s team to assist in the scheduling.   - We will assist to schedule the date of your surgery.    o The time will be determined a few days ahead of time.    o You can expect a call from Same Day Surgery 2-3 days ahead of time with specific instructions for what time to arrive at the hospital as well as any  other preparations you should take prior to surgery.  - You may need to obtain a pre-operative physical from a primary medical provider.    o This must be done within 30 days of your surgery date.  - You will also need a preoperative COVID test.    o This is typically done 4 days before your scheduled surgery date.    o As most surgeries are scheduled on Tuesday, this means the test will need to be performed on the Friday before surgery.    o There are several centers where you can have the tests taken and you may need to go to the center that has availability.    - We will also schedule your first post-operative appointment for a bandage and wound check for the Monday following your surgery at the [facility].  - You may be non-weight bearing for a period of up to 6 weeks.  Options for this include: (Please indicate which you would prefer so we can provide you with an order and instructions)  o Crutches  o Walker  o Roll-a-bout knee walker.  - If you will need paperwork filled out for your employer you may drop those off at the clinic directly or you may have those faxed to us at [phone].  Please indicate on the form the date you would like the LA to begin if it will not be your surgery date.    The forms are typically filled out for up to 12 weeks, however you may be cleared to return prior to that time depending on your individual healing and job requirements.    GETTING READY FOR YOUR SURGERY    ONE-THREE WEEKS BEFORE  1. See your Family Doctor or Primary Care Provider for a Preoperative History and Physical.    2. Please see pre-surgical medications below to which medications need to be stopped before surgery and when.    SAME DAY SURGERY PATIENTS  1. You will need a family member of friend to drive you home. If you do not have one the surgery will be cancelled or rescheduled.  2. You will need a responsible adult to stay with you that night after the surgery.       We will ask this person to listen  to some instructions before you leave the hospital.    DAY BEFORE SURGERY  1. DO NOT EAT OR DRINK ANYTHING AFTER MIDNIGHT THE NIGHT  BEFORE YOUR SURGERY!   2. DO NOT DRINK ALCOHOL.  3. Do not take over the counter drugs.  4. Some people need to have blood tests at the hospital. If you need blood tests, we will tell you in advance.  5. Take medications as directed by your doctor. You may take these with a small amount of water.  6. Do not chew gum, chew tobacco, or suck on hard candy the day of surgery.  7. Bring your insurance cards, a list of your medicines and co-pays you might need. Leave jewelry and other valuables at home.      PRESURGICAL MEDICATIONS:  Certain prescription, over-the-counter, and herbal medications interfere with healing after an operation. The main concern relates to medications that increase bleeding at the surgical site. Excess blood under the incision results in poor wound healing, excess pain, increased scarring, and a higher risk of infection.    Some medications slow the healing process of bone. Medications can also interfere with the anesthesia drugs that keep you asleep during the operation. It is important to ensure that these medications are out of your system prior to the operation. The list below details a number of medications that are of concern. Pay special attention to how long you should avoid these medications before your operation. Please note that this list is not complete. You should ask your surgeon or pharmacist if you are uncertain about other medications. Any herbal supplement not listed should be discontinued at least one week prior to surgery.    Aspirin: Hold for one week prior to surgery and restart the day after surgery. This over the counter medication promotes bleeding.    Motrin / Ibuprofen / Aleve / Advil / NSAIDS:  Stop one week prior to surgery. These medications affect bleeding and may cause delay in bone healing. Avoid taking these medications for six  weeks after bone surgeries. Other procedures may allow you to restart sooner than 6 weeks after surgery.    Coumadin / Plavix: Your primary care provider will manage Coumadin in relation to surgery. Coumadin may result in excessive bleeding and may be adjusted before and after surgery.    Enbriel: Stop two weeks prior to surgery and restart two weeks after surgery. This medication can effect soft tissue healing and increases the risk of infection.    Remicade: Stop 8-12 weeks before surgery and restart two weeks after surgery. This medication can affect soft tissue healing and increases the risk of infection.    Humira: Stop 4 weeks before surgery and restart two weeks after surgery. This medication can affect soft tissue healing and increases the risk of infection.    Methotrexate: Stop one dose prior to surgery. This medication will be restarted when the wound appears to be healing well. Please ask your surgeon about restarting this medication when you are being seen in the office for wound checks.    Kava: Stop at least one day prior to surgery and may restart one day after surgery. Kava may increase the sedative effect of anesthetics that are given during the operation. Kava can also increase bleeding at the surgical site.    Ephedra (ma villarreal): Stop at least one day prior to surgery and may restart one day after surgery.  Ephedra may increase the risk of heart attack and stroke. This medication can also increase bleeding at the surgical site.    Riva's Wort: Stop at least five days before surgery and may restart one day after surgery. Jani's wort may diminish the effects of several drugs that are given during surgery.    Ginseng: Stop at least one week prior to surgery and may restart one day after surgery.  Ginseng lowers blood sugar and may increase bleeding at the surgical site.    Ginkgo: Stop 36 hours before surgery and may restart one day after surgery. Ginkgo may increase bleeding at the surgical  site.    Garlic: Stop at least one week prior to surgery and may restart one day after. Garlic may increase bleeding at the surgery site.    Valerian: Do a slow steady decrease in your daily dose over a period of 2-3 weeks before surgery to decrease the chance of withdrawal symptoms. Valerian may increase the sedative effect of anesthetics given during the operation.    Echinacea: There is no data on stopping echinacea prior to surgery. This medication though can be associated with allergic reactions and suppression of your immune system.    Vitamin E, Omega-3, Flax, Fish Oil, Glucosamine and Chondroitin: Stop 2 weeks prior to surgery and may restart one day after. These herbal medications can increase risk of bleeding at surgical site.

## 2022-02-10 NOTE — PROGRESS NOTES
PATIENT HISTORY:  Newton Buchanan is a 76 year old male who presents to clinic in consultation at the request of  Marcial Cutler M.D. with a chief complaint of deformity of second toe right foot.  The patient is seen by themselves.  The patient relates the pain is primarily located around the second toe.  Patient describes injury as he stubbed the toe against the leg of a chair or a table that has been going on for 1 year(s).  The patient has previously tried seen another provider in [place], and a toe device with little relief.  Denies any prior history of similar issues..  The patient is retired.  Any previous notes and studies that pertain to the patient's condition were reviewed.    Pertinent medical, surgical and family history was reviewed in Epic chart and include oropharynx cancer, gastroesophageal reflux disease, prostate cancer    Medications:   Current Outpatient Medications:      aspirin 81 MG tablet, Take by mouth daily, Disp: 30 tablet, Rfl:      fluticasone (FLONASE) 50 MCG/ACT nasal spray, Spray 1-2 sprays in nostril, Disp: , Rfl:      losartan (COZAAR) 25 MG tablet, Take 1 tablet (25 mg) by mouth daily, Disp: 90 tablet, Rfl: 3     pembrolizumab (KEYTRUDA) 25 MG/ML, , Disp: , Rfl:      pravastatin (PRAVACHOL) 20 MG tablet, Take 20 mg by mouth daily , Disp: , Rfl:      saccharomyces boulardii (FLORASTOR) 250 MG capsule, Take 1 capsule by mouth 2 times daily, Disp: , Rfl:      sildenafil (VIAGRA) 100 MG tablet, Take by mouth daily as needed for erectile dysfunction, Disp: 30 tablet, Rfl:      VITAMIN D, CHOLECALCIFEROL, PO, Take 1,000 Units by mouth daily, Disp: , Rfl:      Allergies:    Allergies   Allergen Reactions     Atorvastatin      Other reaction(s): Confusion  unusual behavior and dizziness         Vitals: BP (!) 156/80   Pulse 62   Ht 1.829 m (6')   Wt 87.1 kg (192 lb)   BMI 26.04 kg/m    BMI= Body mass index is 26.04 kg/m .    LOWER EXTREMITY PHYSICAL EXAM    Dermatologic: Skin is  intact to right lower extremity without significant lesions, rash or abrasion.        Vascular: DP & PT pulses are intact & regular on the right.   CFT and skin temperature is normal to the right lower extremity.     Neurologic: Lower extremity sensation is intact to light touch.  No evidence of weakness in the right lower extremity.        Musculoskeletal: Patient is ambulatory without assistive device or brace.  No gross ankle deformity noted.  No foot or ankle joint effusion is noted.  Noted moderate bunion deformity with hammertoe deformity of the second toe on the right foot.  Limited range of motion of the first metatarsophalangeal joint on the right.  No surrounding erythema noted.  Noted pain on palpation over the dorsal aspect of the second toe and bunion on the right foot.    Diagnostics:  Radiographs included three views of the right foot demonstrating a moderate bunion deformity with an elevated first metatarsal angle and hallux abductus angle.  Noted degenerative changes to the first metatarsophalangeal joint.  Noted contracted second toe hammertoe deformity.  No cortical erosions or periosteal elevation.  All joint margins appear stable.  There is no apparent fracture or tumor formation noted.  There is no evidence of foreign body.  The images were independently reviewed by myself along with the patient explaining the findings.      ASSESSMENT / PLAN:     ICD-10-CM    1. Hammertoe of second toe of right foot  M20.41    2. Hallux valgus, right  M20.11    3. 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  We discussed the underlying contributing factors to the condition as well as treatment options along with expected length of recovery.  At this time, the patient was educated on the importance of offloading supportive, roomy shoes and other devices.  The patient was instructed to perform warm soaks with Epson salt  after which to also apply over-the-counter Voltaren gel to deeply massage the injured tissue.    The patient will return in four weeks for reevaluation if the symptoms do not resolve.    I also discussed with patient details of procedure(s), possible risks and complications, alternative treatment options and post-operative course detailed below.  Patient is aware that surgery is elective and can be avoided if desired.  Likely surgical procedures include arthrodesis of the first metatarsophalangeal joint with hammertoe correction of the second toe on the right foot.  The patient was educated today about risks associated with surgery.  Risks of surgery include, but are not limited to: infection, painful scar, nerve injury, numbness, stiffness, over-correction, under-correction, non-union, need for repeat surgery, recurrence of condition, ongoing pain, delayed wound healing, blood clot in the legs or lungs, amputation or other unforeseen side effects from undergoing surgery.       The patient was informed that metal screws and occasional  metal plates are used to stabilize the fractures and or osteotomies.  The hardware typically remains in the foot unless it becomes bothersome to the patient.  If this happens the hardware may need to be removed.  The patient was instructed to not smoke or use nicotine patches before and after the surgery as this could result in poor outcomes due to slower healing potentially.  Risks of DVT/PE were discussed in relation to anticipated level of immobilization, inactivity, injury, surgery, medications and personal risk factors.  Perioperative education was provided regarding signs and symptoms of a blood clot.  The patient was encouraged to be vigilant regarding symptoms and pursue risk reduction measures.  Based on patient history, procedure and post-operative plan, risk outweighs benefit of chemical prophylaxis therefore mechanical prophylaxis was encouraged.  Lower extremity range of  motion exercises are encouraged.   Postoperative pain regimens were discussed in great detail the patient.  The risks and benefits of non-narcotic and narcotic pain medications, as well as synergistic agents was also discussed.  The patient will be prescribed Oxycodone for more severe breakthrough pain not relieved by scheduled Tylenol.  The patient was also encouraged to rest, elevate and ice postoperatively to help with swelling and pain control.  The patient was in agreement with this plan.  The patient understands that they would need to remain non- weightbearing with use of knee walker post-operatively.The recovery process was discussed including impact to work, walking, shoes and daily activities.  We discussed that the patient should anticipate up to 12 months for maximum recovery after surgery.  There is moderate risk involved.        After detailed discussed patient wishes to continue with the proposed surgical intervention.  The procedure will be performed under monitored anesthesia care with popliteal block for anesthesia.  The patient will obtain a preoperative history and physical by the primary care provider along with a Covid test approximately 4 days before the scheduled surgery.  Consents will be reviewed and signed on the day of surgery.        Newton verbalized agreement with and understanding of the rational for the diagnosis and treatment plan.  All questions were answered to best of my ability and the patient's satisfaction. The patient was advised to contact the clinic with any questions that may arise after the clinic visit.      Disclaimer: This note consists of symbols derived from keyboarding, dictation and/or voice recognition software. As a result, there may be errors in the script that have gone undetected. Please consider this when interpreting information found in this chart.       VINNIE Bland D.P.M., FRASHAWN.F.A.S.

## 2022-02-10 NOTE — NURSING NOTE
Chief Complaint   Patient presents with     Consult     deformity of right foot second toe       Initial BP (!) 163/80   Pulse 62   Ht 1.829 m (6')   Wt 87.1 kg (192 lb)   BMI 26.04 kg/m   Estimated body mass index is 26.04 kg/m  as calculated from the following:    Height as of this encounter: 1.829 m (6').    Weight as of this encounter: 87.1 kg (192 lb).  Medications and allergies reviewed.      Lary MORALES MA

## 2022-02-24 ENCOUNTER — INFUSION THERAPY VISIT (OUTPATIENT)
Dept: INFUSION THERAPY | Facility: CLINIC | Age: 77
End: 2022-02-24
Attending: INTERNAL MEDICINE
Payer: MEDICARE

## 2022-02-24 ENCOUNTER — LAB (OUTPATIENT)
Dept: LAB | Facility: CLINIC | Age: 77
End: 2022-02-24
Payer: MEDICARE

## 2022-02-24 VITALS
SYSTOLIC BLOOD PRESSURE: 132 MMHG | BODY MASS INDEX: 26.31 KG/M2 | WEIGHT: 194 LBS | TEMPERATURE: 98.3 F | DIASTOLIC BLOOD PRESSURE: 74 MMHG | HEART RATE: 58 BPM

## 2022-02-24 DIAGNOSIS — C76.0 HEAD AND NECK CANCER (H): Primary | ICD-10-CM

## 2022-02-24 LAB
ALBUMIN SERPL-MCNC: 3.4 G/DL (ref 3.4–5)
ALP SERPL-CCNC: 43 U/L (ref 40–150)
ALT SERPL W P-5'-P-CCNC: 24 U/L (ref 0–70)
ANION GAP SERPL CALCULATED.3IONS-SCNC: 4 MMOL/L (ref 3–14)
AST SERPL W P-5'-P-CCNC: 14 U/L (ref 0–45)
BILIRUB SERPL-MCNC: 0.5 MG/DL (ref 0.2–1.3)
BUN SERPL-MCNC: 16 MG/DL (ref 7–30)
CALCIUM SERPL-MCNC: 9.5 MG/DL (ref 8.5–10.1)
CHLORIDE BLD-SCNC: 106 MMOL/L (ref 94–109)
CO2 SERPL-SCNC: 30 MMOL/L (ref 20–32)
CREAT SERPL-MCNC: 1.09 MG/DL (ref 0.66–1.25)
GFR SERPL CREATININE-BSD FRML MDRD: 71 ML/MIN/1.73M2
GLUCOSE BLD-MCNC: 92 MG/DL (ref 70–99)
HOLD SPECIMEN: NORMAL
POTASSIUM BLD-SCNC: 4.1 MMOL/L (ref 3.4–5.3)
PROT SERPL-MCNC: 6.6 G/DL (ref 6.8–8.8)
SODIUM SERPL-SCNC: 140 MMOL/L (ref 133–144)
TSH SERPL DL<=0.005 MIU/L-ACNC: 2.15 MU/L (ref 0.4–4)

## 2022-02-24 PROCEDURE — 250N000011 HC RX IP 250 OP 636: Performed by: INTERNAL MEDICINE

## 2022-02-24 PROCEDURE — 80053 COMPREHEN METABOLIC PANEL: CPT | Performed by: INTERNAL MEDICINE

## 2022-02-24 PROCEDURE — 84443 ASSAY THYROID STIM HORMONE: CPT | Performed by: INTERNAL MEDICINE

## 2022-02-24 PROCEDURE — 36415 COLL VENOUS BLD VENIPUNCTURE: CPT | Performed by: INTERNAL MEDICINE

## 2022-02-24 PROCEDURE — 96413 CHEMO IV INFUSION 1 HR: CPT

## 2022-02-24 PROCEDURE — 258N000003 HC RX IP 258 OP 636: Performed by: INTERNAL MEDICINE

## 2022-02-24 RX ADMIN — SODIUM CHLORIDE 250 ML: 9 INJECTION, SOLUTION INTRAVENOUS at 14:07

## 2022-02-24 RX ADMIN — SODIUM CHLORIDE 200 MG: 9 INJECTION, SOLUTION INTRAVENOUS at 14:02

## 2022-02-25 NOTE — PROGRESS NOTES
Infusion Nursing Note:  Newton Buchanan presents today for Keytruda.    Patient seen by provider today: No   present during visit today: Not Applicable.    Note: N/A.    Intravenous Access:  Peripheral IV placed.    Treatment Conditions:  Results reviewed, labs MET treatment parameters, ok to proceed with treatment.      Post Infusion Assessment:  Patient tolerated infusion without incident.  Access discontinued per protocol.       Discharge Plan:   Patient discharged in stable condition accompanied by: self.  Departure Mode: Ambulatory.    Nallely Cao RN

## 2022-03-12 NOTE — PROGRESS NOTES
Chief Complaint   Patient presents with     Consult     Tonsillar mass, showed up on recent PET scan.     History of Present Illness  Newton Buchanan is a 76 year old male who presents today for evaluation.  I am seeing this patient in consultation for tonsillar mass at the request of the provider Dr. Ruiz.  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  The patient had richard disease in the neck and lung metastasis.  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  His pretreatment PET scan showed his cervical of adenopathy on the right and numerous bilateral lung metastases with hilar lymphadenopathy.  No primary site was identified on his PET scan physical examination.  The tumor stayed highly with PD1 so he was started on Keytruda in May 2020.  He received radiation to the right neck and oropharynx for unknown primary completing in December 2020.  Follow-up PET/CT in April 2021 showed stable/improved disease.  The patient recently underwent a PET scan 1/31/2022.  My review of the PET/CT shows some slight asymmetric FDG uptake in the left lingual tonsil and left palatine tonsil.  Near the glossotonsillar sulcus, there appears to be a calcification consistent with a tonsillolith.  There is no abnormal FDG avid uptake in the neck or chest.  Prior FDG uptake at the tonsil tongue base was 4.4 SUV which increased to 6.4 on this current study.    From a symptom standpoint, the patient reports that overall he is doing well without any significant symptomatology.  The patient denies any dysphagia, odynophagia, pharyngodynia, otalgia, dysphonia, hemoptysis, neck lumps/bumps/swelling.  No unintentional weight loss.  He does report some intermittent dry throat/dry mouth.  The patient is a lifetime non-smoker.  He has noted some significant dry nasal secretions, congestion, and  some intermittent nosebleeding on the left-hand side.  He is not had previous nasal cautery or required nasal packing previously.  He does use CPAP at nighttime and does feel his chamber every evening.  He does use Flonase intermittently.  He does not use any saline in his nose.  He does take a daily baby aspirin but no other blood thinners.    Past Medical History  Patient Active Problem List   Diagnosis     Essential hypertension with goal blood pressure less than 140/90     Gastroesophageal reflux disease without esophagitis     CRYSTAL (obstructive sleep apnea)     Prostate cancer (H)     Head and neck cancer (H)     Oropharyngeal cancer (H)     Current Medications    Current Outpatient Medications:      aspirin 81 MG tablet, Take by mouth daily, Disp: 30 tablet, Rfl:      fluticasone (FLONASE) 50 MCG/ACT nasal spray, Spray 1-2 sprays in nostril, Disp: , Rfl:      losartan (COZAAR) 25 MG tablet, Take 1 tablet (25 mg) by mouth daily, Disp: 90 tablet, Rfl: 3     pembrolizumab (KEYTRUDA) 25 MG/ML, , Disp: , Rfl:      pravastatin (PRAVACHOL) 20 MG tablet, Take 20 mg by mouth daily , Disp: , Rfl:      saccharomyces boulardii (FLORASTOR) 250 MG capsule, Take 1 capsule by mouth 2 times daily, Disp: , Rfl:      sildenafil (VIAGRA) 100 MG tablet, Take by mouth daily as needed for erectile dysfunction, Disp: 30 tablet, Rfl:      VITAMIN D, CHOLECALCIFEROL, PO, Take 1,000 Units by mouth daily, Disp: , Rfl:     Allergies  Allergies   Allergen Reactions     Atorvastatin      Other reaction(s): Confusion  unusual behavior and dizziness         Social History  Social History     Socioeconomic History     Marital status: Single     Spouse name: Not on file     Number of children: Not on file     Years of education: Not on file     Highest education level: Not on file   Occupational History     Not on file   Tobacco Use     Smoking status: Never Smoker     Smokeless tobacco: Never Used   Substance and Sexual Activity     Alcohol  use: Yes     Alcohol/week: 0.0 standard drinks     Comment: weekly     Drug use: No     Sexual activity: Yes     Partners: Female   Other Topics Concern     Parent/sibling w/ CABG, MI or angioplasty before 65F 55M? Not Asked   Social History Narrative     Not on file     Social Determinants of Health     Financial Resource Strain: Not on file   Food Insecurity: Not on file   Transportation Needs: Not on file   Physical Activity: Not on file   Stress: Not on file   Social Connections: Not on file   Intimate Partner Violence: Not on file   Housing Stability: Not on file       Family History  Family History   Problem Relation Age of Onset     Coronary Artery Disease No family hx of      Colon Cancer No family hx of        Review of Systems  As per HPI and PMHx, otherwise 10 system review including the head and neck, constitutional, eyes, respiratory, GI, skin, neurologic, lymphatic, endocrine, and allergy systems is negative.    Physical Exam  /65 (BP Location: Right arm, Patient Position: Sitting, Cuff Size: Adult Large)   Pulse 59   Temp 98  F (36.7  C) (Tympanic)   Ht 1.829 m (6')   Wt 88 kg (194 lb)   BMI 26.31 kg/m    GENERAL: Patient is a pleasant, cooperative 76 year old male in no acute distress.  HEAD: Normocephalic, atraumatic.  Hair and scalp are normal.  EYES: Pupils are equal, round, reactive to light and accommodation.  Extraocular movements are intact.  The sclera nonicteric without injection.  The extraocular structures are normal.  EARS: Normal shape and symmetry.  No tenderness when palpating the mastoid or tragal areas bilaterally.  Otoscopic exam reveals a minimal amount of cerumen bilaterally.  The bilateral tympanic membranes are round, intact without evidence of effusion, good landmarks.  No retraction, granulation, or drainage.  NOSE: Nares are patent.  Nasal mucosa is significantly dry with some crusting more so on the left-hand side.  The patient has a rightward nasal septal  deviation anteriorly and mid septum.  No nasal cavity masses, polyps, or mucopurulence on anterior rhinoscopy.  ORAL CAVITY: Dentition is in good repair.  Mucous membranes are dry.  Tongue is mobile, protrudes to the midline.  Palate elevates symmetrically.  Tonsils are 1+, symmetric.  Careful palpation of the tongue base and tonsil fossa bilaterally does not reveal any firmness, fullness, masses, lesions. No erythema or exudate.  No oral cavity or oropharyngeal masses, lesions, ulcerations, leukoplakia.  NECK: Supple, trachea is midline. Palpation of the right neck does reveal some fullness in the right level 2A just anterior to sternocleidomastoid muscle adjacent to the posterior border of the submandibular gland. There no obvious palpable cervical lymphadenopathy or masses bilaterally. The bilateral parotid and submandibular areas reveal no masses.  No thyromegaly.    NEUROLOGIC: Cranial nerves II through XII are grossly intact.  Voice is strong.  Patient is House-Brackmann I/VI bilaterally.  CARDIOVASCULAR: Extremities are warm and well-perfused.  No significant peripheral edema.  RESPIRATORY: Patient has nonlabored breathing without cough, wheeze, stridor.  PSYCHIATRIC: Patient is alert and oriented.  Mood and affect appear normal.  SKIN: Warm and dry.  No scalp, face, or neck lesions noted.    Procedure: Flexible Laryngoscopy  Indication: History of oropharyngeal squamous cell carcinoma of unknown primary, asymmetric FDG avid uptake in the left tonsil/tongue base    To best visualize the upper airway anatomy and due to the chief complaint and HPI, I proceeded with flexible fiberoptic laryngoscopy examination.  The bilateral nasal cavities were anesthetized and decongested with a mixture of lidocaine and neosynephrine.  The bilateral nasal cavities were examined using a flexible fiberoptic laryngoscope.  There were no nasal cavity masses, polyps, or mucopurulence bilaterally.  The nasal septum deviates to the  right anteriorly.  The nasopharynx had a normal appearance with normal Eustachian tube openings and fossa of Rosenmuller bilaterally.  Minimal adenoid tissue.  Posttreatment changes of the oropharynx and supraglottic larynx.  The base of tongue, vallecula, epiglottis, aryepiglottic folds, arytenoids, and piriform sinuses were without mass or lesion.  The bilateral true vocal folds were symmetrically mobile without nodules or masses.  The visualized portions of the infraglottic and subglottic airway are unremarkable.  The scope was removed.  The patient tolerated the procedure well.                    Procedure: Control simple left anterior nasal hemorrhage  Indication: Recurrent epistaxis     Phenylephrine and lidocaine was applied to the anterior septum.  Using silver nitrate, I addressed several prominent blood vessels in the left anterior Kiesselbach's plexus. Hemostasis was achieved.  Surgicel was applied.  Patient tolerated the procedure well.      Assessment and Plan     ICD-10-CM    1. Abnormal positron emission tomography (PET) scan  R94.8 LARYNGOSCOPY FLEX FIBEROPTIC, DIAGNOSTIC     Nasal Cautery Simple Unilat   2. Normal throat exam  Z00.00 LARYNGOSCOPY FLEX FIBEROPTIC, DIAGNOSTIC     Nasal Cautery Simple Unilat   3. Squamous cell carcinoma metastatic to head and neck with unknown primary site (H)  C79.89 LARYNGOSCOPY FLEX FIBEROPTIC, DIAGNOSTIC    C80.1 Nasal Cautery Simple Unilat   4. History of head and neck radiation  Z92.3 LARYNGOSCOPY FLEX FIBEROPTIC, DIAGNOSTIC     Nasal Cautery Simple Unilat   5. Recurrent epistaxis  R04.0 LARYNGOSCOPY FLEX FIBEROPTIC, DIAGNOSTIC     Nasal Cautery Simple Unilat      It was my pleasure seeing Newton Buchanan today in clinic.  The patient is roughly 15 months from completing primary radiation therapy for P16+ squamous cell carcinoma of unknown primary with distant metastatic disease in the lungs and mediastinum (T0 N1 M1).  He had good response with PD-1 inhibitor  treatment. The patient has no evidence of oropharyngeal squamous cell carcinoma on physical exam or endoscopic exam today.  My suspicion is that the low-grade FDG uptake that is seen is likely inflammatory especially in the setting of the calcification in the left palatine tonsil consistent with a tonsil stone. I would recommend continued observation with clinical follow-up every 3 to 4 months for the first 2 years after completing radiation therapy.  There is no evidence of recurrent disease at 2 years, we could see him every 6 months until year 5.    The patient also presented today with recurrent epistaxis.  He had several blood vessels prominent in Kiesselbach's plexus on the left-hand side that we cauterized today.    We discussed restrictions on manipulation and picking of the nose.  Also, sleeping with the head elevated, and avoidance of strenuous activity, heavy lifting, and bending over until the septum heals. I explained using vaseline twice daily to the anterior septum, nasal saline spray 3-5 times per day, and using the humidifier on his CPAP nightly.      We discussed using Afrin for significant or acute nosebleeds.  I also explained applying digital pressure to the nasal tip for a minimum of 15-20 minutes to stop a nose bleed.     Migue Kelly MD  Department of Otolaryngology-Head and Neck Surgery  [facility]

## 2022-03-14 ENCOUNTER — OFFICE VISIT (OUTPATIENT)
Dept: OTOLARYNGOLOGY | Facility: CLINIC | Age: 77
End: 2022-03-14
Payer: MEDICARE

## 2022-03-14 VITALS
SYSTOLIC BLOOD PRESSURE: 137 MMHG | BODY MASS INDEX: 26.28 KG/M2 | TEMPERATURE: 98 F | HEART RATE: 59 BPM | HEIGHT: 72 IN | DIASTOLIC BLOOD PRESSURE: 65 MMHG | WEIGHT: 194 LBS

## 2022-03-14 DIAGNOSIS — Z00.00 NORMAL THROAT EXAM: ICD-10-CM

## 2022-03-14 DIAGNOSIS — C79.89 SQUAMOUS CELL CARCINOMA METASTATIC TO HEAD AND NECK WITH UNKNOWN PRIMARY SITE (H): ICD-10-CM

## 2022-03-14 DIAGNOSIS — C44.92 SQUAMOUS CELL CARCINOMA METASTATIC TO HEAD AND NECK WITH UNKNOWN PRIMARY SITE (H): ICD-10-CM

## 2022-03-14 DIAGNOSIS — R94.8 ABNORMAL POSITRON EMISSION TOMOGRAPHY (PET) SCAN: Primary | ICD-10-CM

## 2022-03-14 DIAGNOSIS — R04.0 RECURRENT EPISTAXIS: ICD-10-CM

## 2022-03-14 DIAGNOSIS — Z92.3 HISTORY OF HEAD AND NECK RADIATION: ICD-10-CM

## 2022-03-14 PROCEDURE — 30901 CONTROL OF NOSEBLEED: CPT | Mod: LT | Performed by: OTOLARYNGOLOGY

## 2022-03-14 PROCEDURE — 99214 OFFICE O/P EST MOD 30 MIN: CPT | Mod: 25 | Performed by: OTOLARYNGOLOGY

## 2022-03-14 PROCEDURE — 31575 DIAGNOSTIC LARYNGOSCOPY: CPT | Performed by: OTOLARYNGOLOGY

## 2022-03-14 NOTE — LETTER
[facility]  [address] [address]  [phone]  Dept: [phone]      March 14, 2022      Patient: Newton Buchanan   YOB: 1945   Date of Visit: 3/14/2022       Dear Dr. Margie Menon,     Newton Buhcanan is a patient under my care at [facility].  The patient evaluated 3/14/2022 in clinic for head and neck oncologic surveillance.  Please see my enclosed notes and documentation.  My recommendation would be close clinical follow-up with return for oncologic surveillance in 3 to 4 months.  If you have any questions, feel free to contact me at [phone].    Regards,          Migue Kelly MD  Department of Otolaryngology-Head and Neck Surgery  [facility]

## 2022-03-14 NOTE — NURSING NOTE
Initial /65 (BP Location: Right arm, Patient Position: Sitting, Cuff Size: Adult Large)   Pulse 59   Temp 98  F (36.7  C) (Tympanic)   Ht 1.829 m (6')   Wt 88 kg (194 lb)   BMI 26.31 kg/m   Estimated body mass index is 26.31 kg/m  as calculated from the following:    Height as of this encounter: 1.829 m (6').    Weight as of this encounter: 88 kg (194 lb). .    Abiola Perez LPN on 3/14/2022 at 9:45 AM

## 2022-03-14 NOTE — PATIENT INSTRUCTIONS
"Epistaxis (Nosebleed) Discharge Instructions     It is normal to have a small amount of pink/blood tinged mucous oozing after cautery. Sleeping with the head elevated, avoidance of strenuous activity, heavy lifting, and bending over for 2-3 days until septum heals. You may use Vaseline/Aquaphor twice daily to the anterior septum and nasal saline spray 3-5 times daily to help with healing.      If you develop a nosebleed, you can spray Afrin (oxymetazoline nasal spray) in the side of his bleeding.  Apply pressure to the outside of the nose (over the flexible end of the nose) for 15 minutes and lean forward so that you do not swallow blood.  Hold constant, firm pressure for the entire duration without \"peeking\" to see if it has stopped as this will likely result in repeated bleeding.  If you continue to have a nose bleed or if the bleeding is very rapid or excessive, please present to the nearest emergency department emergently for medical evaluation.     In order to decrease your risk for future nosebleeds we recommend the following strategies:     1. Avoid trauma to your nose.  This includes irritation from exploring digits (nose picking) and excessive nose blowing.  2. Using a room humidifier at night, especially during the dry winter months will keep your nose moist and less susceptible to bleeding.  3. Use Vaseline or Aquaphor in the nose at nighttime to help with moisture.  4. For those with a history of many severe nose bleeds, it is helpful to irrigate the nose twice daily with normal saline (salt water rinses) to keep the nasal mucosa healthy.  You can also use AYR saline gel in the nose.     Nosebleeds are very common and regularly occur in otherwise healthy people, however frequent or recurrent severe nose bleeds may be a sign of a serious underlying medical condition.  We recommend that you discuss this episode with your primary care physician so that, if warranted, further testing may be performed.     If " you have questions or concerns on any instructions given to you by your provider today or if you need to schedule an appointment, you can reach us at [phone].

## 2022-03-14 NOTE — LETTER
3/14/2022         RE: Newton Buchanan  [address]  [place] [address]        Dear Colleague,    Thank you for referring your patient, Newton Buchanan, to the [facility]. Please see a copy of my visit note below.    Chief Complaint   Patient presents with     Consult     Tonsillar mass, showed up on recent PET scan.     History of Present Illness  Newton Buchanan is a 76 year old male who presents today for evaluation.  I am seeing this patient in consultation for tonsillar mass at the request of the provider Dr. Ruiz.  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  The patient had richard disease in the neck and lung metastasis.  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  His pretreatment PET scan showed his cervical of adenopathy on the right and numerous bilateral lung metastases with hilar lymphadenopathy.  No primary site was identified on his PET scan physical examination.  The tumor stayed highly with PD1 so he was started on Keytruda in May 2020.  He received radiation to the right neck and oropharynx for unknown primary completing in December 2020.  Follow-up PET/CT in April 2021 showed stable/improved disease.  The patient recently underwent a PET scan 1/31/2022.  My review of the PET/CT shows some slight asymmetric FDG uptake in the left lingual tonsil and left palatine tonsil.  Near the glossotonsillar sulcus, there appears to be a calcification consistent with a tonsillolith.  There is no abnormal FDG avid uptake in the neck or chest.  Prior FDG uptake at the tonsil tongue base was 4.4 SUV which increased to 6.4 on this current study.    From a symptom standpoint, the patient reports that overall he is doing well without any significant symptomatology.  The patient denies any dysphagia, odynophagia, pharyngodynia, otalgia,  dysphonia, hemoptysis, neck lumps/bumps/swelling.  No unintentional weight loss.  He does report some intermittent dry throat/dry mouth.  The patient is a lifetime non-smoker.  He has noted some significant dry nasal secretions, congestion, and some intermittent nosebleeding on the left-hand side.  He is not had previous nasal cautery or required nasal packing previously.  He does use CPAP at nighttime and does feel his chamber every evening.  He does use Flonase intermittently.  He does not use any saline in his nose.  He does take a daily baby aspirin but no other blood thinners.    Past Medical History  Patient Active Problem List   Diagnosis     Essential hypertension with goal blood pressure less than 140/90     Gastroesophageal reflux disease without esophagitis     CRYSTAL (obstructive sleep apnea)     Prostate cancer (H)     Head and neck cancer (H)     Oropharyngeal cancer (H)     Current Medications    Current Outpatient Medications:      aspirin 81 MG tablet, Take by mouth daily, Disp: 30 tablet, Rfl:      fluticasone (FLONASE) 50 MCG/ACT nasal spray, Spray 1-2 sprays in nostril, Disp: , Rfl:      losartan (COZAAR) 25 MG tablet, Take 1 tablet (25 mg) by mouth daily, Disp: 90 tablet, Rfl: 3     pembrolizumab (KEYTRUDA) 25 MG/ML, , Disp: , Rfl:      pravastatin (PRAVACHOL) 20 MG tablet, Take 20 mg by mouth daily , Disp: , Rfl:      saccharomyces boulardii (FLORASTOR) 250 MG capsule, Take 1 capsule by mouth 2 times daily, Disp: , Rfl:      sildenafil (VIAGRA) 100 MG tablet, Take by mouth daily as needed for erectile dysfunction, Disp: 30 tablet, Rfl:      VITAMIN D, CHOLECALCIFEROL, PO, Take 1,000 Units by mouth daily, Disp: , Rfl:     Allergies  Allergies   Allergen Reactions     Atorvastatin      Other reaction(s): Confusion  unusual behavior and dizziness         Social History  Social History     Socioeconomic History     Marital status: Single     Spouse name: Not on file     Number of children: Not on file      Years of education: Not on file     Highest education level: Not on file   Occupational History     Not on file   Tobacco Use     Smoking status: Never Smoker     Smokeless tobacco: Never Used   Substance and Sexual Activity     Alcohol use: Yes     Alcohol/week: 0.0 standard drinks     Comment: weekly     Drug use: No     Sexual activity: Yes     Partners: Female   Other Topics Concern     Parent/sibling w/ CABG, MI or angioplasty before 65F 55M? Not Asked   Social History Narrative     Not on file     Social Determinants of Health     Financial Resource Strain: Not on file   Food Insecurity: Not on file   Transportation Needs: Not on file   Physical Activity: Not on file   Stress: Not on file   Social Connections: Not on file   Intimate Partner Violence: Not on file   Housing Stability: Not on file       Family History  Family History   Problem Relation Age of Onset     Coronary Artery Disease No family hx of      Colon Cancer No family hx of        Review of Systems  As per HPI and PMHx, otherwise 10 system review including the head and neck, constitutional, eyes, respiratory, GI, skin, neurologic, lymphatic, endocrine, and allergy systems is negative.    Physical Exam  /65 (BP Location: Right arm, Patient Position: Sitting, Cuff Size: Adult Large)   Pulse 59   Temp 98  F (36.7  C) (Tympanic)   Ht 1.829 m (6')   Wt 88 kg (194 lb)   BMI 26.31 kg/m    GENERAL: Patient is a pleasant, cooperative 76 year old male in no acute distress.  HEAD: Normocephalic, atraumatic.  Hair and scalp are normal.  EYES: Pupils are equal, round, reactive to light and accommodation.  Extraocular movements are intact.  The sclera nonicteric without injection.  The extraocular structures are normal.  EARS: Normal shape and symmetry.  No tenderness when palpating the mastoid or tragal areas bilaterally.  Otoscopic exam reveals a minimal amount of cerumen bilaterally.  The bilateral tympanic membranes are round, intact  without evidence of effusion, good landmarks.  No retraction, granulation, or drainage.  NOSE: Nares are patent.  Nasal mucosa is significantly dry with some crusting more so on the left-hand side.  The patient has a rightward nasal septal deviation anteriorly and mid septum.  No nasal cavity masses, polyps, or mucopurulence on anterior rhinoscopy.  ORAL CAVITY: Dentition is in good repair.  Mucous membranes are dry.  Tongue is mobile, protrudes to the midline.  Palate elevates symmetrically.  Tonsils are 1+, symmetric.  Careful palpation of the tongue base and tonsil fossa bilaterally does not reveal any firmness, fullness, masses, lesions. No erythema or exudate.  No oral cavity or oropharyngeal masses, lesions, ulcerations, leukoplakia.  NECK: Supple, trachea is midline. Palpation of the right neck does reveal some fullness in the right level 2A just anterior to sternocleidomastoid muscle adjacent to the posterior border of the submandibular gland. There no obvious palpable cervical lymphadenopathy or masses bilaterally. The bilateral parotid and submandibular areas reveal no masses.  No thyromegaly.    NEUROLOGIC: Cranial nerves II through XII are grossly intact.  Voice is strong.  Patient is House-Brackmann I/VI bilaterally.  CARDIOVASCULAR: Extremities are warm and well-perfused.  No significant peripheral edema.  RESPIRATORY: Patient has nonlabored breathing without cough, wheeze, stridor.  PSYCHIATRIC: Patient is alert and oriented.  Mood and affect appear normal.  SKIN: Warm and dry.  No scalp, face, or neck lesions noted.    Procedure: Flexible Laryngoscopy  Indication: History of oropharyngeal squamous cell carcinoma of unknown primary, asymmetric FDG avid uptake in the left tonsil/tongue base    To best visualize the upper airway anatomy and due to the chief complaint and HPI, I proceeded with flexible fiberoptic laryngoscopy examination.  The bilateral nasal cavities were anesthetized and decongested  with a mixture of lidocaine and neosynephrine.  The bilateral nasal cavities were examined using a flexible fiberoptic laryngoscope.  There were no nasal cavity masses, polyps, or mucopurulence bilaterally.  The nasal septum deviates to the right anteriorly.  The nasopharynx had a normal appearance with normal Eustachian tube openings and fossa of Rosenmuller bilaterally.  Minimal adenoid tissue.  Posttreatment changes of the oropharynx and supraglottic larynx.  The base of tongue, vallecula, epiglottis, aryepiglottic folds, arytenoids, and piriform sinuses were without mass or lesion.  The bilateral true vocal folds were symmetrically mobile without nodules or masses.  The visualized portions of the infraglottic and subglottic airway are unremarkable.  The scope was removed.  The patient tolerated the procedure well.                    Procedure: Control simple left anterior nasal hemorrhage  Indication: Recurrent epistaxis     Phenylephrine and lidocaine was applied to the anterior septum.  Using silver nitrate, I addressed several prominent blood vessels in the left anterior Kiesselbach's plexus. Hemostasis was achieved.  Surgicel was applied.  Patient tolerated the procedure well.      Assessment and Plan     ICD-10-CM    1. Abnormal positron emission tomography (PET) scan  R94.8 LARYNGOSCOPY FLEX FIBEROPTIC, DIAGNOSTIC     Nasal Cautery Simple Unilat   2. Normal throat exam  Z00.00 LARYNGOSCOPY FLEX FIBEROPTIC, DIAGNOSTIC     Nasal Cautery Simple Unilat   3. Squamous cell carcinoma metastatic to head and neck with unknown primary site (H)  C79.89 LARYNGOSCOPY FLEX FIBEROPTIC, DIAGNOSTIC    C80.1 Nasal Cautery Simple Unilat   4. History of head and neck radiation  Z92.3 LARYNGOSCOPY FLEX FIBEROPTIC, DIAGNOSTIC     Nasal Cautery Simple Unilat   5. Recurrent epistaxis  R04.0 LARYNGOSCOPY FLEX FIBEROPTIC, DIAGNOSTIC     Nasal Cautery Simple Unilat      It was my pleasure seeing Newton Buchanan today in clinic.  The  patient is roughly 15 months from completing primary radiation therapy for P16+ squamous cell carcinoma of unknown primary with distant metastatic disease in the lungs and mediastinum (T0 N1 M1).  He had good response with PD-1 inhibitor treatment. The patient has no evidence of oropharyngeal squamous cell carcinoma on physical exam or endoscopic exam today.  My suspicion is that the low-grade FDG uptake that is seen is likely inflammatory especially in the setting of the calcification in the left palatine tonsil consistent with a tonsil stone. I would recommend continued observation with clinical follow-up every 3 to 4 months for the first 2 years after completing radiation therapy.  There is no evidence of recurrent disease at 2 years, we could see him every 6 months until year 5.    The patient also presented today with recurrent epistaxis.  He had several blood vessels prominent in Kiesselbach's plexus on the left-hand side that we cauterized today.    We discussed restrictions on manipulation and picking of the nose.  Also, sleeping with the head elevated, and avoidance of strenuous activity, heavy lifting, and bending over until the septum heals. I explained using vaseline twice daily to the anterior septum, nasal saline spray 3-5 times per day, and using the humidifier on his CPAP nightly.      We discussed using Afrin for significant or acute nosebleeds.  I also explained applying digital pressure to the nasal tip for a minimum of 15-20 minutes to stop a nose bleed.     Migue Kelly MD  Department of Otolaryngology-Head and Neck Surgery  [facility]         Again, thank you for allowing me to participate in the care of your patient.        Sincerely,        Migue Kelly MD

## 2022-03-17 ENCOUNTER — INFUSION THERAPY VISIT (OUTPATIENT)
Dept: INFUSION THERAPY | Facility: CLINIC | Age: 77
End: 2022-03-17
Attending: INTERNAL MEDICINE
Payer: MEDICARE

## 2022-03-17 ENCOUNTER — APPOINTMENT (OUTPATIENT)
Dept: LAB | Facility: CLINIC | Age: 77
End: 2022-03-17
Payer: MEDICARE

## 2022-03-17 VITALS
BODY MASS INDEX: 26.23 KG/M2 | TEMPERATURE: 96.8 F | SYSTOLIC BLOOD PRESSURE: 129 MMHG | WEIGHT: 193.4 LBS | DIASTOLIC BLOOD PRESSURE: 72 MMHG | HEART RATE: 54 BPM | RESPIRATION RATE: 18 BRPM

## 2022-03-17 DIAGNOSIS — C76.0 HEAD AND NECK CANCER (H): Primary | ICD-10-CM

## 2022-03-17 LAB
ALBUMIN SERPL-MCNC: 3.2 G/DL (ref 3.4–5)
ALP SERPL-CCNC: 45 U/L (ref 40–150)
ALT SERPL W P-5'-P-CCNC: 29 U/L (ref 0–70)
ANION GAP SERPL CALCULATED.3IONS-SCNC: 4 MMOL/L (ref 3–14)
AST SERPL W P-5'-P-CCNC: 19 U/L (ref 0–45)
BILIRUB SERPL-MCNC: 0.7 MG/DL (ref 0.2–1.3)
BUN SERPL-MCNC: 13 MG/DL (ref 7–30)
CALCIUM SERPL-MCNC: 8.7 MG/DL (ref 8.5–10.1)
CHLORIDE BLD-SCNC: 109 MMOL/L (ref 94–109)
CO2 SERPL-SCNC: 30 MMOL/L (ref 20–32)
CREAT SERPL-MCNC: 0.97 MG/DL (ref 0.66–1.25)
GFR SERPL CREATININE-BSD FRML MDRD: 81 ML/MIN/1.73M2
GLUCOSE BLD-MCNC: 70 MG/DL (ref 70–99)
POTASSIUM BLD-SCNC: 4 MMOL/L (ref 3.4–5.3)
PROT SERPL-MCNC: 6.1 G/DL (ref 6.8–8.8)
SODIUM SERPL-SCNC: 143 MMOL/L (ref 133–144)
TSH SERPL DL<=0.005 MIU/L-ACNC: 2.05 MU/L (ref 0.4–4)

## 2022-03-17 PROCEDURE — 96413 CHEMO IV INFUSION 1 HR: CPT

## 2022-03-17 PROCEDURE — 80053 COMPREHEN METABOLIC PANEL: CPT | Performed by: INTERNAL MEDICINE

## 2022-03-17 PROCEDURE — 82040 ASSAY OF SERUM ALBUMIN: CPT | Performed by: INTERNAL MEDICINE

## 2022-03-17 PROCEDURE — 84443 ASSAY THYROID STIM HORMONE: CPT | Performed by: INTERNAL MEDICINE

## 2022-03-17 PROCEDURE — 36415 COLL VENOUS BLD VENIPUNCTURE: CPT | Performed by: INTERNAL MEDICINE

## 2022-03-17 PROCEDURE — 258N000003 HC RX IP 258 OP 636: Performed by: INTERNAL MEDICINE

## 2022-03-17 PROCEDURE — 250N000011 HC RX IP 250 OP 636: Performed by: INTERNAL MEDICINE

## 2022-03-17 RX ADMIN — SODIUM CHLORIDE 250 ML: 9 INJECTION, SOLUTION INTRAVENOUS at 13:47

## 2022-03-17 RX ADMIN — SODIUM CHLORIDE 200 MG: 9 INJECTION, SOLUTION INTRAVENOUS at 13:46

## 2022-03-17 ASSESSMENT — PAIN SCALES - GENERAL: PAINLEVEL: NO PAIN (0)

## 2022-03-17 NOTE — PROGRESS NOTES
Infusion Nursing Note:  Newton Buchanan presents today for Keytruda C20D1    Patient seen by provider today: No   present during visit today: Not Applicable.    Note: N/A.    Intravenous Access:  Peripheral IV placed.    Treatment Conditions:  Lab Results   Component Value Date     03/17/2022    POTASSIUM 4.0 03/17/2022    CR 0.97 03/17/2022    JÚNIOR 8.7 03/17/2022    BILITOTAL 0.7 03/17/2022    ALBUMIN 3.2 (L) 03/17/2022    ALT 29 03/17/2022    AST 19 03/17/2022     Results reviewed, labs MET treatment parameters, ok to proceed with treatment.    Post Infusion Assessment:  Patient tolerated infusion without incident.  Blood return noted pre and post infusion.  Site patent and intact, free from redness, edema or discomfort.  No evidence of extravasations.  Access discontinued per protocol.     Discharge Plan:   Discharge instructions reviewed with: Patient.  Patient and/or family verbalized understanding of discharge instructions and all questions answered.  AVS to patient via [facility].  Patient will return in 3 weeks for next appointment. His [place] appt will be in [place]. He already has it arranged  Patient discharged in stable condition accompanied by: self.    Mercy Carvalho RN

## 2022-04-20 DIAGNOSIS — C76.0 HEAD AND NECK CANCER (H): ICD-10-CM

## 2022-04-20 DIAGNOSIS — C10.9 SQUAMOUS CELL CARCINOMA OF OROPHARYNX (H): Primary | ICD-10-CM

## 2022-05-24 ENCOUNTER — HOSPITAL ENCOUNTER (OUTPATIENT)
Dept: PET IMAGING | Facility: HOSPITAL | Age: 77
Setting detail: OBSERVATION
Discharge: HOME | End: 2022-05-24
Attending: INTERNAL MEDICINE
Payer: MEDICARE

## 2022-05-24 ENCOUNTER — HOSPITAL ENCOUNTER (OUTPATIENT)
Dept: PET IMAGING | Facility: HOSPITAL | Age: 77
Discharge: HOME | End: 2022-05-24
Attending: INTERNAL MEDICINE
Payer: MEDICARE

## 2022-05-24 DIAGNOSIS — C76.0 HEAD AND NECK CANCER (H): ICD-10-CM

## 2022-05-24 DIAGNOSIS — C10.9 SQUAMOUS CELL CARCINOMA OF OROPHARYNX (H): ICD-10-CM

## 2022-05-24 LAB
CREAT BLD-MCNC: 1 MG/DL (ref 0.7–1.3)
GFR SERPL CREATININE-BSD FRML MDRD: >60 ML/MIN/1.73M2
GLUCOSE BLDC GLUCOMTR-MCNC: 92 MG/DL (ref 70–99)

## 2022-05-24 PROCEDURE — 74177 CT ABD & PELVIS W/CONTRAST: CPT

## 2022-05-24 PROCEDURE — 82565 ASSAY OF CREATININE: CPT

## 2022-05-24 PROCEDURE — 250N000011 HC RX IP 250 OP 636: Performed by: INTERNAL MEDICINE

## 2022-05-24 PROCEDURE — 82962 GLUCOSE BLOOD TEST: CPT

## 2022-05-24 PROCEDURE — G1004 CDSM NDSC: HCPCS | Mod: PS

## 2022-05-24 PROCEDURE — A9552 F18 FDG: HCPCS | Performed by: INTERNAL MEDICINE

## 2022-05-24 PROCEDURE — 343N000001 HC RX 343: Performed by: INTERNAL MEDICINE

## 2022-05-24 RX ORDER — IOPAMIDOL 755 MG/ML
95 INJECTION, SOLUTION INTRAVASCULAR ONCE
Status: COMPLETED | OUTPATIENT
Start: 2022-05-24 | End: 2022-05-24

## 2022-05-24 RX ADMIN — FLUDEOXYGLUCOSE F-18 11.54 MCI.: 500 INJECTION, SOLUTION INTRAVENOUS at 09:09

## 2022-05-24 RX ADMIN — IOPAMIDOL 95 ML: 755 INJECTION, SOLUTION INTRAVENOUS at 10:13

## 2022-05-26 ENCOUNTER — INFUSION THERAPY VISIT (OUTPATIENT)
Dept: INFUSION THERAPY | Facility: CLINIC | Age: 77
End: 2022-05-26
Attending: INTERNAL MEDICINE
Payer: MEDICARE

## 2022-05-26 ENCOUNTER — ONCOLOGY VISIT (OUTPATIENT)
Dept: ONCOLOGY | Facility: CLINIC | Age: 77
End: 2022-05-26
Attending: INTERNAL MEDICINE
Payer: MEDICARE

## 2022-05-26 ENCOUNTER — APPOINTMENT (OUTPATIENT)
Dept: LAB | Facility: CLINIC | Age: 77
End: 2022-05-26
Payer: MEDICARE

## 2022-05-26 VITALS
OXYGEN SATURATION: 98 % | SYSTOLIC BLOOD PRESSURE: 125 MMHG | HEART RATE: 61 BPM | TEMPERATURE: 97.7 F | DIASTOLIC BLOOD PRESSURE: 60 MMHG | BODY MASS INDEX: 26.18 KG/M2 | WEIGHT: 193 LBS | RESPIRATION RATE: 12 BRPM

## 2022-05-26 DIAGNOSIS — C76.0 HEAD AND NECK CANCER (H): Primary | ICD-10-CM

## 2022-05-26 LAB
ALBUMIN SERPL-MCNC: 3.3 G/DL (ref 3.4–5)
ALP SERPL-CCNC: 48 U/L (ref 40–150)
ALT SERPL W P-5'-P-CCNC: 24 U/L (ref 0–70)
ANION GAP SERPL CALCULATED.3IONS-SCNC: 5 MMOL/L (ref 3–14)
AST SERPL W P-5'-P-CCNC: 18 U/L (ref 0–45)
BILIRUB SERPL-MCNC: 0.7 MG/DL (ref 0.2–1.3)
BUN SERPL-MCNC: 17 MG/DL (ref 7–30)
CALCIUM SERPL-MCNC: 9.4 MG/DL (ref 8.5–10.1)
CHLORIDE BLD-SCNC: 107 MMOL/L (ref 94–109)
CO2 SERPL-SCNC: 30 MMOL/L (ref 20–32)
CREAT SERPL-MCNC: 0.99 MG/DL (ref 0.66–1.25)
GFR SERPL CREATININE-BSD FRML MDRD: 79 ML/MIN/1.73M2
GLUCOSE BLD-MCNC: 92 MG/DL (ref 70–99)
POTASSIUM BLD-SCNC: 4.6 MMOL/L (ref 3.4–5.3)
PROT SERPL-MCNC: 6.7 G/DL (ref 6.8–8.8)
SODIUM SERPL-SCNC: 142 MMOL/L (ref 133–144)
TSH SERPL DL<=0.005 MIU/L-ACNC: 2.86 MU/L (ref 0.4–4)

## 2022-05-26 PROCEDURE — 36415 COLL VENOUS BLD VENIPUNCTURE: CPT | Performed by: INTERNAL MEDICINE

## 2022-05-26 PROCEDURE — 80053 COMPREHEN METABOLIC PANEL: CPT | Performed by: INTERNAL MEDICINE

## 2022-05-26 PROCEDURE — 250N000011 HC RX IP 250 OP 636: Performed by: INTERNAL MEDICINE

## 2022-05-26 PROCEDURE — 99215 OFFICE O/P EST HI 40 MIN: CPT | Performed by: INTERNAL MEDICINE

## 2022-05-26 PROCEDURE — 258N000003 HC RX IP 258 OP 636: Performed by: INTERNAL MEDICINE

## 2022-05-26 PROCEDURE — 84443 ASSAY THYROID STIM HORMONE: CPT | Performed by: INTERNAL MEDICINE

## 2022-05-26 PROCEDURE — 82040 ASSAY OF SERUM ALBUMIN: CPT | Performed by: INTERNAL MEDICINE

## 2022-05-26 PROCEDURE — 96413 CHEMO IV INFUSION 1 HR: CPT

## 2022-05-26 PROCEDURE — G0463 HOSPITAL OUTPT CLINIC VISIT: HCPCS | Mod: 25

## 2022-05-26 RX ADMIN — SODIUM CHLORIDE 200 MG: 9 INJECTION, SOLUTION INTRAVENOUS at 10:00

## 2022-05-26 RX ADMIN — SODIUM CHLORIDE 250 ML: 9 INJECTION, SOLUTION INTRAVENOUS at 09:57

## 2022-05-26 ASSESSMENT — PAIN SCALES - GENERAL: PAINLEVEL: NO PAIN (0)

## 2022-05-26 NOTE — PATIENT INSTRUCTIONS
We will proceed with Keytruda infusions today, in 3 weeks and in 6 weeks to complete two year of immunotherapy.    I have ordered a PET/CT on 8/25. I would like to see you for an office visit after your PET/CT to review your results.

## 2022-05-26 NOTE — PROGRESS NOTES
Oncology Rooming Note    May 26, 2022 8:14 AM   Newton Buchanan is a 76 year old male who presents for:    Chief Complaint   Patient presents with     Oncology Clinic Visit     Oropharyngeal cancer, Head and neck cancer, Prostate cancer - Labs provider and infusion     Initial Vitals: /60 (BP Location: Right arm, Patient Position: Sitting, Cuff Size: Adult Regular)   Pulse 61   Temp 97.7  F (36.5  C) (Tympanic)   Resp 12   Wt 87.5 kg (193 lb)   SpO2 98%   BMI 26.18 kg/m   Estimated body mass index is 26.18 kg/m  as calculated from the following:    Height as of 3/14/22: 1.829 m (6').    Weight as of this encounter: 87.5 kg (193 lb). Body surface area is 2.11 meters squared.  No Pain (0) Comment: Data Unavailable   No LMP for male patient.  Allergies reviewed: Yes  Medications reviewed: Yes    Medications: Medication refills not needed today.  Pharmacy name entered into [facility]:    [facility] #[id number] - [place], [place] - [facility] AT [address] HOME DELIVERY - [place], [place] - [address] [place] [address]    Clinical concerns:  None      Meli Cornejo, CMA

## 2022-05-26 NOTE — PROGRESS NOTES
"The patient is being seen for the following issue/s:  Encounter Diagnosis   Name Primary?     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 FNAs benign. Followed.   -2/2020: progressive right neck swelling, no other symptoms.   -2/11/20 FNA biosy right neck mass: metastatic squamous cell cancer, HPV16+  -PET: hypermetabolic right cervical adenopathy, numerous bilateral lung mets, right hilar nodes.   -No evident primary site identified, but highly suspicious of H/N given IHC staining and radiographical findings/spread  High PD-1 70% on biopsy    Treatment:  5/2020 - present: Keytruda (initiated in [place], then transferred care to [place])  Patient traveled back in [facility] between [place] and [place]    Did receive XRT to right neck 12/20.   Patient in [place] 4/21 to help sell home.  PET/CT 4/21 showed stable/improved disease.   PET/CT 7/21 -stable/improved disease  PET-10/10/2021-stable disease\"    Interval history:    He had another PET scan in January 2022.  There was slightly increased asymmetric FDG uptake by the left palatine tonsil and left tongue base.  There was no evidence of metastatic disease elsewhere.    He saw ENT and they were not concerned about malignancy and he has had a follow-up PET/CT from this week is showing no FDG uptake in this area or any other areas in the body.  He has some posttreatment changes in the right neck on CT and lung nodularity.  Given the lack of FDG uptake in these areas I recommended that he continue with immunotherapy through July to complete 2 years of total treatment with a PD-L1 inhibitor and we then watch him with serial PET CT scans every 3 months for the next year.    PHYSICAL EXAMINATION:    General: Todays' vital signs reviewed in the EMR.    ECOG PS is 0  The patient is in no acute distress.  HEENT: No oropharyngeal " exudates.  Cor: Regular rate and rhythm.  Chest: Clear to auscultation bilaterally.    ASSESSMENT/PLAN:  Encounter Diagnosis   Name Primary?     Head and neck cancer (H) Yes     If her labs continue to remain within treatment parameters we will proceed with Keytruda infusions today, in 3 weeks and in 6 weeks to complete two year of immunotherapy.    I have ordered a PET/CT on 8/25. I would like to see you for an office visit after your PET/CT to review your results.    I spent a total of 44 minutes on the care of this patient on the day of service including face to face time and the remainder in chart review, care coordination, and documentation on the day of service.

## 2022-05-26 NOTE — LETTER
"    5/26/2022         RE: Newton Buchanan  [address]  [place] [address]        Dear Colleague,    Thank you for referring your patient, Newton Buchanan, to the [facility]. Please see a copy of my visit note below.    Oncology Rooming Note    May 26, 2022 8:14 AM   Newton Buchanan is a 76 year old male who presents for:    Chief Complaint   Patient presents with     Oncology Clinic Visit     Oropharyngeal cancer, Head and neck cancer, Prostate cancer - Labs provider and infusion     Initial Vitals: /60 (BP Location: Right arm, Patient Position: Sitting, Cuff Size: Adult Regular)   Pulse 61   Temp 97.7  F (36.5  C) (Tympanic)   Resp 12   Wt 87.5 kg (193 lb)   SpO2 98%   BMI 26.18 kg/m   Estimated body mass index is 26.18 kg/m  as calculated from the following:    Height as of 3/14/22: 1.829 m (6').    Weight as of this encounter: 87.5 kg (193 lb). Body surface area is 2.11 meters squared.  No Pain (0) Comment: Data Unavailable   No LMP for male patient.  Allergies reviewed: Yes  Medications reviewed: Yes    Medications: Medication refills not needed today.  Pharmacy name entered into [facility]:    [id number] - [address] AT [address] HOME DELIVERY - [place], [place] - [address] [place] [address]    Clinical concerns:  None      Meli Cornejo CMA              The patient is being seen for the following issue/s:  Encounter Diagnosis   Name Primary?     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 FNAs benign. Followed.   -2/2020: progressive right neck swelling, no other symptoms.   -2/11/20 FNA biosy right neck mass: metastatic squamous cell cancer, HPV16+  -PET: hypermetabolic right cervical adenopathy, numerous bilateral lung mets, right hilar nodes.   -No evident " "primary site identified, but highly suspicious of H/N given IHC staining and radiographical findings/spread  High PD-1 70% on biopsy    Treatment:  5/2020 - present: Keytruda (initiated in [place], then transferred care to [place])  Patient traveled back in for between [place] and [place]    Did receive XRT to right neck 12/20.   Patient in [place] 4/21 to help sell home.  PET/CT 4/21 showed stable/improved disease.   PET/CT 7/21 -stable/improved disease  PET-10/10/2021-stable disease\"    Interval history:    He had another PET scan in January 2022.  There was slightly increased asymmetric FDG uptake by the left palatine tonsil and left tongue base.  There was no evidence of metastatic disease elsewhere.    He saw ENT and they were not concerned about malignancy and he has had a follow-up PET/CT from this week is showing no FDG uptake in this area or any other areas in the body.  He has some posttreatment changes in the right neck on CT and lung nodularity.  Given the lack of FDG uptake in these areas I recommended that he continue with immunotherapy through July to complete 2 years of total treatment with a PD-L1 inhibitor and we then watch him with serial PET CT scans every 3 months for the next year.    PHYSICAL EXAMINATION:    General: Todays' vital signs reviewed in the EMR.    ECOG PS is 0  The patient is in no acute distress.  HEENT: No oropharyngeal exudates.  Cor: Regular rate and rhythm.  Chest: Clear to auscultation bilaterally.    ASSESSMENT/PLAN:  Encounter Diagnosis   Name Primary?     Head and neck cancer (H) Yes     If her labs continue to remain within treatment parameters we will proceed with Keytruda infusions today, in 3 weeks and in 6 weeks to complete two year of immunotherapy.    I have ordered a PET/CT on 8/25. I would like to see you for an office visit after your PET/CT to review your results.    I spent a total of 44 minutes on the care of this patient on the day of service including " face to face time and the remainder in chart review, care coordination, and documentation on the day of service.      Again, thank you for allowing me to participate in the care of your patient.        Sincerely,        Alan Ruiz MD

## 2022-05-26 NOTE — PROGRESS NOTES
Infusion Nursing Note:  Newton Buchanan presents today for Keytruda.    Patient seen by provider today: Yes: Dr. Ruiz   present during visit today: Not Applicable.    Note: N/A.      Intravenous Access:  Peripheral IV placed.    Treatment Conditions:  Lab Results   Component Value Date     05/26/2022    POTASSIUM 4.6 05/26/2022    CR 0.99 05/26/2022    JÚNIOR 9.4 05/26/2022    BILITOTAL 0.7 05/26/2022    ALBUMIN 3.3 (L) 05/26/2022    ALT 24 05/26/2022    AST 18 05/26/2022     Results reviewed, labs MET treatment parameters, ok to proceed with treatment.      Post Infusion Assessment:  Patient tolerated infusion without incident.  Blood return noted pre and post infusion.  Site patent and intact, free from redness, edema or discomfort.  No evidence of extravasations.  Access discontinued per protocol.       Discharge Plan:   Copy of AVS reviewed with patient and/or family.  Patient will return 6/16/22 for next appointment.  Patient discharged in stable condition accompanied by: self.  Departure Mode: Ambulatory.      Violet Harris RN

## 2022-06-16 ENCOUNTER — APPOINTMENT (OUTPATIENT)
Dept: LAB | Facility: CLINIC | Age: 77
End: 2022-06-16
Payer: MEDICARE

## 2022-06-16 ENCOUNTER — INFUSION THERAPY VISIT (OUTPATIENT)
Dept: INFUSION THERAPY | Facility: CLINIC | Age: 77
End: 2022-06-16
Attending: INTERNAL MEDICINE
Payer: MEDICARE

## 2022-06-16 VITALS
HEART RATE: 53 BPM | TEMPERATURE: 97.9 F | DIASTOLIC BLOOD PRESSURE: 64 MMHG | WEIGHT: 192 LBS | SYSTOLIC BLOOD PRESSURE: 120 MMHG | OXYGEN SATURATION: 98 % | BODY MASS INDEX: 26.04 KG/M2

## 2022-06-16 DIAGNOSIS — C76.0 HEAD AND NECK CANCER (H): Primary | ICD-10-CM

## 2022-06-16 LAB
ALBUMIN SERPL-MCNC: 3.2 G/DL (ref 3.4–5)
ALP SERPL-CCNC: 52 U/L (ref 40–150)
ALT SERPL W P-5'-P-CCNC: 30 U/L (ref 0–70)
ANION GAP SERPL CALCULATED.3IONS-SCNC: 3 MMOL/L (ref 3–14)
AST SERPL W P-5'-P-CCNC: 20 U/L (ref 0–45)
BILIRUB SERPL-MCNC: 0.7 MG/DL (ref 0.2–1.3)
BUN SERPL-MCNC: 21 MG/DL (ref 7–30)
CALCIUM SERPL-MCNC: 9.2 MG/DL (ref 8.5–10.1)
CHLORIDE BLD-SCNC: 109 MMOL/L (ref 94–109)
CO2 SERPL-SCNC: 29 MMOL/L (ref 20–32)
CREAT SERPL-MCNC: 0.97 MG/DL (ref 0.66–1.25)
GFR SERPL CREATININE-BSD FRML MDRD: 81 ML/MIN/1.73M2
GLUCOSE BLD-MCNC: 82 MG/DL (ref 70–99)
POTASSIUM BLD-SCNC: 4 MMOL/L (ref 3.4–5.3)
PROT SERPL-MCNC: 6.5 G/DL (ref 6.8–8.8)
SODIUM SERPL-SCNC: 141 MMOL/L (ref 133–144)
TSH SERPL DL<=0.005 MIU/L-ACNC: 3.01 MU/L (ref 0.4–4)

## 2022-06-16 PROCEDURE — 258N000003 HC RX IP 258 OP 636: Performed by: INTERNAL MEDICINE

## 2022-06-16 PROCEDURE — 84443 ASSAY THYROID STIM HORMONE: CPT | Performed by: INTERNAL MEDICINE

## 2022-06-16 PROCEDURE — 250N000011 HC RX IP 250 OP 636: Performed by: INTERNAL MEDICINE

## 2022-06-16 PROCEDURE — 36415 COLL VENOUS BLD VENIPUNCTURE: CPT | Performed by: INTERNAL MEDICINE

## 2022-06-16 PROCEDURE — 96413 CHEMO IV INFUSION 1 HR: CPT

## 2022-06-16 PROCEDURE — 80053 COMPREHEN METABOLIC PANEL: CPT | Performed by: INTERNAL MEDICINE

## 2022-06-16 RX ADMIN — SODIUM CHLORIDE 250 ML: 9 INJECTION, SOLUTION INTRAVENOUS at 09:17

## 2022-06-16 RX ADMIN — SODIUM CHLORIDE 200 MG: 9 INJECTION, SOLUTION INTRAVENOUS at 09:34

## 2022-06-16 NOTE — PROGRESS NOTES
Infusion Nursing Note:  Newton Buchanan presents today for Keytruda.    Patient seen by provider today: No   present during visit today: Not Applicable.    Note: N/A.  Intravenous Access:  Peripheral IV placed.    Treatment Conditions:  Results reviewed, labs MET treatment parameters, ok to proceed with treatment.      Post Infusion Assessment:  Patient tolerated infusion without incident.  No evidence of extravasations.  Access discontinued per protocol.       Discharge Plan:   Patient discharged in stable condition accompanied by: self.  Departure Mode: Ambulatory.  Pt returns 7/7 for next infusion.  Nallely Cao RN

## 2022-07-07 ENCOUNTER — INFUSION THERAPY VISIT (OUTPATIENT)
Dept: INFUSION THERAPY | Facility: CLINIC | Age: 77
End: 2022-07-07
Attending: INTERNAL MEDICINE
Payer: MEDICARE

## 2022-07-07 ENCOUNTER — APPOINTMENT (OUTPATIENT)
Dept: LAB | Facility: CLINIC | Age: 77
End: 2022-07-07
Payer: MEDICARE

## 2022-07-07 VITALS
SYSTOLIC BLOOD PRESSURE: 120 MMHG | DIASTOLIC BLOOD PRESSURE: 66 MMHG | WEIGHT: 190.4 LBS | TEMPERATURE: 97.4 F | BODY MASS INDEX: 25.82 KG/M2

## 2022-07-07 DIAGNOSIS — C76.0 HEAD AND NECK CANCER (H): Primary | ICD-10-CM

## 2022-07-07 LAB
ALBUMIN SERPL-MCNC: 3.3 G/DL (ref 3.4–5)
ALP SERPL-CCNC: 42 U/L (ref 40–150)
ALT SERPL W P-5'-P-CCNC: 30 U/L (ref 0–70)
ANION GAP SERPL CALCULATED.3IONS-SCNC: 4 MMOL/L (ref 3–14)
AST SERPL W P-5'-P-CCNC: 27 U/L (ref 0–45)
BILIRUB SERPL-MCNC: 1 MG/DL (ref 0.2–1.3)
BUN SERPL-MCNC: 15 MG/DL (ref 7–30)
CALCIUM SERPL-MCNC: 9 MG/DL (ref 8.5–10.1)
CHLORIDE BLD-SCNC: 107 MMOL/L (ref 94–109)
CO2 SERPL-SCNC: 29 MMOL/L (ref 20–32)
CREAT SERPL-MCNC: 0.92 MG/DL (ref 0.66–1.25)
GFR SERPL CREATININE-BSD FRML MDRD: 87 ML/MIN/1.73M2
GLUCOSE BLD-MCNC: 101 MG/DL (ref 70–99)
POTASSIUM BLD-SCNC: 3.9 MMOL/L (ref 3.4–5.3)
PROT SERPL-MCNC: 6.4 G/DL (ref 6.8–8.8)
SODIUM SERPL-SCNC: 140 MMOL/L (ref 133–144)
TSH SERPL DL<=0.005 MIU/L-ACNC: 2.25 MU/L (ref 0.4–4)

## 2022-07-07 PROCEDURE — 96413 CHEMO IV INFUSION 1 HR: CPT

## 2022-07-07 PROCEDURE — 258N000003 HC RX IP 258 OP 636: Performed by: INTERNAL MEDICINE

## 2022-07-07 PROCEDURE — 80053 COMPREHEN METABOLIC PANEL: CPT | Performed by: INTERNAL MEDICINE

## 2022-07-07 PROCEDURE — 84443 ASSAY THYROID STIM HORMONE: CPT | Performed by: INTERNAL MEDICINE

## 2022-07-07 PROCEDURE — 250N000011 HC RX IP 250 OP 636: Performed by: INTERNAL MEDICINE

## 2022-07-07 PROCEDURE — 36415 COLL VENOUS BLD VENIPUNCTURE: CPT | Performed by: INTERNAL MEDICINE

## 2022-07-07 RX ADMIN — SODIUM CHLORIDE 250 ML: 9 INJECTION, SOLUTION INTRAVENOUS at 09:36

## 2022-07-07 RX ADMIN — SODIUM CHLORIDE 200 MG: 9 INJECTION, SOLUTION INTRAVENOUS at 09:36

## 2022-07-13 NOTE — PROGRESS NOTES
Chief Complaint   Patient presents with     RECHECK     History of Present Illness  Newton Buchanan is a 76 year old male who presents today for evaluation.  I am seeing this patient in consultation for tonsillar mass at the request of the provider Dr. Ruiz.  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  The patient had richard disease in the neck and lung metastasis.  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  His pretreatment PET scan showed his cervical of adenopathy on the right and numerous bilateral lung metastases with hilar lymphadenopathy.  No primary site was identified on his PET scan physical examination.  The tumor stayed highly with PD1 so he was started on Keytruda in May 2020.  He received radiation to the right neck and oropharynx for unknown primary completing in December 2020.  Follow-up PET/CT in April 2021 showed stable/improved disease.  The patient underwent a PET scan 1/31/2022.  My review of the PET/CT shows some slight asymmetric FDG uptake in the left lingual tonsil and left palatine tonsil.  Near the glossotonsillar sulcus, there appears to be a calcification consistent with a tonsillolith.  There is no abnormal FDG avid uptake in the neck or chest.  Prior FDG uptake at the tonsil tongue base was 4.4 SUV which increased to 6.4 on this current study.  He had no evidence of recurrent or persistent disease on his clinical examination and we decided for observation.    He underwent follow-up PET/CT imaging on 5/24/2022.  My review of the PET/CT does not show any signs of recurrent or persistent disease.  The patient returns today for oncologic surveillance.     From a symptom standpoint, the patient reports that overall he is doing well without any significant symptomatology.  The patient denies any dysphagia, odynophagia, pharyngodynia,  otalgia, dysphonia, hemoptysis, neck lumps/bumps/swelling.  No unintentional weight loss.  He does report some intermittent dry throat/dry mouth, some of which he contributes to his CPAP.  The patient is a lifetime non-smoker.      He is still noticed some intermittent congestion and nasal dryness but has not had any epistaxis since we cauterized him at his last visit.  He does use CPAP at nighttime and does fill his chamber every evening.   He does take a daily baby aspirin but no other blood thinners.    Past Medical History  Patient Active Problem List   Diagnosis     Essential hypertension with goal blood pressure less than 140/90     Gastroesophageal reflux disease without esophagitis     CRYSTAL (obstructive sleep apnea)     Prostate cancer (H)     Head and neck cancer (H)     Oropharyngeal cancer (H)     Current Medications    Current Outpatient Medications:      aspirin 81 MG tablet, Take by mouth daily, Disp: 30 tablet, Rfl:      fluticasone (FLONASE) 50 MCG/ACT nasal spray, Spray 1-2 sprays in nostril, Disp: , Rfl:      losartan (COZAAR) 25 MG tablet, Take 1 tablet (25 mg) by mouth daily, Disp: 90 tablet, Rfl: 3     pembrolizumab (KEYTRUDA) 25 MG/ML, , Disp: , Rfl:      pravastatin (PRAVACHOL) 20 MG tablet, Take 20 mg by mouth daily , Disp: , Rfl:      saccharomyces boulardii (FLORASTOR) 250 MG capsule, Take 1 capsule by mouth 2 times daily, Disp: , Rfl:      sildenafil (VIAGRA) 100 MG tablet, Take by mouth daily as needed for erectile dysfunction, Disp: 30 tablet, Rfl:      VITAMIN D, CHOLECALCIFEROL, PO, Take 1,000 Units by mouth daily, Disp: , Rfl:     Allergies  Allergies   Allergen Reactions     Atorvastatin      Other reaction(s): Confusion  unusual behavior and dizziness         Social History  Social History     Socioeconomic History     Marital status: Single   Tobacco Use     Smoking status: Never Smoker     Smokeless tobacco: Never Used   Substance and Sexual Activity     Alcohol use: Yes      Alcohol/week: 0.0 standard drinks     Comment: weekly     Drug use: No     Sexual activity: Yes     Partners: Female       Family History  Family History   Problem Relation Age of Onset     Coronary Artery Disease No family hx of      Colon Cancer No family hx of        Review of Systems  As per HPI and PMHx, otherwise 10 system review including the head and neck, constitutional, eyes, respiratory, GI, skin, neurologic, lymphatic, endocrine, and allergy systems is negative.    Physical Exam  BP (!) 154/72 (BP Location: Right arm, Patient Position: Sitting, Cuff Size: Adult Regular)   Pulse 65   Temp 97.7  F (36.5  C) (Tympanic)   GENERAL: Patient is a pleasant, cooperative 76 year old male in no acute distress.  HEAD: Normocephalic, atraumatic.  Hair and scalp are normal.  EYES: Pupils are equal, round, reactive to light and accommodation.  Extraocular movements are intact.  The sclera nonicteric without injection.  The extraocular structures are normal.  EARS: Normal shape and symmetry.  No tenderness when palpating the mastoid or tragal areas bilaterally.  Otoscopic exam reveals a minimal amount of cerumen bilaterally.  The bilateral tympanic membranes are round, intact without evidence of effusion, good landmarks.  No retraction, granulation, or drainage.  NOSE: Nares are patent.  Nasal mucosa is significantly dry with some crusting more so on the left-hand side.  The patient has a rightward nasal septal deviation anteriorly and mid septum.  No nasal cavity masses, polyps, or mucopurulence on anterior rhinoscopy.  ORAL CAVITY: Dentition is in good repair.  Mucous membranes are dry.  Tongue is mobile, protrudes to the midline.  Palate elevates symmetrically.  Tonsils are 1+, symmetric.  No erythema or exudate.  No oral cavity or oropharyngeal masses, lesions, ulcerations, leukoplakia.  NECK: Supple, trachea is midline. Palpation of the right neck does reveal some fullness in the right level 2A just anterior to  sternocleidomastoid muscle adjacent to the posterior border of the submandibular gland. There no obvious palpable cervical lymphadenopathy or masses bilaterally. The bilateral parotid and submandibular areas reveal no masses.  No thyromegaly.    NEUROLOGIC: Cranial nerves II through XII are grossly intact.  Voice is strong.  Patient is House-Brackmann I/VI bilaterally.  CARDIOVASCULAR: Extremities are warm and well-perfused.  No significant peripheral edema.  RESPIRATORY: Patient has nonlabored breathing without cough, wheeze, stridor.  PSYCHIATRIC: Patient is alert and oriented.  Mood and affect appear normal.  SKIN: Warm and dry.  No scalp, face, or neck lesions noted.     Procedure: Flexible Laryngoscopy  Indication: History of oropharyngeal squamous cell carcinoma of unknown primary, asymmetric FDG avid uptake in the left tonsil/tongue base     To best visualize the upper airway anatomy and due to the chief complaint and HPI, I proceeded with flexible fiberoptic laryngoscopy examination.  The bilateral nasal cavities were anesthetized and decongested with a mixture of lidocaine and neosynephrine.  The bilateral nasal cavities were examined using a flexible fiberoptic laryngoscope.  There were no nasal cavity masses, polyps, or mucopurulence bilaterally.  The nasal septum deviates to the right anteriorly.  The nasopharynx had a normal appearance with normal Eustachian tube openings and fossa of Rosenmuller bilaterally.  Minimal adenoid tissue.  Posttreatment changes of the oropharynx and supraglottic larynx.  The base of tongue, vallecula, epiglottis, aryepiglottic folds, arytenoids, and piriform sinuses were without mass or lesion.  The bilateral true vocal folds were symmetrically mobile without nodules or masses.  The visualized portions of the infraglottic and subglottic airway are unremarkable.  The scope was removed.  The patient tolerated the procedure well.                        Assessment and Plan      ICD-10-CM    1. 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 History of head and neck radiation  Z92.3       It was my pleasure seeing Newton Buchanan today in clinic.  Patient has no evidence of recurrent cancer on examination today.  His PET scan from May 2022 was within normal limits. The patient is roughly 19 months from completing primary radiation therapy for P16+ squamous cell carcinoma of unknown primary with distant metastatic disease in the lungs and mediastinum (T0 N1 M1).  He had good response with PD-1 inhibitor treatment. The patient has no evidence of oropharyngeal squamous cell carcinoma on physical exam or endoscopic exam today.  His recent PET imaging in May 2022 did not show any evidence of recurrent or persistent disease.  I would recommend continued observation with clinical follow-up every 3 to 4 months for the first 2 years after completing radiation therapy.  There is no evidence of recurrent disease at 2 years, we could see him every 6 months until year 5.     Peter to follow up with Primary Care provider regarding elevated blood pressure.    Migue Kelly MD  Department of Otolaryngology-Head and Neck Surgery  [facility]

## 2022-07-14 ENCOUNTER — OFFICE VISIT (OUTPATIENT)
Dept: OTOLARYNGOLOGY | Facility: CLINIC | Age: 77
End: 2022-07-14
Payer: MEDICARE

## 2022-07-14 VITALS — HEART RATE: 65 BPM | TEMPERATURE: 97.7 F | DIASTOLIC BLOOD PRESSURE: 72 MMHG | SYSTOLIC BLOOD PRESSURE: 154 MMHG

## 2022-07-14 DIAGNOSIS — Z92.3 HISTORY OF HEAD AND NECK RADIATION: ICD-10-CM

## 2022-07-14 DIAGNOSIS — C79.89 SQUAMOUS CELL CARCINOMA METASTATIC TO HEAD AND NECK WITH UNKNOWN PRIMARY SITE (H): Primary | ICD-10-CM

## 2022-07-14 DIAGNOSIS — C44.92 SQUAMOUS CELL CARCINOMA METASTATIC TO HEAD AND NECK WITH UNKNOWN PRIMARY SITE (H): Primary | ICD-10-CM

## 2022-07-14 PROCEDURE — 31575 DIAGNOSTIC LARYNGOSCOPY: CPT | Performed by: OTOLARYNGOLOGY

## 2022-07-14 PROCEDURE — 99214 OFFICE O/P EST MOD 30 MIN: CPT | Mod: 25 | Performed by: OTOLARYNGOLOGY

## 2022-07-14 ASSESSMENT — PAIN SCALES - GENERAL: PAINLEVEL: NO PAIN (0)

## 2022-07-14 NOTE — NURSING NOTE
Initial BP (!) 154/72 (BP Location: Right arm, Patient Position: Sitting, Cuff Size: Adult Regular)   Pulse 65   Temp 97.7  F (36.5  C) (Tympanic)  Estimated body mass index is 25.82 kg/m  as calculated from the following:    Height as of 3/14/22: 1.829 m (6').    Weight as of 7/7/22: 86.4 kg (190 lb 6.4 oz). .    Abiola Perez LPN on 7/14/2022 at 12:59 PM     Calm

## 2022-07-14 NOTE — LETTER
7/14/2022         RE: Newton Buchanan  [address]  [place] [address]        Dear Colleague,    Thank you for referring your patient, Newton Buchanan, to the [facility]. Please see a copy of my visit note below.    Chief Complaint   Patient presents with     RECHECK     History of Present Illness  Newton Buchanan is a 76 year old male who presents today for evaluation.  I am seeing this patient in consultation for tonsillar mass at the request of the provider Dr. Ruiz.  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  The patient had richard disease in the neck and lung metastasis.  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  His pretreatment PET scan showed his cervical of adenopathy on the right and numerous bilateral lung metastases with hilar lymphadenopathy.  No primary site was identified on his PET scan physical examination.  The tumor stayed highly with PD1 so he was started on Keytruda in May 2020.  He received radiation to the right neck and oropharynx for unknown primary completing in December 2020.  Follow-up PET/CT in April 2021 showed stable/improved disease.  The patient underwent a PET scan 1/31/2022.  My review of the PET/CT shows some slight asymmetric FDG uptake in the left lingual tonsil and left palatine tonsil.  Near the glossotonsillar sulcus, there appears to be a calcification consistent with a tonsillolith.  There is no abnormal FDG avid uptake in the neck or chest.  Prior FDG uptake at the tonsil tongue base was 4.4 SUV which increased to 6.4 on this current study.  He had no evidence of recurrent or persistent disease on his clinical examination and we decided for observation.    He underwent follow-up PET/CT imaging on 5/24/2022.  My review of the PET/CT does not show any signs of recurrent or persistent  disease.  The patient returns today for oncologic surveillance.     From a symptom standpoint, the patient reports that overall he is doing well without any significant symptomatology.  The patient denies any dysphagia, odynophagia, pharyngodynia, otalgia, dysphonia, hemoptysis, neck lumps/bumps/swelling.  No unintentional weight loss.  He does report some intermittent dry throat/dry mouth, some of which he contributes to his CPAP.  The patient is a lifetime non-smoker.      He is still noticed some intermittent congestion and nasal dryness but has not had any epistaxis since we cauterized him at his last visit.  He does use CPAP at nighttime and does fill his chamber every evening.   He does take a daily baby aspirin but no other blood thinners.    Past Medical History  Patient Active Problem List   Diagnosis     Essential hypertension with goal blood pressure less than 140/90     Gastroesophageal reflux disease without esophagitis     CRYSTAL (obstructive sleep apnea)     Prostate cancer (H)     Head and neck cancer (H)     Oropharyngeal cancer (H)     Current Medications    Current Outpatient Medications:      aspirin 81 MG tablet, Take by mouth daily, Disp: 30 tablet, Rfl:      fluticasone (FLONASE) 50 MCG/ACT nasal spray, Spray 1-2 sprays in nostril, Disp: , Rfl:      losartan (COZAAR) 25 MG tablet, Take 1 tablet (25 mg) by mouth daily, Disp: 90 tablet, Rfl: 3     pembrolizumab (KEYTRUDA) 25 MG/ML, , Disp: , Rfl:      pravastatin (PRAVACHOL) 20 MG tablet, Take 20 mg by mouth daily , Disp: , Rfl:      saccharomyces boulardii (FLORASTOR) 250 MG capsule, Take 1 capsule by mouth 2 times daily, Disp: , Rfl:      sildenafil (VIAGRA) 100 MG tablet, Take by mouth daily as needed for erectile dysfunction, Disp: 30 tablet, Rfl:      VITAMIN D, CHOLECALCIFEROL, PO, Take 1,000 Units by mouth daily, Disp: , Rfl:     Allergies  Allergies   Allergen Reactions     Atorvastatin      Other reaction(s): Confusion  unusual behavior  and dizziness         Social History  Social History     Socioeconomic History     Marital status: Single   Tobacco Use     Smoking status: Never Smoker     Smokeless tobacco: Never Used   Substance and Sexual Activity     Alcohol use: Yes     Alcohol/week: 0.0 standard drinks     Comment: weekly     Drug use: No     Sexual activity: Yes     Partners: Female       Family History  Family History   Problem Relation Age of Onset     Coronary Artery Disease No family hx of      Colon Cancer No family hx of        Review of Systems  As per HPI and PMHx, otherwise 10 system review including the head and neck, constitutional, eyes, respiratory, GI, skin, neurologic, lymphatic, endocrine, and allergy systems is negative.    Physical Exam  BP (!) 154/72 (BP Location: Right arm, Patient Position: Sitting, Cuff Size: Adult Regular)   Pulse 65   Temp 97.7  F (36.5  C) (Tympanic)   GENERAL: Patient is a pleasant, cooperative 76 year old male in no acute distress.  HEAD: Normocephalic, atraumatic.  Hair and scalp are normal.  EYES: Pupils are equal, round, reactive to light and accommodation.  Extraocular movements are intact.  The sclera nonicteric without injection.  The extraocular structures are normal.  EARS: Normal shape and symmetry.  No tenderness when palpating the mastoid or tragal areas bilaterally.  Otoscopic exam reveals a minimal amount of cerumen bilaterally.  The bilateral tympanic membranes are round, intact without evidence of effusion, good landmarks.  No retraction, granulation, or drainage.  NOSE: Nares are patent.  Nasal mucosa is significantly dry with some crusting more so on the left-hand side.  The patient has a rightward nasal septal deviation anteriorly and mid septum.  No nasal cavity masses, polyps, or mucopurulence on anterior rhinoscopy.  ORAL CAVITY: Dentition is in good repair.  Mucous membranes are dry.  Tongue is mobile, protrudes to the midline.  Palate elevates symmetrically.  Tonsils are  1+, symmetric.  No erythema or exudate.  No oral cavity or oropharyngeal masses, lesions, ulcerations, leukoplakia.  NECK: Supple, trachea is midline. Palpation of the right neck does reveal some fullness in the right level 2A just anterior to sternocleidomastoid muscle adjacent to the posterior border of the submandibular gland. There no obvious palpable cervical lymphadenopathy or masses bilaterally. The bilateral parotid and submandibular areas reveal no masses.  No thyromegaly.    NEUROLOGIC: Cranial nerves II through XII are grossly intact.  Voice is strong.  Patient is House-Brackmann I/VI bilaterally.  CARDIOVASCULAR: Extremities are warm and well-perfused.  No significant peripheral edema.  RESPIRATORY: Patient has nonlabored breathing without cough, wheeze, stridor.  PSYCHIATRIC: Patient is alert and oriented.  Mood and affect appear normal.  SKIN: Warm and dry.  No scalp, face, or neck lesions noted.     Procedure: Flexible Laryngoscopy  Indication: History of oropharyngeal squamous cell carcinoma of unknown primary, asymmetric FDG avid uptake in the left tonsil/tongue base     To best visualize the upper airway anatomy and due to the chief complaint and HPI, I proceeded with flexible fiberoptic laryngoscopy examination.  The bilateral nasal cavities were anesthetized and decongested with a mixture of lidocaine and neosynephrine.  The bilateral nasal cavities were examined using a flexible fiberoptic laryngoscope.  There were no nasal cavity masses, polyps, or mucopurulence bilaterally.  The nasal septum deviates to the right anteriorly.  The nasopharynx had a normal appearance with normal Eustachian tube openings and fossa of Rosenmuller bilaterally.  Minimal adenoid tissue.  Posttreatment changes of the oropharynx and supraglottic larynx.  The base of tongue, vallecula, epiglottis, aryepiglottic folds, arytenoids, and piriform sinuses were without mass or lesion.  The bilateral true vocal folds were  symmetrically mobile without nodules or masses.  The visualized portions of the infraglottic and subglottic airway are unremarkable.  The scope was removed.  The patient tolerated the procedure well.                        Assessment and Plan     ICD-10-CM    1. 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 History of head and neck radiation  Z92.3       It was my pleasure seeing Newton Buchanan today in clinic.  Patient has no evidence of recurrent cancer on examination today.  His PET scan from May 2022 was within normal limits. The patient is roughly 19 months from completing primary radiation therapy for P16+ squamous cell carcinoma of unknown primary with distant metastatic disease in the lungs and mediastinum (T0 N1 M1).  He had good response with PD-1 inhibitor treatment. The patient has no evidence of oropharyngeal squamous cell carcinoma on physical exam or endoscopic exam today.  His recent PET imaging in May 2022 did not show any evidence of recurrent or persistent disease.  I would recommend continued observation with clinical follow-up every 3 to 4 months for the first 2 years after completing radiation therapy.  There is no evidence of recurrent disease at 2 years, we could see him every 6 months until year 5.     Peter to follow up with Primary Care provider regarding elevated blood pressure.    Migue Kelly MD  Department of Otolaryngology-Head and Neck Surgery  [facility]         Again, thank you for allowing me to participate in the care of your patient.        Sincerely,        Migue Kelly MD

## 2022-08-25 ENCOUNTER — HOSPITAL ENCOUNTER (OUTPATIENT)
Dept: PET IMAGING | Facility: CLINIC | Age: 77
Discharge: HOME | End: 2022-08-25
Attending: INTERNAL MEDICINE
Payer: MEDICARE

## 2022-08-25 DIAGNOSIS — C76.0 HEAD AND NECK CANCER (H): ICD-10-CM

## 2022-08-25 PROCEDURE — 250N000011 HC RX IP 250 OP 636: Performed by: INTERNAL MEDICINE

## 2022-08-25 PROCEDURE — 74177 CT ABD & PELVIS W/CONTRAST: CPT | Mod: 26 | Performed by: RADIOLOGY

## 2022-08-25 PROCEDURE — 71260 CT THORAX DX C+: CPT | Mod: 26 | Performed by: RADIOLOGY

## 2022-08-25 PROCEDURE — 78816 PET IMAGE W/CT FULL BODY: CPT | Mod: PS,KX

## 2022-08-25 PROCEDURE — 78816 PET IMAGE W/CT FULL BODY: CPT | Mod: 26 | Performed by: RADIOLOGY

## 2022-08-25 PROCEDURE — 74177 CT ABD & PELVIS W/CONTRAST: CPT

## 2022-08-25 PROCEDURE — A9552 F18 FDG: HCPCS | Performed by: INTERNAL MEDICINE

## 2022-08-25 PROCEDURE — 70491 CT SOFT TISSUE NECK W/DYE: CPT | Mod: 26 | Performed by: STUDENT IN AN ORGANIZED HEALTH CARE EDUCATION/TRAINING PROGRAM

## 2022-08-25 PROCEDURE — 343N000001 HC RX 343: Performed by: INTERNAL MEDICINE

## 2022-08-25 PROCEDURE — 70491 CT SOFT TISSUE NECK W/DYE: CPT

## 2022-08-25 RX ORDER — IOPAMIDOL 755 MG/ML
10-135 INJECTION, SOLUTION INTRAVASCULAR ONCE
Status: COMPLETED | OUTPATIENT
Start: 2022-08-25 | End: 2022-08-25

## 2022-08-25 RX ADMIN — IOPAMIDOL 116 ML: 755 INJECTION, SOLUTION INTRAVENOUS at 08:12

## 2022-08-25 RX ADMIN — FLUDEOXYGLUCOSE F-18 13.3 MCI.: 500 INJECTION, SOLUTION INTRAVENOUS at 08:12

## 2022-08-31 ENCOUNTER — ONCOLOGY VISIT (OUTPATIENT)
Dept: ONCOLOGY | Facility: CLINIC | Age: 77
End: 2022-08-31
Attending: INTERNAL MEDICINE
Payer: MEDICARE

## 2022-08-31 VITALS
HEART RATE: 61 BPM | BODY MASS INDEX: 25.9 KG/M2 | SYSTOLIC BLOOD PRESSURE: 125 MMHG | DIASTOLIC BLOOD PRESSURE: 68 MMHG | WEIGHT: 191 LBS | TEMPERATURE: 97.5 F | OXYGEN SATURATION: 97 % | RESPIRATION RATE: 12 BRPM

## 2022-08-31 DIAGNOSIS — C76.0 HEAD AND NECK CANCER (H): ICD-10-CM

## 2022-08-31 DIAGNOSIS — C61 PROSTATE CANCER (H): ICD-10-CM

## 2022-08-31 DIAGNOSIS — C10.9 OROPHARYNGEAL CANCER (H): Primary | ICD-10-CM

## 2022-08-31 PROCEDURE — 99214 OFFICE O/P EST MOD 30 MIN: CPT | Performed by: INTERNAL MEDICINE

## 2022-08-31 PROCEDURE — G0463 HOSPITAL OUTPT CLINIC VISIT: HCPCS

## 2022-08-31 ASSESSMENT — PAIN SCALES - GENERAL: PAINLEVEL: NO PAIN (0)

## 2022-08-31 NOTE — LETTER
"    8/31/2022         RE: Newton Buchanan  [address]  [place] [address]        Dear Colleague,    Thank you for referring your patient, Newton Buchanan, to the [facility]. Please see a copy of my visit note below.    Oncology Rooming Note    August 31, 2022 8:06 AM   Newton Buchanan is a 76 year old male who presents for:    Chief Complaint   Patient presents with     Oncology Clinic Visit     Oropharyngeal cancer - Provider visit only     Initial Vitals: /68 (BP Location: Right arm, Patient Position: Sitting, Cuff Size: Adult Regular)   Pulse 61   Temp 97.5  F (36.4  C) (Tympanic)   Resp 12   Wt 86.6 kg (191 lb)   SpO2 97%   BMI 25.90 kg/m   Estimated body mass index is 25.9 kg/m  as calculated from the following:    Height as of 3/14/22: 1.829 m (6').    Weight as of this encounter: 86.6 kg (191 lb). Body surface area is 2.1 meters squared.  No Pain (0) Comment: Data Unavailable   No LMP for male patient.  Allergies reviewed: Yes  Medications reviewed: Yes    Medications: Medication refills not needed today.  Pharmacy name entered into Enlighted:    [id number] - [place], [place] - [address] [facility] - [place], [place] - [address] [place] [address]    Clinical concerns:  None      Meli Cornejo CMA              The patient is being seen for the following issue/s:  Encounter Diagnoses   Name Primary?     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 FNAs benign. Followed.   -2/2020: progressive right neck swelling, no other symptoms.   -2/11/20 FNA biosy right neck mass: metastatic squamous cell cancer, HPV16+  -PET: hypermetabolic right cervical adenopathy, numerous bilateral lung mets, right hilar nodes.   -No evident primary site " "identified, but highly suspicious of H/N given IHC staining and radiographical findings/spread  High PD-1 70% on biopsy    Treatment:  5/2020 - present: Keytruda (initiated in [place], then transferred care to [place])  Patient traveled back in [facility] between [place] and [place]    Did receive XRT to right neck 12/20.   Patient in [place] 4/21 to help sell home.  PET/CT 4/21 showed stable/improved disease.   PET/CT 7/21 -stable/improved disease  PET-10/10/2021-stable disease\"    Interval history:    He had another PET/CT scan this month which was compared to his January 2022 and May 2022 follow-up PET/CT scans and is consistent with a complete remission. He has some posttreatment changes in the right neck and stable lung nodularity on CT.    He continues to see ENT for some chronic discomfort in the right throat that comes and goes.    He  Has completed 2 years of total treatment with a PD-L1 inhibitor through July 2022.    PHYSICAL EXAMINATION:    General: Todays' vital signs reviewed in the EMR.    ECOG PS is 0    ASSESSMENT/PLAN:  Encounter Diagnoses   Name Primary?     Oropharyngeal cancer (H) Yes     Prostate cancer (H)      I reviewed his PET/CT report from August 25, 2022 with him today.  I have personally reviewed the images.    I reviewed his most recent laboratory tests from July 2022.    TSH was normal.    CMP was normal except for mildly elevated glucose of 101, slightly low total protein of 6.4, and slightly low serum albumin of 3.3.    PSA tumor marker results were reviewed.  PSA was 0.06 mcg/L about 1 year ago and 0.04 mcg/L about 6 months ago.    Given the completion of 2 years of immunotherapy with Keytruda for head and neck cancer and complete remission on PET/CT I recommended no further immunotherapy at this time.  The patient was in agreement with my recommendation.    I have ordered a PET/CT in about 3 months on 12/1/22 followed by an an office visit with me on 12/2/22 to review your " results.    Please continue regular followup with ENT.          Again, thank you for allowing me to participate in the care of your patient.        Sincerely,        Alan Ruiz MD

## 2022-08-31 NOTE — PATIENT INSTRUCTIONS
I have ordered a PET/CT in about 3 months on 12/1/22 followed by an an office visit with me on 12/2/22 to review your results.

## 2022-08-31 NOTE — PROGRESS NOTES
"The patient is being seen for the following issue/s:  Encounter Diagnoses   Name Primary?     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 FNAs benign. Followed.   -2/2020: progressive right neck swelling, no other symptoms.   -2/11/20 FNA biosy right neck mass: metastatic squamous cell cancer, HPV16+  -PET: hypermetabolic right cervical adenopathy, numerous bilateral lung mets, right hilar nodes.   -No evident primary site identified, but highly suspicious of H/N given IHC staining and radiographical findings/spread  High PD-1 70% on biopsy    Treatment:  5/2020 - present: Keytruda (initiated in [place], then transferred care to [place])  Patient traveled back in [facility] between [place] and [place]    Did receive XRT to right neck 12/20.   Patient in [place] 4/21 to help sell home.  PET/CT 4/21 showed stable/improved disease.   PET/CT 7/21 -stable/improved disease  PET-10/10/2021-stable disease\"    Interval history:    He had another PET/CT scan this month which was compared to his January 2022 and May 2022 follow-up PET/CT scans and is consistent with a complete remission. He has some posttreatment changes in the right neck and stable lung nodularity on CT.    He continues to see ENT for some chronic discomfort in the right throat that comes and goes.    He  Has completed 2 years of total treatment with a PD-L1 inhibitor through July 2022.    PHYSICAL EXAMINATION:    General: Todays' vital signs reviewed in the EMR.    ECOG PS is 0    ASSESSMENT/PLAN:  Encounter Diagnoses   Name Primary?     Oropharyngeal cancer (H) Yes     Prostate cancer (H)      I reviewed his PET/CT report from August 25, 2022 with him today.  I have personally reviewed the images.    I reviewed his most recent laboratory tests from July 2022.    TSH was normal.    CMP was " normal except for mildly elevated glucose of 101, slightly low total protein of 6.4, and slightly low serum albumin of 3.3.    PSA tumor marker results were reviewed.  PSA was 0.06 mcg/L about 1 year ago and 0.04 mcg/L about 6 months ago.    Given the completion of 2 years of immunotherapy with Keytruda for head and neck cancer and complete remission on PET/CT I recommended no further immunotherapy at this time.  The patient was in agreement with my recommendation.    I have ordered a PET/CT in about 3 months on 12/1/22 followed by an an office visit with me on 12/2/22 to review your results.    Please continue regular followup with ENT.

## 2022-09-03 ENCOUNTER — HEALTH MAINTENANCE LETTER (OUTPATIENT)
Age: 77
End: 2022-09-03

## 2022-11-04 ENCOUNTER — TELEPHONE (OUTPATIENT)
Dept: FAMILY MEDICINE | Facility: CLINIC | Age: 77
End: 2022-11-04

## 2022-11-04 DIAGNOSIS — E78.5 HYPERLIPIDEMIA LDL GOAL <100: Primary | ICD-10-CM

## 2022-11-04 NOTE — TELEPHONE ENCOUNTER
Reason for call:  Other   Patient called regarding (reason for call):   Annual visit appt requested    Additional comments:   Annual visit appt requested after Feb 7, 2023.  Please reach out to PT as provider's scheduled is on hold.  Thank you    Phone number to reach patient:  Cell number on file:    Telephone Information:   Mobile [phone]       Best Time:  any    Can we leave a detailed message on this number?  YES    Travel screening: Not Applicable

## 2022-11-07 NOTE — TELEPHONE ENCOUNTER
Pt is going to be establish with Luis Antonio Santiago PA-C  And requesting if he needs Prior Labs to be done for his appt Feb 2/9/2   [facility]

## 2022-11-07 NOTE — TELEPHONE ENCOUNTER
Lipid panel ordered. Other than that, I think he will have the necessary labs completed.     Luis Antonio Santiago PA-C

## 2022-11-11 NOTE — PROGRESS NOTES
Chief Complaint   Patient presents with     Follow Up     History of SCC metastatic to head and neck- no concerns today     History of Present Illness  Newton Buchanan is a 77 year old male who presents today for follow up. 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  The patient had richard disease in the neck and lung metastasis.  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  His pretreatment PET scan showed his cervical of adenopathy on the right and numerous bilateral lung metastases with hilar lymphadenopathy.  No primary site was identified on his PET scan physical examination.  The tumor stayed highly with PD1 so he was started on Keytruda in May 2020.  He received radiation to the right neck and oropharynx for unknown primary completing in December 2020.  Follow-up PET/CT in April 2021 showed stable/improved disease.  The patient underwent a PET scan 1/31/2022.  My review of the PET/CT shows some slight asymmetric FDG uptake in the left lingual tonsil and left palatine tonsil.  Near the glossotonsillar sulcus, there appears to be a calcification consistent with a tonsillolith.  There is no abnormal FDG avid uptake in the neck or chest.  Prior FDG uptake at the tonsil tongue base was 4.4 SUV which increased to 6.4 on this current study.  He had no evidence of recurrent or persistent disease on his clinical examination and we decided for observation.     He underwent follow-up PET/CT imaging on 8/25/2022.  My review of the PET/CT does not show any signs of recurrent or persistent disease in the head and neck area.    On his chest imaging, there is a subcarinal lymph node that is mildly hypermetabolic with an SUV max of 5.9.  Its been slowly getting larger over his several examinations.  Given the low level of metabolic activity compared to his initial  diagnostic metastatic SUV of 10-14, it sounds like they are favoring reactive or Keytruda induced inflammation.  The patient was last seen for oncologic surveillance on 7/14/2022 and he was doing well.  He returns today for oncologic surveillance.     From a symptom standpoint, the patient reports that overall he is doing well without any significant symptomatology.  The patient denies any dysphagia, odynophagia, pharyngodynia, otalgia, dysphonia, hemoptysis, neck lumps/bumps/swelling.  No unintentional weight loss.  He does report some intermittent dry throat/dry mouth, some of which he contributes to his CPAP.  The patient is a lifetime non-smoker.       He has still noticed some intermittent congestion and nasal dryness but has not had any epistaxis since we cauterized him at his last visit.  He does use CPAP at nighttime and does fill his chamber every evening.   He does take a daily baby aspirin but no other blood thinners.    Past Medical History  Patient Active Problem List   Diagnosis     Essential hypertension with goal blood pressure less than 140/90     Gastroesophageal reflux disease without esophagitis     CRYSTAL (obstructive sleep apnea)     Prostate cancer (H)     Head and neck cancer (H)     Oropharyngeal cancer (H)     Current Medications    Current Outpatient Medications:      aspirin 81 MG tablet, Take by mouth daily, Disp: 30 tablet, Rfl:      fluticasone (FLONASE) 50 MCG/ACT nasal spray, Spray 1-2 sprays in nostril, Disp: , Rfl:      losartan (COZAAR) 25 MG tablet, Take 1 tablet (25 mg) by mouth daily, Disp: 90 tablet, Rfl: 3     pembrolizumab (KEYTRUDA) 25 MG/ML, , Disp: , Rfl:      pravastatin (PRAVACHOL) 20 MG tablet, Take 20 mg by mouth daily , Disp: , Rfl:      saccharomyces boulardii (FLORASTOR) 250 MG capsule, Take 1 capsule by mouth 2 times daily, Disp: , Rfl:      sildenafil (VIAGRA) 100 MG tablet, Take by mouth daily as needed for erectile dysfunction, Disp: 30 tablet, Rfl:      VITAMIN  D, CHOLECALCIFEROL, PO, Take 1,000 Units by mouth daily, Disp: , Rfl:     Allergies  Allergies   Allergen Reactions     Atorvastatin      Other reaction(s): Confusion  unusual behavior and dizziness         Social History  Social History     Socioeconomic History     Marital status: Single   Tobacco Use     Smoking status: Never     Smokeless tobacco: Never   Substance and Sexual Activity     Alcohol use: Yes     Alcohol/week: 0.0 standard drinks     Comment: weekly     Drug use: No     Sexual activity: Yes     Partners: Female       Family History  Family History   Problem Relation Age of Onset     Diabetes Type 1 Son          at 25     Coronary Artery Disease No family hx of      Colon Cancer No family hx of        Review of Systems  As per HPI and PMHx, otherwise 10 system review including the head and neck, constitutional, eyes, respiratory, GI, skin, neurologic, lymphatic, endocrine, and allergy systems is negative.    Physical Exam  /71 (BP Location: Right arm, Patient Position: Chair, Cuff Size: Adult Large)   Pulse 60   Temp 97.7  F (36.5  C) (Tympanic)   Wt 84.8 kg (187 lb)   BMI 25.36 kg/m    GENERAL: Patient is a pleasant, cooperative 77 year old male in no acute distress.  HEAD: Normocephalic, atraumatic.  Hair and scalp are normal.  EYES: Pupils are equal, round, reactive to light and accommodation.  Extraocular movements are intact.  The sclera nonicteric without injection.  The extraocular structures are normal.  EARS: Normal shape and symmetry.  No tenderness when palpating the mastoid or tragal areas bilaterally.  Otoscopic exam reveals a minimal amount of cerumen bilaterally.  The bilateral tympanic membranes are round, intact without evidence of effusion, good landmarks.  No retraction, granulation, or drainage.  NOSE: Nares are patent.  Nasal mucosa is significantly dry with some crusting more so on the left-hand side.  The patient has a rightward nasal septal deviation anteriorly  and mid septum.  No nasal cavity masses, polyps, or mucopurulence on anterior rhinoscopy.  ORAL CAVITY: Dentition is in good repair.  Mucous membranes are dry.  Tongue is mobile, protrudes to the midline.  Palate elevates symmetrically.  Tonsils are 1+, symmetric.  No erythema or exudate.  No oral cavity or oropharyngeal masses, lesions, ulcerations, leukoplakia.  NECK: Supple, trachea is midline. Palpation of the right neck does reveal some fullness in the right level 2A just anterior to sternocleidomastoid muscle adjacent to the posterior border of the submandibular gland. There no obvious palpable cervical lymphadenopathy or masses bilaterally. The bilateral parotid and submandibular areas reveal no masses.  No thyromegaly.    NEUROLOGIC: Cranial nerves II through XII are grossly intact.  Voice is strong.  Patient is House-Brackmann I/VI bilaterally.  CARDIOVASCULAR: Extremities are warm and well-perfused.  No significant peripheral edema.  RESPIRATORY: Patient has nonlabored breathing without cough, wheeze, stridor.  PSYCHIATRIC: Patient is alert and oriented.  Mood and affect appear normal.  SKIN: Warm and dry.  No scalp, face, or neck lesions noted.     Procedure: Flexible Laryngoscopy  Indication: History of oropharyngeal squamous cell carcinoma of unknown primary, asymmetric FDG avid uptake in the left tonsil/tongue base     To best visualize the upper airway anatomy and due to the chief complaint and HPI, I proceeded with flexible fiberoptic laryngoscopy examination.  The bilateral nasal cavities were anesthetized and decongested with a mixture of lidocaine and neosynephrine.  The bilateral nasal cavities were examined using a flexible fiberoptic laryngoscope.  There were no nasal cavity masses, polyps, or mucopurulence bilaterally.  The nasal septum deviates to the right anteriorly.  The nasopharynx had a normal appearance with normal Eustachian tube openings and fossa of Rosenmuller bilaterally.   Minimal adenoid tissue.  Posttreatment changes of the oropharynx and supraglottic larynx.  The base of tongue, vallecula, epiglottis, aryepiglottic folds, arytenoids, and piriform sinuses were without mass or lesion.  The bilateral true vocal folds were symmetrically mobile without nodules or masses.  The visualized portions of the infraglottic and subglottic airway are unremarkable.  The scope was removed.  The patient tolerated the procedure well.                    Assessment and Plan     ICD-10-CM    1. Squamous cell carcinoma metastatic to head and neck with unknown primary site (H)  C79.89 LARYNGOSCOPY FLEX FIBEROPTIC, DIAGNOSTIC    C80.1       2. History of head and neck radiation  Z92.3 LARYNGOSCOPY FLEX FIBEROPTIC, DIAGNOSTIC         It was my pleasure seeing Newton Santanaberg today in clinic.  The patient has no evidence of oropharyngeal squamous cell carcinoma on physical exam or endoscopic exam today.  His PET scan from August 2022 was within normal limits, however they are watching an area in his subcarina in his chest. The patient is roughly 23 months from completing primary radiation therapy for P16+ squamous cell carcinoma of unknown primary with distant metastatic disease in the lungs and mediastinum (T0 N1 M1).  He had good response with PD-1 inhibitor treatment. I would recommend continued observation with clinical follow-up in 3 to 4 months.    Migue Kelly MD  Department of Otolaryngology-Head and Neck Surgery  [facility]

## 2022-11-14 ENCOUNTER — OFFICE VISIT (OUTPATIENT)
Dept: OTOLARYNGOLOGY | Facility: CLINIC | Age: 77
End: 2022-11-14
Payer: MEDICARE

## 2022-11-14 VITALS
TEMPERATURE: 97.7 F | DIASTOLIC BLOOD PRESSURE: 71 MMHG | WEIGHT: 187 LBS | SYSTOLIC BLOOD PRESSURE: 128 MMHG | BODY MASS INDEX: 25.36 KG/M2 | HEART RATE: 60 BPM

## 2022-11-14 DIAGNOSIS — C79.89 SQUAMOUS CELL CARCINOMA METASTATIC TO HEAD AND NECK WITH UNKNOWN PRIMARY SITE (H): Primary | ICD-10-CM

## 2022-11-14 DIAGNOSIS — Z92.3 HISTORY OF HEAD AND NECK RADIATION: ICD-10-CM

## 2022-11-14 DIAGNOSIS — C44.92 SQUAMOUS CELL CARCINOMA METASTATIC TO HEAD AND NECK WITH UNKNOWN PRIMARY SITE (H): Primary | ICD-10-CM

## 2022-11-14 PROCEDURE — 31575 DIAGNOSTIC LARYNGOSCOPY: CPT | Performed by: OTOLARYNGOLOGY

## 2022-11-14 PROCEDURE — 99214 OFFICE O/P EST MOD 30 MIN: CPT | Mod: 25 | Performed by: OTOLARYNGOLOGY

## 2022-11-14 ASSESSMENT — PAIN SCALES - GENERAL: PAINLEVEL: NO PAIN (0)

## 2022-11-14 NOTE — LETTER
11/14/2022         RE: Newton Buchanan  [address]  [place] [address]        Dear Colleague,    Thank you for referring your patient, Newton Buchanan, to the [facility]. Please see a copy of my visit note below.    Chief Complaint   Patient presents with     Follow Up     History of SCC metastatic to head and neck- no concerns today     History of Present Illness  Newton Buchanan is a 77 year old male who presents today for follow up. 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  The patient had richard disease in the neck and lung metastasis.  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  His pretreatment PET scan showed his cervical of adenopathy on the right and numerous bilateral lung metastases with hilar lymphadenopathy.  No primary site was identified on his PET scan physical examination.  The tumor stayed highly with PD1 so he was started on Keytruda in May 2020.  He received radiation to the right neck and oropharynx for unknown primary completing in December 2020.  Follow-up PET/CT in April 2021 showed stable/improved disease.  The patient underwent a PET scan 1/31/2022.  My review of the PET/CT shows some slight asymmetric FDG uptake in the left lingual tonsil and left palatine tonsil.  Near the glossotonsillar sulcus, there appears to be a calcification consistent with a tonsillolith.  There is no abnormal FDG avid uptake in the neck or chest.  Prior FDG uptake at the tonsil tongue base was 4.4 SUV which increased to 6.4 on this current study.  He had no evidence of recurrent or persistent disease on his clinical examination and we decided for observation.     He underwent follow-up PET/CT imaging on 8/25/2022.  My review of the PET/CT does not show any signs of recurrent or persistent disease in the head and neck area.    On  his chest imaging, there is a subcarinal lymph node that is mildly hypermetabolic with an SUV max of 5.9.  Its been slowly getting larger over his several examinations.  Given the low level of metabolic activity compared to his initial diagnostic metastatic SUV of 10-14, it sounds like they are favoring reactive or Keytruda induced inflammation.  The patient was last seen for oncologic surveillance on 7/14/2022 and he was doing well.  He returns today for oncologic surveillance.     From a symptom standpoint, the patient reports that overall he is doing well without any significant symptomatology.  The patient denies any dysphagia, odynophagia, pharyngodynia, otalgia, dysphonia, hemoptysis, neck lumps/bumps/swelling.  No unintentional weight loss.  He does report some intermittent dry throat/dry mouth, some of which he contributes to his CPAP.  The patient is a lifetime non-smoker.       He has still noticed some intermittent congestion and nasal dryness but has not had any epistaxis since we cauterized him at his last visit.  He does use CPAP at nighttime and does fill his chamber every evening.   He does take a daily baby aspirin but no other blood thinners.    Past Medical History  Patient Active Problem List   Diagnosis     Essential hypertension with goal blood pressure less than 140/90     Gastroesophageal reflux disease without esophagitis     CRYSTAL (obstructive sleep apnea)     Prostate cancer (H)     Head and neck cancer (H)     Oropharyngeal cancer (H)     Current Medications    Current Outpatient Medications:      aspirin 81 MG tablet, Take by mouth daily, Disp: 30 tablet, Rfl:      fluticasone (FLONASE) 50 MCG/ACT nasal spray, Spray 1-2 sprays in nostril, Disp: , Rfl:      losartan (COZAAR) 25 MG tablet, Take 1 tablet (25 mg) by mouth daily, Disp: 90 tablet, Rfl: 3     pembrolizumab (KEYTRUDA) 25 MG/ML, , Disp: , Rfl:      pravastatin (PRAVACHOL) 20 MG tablet, Take 20 mg by mouth daily , Disp: , Rfl:       saccharomyces boulardii (FLORASTOR) 250 MG capsule, Take 1 capsule by mouth 2 times daily, Disp: , Rfl:      sildenafil (VIAGRA) 100 MG tablet, Take by mouth daily as needed for erectile dysfunction, Disp: 30 tablet, Rfl:      VITAMIN D, CHOLECALCIFEROL, PO, Take 1,000 Units by mouth daily, Disp: , Rfl:     Allergies  Allergies   Allergen Reactions     Atorvastatin      Other reaction(s): Confusion  unusual behavior and dizziness         Social History  Social History     Socioeconomic History     Marital status: Single   Tobacco Use     Smoking status: Never     Smokeless tobacco: Never   Substance and Sexual Activity     Alcohol use: Yes     Alcohol/week: 0.0 standard drinks     Comment: weekly     Drug use: No     Sexual activity: Yes     Partners: Female       Family History  Family History   Problem Relation Age of Onset     Diabetes Type 1 Son          at 25     Coronary Artery Disease No family hx of      Colon Cancer No family hx of        Review of Systems  As per HPI and PMHx, otherwise 10 system review including the head and neck, constitutional, eyes, respiratory, GI, skin, neurologic, lymphatic, endocrine, and allergy systems is negative.    Physical Exam  /71 (BP Location: Right arm, Patient Position: Chair, Cuff Size: Adult Large)   Pulse 60   Temp 97.7  F (36.5  C) (Tympanic)   Wt 84.8 kg (187 lb)   BMI 25.36 kg/m    GENERAL: Patient is a pleasant, cooperative 77 year old male in no acute distress.  HEAD: Normocephalic, atraumatic.  Hair and scalp are normal.  EYES: Pupils are equal, round, reactive to light and accommodation.  Extraocular movements are intact.  The sclera nonicteric without injection.  The extraocular structures are normal.  EARS: Normal shape and symmetry.  No tenderness when palpating the mastoid or tragal areas bilaterally.  Otoscopic exam reveals a minimal amount of cerumen bilaterally.  The bilateral tympanic membranes are round, intact without evidence of  effusion, good landmarks.  No retraction, granulation, or drainage.  NOSE: Nares are patent.  Nasal mucosa is significantly dry with some crusting more so on the left-hand side.  The patient has a rightward nasal septal deviation anteriorly and mid septum.  No nasal cavity masses, polyps, or mucopurulence on anterior rhinoscopy.  ORAL CAVITY: Dentition is in good repair.  Mucous membranes are dry.  Tongue is mobile, protrudes to the midline.  Palate elevates symmetrically.  Tonsils are 1+, symmetric.  No erythema or exudate.  No oral cavity or oropharyngeal masses, lesions, ulcerations, leukoplakia.  NECK: Supple, trachea is midline. Palpation of the right neck does reveal some fullness in the right level 2A just anterior to sternocleidomastoid muscle adjacent to the posterior border of the submandibular gland. There no obvious palpable cervical lymphadenopathy or masses bilaterally. The bilateral parotid and submandibular areas reveal no masses.  No thyromegaly.    NEUROLOGIC: Cranial nerves II through XII are grossly intact.  Voice is strong.  Patient is House-Brackmann I/VI bilaterally.  CARDIOVASCULAR: Extremities are warm and well-perfused.  No significant peripheral edema.  RESPIRATORY: Patient has nonlabored breathing without cough, wheeze, stridor.  PSYCHIATRIC: Patient is alert and oriented.  Mood and affect appear normal.  SKIN: Warm and dry.  No scalp, face, or neck lesions noted.     Procedure: Flexible Laryngoscopy  Indication: History of oropharyngeal squamous cell carcinoma of unknown primary, asymmetric FDG avid uptake in the left tonsil/tongue base     To best visualize the upper airway anatomy and due to the chief complaint and HPI, I proceeded with flexible fiberoptic laryngoscopy examination.  The bilateral nasal cavities were anesthetized and decongested with a mixture of lidocaine and neosynephrine.  The bilateral nasal cavities were examined using a flexible fiberoptic laryngoscope.  There  were no nasal cavity masses, polyps, or mucopurulence bilaterally.  The nasal septum deviates to the right anteriorly.  The nasopharynx had a normal appearance with normal Eustachian tube openings and fossa of Rosenmuller bilaterally.  Minimal adenoid tissue.  Posttreatment changes of the oropharynx and supraglottic larynx.  The base of tongue, vallecula, epiglottis, aryepiglottic folds, arytenoids, and piriform sinuses were without mass or lesion.  The bilateral true vocal folds were symmetrically mobile without nodules or masses.  The visualized portions of the infraglottic and subglottic airway are unremarkable.  The scope was removed.  The patient tolerated the procedure well.                    Assessment and Plan     ICD-10-CM    1. Squamous cell carcinoma metastatic to head and neck with unknown primary site (H)  C79.89 LARYNGOSCOPY FLEX FIBEROPTIC, DIAGNOSTIC    C80.1       2. History of head and neck radiation  Z92.3 LARYNGOSCOPY FLEX FIBEROPTIC, DIAGNOSTIC         It was my pleasure seeing Newton Buchanan today in clinic.  The patient has no evidence of oropharyngeal squamous cell carcinoma on physical exam or endoscopic exam today.  His PET scan from August 2022 was within normal limits, however they are watching an area in his subcarina in his chest. The patient is roughly 23 months from completing primary radiation therapy for P16+ squamous cell carcinoma of unknown primary with distant metastatic disease in the lungs and mediastinum (T0 N1 M1).  He had good response with PD-1 inhibitor treatment. I would recommend continued observation with clinical follow-up in 3 to 4 months.    Migue Kelly MD  Department of Otolaryngology-Head and Neck Surgery  [facility]         Again, thank you for allowing me to participate in the care of your patient.        Sincerely,        Migue Kelly MD

## 2022-11-14 NOTE — NURSING NOTE
Initial /71 (BP Location: Right arm, Patient Position: Chair, Cuff Size: Adult Large)   Pulse 60   Temp 97.7  F (36.5  C) (Tympanic)   Wt 84.8 kg (187 lb)   BMI 25.36 kg/m   Estimated body mass index is 25.36 kg/m  as calculated from the following:    Height as of 3/14/22: 1.829 m (6').    Weight as of this encounter: 84.8 kg (187 lb). .    Clementina Gilliam LPN

## 2022-12-01 ENCOUNTER — HOSPITAL ENCOUNTER (OUTPATIENT)
Dept: PET IMAGING | Facility: CLINIC | Age: 77
Discharge: HOME | End: 2022-12-01
Attending: INTERNAL MEDICINE
Payer: MEDICARE

## 2022-12-01 DIAGNOSIS — C10.9 OROPHARYNGEAL CANCER (H): ICD-10-CM

## 2022-12-01 DIAGNOSIS — C76.0 HEAD AND NECK CANCER (H): ICD-10-CM

## 2022-12-01 PROCEDURE — 74177 CT ABD & PELVIS W/CONTRAST: CPT

## 2022-12-01 PROCEDURE — G1010 CDSM STANSON: HCPCS | Mod: PS

## 2022-12-01 PROCEDURE — A9552 F18 FDG: HCPCS | Performed by: INTERNAL MEDICINE

## 2022-12-01 PROCEDURE — 70491 CT SOFT TISSUE NECK W/DYE: CPT

## 2022-12-01 PROCEDURE — 343N000001 HC RX 343: Performed by: INTERNAL MEDICINE

## 2022-12-01 PROCEDURE — 250N000011 HC RX IP 250 OP 636: Performed by: INTERNAL MEDICINE

## 2022-12-01 RX ORDER — IOPAMIDOL 755 MG/ML
0-135 INJECTION, SOLUTION INTRAVASCULAR ONCE
Status: COMPLETED | OUTPATIENT
Start: 2022-12-01 | End: 2022-12-01

## 2022-12-01 RX ADMIN — IOPAMIDOL 115 ML: 755 INJECTION, SOLUTION INTRAVENOUS at 07:32

## 2022-12-01 RX ADMIN — FLUDEOXYGLUCOSE F-18 12.05 MCI.: 500 INJECTION, SOLUTION INTRAVENOUS at 07:28

## 2022-12-02 ENCOUNTER — ONCOLOGY VISIT (OUTPATIENT)
Dept: ONCOLOGY | Facility: CLINIC | Age: 77
End: 2022-12-02
Attending: INTERNAL MEDICINE
Payer: MEDICARE

## 2022-12-02 VITALS
RESPIRATION RATE: 12 BRPM | SYSTOLIC BLOOD PRESSURE: 138 MMHG | OXYGEN SATURATION: 98 % | HEART RATE: 62 BPM | BODY MASS INDEX: 25.88 KG/M2 | WEIGHT: 190.8 LBS | DIASTOLIC BLOOD PRESSURE: 67 MMHG | TEMPERATURE: 97.5 F

## 2022-12-02 DIAGNOSIS — G47.33 OSA (OBSTRUCTIVE SLEEP APNEA): Primary | ICD-10-CM

## 2022-12-02 DIAGNOSIS — C76.0 HEAD AND NECK CANCER (H): Primary | ICD-10-CM

## 2022-12-02 PROCEDURE — G0463 HOSPITAL OUTPT CLINIC VISIT: HCPCS

## 2022-12-02 PROCEDURE — 99214 OFFICE O/P EST MOD 30 MIN: CPT | Performed by: INTERNAL MEDICINE

## 2022-12-02 RX ORDER — SODIUM CHLORIDE 9 MG/ML
1000 INJECTION, SOLUTION INTRAVENOUS CONTINUOUS PRN
Status: CANCELLED
Start: 2023-02-13

## 2022-12-02 RX ORDER — MEPERIDINE HYDROCHLORIDE 25 MG/ML
25 INJECTION INTRAMUSCULAR; INTRAVENOUS; SUBCUTANEOUS EVERY 30 MIN PRN
Status: CANCELLED | OUTPATIENT
Start: 2023-01-23

## 2022-12-02 RX ORDER — ALBUTEROL SULFATE 90 UG/1
1-2 AEROSOL, METERED RESPIRATORY (INHALATION)
Status: CANCELLED
Start: 2023-01-23

## 2022-12-02 RX ORDER — LORAZEPAM 2 MG/ML
0.5 INJECTION INTRAMUSCULAR EVERY 4 HOURS PRN
Status: CANCELLED
Start: 2023-03-06

## 2022-12-02 RX ORDER — DIPHENHYDRAMINE HYDROCHLORIDE 50 MG/ML
50 INJECTION INTRAMUSCULAR; INTRAVENOUS
Status: CANCELLED
Start: 2023-03-06

## 2022-12-02 RX ORDER — MEPERIDINE HYDROCHLORIDE 25 MG/ML
25 INJECTION INTRAMUSCULAR; INTRAVENOUS; SUBCUTANEOUS EVERY 30 MIN PRN
Status: CANCELLED | OUTPATIENT
Start: 2023-03-06

## 2022-12-02 RX ORDER — SODIUM CHLORIDE 9 MG/ML
1000 INJECTION, SOLUTION INTRAVENOUS CONTINUOUS PRN
Status: CANCELLED
Start: 2023-03-06

## 2022-12-02 RX ORDER — DIPHENHYDRAMINE HYDROCHLORIDE 50 MG/ML
50 INJECTION INTRAMUSCULAR; INTRAVENOUS
Status: CANCELLED
Start: 2023-01-02

## 2022-12-02 RX ORDER — MEPERIDINE HYDROCHLORIDE 25 MG/ML
25 INJECTION INTRAMUSCULAR; INTRAVENOUS; SUBCUTANEOUS EVERY 30 MIN PRN
Status: CANCELLED | OUTPATIENT
Start: 2023-01-02

## 2022-12-02 RX ORDER — ALBUTEROL SULFATE 0.83 MG/ML
2.5 SOLUTION RESPIRATORY (INHALATION)
Status: CANCELLED | OUTPATIENT
Start: 2022-12-12

## 2022-12-02 RX ORDER — SODIUM CHLORIDE 9 MG/ML
1000 INJECTION, SOLUTION INTRAVENOUS CONTINUOUS PRN
Status: CANCELLED
Start: 2022-12-12

## 2022-12-02 RX ORDER — NALOXONE HYDROCHLORIDE 0.4 MG/ML
.1-.4 INJECTION, SOLUTION INTRAMUSCULAR; INTRAVENOUS; SUBCUTANEOUS
Status: CANCELLED | OUTPATIENT
Start: 2022-12-12

## 2022-12-02 RX ORDER — MEPERIDINE HYDROCHLORIDE 25 MG/ML
25 INJECTION INTRAMUSCULAR; INTRAVENOUS; SUBCUTANEOUS EVERY 30 MIN PRN
Status: CANCELLED | OUTPATIENT
Start: 2022-12-12

## 2022-12-02 RX ORDER — LORAZEPAM 2 MG/ML
0.5 INJECTION INTRAMUSCULAR EVERY 4 HOURS PRN
Status: CANCELLED
Start: 2023-02-13

## 2022-12-02 RX ORDER — ALBUTEROL SULFATE 0.83 MG/ML
2.5 SOLUTION RESPIRATORY (INHALATION)
Status: CANCELLED | OUTPATIENT
Start: 2023-02-13

## 2022-12-02 RX ORDER — LORAZEPAM 2 MG/ML
0.5 INJECTION INTRAMUSCULAR EVERY 4 HOURS PRN
Status: CANCELLED
Start: 2023-01-23

## 2022-12-02 RX ORDER — ALBUTEROL SULFATE 90 UG/1
1-2 AEROSOL, METERED RESPIRATORY (INHALATION)
Status: CANCELLED
Start: 2023-01-02

## 2022-12-02 RX ORDER — EPINEPHRINE 1 MG/ML
0.3 INJECTION, SOLUTION, CONCENTRATE INTRAVENOUS EVERY 5 MIN PRN
Status: CANCELLED | OUTPATIENT
Start: 2022-12-12

## 2022-12-02 RX ORDER — NALOXONE HYDROCHLORIDE 0.4 MG/ML
.1-.4 INJECTION, SOLUTION INTRAMUSCULAR; INTRAVENOUS; SUBCUTANEOUS
Status: CANCELLED | OUTPATIENT
Start: 2023-01-02

## 2022-12-02 RX ORDER — ALBUTEROL SULFATE 90 UG/1
1-2 AEROSOL, METERED RESPIRATORY (INHALATION)
Status: CANCELLED
Start: 2023-02-13

## 2022-12-02 RX ORDER — MEPERIDINE HYDROCHLORIDE 25 MG/ML
25 INJECTION INTRAMUSCULAR; INTRAVENOUS; SUBCUTANEOUS EVERY 30 MIN PRN
Status: CANCELLED | OUTPATIENT
Start: 2023-02-13

## 2022-12-02 RX ORDER — SODIUM CHLORIDE 9 MG/ML
1000 INJECTION, SOLUTION INTRAVENOUS CONTINUOUS PRN
Status: CANCELLED
Start: 2023-01-02

## 2022-12-02 RX ORDER — NALOXONE HYDROCHLORIDE 0.4 MG/ML
.1-.4 INJECTION, SOLUTION INTRAMUSCULAR; INTRAVENOUS; SUBCUTANEOUS
Status: CANCELLED | OUTPATIENT
Start: 2023-01-23

## 2022-12-02 RX ORDER — LORAZEPAM 2 MG/ML
0.5 INJECTION INTRAMUSCULAR EVERY 4 HOURS PRN
Status: CANCELLED
Start: 2023-01-02

## 2022-12-02 RX ORDER — DIPHENHYDRAMINE HYDROCHLORIDE 50 MG/ML
50 INJECTION INTRAMUSCULAR; INTRAVENOUS
Status: CANCELLED
Start: 2023-02-13

## 2022-12-02 RX ORDER — METHYLPREDNISOLONE SODIUM SUCCINATE 125 MG/2ML
125 INJECTION, POWDER, LYOPHILIZED, FOR SOLUTION INTRAMUSCULAR; INTRAVENOUS
Status: CANCELLED
Start: 2023-02-13

## 2022-12-02 RX ORDER — ALBUTEROL SULFATE 90 UG/1
1-2 AEROSOL, METERED RESPIRATORY (INHALATION)
Status: CANCELLED
Start: 2023-03-06

## 2022-12-02 RX ORDER — DIPHENHYDRAMINE HYDROCHLORIDE 50 MG/ML
50 INJECTION INTRAMUSCULAR; INTRAVENOUS
Status: CANCELLED
Start: 2023-01-23

## 2022-12-02 RX ORDER — NALOXONE HYDROCHLORIDE 0.4 MG/ML
.1-.4 INJECTION, SOLUTION INTRAMUSCULAR; INTRAVENOUS; SUBCUTANEOUS
Status: CANCELLED | OUTPATIENT
Start: 2023-02-13

## 2022-12-02 RX ORDER — EPINEPHRINE 1 MG/ML
0.3 INJECTION, SOLUTION, CONCENTRATE INTRAVENOUS EVERY 5 MIN PRN
Status: CANCELLED | OUTPATIENT
Start: 2023-01-23

## 2022-12-02 RX ORDER — METHYLPREDNISOLONE SODIUM SUCCINATE 125 MG/2ML
125 INJECTION, POWDER, LYOPHILIZED, FOR SOLUTION INTRAMUSCULAR; INTRAVENOUS
Status: CANCELLED
Start: 2023-03-06

## 2022-12-02 RX ORDER — LORAZEPAM 2 MG/ML
0.5 INJECTION INTRAMUSCULAR EVERY 4 HOURS PRN
Status: CANCELLED
Start: 2022-12-12

## 2022-12-02 RX ORDER — EPINEPHRINE 1 MG/ML
0.3 INJECTION, SOLUTION, CONCENTRATE INTRAVENOUS EVERY 5 MIN PRN
Status: CANCELLED | OUTPATIENT
Start: 2023-01-02

## 2022-12-02 RX ORDER — METHYLPREDNISOLONE SODIUM SUCCINATE 125 MG/2ML
125 INJECTION, POWDER, LYOPHILIZED, FOR SOLUTION INTRAMUSCULAR; INTRAVENOUS
Status: CANCELLED
Start: 2023-01-23

## 2022-12-02 RX ORDER — METHYLPREDNISOLONE SODIUM SUCCINATE 125 MG/2ML
125 INJECTION, POWDER, LYOPHILIZED, FOR SOLUTION INTRAMUSCULAR; INTRAVENOUS
Status: CANCELLED
Start: 2023-01-02

## 2022-12-02 RX ORDER — NALOXONE HYDROCHLORIDE 0.4 MG/ML
.1-.4 INJECTION, SOLUTION INTRAMUSCULAR; INTRAVENOUS; SUBCUTANEOUS
Status: CANCELLED | OUTPATIENT
Start: 2023-03-06

## 2022-12-02 RX ORDER — ALBUTEROL SULFATE 0.83 MG/ML
2.5 SOLUTION RESPIRATORY (INHALATION)
Status: CANCELLED | OUTPATIENT
Start: 2023-01-23

## 2022-12-02 RX ORDER — ALBUTEROL SULFATE 0.83 MG/ML
2.5 SOLUTION RESPIRATORY (INHALATION)
Status: CANCELLED | OUTPATIENT
Start: 2023-03-06

## 2022-12-02 RX ORDER — ALBUTEROL SULFATE 0.83 MG/ML
2.5 SOLUTION RESPIRATORY (INHALATION)
Status: CANCELLED | OUTPATIENT
Start: 2023-01-02

## 2022-12-02 RX ORDER — EPINEPHRINE 1 MG/ML
0.3 INJECTION, SOLUTION, CONCENTRATE INTRAVENOUS EVERY 5 MIN PRN
Status: CANCELLED | OUTPATIENT
Start: 2023-02-13

## 2022-12-02 RX ORDER — SODIUM CHLORIDE 9 MG/ML
1000 INJECTION, SOLUTION INTRAVENOUS CONTINUOUS PRN
Status: CANCELLED
Start: 2023-01-23

## 2022-12-02 RX ORDER — METHYLPREDNISOLONE SODIUM SUCCINATE 125 MG/2ML
125 INJECTION, POWDER, LYOPHILIZED, FOR SOLUTION INTRAMUSCULAR; INTRAVENOUS
Status: CANCELLED
Start: 2022-12-12

## 2022-12-02 RX ORDER — ALBUTEROL SULFATE 90 UG/1
1-2 AEROSOL, METERED RESPIRATORY (INHALATION)
Status: CANCELLED
Start: 2022-12-12

## 2022-12-02 RX ORDER — EPINEPHRINE 1 MG/ML
0.3 INJECTION, SOLUTION, CONCENTRATE INTRAVENOUS EVERY 5 MIN PRN
Status: CANCELLED | OUTPATIENT
Start: 2023-03-06

## 2022-12-02 RX ORDER — DIPHENHYDRAMINE HYDROCHLORIDE 50 MG/ML
50 INJECTION INTRAMUSCULAR; INTRAVENOUS
Status: CANCELLED
Start: 2022-12-12

## 2022-12-02 ASSESSMENT — PAIN SCALES - GENERAL: PAINLEVEL: NO PAIN (0)

## 2022-12-02 NOTE — PATIENT INSTRUCTIONS
I am concerned about subcarinal lymph node recurrence of your metastatic squamous cell cancer of the tonsil on your recent PET/CT and I am recommending that you resume single agent immunotherapy with Keytruda again.    In the absence of new signs/symptoms of progression of cancer we could continue Keytruda for about 3 months and repeat the PET/CT and see you back in the clinic at that time.    Today, we discussed potential immune related adverse events of Keytruda again and we talked about the importance of contacting our office with any unusual  symptoms after restarting Keytruda.    Please continue regular followup with ENT and periodic monitoring of your PSA.    Orders Placed This Encounter   Procedures    PET Oncology (Eyes to Thighs)

## 2022-12-02 NOTE — PROGRESS NOTES
"The patient is being seen for the following issue/s:  Encounter Diagnosis   Name Primary?     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 FNAs benign. Followed.   -2/2020: progressive right neck swelling, no other symptoms.   -2/11/20 FNA biosy right neck mass: metastatic squamous cell cancer, HPV16+  -PET: hypermetabolic right cervical adenopathy, numerous bilateral lung mets, right hilar nodes.   -No evident primary site identified, but highly suspicious of H/N given IHC staining and radiographical findings/spread  High PD-1 70% on biopsy    Treatment:  5/2020 - present: Keytruda (initiated in [place], then transferred care to [place])  Patient traveled back in [facility] between [place] and [place]    Did receive XRT to right neck 12/20.  Patient in [place] 4/21 to help sell home.  PET/CT 4/21 showed stable/improved disease.   PET/CT 7/21 -stable/improved disease  PET-10/10/2021-stable disease\"    Interval history:    He completed 2 years of total treatment with a PD-L1 inhibitor through July 2022 and has been on immunotherapy break since then and PET/CT was consistent with a complete remission and we made the mutual decision to have him stop immunotherapy with Keytruda at that time.    He just underwent a follow-up PET/CT which is worrisome for a subcarinal lymph node recurrence of his metastatic head neck cancer.    His main complaint today is that certain foods do not taste like they used to which can make it sometimes difficult for him to eat.    Todays' vital signs reviewed in the EMR.    ECOG PS is 0  Physical examination:  HEENT: No scleral icterus  Cardiovascular: Cor RRR  Respiratory: Lungs clear to auscultation bilaterally  Skin: No rashes noted    ASSESSMENT/PLAN:  Encounter Diagnosis   Name Primary?     Head and neck cancer (H) Yes     PET/CT " report reviewed as outlined below. I personally reviewed the PET/CT images.    CT Chest/Abdomen/Pelvis w Contrast  Narrative: EXAM: PET ONCOLOGY WHOLE BODY, CT SOFT TISSUE NECK W CONTRAST, CT CHEST/ABDOMEN/PELVIS W CONTRAST  LOCATION: [facility]  DATE/TIME: 12/1/2022 9:04 AM    INDICATION: Head and neck cancer, restaging. Malignant neoplasm of tonsillar fossa, right. HPV related squamous cell carcinoma. Prior cervical richard metastases. Status post two years of immunotherapy. Prior right neck radiation therapy. Subsequent   treatment strategy.  COMPARISON: PET/CT 08/25/2022, 08/25/2022, 05/24/2022, 10/10/2021 reviewed.  CONTRAST: 115 mL Isovue-370  TECHNIQUE: Serum glucose level 101 mg/dL. One hour post intravenous administration of 12.1 mCi F-18 FDG, PET imaging was performed from the skull vertex to feet, utilizing attenuation correction with concurrent axial CT and PET/CT image fusion. Separate   diagnostic CT of the neck, chest, abdomen, and pelvis was performed. Dose reduction techniques were used.    PET/CT FINDINGS: Since 08/05/2022, a mildly enlarged subcarinal station 7 lymph node has continued to increase in size, estimated at 1.1 x 2.2 cm (previously 1.1 x 1.8 cm at similar level measurements) and degree of now marked FDG uptake (SUVmax 8.5,   previously 5.9), indeterminate. This was present although even smaller and less FDG avid on 8/25/2022 and 5/24/2022, not present on PET/CT 10/10/2021. No new or enlarging FDG avid adenopathy in the neck, chest or elsewhere. Posttreatment changes in the   right tonsillar fossa and right neck with no evidence of local recurrence. No suspicious focal uptake in the liver or skeleton.    CT FINDINGS: Mild band like scarring in the lung apices. Minimal linear scarring or atelectasis in the lower lungs. Few small benign bilateral renal simple cysts, no follow-up needed. Mild colonic diverticulosis. Prostatectomy. Small right knee joint    effusion. Moderate scattered hypertrophic degenerative changes in the spine.  Impression: IMPRESSION:  1. Slight increase in size and FDG activity involving a mildly enlarged subcarinal lymph node, indeterminate. Differential considerations include richard metastasis versus ongoing immunotherapy related sarcoid-like granulomatous inflammation. At a minimum,   recommend continued attention on follow-up. Alternatively, EBUS-guided biopsy could be considered.  2. No evidence of FDG avid malignancy elsewhere.  PET Oncology Whole Body  Narrative: EXAM: PET ONCOLOGY WHOLE BODY, CT SOFT TISSUE NECK W CONTRAST, CT CHEST/ABDOMEN/PELVIS W CONTRAST  LOCATION: [facility]  DATE/TIME: 12/1/2022 9:04 AM    INDICATION: Head and neck cancer, restaging. Malignant neoplasm of tonsillar fossa, right. HPV related squamous cell carcinoma. Prior cervical richard metastases. Status post two years of immunotherapy. Prior right neck radiation therapy. Subsequent   treatment strategy.  COMPARISON: PET/CT 08/25/2022, 08/25/2022, 05/24/2022, 10/10/2021 reviewed.  CONTRAST: 115 mL Isovue-370  TECHNIQUE: Serum glucose level 101 mg/dL. One hour post intravenous administration of 12.1 mCi F-18 FDG, PET imaging was performed from the skull vertex to feet, utilizing attenuation correction with concurrent axial CT and PET/CT image fusion. Separate   diagnostic CT of the neck, chest, abdomen, and pelvis was performed. Dose reduction techniques were used.    PET/CT FINDINGS: Since 08/05/2022, a mildly enlarged subcarinal station 7 lymph node has continued to increase in size, estimated at 1.1 x 2.2 cm (previously 1.1 x 1.8 cm at similar level measurements) and degree of now marked FDG uptake (SUVmax 8.5,   previously 5.9), indeterminate. This was present although even smaller and less FDG avid on 8/25/2022 and 5/24/2022, not present on PET/CT 10/10/2021. No new or enlarging FDG avid adenopathy in the neck, chest or elsewhere.  Posttreatment changes in the   right tonsillar fossa and right neck with no evidence of local recurrence. No suspicious focal uptake in the liver or skeleton.    CT FINDINGS: Mild band like scarring in the lung apices. Minimal linear scarring or atelectasis in the lower lungs. Few small benign bilateral renal simple cysts, no follow-up needed. Mild colonic diverticulosis. Prostatectomy. Small right knee joint   effusion. Moderate scattered hypertrophic degenerative changes in the spine.  Impression: IMPRESSION:  1. Slight increase in size and FDG activity involving a mildly enlarged subcarinal lymph node, indeterminate. Differential considerations include richard metastasis versus ongoing immunotherapy related sarcoid-like granulomatous inflammation. At a minimum,   recommend continued attention on follow-up. Alternatively, EBUS-guided biopsy could be considered.  2. No evidence of FDG avid malignancy elsewhere.  CT Soft Tissue Neck w Contrast  Narrative: EXAM: PET ONCOLOGY WHOLE BODY, CT SOFT TISSUE NECK W CONTRAST, CT CHEST/ABDOMEN/PELVIS W CONTRAST  LOCATION: [facility]  DATE/TIME: 12/1/2022 9:04 AM    INDICATION: Head and neck cancer, restaging. Malignant neoplasm of tonsillar fossa, right. HPV related squamous cell carcinoma. Prior cervical richard metastases. Status post two years of immunotherapy. Prior right neck radiation therapy. Subsequent   treatment strategy.  COMPARISON: PET/CT 08/25/2022, 08/25/2022, 05/24/2022, 10/10/2021 reviewed.  CONTRAST: 115 mL Isovue-370  TECHNIQUE: Serum glucose level 101 mg/dL. One hour post intravenous administration of 12.1 mCi F-18 FDG, PET imaging was performed from the skull vertex to feet, utilizing attenuation correction with concurrent axial CT and PET/CT image fusion. Separate   diagnostic CT of the neck, chest, abdomen, and pelvis was performed. Dose reduction techniques were used.    PET/CT FINDINGS: Since 08/05/2022, a mildly enlarged  subcarinal station 7 lymph node has continued to increase in size, estimated at 1.1 x 2.2 cm (previously 1.1 x 1.8 cm at similar level measurements) and degree of now marked FDG uptake (SUVmax 8.5,   previously 5.9), indeterminate. This was present although even smaller and less FDG avid on 8/25/2022 and 5/24/2022, not present on PET/CT 10/10/2021. No new or enlarging FDG avid adenopathy in the neck, chest or elsewhere. Posttreatment changes in the   right tonsillar fossa and right neck with no evidence of local recurrence. No suspicious focal uptake in the liver or skeleton.    CT FINDINGS: Mild band like scarring in the lung apices. Minimal linear scarring or atelectasis in the lower lungs. Few small benign bilateral renal simple cysts, no follow-up needed. Mild colonic diverticulosis. Prostatectomy. Small right knee joint   effusion. Moderate scattered hypertrophic degenerative changes in the spine.  Impression: IMPRESSION:  1. Slight increase in size and FDG activity involving a mildly enlarged subcarinal lymph node, indeterminate. Differential considerations include richard metastasis versus ongoing immunotherapy related sarcoid-like granulomatous inflammation. At a minimum,   recommend continued attention on follow-up. Alternatively, EBUS-guided biopsy could be considered.  2. No evidence of FDG avid malignancy elsewhere.    I am concerned about subcarinal lymph node recurrence of your metastatic squamous cell cancer of the tonsil on your recent PET/CT and I am recommending that you resume single agent immunotherapy with Keytruda again.    In the absence of new signs/symptoms of progression of cancer we could continue Keytruda for about 3 months and repeat the PET/CT and see you back in the clinic at that time.    Today, we discussed potential immune related adverse events of Keytruda again and we talked about the importance of contacting our office with any unusual  symptoms after restarting Keytruda.    Please  continue regular followup with ENT and periodic monitoring of your PSA.

## 2022-12-02 NOTE — LETTER
"    12/2/2022         RE: Newton Buchanan  [address]  [place] [address]        Dear Colleague,    Thank you for referring your patient, Newton Buchanan, to the [facility]. Please see a copy of my visit note below.    Oncology Rooming Note    December 2, 2022 11:20 AM   Newton Buchanan is a 77 year old male who presents for:    Chief Complaint   Patient presents with     Oncology Clinic Visit     Head and neck cancer - Provider visit     Initial Vitals: /67 (BP Location: Right arm, Patient Position: Sitting, Cuff Size: Adult Regular)   Pulse 62   Temp 97.5  F (36.4  C) (Tympanic)   Resp 12   Wt 86.5 kg (190 lb 12.8 oz)   SpO2 98%   BMI 25.88 kg/m   Estimated body mass index is 25.88 kg/m  as calculated from the following:    Height as of 3/14/22: 1.829 m (6').    Weight as of this encounter: 86.5 kg (190 lb 12.8 oz). Body surface area is 2.1 meters squared.  No Pain (0) Comment: Data Unavailable   No LMP for male patient.  Allergies reviewed: Yes  Medications reviewed: Yes    Medications: Medication refills not needed today.  Pharmacy name entered into [facility]:    [facility] #[id number] - [address] AT [address] HOME DELIVERY - [place], [place] - [address] [place] [address]    Clinical concerns:  None      Dayana Bradshaw CMA                The patient is being seen for the following issue/s:  Encounter Diagnosis   Name Primary?     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 FNAs benign. Followed.   -2/2020: progressive right neck swelling, no other symptoms.   -2/11/20 FNA biosy right neck mass: metastatic squamous cell cancer, HPV16+  -PET: hypermetabolic right cervical adenopathy, numerous bilateral lung mets, right hilar nodes.   -No evident primary site " "identified, but highly suspicious of H/N given IHC staining and radiographical findings/spread  High PD-1 70% on biopsy    Treatment:  5/2020 - present: Keytruda (initiated in [place], then transferred care to [place])  Patient traveled back in for between [place] and [place]    Did receive XRT to right neck 12/20.  Patient in [place] 4/21 to help sell home.  PET/CT 4/21 showed stable/improved disease.   PET/CT 7/21 -stable/improved disease  PET-10/10/2021-stable disease\"    Interval history:    He completed 2 years of total treatment with a PD-L1 inhibitor through July 2022 and has been on immunotherapy break since then and PET/CT was consistent with a complete remission and we made the mutual decision to have him stop immunotherapy with Keytruda at that time.    He just underwent a follow-up PET/CT which is worrisome for a subcarinal lymph node recurrence of his metastatic head neck cancer.    His main complaint today is that certain foods do not taste like they used to which can make it sometimes difficult for him to eat.    Todays' vital signs reviewed in the EMR.    ECOG PS is 0  Physical examination:  HEENT: No scleral icterus  Cardiovascular: Cor RRR  Respiratory: Lungs clear to auscultation bilaterally  Skin: No rashes noted    ASSESSMENT/PLAN:  Encounter Diagnosis   Name Primary?     Head and neck cancer (H) Yes     PET/CT report reviewed as outlined below. I personally reviewed the PET/CT images.    CT Chest/Abdomen/Pelvis w Contrast  Narrative: EXAM: PET ONCOLOGY WHOLE BODY, CT SOFT TISSUE NECK W CONTRAST, CT CHEST/ABDOMEN/PELVIS W CONTRAST  LOCATION: [facility]  DATE/TIME: 12/1/2022 9:04 AM    INDICATION: Head and neck cancer, restaging. Malignant neoplasm of tonsillar fossa, right. HPV related squamous cell carcinoma. Prior cervical richard metastases. Status post two years of immunotherapy. Prior right neck radiation therapy. Subsequent   treatment strategy.  COMPARISON: " PET/CT 08/25/2022, 08/25/2022, 05/24/2022, 10/10/2021 reviewed.  CONTRAST: 115 mL Isovue-370  TECHNIQUE: Serum glucose level 101 mg/dL. One hour post intravenous administration of 12.1 mCi F-18 FDG, PET imaging was performed from the skull vertex to feet, utilizing attenuation correction with concurrent axial CT and PET/CT image fusion. Separate   diagnostic CT of the neck, chest, abdomen, and pelvis was performed. Dose reduction techniques were used.    PET/CT FINDINGS: Since 08/05/2022, a mildly enlarged subcarinal station 7 lymph node has continued to increase in size, estimated at 1.1 x 2.2 cm (previously 1.1 x 1.8 cm at similar level measurements) and degree of now marked FDG uptake (SUVmax 8.5,   previously 5.9), indeterminate. This was present although even smaller and less FDG avid on 8/25/2022 and 5/24/2022, not present on PET/CT 10/10/2021. No new or enlarging FDG avid adenopathy in the neck, chest or elsewhere. Posttreatment changes in the   right tonsillar fossa and right neck with no evidence of local recurrence. No suspicious focal uptake in the liver or skeleton.    CT FINDINGS: Mild band like scarring in the lung apices. Minimal linear scarring or atelectasis in the lower lungs. Few small benign bilateral renal simple cysts, no follow-up needed. Mild colonic diverticulosis. Prostatectomy. Small right knee joint   effusion. Moderate scattered hypertrophic degenerative changes in the spine.  Impression: IMPRESSION:  1. Slight increase in size and FDG activity involving a mildly enlarged subcarinal lymph node, indeterminate. Differential considerations include richard metastasis versus ongoing immunotherapy related sarcoid-like granulomatous inflammation. At a minimum,   recommend continued attention on follow-up. Alternatively, EBUS-guided biopsy could be considered.  2. No evidence of FDG avid malignancy elsewhere.  PET Oncology Whole Body  Narrative: EXAM: PET ONCOLOGY WHOLE BODY, CT SOFT TISSUE NECK W  CONTRAST, CT CHEST/ABDOMEN/PELVIS W CONTRAST  LOCATION: [facility]  DATE/TIME: 12/1/2022 9:04 AM    INDICATION: Head and neck cancer, restaging. Malignant neoplasm of tonsillar fossa, right. HPV related squamous cell carcinoma. Prior cervical richard metastases. Status post two years of immunotherapy. Prior right neck radiation therapy. Subsequent   treatment strategy.  COMPARISON: PET/CT 08/25/2022, 08/25/2022, 05/24/2022, 10/10/2021 reviewed.  CONTRAST: 115 mL Isovue-370  TECHNIQUE: Serum glucose level 101 mg/dL. One hour post intravenous administration of 12.1 mCi F-18 FDG, PET imaging was performed from the skull vertex to feet, utilizing attenuation correction with concurrent axial CT and PET/CT image fusion. Separate   diagnostic CT of the neck, chest, abdomen, and pelvis was performed. Dose reduction techniques were used.    PET/CT FINDINGS: Since 08/05/2022, a mildly enlarged subcarinal station 7 lymph node has continued to increase in size, estimated at 1.1 x 2.2 cm (previously 1.1 x 1.8 cm at similar level measurements) and degree of now marked FDG uptake (SUVmax 8.5,   previously 5.9), indeterminate. This was present although even smaller and less FDG avid on 8/25/2022 and 5/24/2022, not present on PET/CT 10/10/2021. No new or enlarging FDG avid adenopathy in the neck, chest or elsewhere. Posttreatment changes in the   right tonsillar fossa and right neck with no evidence of local recurrence. No suspicious focal uptake in the liver or skeleton.    CT FINDINGS: Mild band like scarring in the lung apices. Minimal linear scarring or atelectasis in the lower lungs. Few small benign bilateral renal simple cysts, no follow-up needed. Mild colonic diverticulosis. Prostatectomy. Small right knee joint   effusion. Moderate scattered hypertrophic degenerative changes in the spine.  Impression: IMPRESSION:  1. Slight increase in size and FDG activity involving a mildly enlarged subcarinal  lymph node, indeterminate. Differential considerations include richard metastasis versus ongoing immunotherapy related sarcoid-like granulomatous inflammation. At a minimum,   recommend continued attention on follow-up. Alternatively, EBUS-guided biopsy could be considered.  2. No evidence of FDG avid malignancy elsewhere.  CT Soft Tissue Neck w Contrast  Narrative: EXAM: PET ONCOLOGY WHOLE BODY, CT SOFT TISSUE NECK W CONTRAST, CT CHEST/ABDOMEN/PELVIS W CONTRAST  LOCATION: [facility]  DATE/TIME: 12/1/2022 9:04 AM    INDICATION: Head and neck cancer, restaging. Malignant neoplasm of tonsillar fossa, right. HPV related squamous cell carcinoma. Prior cervical richard metastases. Status post two years of immunotherapy. Prior right neck radiation therapy. Subsequent   treatment strategy.  COMPARISON: PET/CT 08/25/2022, 08/25/2022, 05/24/2022, 10/10/2021 reviewed.  CONTRAST: 115 mL Isovue-370  TECHNIQUE: Serum glucose level 101 mg/dL. One hour post intravenous administration of 12.1 mCi F-18 FDG, PET imaging was performed from the skull vertex to feet, utilizing attenuation correction with concurrent axial CT and PET/CT image fusion. Separate   diagnostic CT of the neck, chest, abdomen, and pelvis was performed. Dose reduction techniques were used.    PET/CT FINDINGS: Since 08/05/2022, a mildly enlarged subcarinal station 7 lymph node has continued to increase in size, estimated at 1.1 x 2.2 cm (previously 1.1 x 1.8 cm at similar level measurements) and degree of now marked FDG uptake (SUVmax 8.5,   previously 5.9), indeterminate. This was present although even smaller and less FDG avid on 8/25/2022 and 5/24/2022, not present on PET/CT 10/10/2021. No new or enlarging FDG avid adenopathy in the neck, chest or elsewhere. Posttreatment changes in the   right tonsillar fossa and right neck with no evidence of local recurrence. No suspicious focal uptake in the liver or skeleton.    CT FINDINGS: Mild  band like scarring in the lung apices. Minimal linear scarring or atelectasis in the lower lungs. Few small benign bilateral renal simple cysts, no follow-up needed. Mild colonic diverticulosis. Prostatectomy. Small right knee joint   effusion. Moderate scattered hypertrophic degenerative changes in the spine.  Impression: IMPRESSION:  1. Slight increase in size and FDG activity involving a mildly enlarged subcarinal lymph node, indeterminate. Differential considerations include richard metastasis versus ongoing immunotherapy related sarcoid-like granulomatous inflammation. At a minimum,   recommend continued attention on follow-up. Alternatively, EBUS-guided biopsy could be considered.  2. No evidence of FDG avid malignancy elsewhere.    I am concerned about subcarinal lymph node recurrence of your metastatic squamous cell cancer of the tonsil on your recent PET/CT and I am recommending that you resume single agent immunotherapy with Keytruda again.    In the absence of new signs/symptoms of progression of cancer we could continue Keytruda for about 3 months and repeat the PET/CT and see you back in the clinic at that time.    Today, we discussed potential immune related adverse events of Keytruda again and we talked about the importance of contacting our office with any unusual  symptoms after restarting Keytruda.    Please continue regular followup with ENT and periodic monitoring of your PSA.          Again, thank you for allowing me to participate in the care of your patient.        Sincerely,        Alan Ruiz MD

## 2022-12-02 NOTE — PROGRESS NOTES
Oncology Rooming Note    December 2, 2022 11:20 AM   Newton Buchanan is a 77 year old male who presents for:    Chief Complaint   Patient presents with     Oncology Clinic Visit     Head and neck cancer - Provider visit     Initial Vitals: /67 (BP Location: Right arm, Patient Position: Sitting, Cuff Size: Adult Regular)   Pulse 62   Temp 97.5  F (36.4  C) (Tympanic)   Resp 12   Wt 86.5 kg (190 lb 12.8 oz)   SpO2 98%   BMI 25.88 kg/m   Estimated body mass index is 25.88 kg/m  as calculated from the following:    Height as of 3/14/22: 1.829 m (6').    Weight as of this encounter: 86.5 kg (190 lb 12.8 oz). Body surface area is 2.1 meters squared.  No Pain (0) Comment: Data Unavailable   No LMP for male patient.  Allergies reviewed: Yes  Medications reviewed: Yes    Medications: Medication refills not needed today.  Pharmacy name entered into [facility]:    [facility] #[id number] - [place], [place] - [address] AT [address] HOME DELIVERY - [place], [place] - [address] [place] [address]    Clinical concerns:  None      Dayana Bradshaw, PAUL

## 2022-12-12 ENCOUNTER — INFUSION THERAPY VISIT (OUTPATIENT)
Dept: INFUSION THERAPY | Facility: CLINIC | Age: 77
End: 2022-12-12
Attending: INTERNAL MEDICINE
Payer: MEDICARE

## 2022-12-12 ENCOUNTER — APPOINTMENT (OUTPATIENT)
Dept: LAB | Facility: CLINIC | Age: 77
End: 2022-12-12
Payer: MEDICARE

## 2022-12-12 VITALS
TEMPERATURE: 98.2 F | SYSTOLIC BLOOD PRESSURE: 117 MMHG | WEIGHT: 189.8 LBS | DIASTOLIC BLOOD PRESSURE: 73 MMHG | HEART RATE: 62 BPM | BODY MASS INDEX: 25.74 KG/M2

## 2022-12-12 DIAGNOSIS — C76.0 HEAD AND NECK CANCER (H): Primary | ICD-10-CM

## 2022-12-12 LAB
ALBUMIN SERPL BCG-MCNC: 4 G/DL (ref 3.5–5.2)
ALP SERPL-CCNC: 71 U/L (ref 40–129)
ALT SERPL W P-5'-P-CCNC: 34 U/L (ref 10–50)
ANION GAP SERPL CALCULATED.3IONS-SCNC: 7 MMOL/L (ref 7–15)
AST SERPL W P-5'-P-CCNC: 25 U/L (ref 10–50)
BILIRUB SERPL-MCNC: 0.6 MG/DL
BUN SERPL-MCNC: 15.1 MG/DL (ref 8–23)
CALCIUM SERPL-MCNC: 9.8 MG/DL (ref 8.8–10.2)
CHLORIDE SERPL-SCNC: 104 MMOL/L (ref 98–107)
CREAT SERPL-MCNC: 1.01 MG/DL (ref 0.67–1.17)
DEPRECATED HCO3 PLAS-SCNC: 32 MMOL/L (ref 22–29)
GFR SERPL CREATININE-BSD FRML MDRD: 77 ML/MIN/1.73M2
GLUCOSE SERPL-MCNC: 92 MG/DL (ref 70–99)
POTASSIUM SERPL-SCNC: 4.4 MMOL/L (ref 3.4–5.3)
PROT SERPL-MCNC: 6.9 G/DL (ref 6.4–8.3)
SODIUM SERPL-SCNC: 143 MMOL/L (ref 136–145)
TSH SERPL DL<=0.005 MIU/L-ACNC: 2.22 UIU/ML (ref 0.3–4.2)

## 2022-12-12 PROCEDURE — 258N000003 HC RX IP 258 OP 636: Performed by: INTERNAL MEDICINE

## 2022-12-12 PROCEDURE — 80053 COMPREHEN METABOLIC PANEL: CPT | Performed by: INTERNAL MEDICINE

## 2022-12-12 PROCEDURE — 96413 CHEMO IV INFUSION 1 HR: CPT

## 2022-12-12 PROCEDURE — 250N000011 HC RX IP 250 OP 636: Performed by: INTERNAL MEDICINE

## 2022-12-12 PROCEDURE — 36415 COLL VENOUS BLD VENIPUNCTURE: CPT | Performed by: INTERNAL MEDICINE

## 2022-12-12 PROCEDURE — 84443 ASSAY THYROID STIM HORMONE: CPT | Performed by: INTERNAL MEDICINE

## 2022-12-12 RX ADMIN — SODIUM CHLORIDE 200 MG: 9 INJECTION, SOLUTION INTRAVENOUS at 14:42

## 2022-12-12 RX ADMIN — SODIUM CHLORIDE 250 ML: 9 INJECTION, SOLUTION INTRAVENOUS at 14:44

## 2022-12-12 NOTE — PROGRESS NOTES
Infusion Nursing Note:  Newton Buchanan presents today for Keytruda.    Patient seen by provider today: No   present during visit today: Not Applicable.    Note: N/A.    Intravenous Access:  Peripheral IV placed.    Treatment Conditions:  Lab Results   Component Value Date     12/12/2022    POTASSIUM 4.4 12/12/2022    CR 1.01 12/12/2022    JÚNIOR 9.8 12/12/2022    BILITOTAL 0.6 12/12/2022    ALBUMIN 4.0 12/12/2022    ALT 34 12/12/2022    AST 25 12/12/2022     Results reviewed, labs MET treatment parameters, ok to proceed with treatment.    Post Infusion Assessment:  Patient tolerated infusion without incident.  Blood return noted pre and post infusion.  Site patent and intact, free from redness, edema or discomfort.  No evidence of extravasations.  Access discontinued per protocol.     Discharge Plan:   Patient discharged in stable condition accompanied by: self.  Departure Mode: Ambulatory.      Crescencio Cervantes RN

## 2023-01-03 ENCOUNTER — INFUSION THERAPY VISIT (OUTPATIENT)
Dept: INFUSION THERAPY | Facility: CLINIC | Age: 78
End: 2023-01-03
Attending: INTERNAL MEDICINE
Payer: MEDICARE

## 2023-01-03 ENCOUNTER — APPOINTMENT (OUTPATIENT)
Dept: LAB | Facility: CLINIC | Age: 78
End: 2023-01-03
Payer: MEDICARE

## 2023-01-03 VITALS
DIASTOLIC BLOOD PRESSURE: 72 MMHG | TEMPERATURE: 98.7 F | SYSTOLIC BLOOD PRESSURE: 133 MMHG | BODY MASS INDEX: 25.85 KG/M2 | RESPIRATION RATE: 16 BRPM | WEIGHT: 190.6 LBS | HEART RATE: 66 BPM

## 2023-01-03 DIAGNOSIS — C76.0 HEAD AND NECK CANCER (H): Primary | ICD-10-CM

## 2023-01-03 LAB
ALBUMIN SERPL BCG-MCNC: 3.7 G/DL (ref 3.5–5.2)
ALP SERPL-CCNC: 116 U/L (ref 40–129)
ALT SERPL W P-5'-P-CCNC: 47 U/L (ref 10–50)
ANION GAP SERPL CALCULATED.3IONS-SCNC: 8 MMOL/L (ref 7–15)
AST SERPL W P-5'-P-CCNC: 36 U/L (ref 10–50)
BILIRUB SERPL-MCNC: 0.7 MG/DL
BUN SERPL-MCNC: 11.3 MG/DL (ref 8–23)
CALCIUM SERPL-MCNC: 9.4 MG/DL (ref 8.8–10.2)
CHLORIDE SERPL-SCNC: 99 MMOL/L (ref 98–107)
CREAT SERPL-MCNC: 0.88 MG/DL (ref 0.67–1.17)
DEPRECATED HCO3 PLAS-SCNC: 28 MMOL/L (ref 22–29)
GFR SERPL CREATININE-BSD FRML MDRD: 89 ML/MIN/1.73M2
GLUCOSE SERPL-MCNC: 102 MG/DL (ref 70–99)
POTASSIUM SERPL-SCNC: 3.8 MMOL/L (ref 3.4–5.3)
PROT SERPL-MCNC: 7.3 G/DL (ref 6.4–8.3)
SODIUM SERPL-SCNC: 135 MMOL/L (ref 136–145)
TSH SERPL DL<=0.005 MIU/L-ACNC: 1.95 UIU/ML (ref 0.3–4.2)

## 2023-01-03 PROCEDURE — 84443 ASSAY THYROID STIM HORMONE: CPT | Performed by: INTERNAL MEDICINE

## 2023-01-03 PROCEDURE — 258N000003 HC RX IP 258 OP 636: Performed by: INTERNAL MEDICINE

## 2023-01-03 PROCEDURE — 96413 CHEMO IV INFUSION 1 HR: CPT

## 2023-01-03 PROCEDURE — 80053 COMPREHEN METABOLIC PANEL: CPT | Performed by: INTERNAL MEDICINE

## 2023-01-03 PROCEDURE — 250N000011 HC RX IP 250 OP 636: Performed by: INTERNAL MEDICINE

## 2023-01-03 PROCEDURE — 36415 COLL VENOUS BLD VENIPUNCTURE: CPT | Performed by: INTERNAL MEDICINE

## 2023-01-03 RX ADMIN — SODIUM CHLORIDE 200 MG: 9 INJECTION, SOLUTION INTRAVENOUS at 11:21

## 2023-01-03 RX ADMIN — SODIUM CHLORIDE 250 ML: 9 INJECTION, SOLUTION INTRAVENOUS at 11:20

## 2023-01-03 ASSESSMENT — PAIN SCALES - GENERAL: PAINLEVEL: NO PAIN (0)

## 2023-01-03 NOTE — PROGRESS NOTES
Infusion Nursing Note:  Newton Buchanan presents today for Keytruda.    Patient seen by provider today: No   present during visit today: Not Applicable.    Note: Labs drawn peripherally.    Intravenous Access:  Peripheral IV placed.    Treatment Conditions:  Lab Results   Component Value Date     (L) 01/03/2023    POTASSIUM 3.8 01/03/2023    CR 0.88 01/03/2023    ÚJNIOR 9.4 01/03/2023    BILITOTAL 0.7 01/03/2023    ALBUMIN 3.7 01/03/2023    ALT 47 01/03/2023    AST 36 01/03/2023     Results reviewed, labs MET treatment parameters, ok to proceed with treatment.    Post Infusion Assessment:  Patient tolerated infusion without incident.  Blood return noted pre and post infusion.  Site patent and intact, free from redness, edema or discomfort.  No evidence of extravasations.  Access discontinued per protocol.     Discharge Plan:   Patient discharged in stable condition accompanied by: self.  Departure Mode: Ambulatory.      Shannan Melchor RN

## 2023-01-25 ENCOUNTER — INFUSION THERAPY VISIT (OUTPATIENT)
Dept: INFUSION THERAPY | Facility: CLINIC | Age: 78
End: 2023-01-25
Attending: INTERNAL MEDICINE
Payer: MEDICARE

## 2023-01-25 ENCOUNTER — APPOINTMENT (OUTPATIENT)
Dept: LAB | Facility: CLINIC | Age: 78
End: 2023-01-25
Payer: MEDICARE

## 2023-01-25 VITALS
RESPIRATION RATE: 16 BRPM | HEART RATE: 60 BPM | DIASTOLIC BLOOD PRESSURE: 78 MMHG | SYSTOLIC BLOOD PRESSURE: 148 MMHG | TEMPERATURE: 98.1 F

## 2023-01-25 DIAGNOSIS — C76.0 HEAD AND NECK CANCER (H): Primary | ICD-10-CM

## 2023-01-25 DIAGNOSIS — Z79.899 LONG-TERM USE OF HIGH-RISK MEDICATION: ICD-10-CM

## 2023-01-25 LAB
ALBUMIN SERPL BCG-MCNC: 3.9 G/DL (ref 3.5–5.2)
ALP SERPL-CCNC: 52 U/L (ref 40–129)
ALT SERPL W P-5'-P-CCNC: 20 U/L (ref 10–50)
ANION GAP SERPL CALCULATED.3IONS-SCNC: 8 MMOL/L (ref 7–15)
AST SERPL W P-5'-P-CCNC: 22 U/L (ref 10–50)
BILIRUB SERPL-MCNC: 0.7 MG/DL
BUN SERPL-MCNC: 10.8 MG/DL (ref 8–23)
CALCIUM SERPL-MCNC: 9.5 MG/DL (ref 8.8–10.2)
CHLORIDE SERPL-SCNC: 104 MMOL/L (ref 98–107)
CREAT SERPL-MCNC: 0.94 MG/DL (ref 0.67–1.17)
DEPRECATED HCO3 PLAS-SCNC: 30 MMOL/L (ref 22–29)
GFR SERPL CREATININE-BSD FRML MDRD: 84 ML/MIN/1.73M2
GLUCOSE SERPL-MCNC: 95 MG/DL (ref 70–99)
POTASSIUM SERPL-SCNC: 4.2 MMOL/L (ref 3.4–5.3)
PROT SERPL-MCNC: 6.8 G/DL (ref 6.4–8.3)
SODIUM SERPL-SCNC: 142 MMOL/L (ref 136–145)
TSH SERPL DL<=0.005 MIU/L-ACNC: 2.19 UIU/ML (ref 0.3–4.2)

## 2023-01-25 PROCEDURE — 84443 ASSAY THYROID STIM HORMONE: CPT | Performed by: INTERNAL MEDICINE

## 2023-01-25 PROCEDURE — 96413 CHEMO IV INFUSION 1 HR: CPT

## 2023-01-25 PROCEDURE — 36415 COLL VENOUS BLD VENIPUNCTURE: CPT | Performed by: INTERNAL MEDICINE

## 2023-01-25 PROCEDURE — 250N000011 HC RX IP 250 OP 636: Performed by: INTERNAL MEDICINE

## 2023-01-25 PROCEDURE — 258N000003 HC RX IP 258 OP 636: Performed by: INTERNAL MEDICINE

## 2023-01-25 PROCEDURE — 80053 COMPREHEN METABOLIC PANEL: CPT | Performed by: INTERNAL MEDICINE

## 2023-01-25 RX ADMIN — SODIUM CHLORIDE 250 ML: 9 INJECTION, SOLUTION INTRAVENOUS at 11:54

## 2023-01-25 RX ADMIN — SODIUM CHLORIDE 200 MG: 9 INJECTION, SOLUTION INTRAVENOUS at 11:54

## 2023-01-25 ASSESSMENT — PAIN SCALES - GENERAL: PAINLEVEL: NO PAIN (0)

## 2023-01-25 NOTE — PROGRESS NOTES
Infusion Nursing Note:  Newton Buchanan presents today for Keytruda.    Patient seen by provider today: No   present during visit today: Not Applicable.    Note: Labs drawn peripherally.    Intravenous Access:  Peripheral IV placed.    Treatment Conditions:  Lab Results   Component Value Date     01/25/2023    POTASSIUM 4.2 01/25/2023    CR 0.94 01/25/2023    JÚNIOR 9.5 01/25/2023    BILITOTAL 0.7 01/25/2023    ALBUMIN 3.9 01/25/2023    ALT 20 01/25/2023    AST 22 01/25/2023     Results reviewed, labs MET treatment parameters, ok to proceed with treatment.    Post Infusion Assessment:  Patient tolerated infusion without incident.  Site patent and intact, free from redness, edema or discomfort.  No evidence of extravasations.  Access discontinued per protocol.     Discharge Plan:   Patient discharged in stable condition accompanied by: self.  Departure Mode: Ambulatory.      Shannan Melchor RN

## 2023-02-07 ENCOUNTER — LAB (OUTPATIENT)
Dept: LAB | Facility: CLINIC | Age: 78
End: 2023-02-07
Payer: MEDICARE

## 2023-02-07 DIAGNOSIS — E78.5 HYPERLIPIDEMIA LDL GOAL <100: ICD-10-CM

## 2023-02-07 LAB
CHOLEST SERPL-MCNC: 155 MG/DL
HDLC SERPL-MCNC: 50 MG/DL
LDLC SERPL CALC-MCNC: 90 MG/DL
NONHDLC SERPL-MCNC: 105 MG/DL
TRIGL SERPL-MCNC: 77 MG/DL

## 2023-02-07 PROCEDURE — 36415 COLL VENOUS BLD VENIPUNCTURE: CPT

## 2023-02-07 PROCEDURE — 80061 LIPID PANEL: CPT

## 2023-02-08 ASSESSMENT — ENCOUNTER SYMPTOMS
SHORTNESS OF BREATH: 0
HEADACHES: 0
SORE THROAT: 0
HEARTBURN: 0
DIARRHEA: 0
PARESTHESIAS: 0
EYE PAIN: 0
MYALGIAS: 0
COUGH: 1
FEVER: 0
JOINT SWELLING: 0
NERVOUS/ANXIOUS: 0
NAUSEA: 0
CONSTIPATION: 0
PALPITATIONS: 0
CHILLS: 0
ABDOMINAL PAIN: 0
FREQUENCY: 0
ARTHRALGIAS: 0
DIZZINESS: 0
DYSURIA: 0
HEMATURIA: 0
HEMATOCHEZIA: 0
WEAKNESS: 0

## 2023-02-08 ASSESSMENT — ACTIVITIES OF DAILY LIVING (ADL): CURRENT_FUNCTION: NO ASSISTANCE NEEDED

## 2023-02-09 ENCOUNTER — OFFICE VISIT (OUTPATIENT)
Dept: FAMILY MEDICINE | Facility: CLINIC | Age: 78
End: 2023-02-09
Payer: MEDICARE

## 2023-02-09 VITALS
SYSTOLIC BLOOD PRESSURE: 130 MMHG | BODY MASS INDEX: 26.1 KG/M2 | RESPIRATION RATE: 18 BRPM | HEART RATE: 58 BPM | TEMPERATURE: 97.8 F | HEIGHT: 71 IN | OXYGEN SATURATION: 100 % | WEIGHT: 186.4 LBS | DIASTOLIC BLOOD PRESSURE: 68 MMHG

## 2023-02-09 DIAGNOSIS — I10 ESSENTIAL HYPERTENSION WITH GOAL BLOOD PRESSURE LESS THAN 140/90: ICD-10-CM

## 2023-02-09 DIAGNOSIS — C79.89 SQUAMOUS CELL CARCINOMA METASTATIC TO HEAD AND NECK WITH UNKNOWN PRIMARY SITE (H): ICD-10-CM

## 2023-02-09 DIAGNOSIS — J31.0 CHRONIC RHINITIS: ICD-10-CM

## 2023-02-09 DIAGNOSIS — E78.5 HYPERLIPIDEMIA LDL GOAL <100: ICD-10-CM

## 2023-02-09 DIAGNOSIS — M81.0 AGE-RELATED OSTEOPOROSIS WITHOUT CURRENT PATHOLOGICAL FRACTURE: ICD-10-CM

## 2023-02-09 DIAGNOSIS — C44.92 SQUAMOUS CELL CARCINOMA METASTATIC TO HEAD AND NECK WITH UNKNOWN PRIMARY SITE (H): ICD-10-CM

## 2023-02-09 DIAGNOSIS — N52.9 ERECTILE DYSFUNCTION, UNSPECIFIED ERECTILE DYSFUNCTION TYPE: ICD-10-CM

## 2023-02-09 DIAGNOSIS — Z00.00 ENCOUNTER FOR MEDICARE ANNUAL WELLNESS EXAM: Primary | ICD-10-CM

## 2023-02-09 PROCEDURE — G0439 PPPS, SUBSEQ VISIT: HCPCS | Performed by: PHYSICIAN ASSISTANT

## 2023-02-09 PROCEDURE — 99214 OFFICE O/P EST MOD 30 MIN: CPT | Mod: 25 | Performed by: PHYSICIAN ASSISTANT

## 2023-02-09 RX ORDER — LOSARTAN POTASSIUM 25 MG/1
25 TABLET ORAL DAILY
Qty: 90 TABLET | Refills: 3 | Status: SHIPPED | OUTPATIENT
Start: 2023-02-09 | End: 2024-02-14

## 2023-02-09 RX ORDER — FLUTICASONE PROPIONATE 50 MCG
1-2 SPRAY, SUSPENSION (ML) NASAL DAILY
Qty: 18.2 ML | Refills: 3 | Status: SHIPPED | OUTPATIENT
Start: 2023-02-09

## 2023-02-09 RX ORDER — SILDENAFIL 100 MG/1
50-100 TABLET, FILM COATED ORAL DAILY PRN
Qty: 30 TABLET | Refills: 3 | Status: SHIPPED | OUTPATIENT
Start: 2023-02-09 | End: 2024-02-14

## 2023-02-09 RX ORDER — PRAVASTATIN SODIUM 20 MG
20 TABLET ORAL DAILY
Qty: 90 TABLET | Refills: 3 | Status: SHIPPED | OUTPATIENT
Start: 2023-02-09 | End: 2024-02-14

## 2023-02-09 ASSESSMENT — ENCOUNTER SYMPTOMS
DIARRHEA: 0
JOINT SWELLING: 0
DIZZINESS: 0
DYSURIA: 0
FREQUENCY: 0
PARESTHESIAS: 0
HEMATOCHEZIA: 0
HEARTBURN: 0
PALPITATIONS: 0
HEADACHES: 0
ABDOMINAL PAIN: 0
EYE PAIN: 0
ARTHRALGIAS: 0
HEMATURIA: 0
WEAKNESS: 0
SHORTNESS OF BREATH: 0
FEVER: 0
CHILLS: 0
COUGH: 1
NAUSEA: 0
MYALGIAS: 0
SORE THROAT: 0
NERVOUS/ANXIOUS: 0
CONSTIPATION: 0

## 2023-02-09 ASSESSMENT — PAIN SCALES - GENERAL: PAINLEVEL: NO PAIN (0)

## 2023-02-09 ASSESSMENT — ACTIVITIES OF DAILY LIVING (ADL): CURRENT_FUNCTION: NO ASSISTANCE NEEDED

## 2023-02-09 NOTE — PATIENT INSTRUCTIONS
Schedule Dexa scan at your convenience.     Trial Claritin daily for your nasopharyngitis symptoms.     Medicines are refilled today.     Follow-up with us as needed.     Patient Education   Personalized Prevention Plan  You are due for the preventive services outlined below.  Your care team is available to assist you in scheduling these services.  If you have already completed any of these items, please share that information with your care team to update in your medical record.  Health Maintenance Due   Topic Date Due    ANNUAL REVIEW OF HM ORDERS  Never done    Zoster (Shingles) Vaccine (2 of 2) 03/04/2021    Annual Wellness Visit  02/07/2023       Understanding USDA MyPlate  The USDA has guidelines to help you make healthy food choices. These are called MyPlate. MyPlate shows the food groups that make up healthy meals using the image of a place setting. Before you eat, think about the healthiest choices for what to put on your plate or in your cup or bowl. To learn more about building a healthy plate, visit www.choosemyplate.gov.    The food groups  Fruits. Any fruit or 100% fruit juice counts as part of the Fruit Group. Fruits may be fresh, canned, frozen, or dried, and may be whole, cut-up, or pureed. Make 1/2 of your plate fruits and vegetables.  Vegetables. Any vegetable or 100% vegetable juice counts as a member of the Vegetable Group. Vegetables may be fresh, frozen, canned, or dried. They can be served raw or cooked and may be whole, cut-up, or mashed. Make 1/2 of your plate fruits and vegetables.  Grains. All foods made from grains are part of the Grains Group. These include wheat, rice, oats, cornmeal, and barley. Grains are often used to make foods such as bread, pasta, oatmeal, cereal, tortillas, and grits. Grains should be no more than 1/4 of your plate. At least half of your grains should be whole grains.  Protein. This group includes meat, poultry, seafood, beans and peas, eggs, processed soy  products (such as tofu), nuts (including nut butters), and seeds. Make protein choices no more than 1/4 of your plate. Meat and poultry choices should be lean or low fat.  Dairy. The Dairy Group includes all fluid milk products and foods made from milk that contain calcium, such as yogurt and cheese. (Foods that have little calcium, such as cream, butter, and cream cheese, are not part of this group.) Most dairy choices should be low-fat or fat-free.  Oils. Oils aren't a food group, but they do contain essential nutrients. However it's important to watch your intake of oils. These are fats that are liquid at room temperature. They include canola, corn, olive, soybean, vegetable, and sunflower oil. Foods that are mainly oil include mayonnaise, certain salad dressings, and soft margarines. You likely already get your daily oil allowance from the foods you eat.  Things to limit  Eating healthy also means limiting these things in your diet:     Salt (sodium). Many processed foods have a lot of sodium. To keep sodium intake down, eat fresh vegetables, meats, poultry, and seafood when possible. Purchase low-sodium, reduced-sodium, or no-salt-added food products at the store. And don't add salt to your meals at home. Instead, season them with herbs and spices such as dill, oregano, cumin, and paprika. Or try adding flavor with lemon or lime zest and juice.  Saturated fat. Saturated fats are most often found in animal products such as beef, pork, and chicken. They are often solid at room temperature, such as butter. To reduce your saturated fat intake, choose leaner cuts of meat and poultry. And try healthier cooking methods such as grilling, broiling, roasting, or baking. For a simple lower-fat swap, use plain nonfat yogurt instead of mayonnaise when making potato salad or macaroni salad.  Added sugars. These are sugars added to foods. They are in foods such as ice cream, candy, soda, fruit drinks, sports drinks, energy  drinks, cookies, pastries, jams, and syrups. Cut down on added sugars by sharing sweet treats with a family member or friend. You can also choose fruit for dessert, and drink water or other unsweetened beverages.     [employer] last reviewed this educational content on 6/1/2020 2000-2021 [employer]. All rights reserved. This information is not intended as a substitute for professional medical care. Always follow your healthcare professional's instructions.          Signs of Hearing Loss      Hearing much better with one ear can be a sign of hearing loss.   Hearing loss is a problem shared by many people. In fact, it is one of the most common health problems, particularly as people age. Most people age 65 and older have some hearing loss. By age 80, almost everyone does. Hearing loss often occurs slowly over the years. So you may not realize your hearing has gotten worse.  Have your hearing checked  Call your healthcare provider if you:  Have to strain to hear normal conversation  Have to watch other people s faces very carefully to follow what they re saying  Need to ask people to repeat what they ve said  Often misunderstand what people are saying  Turn the volume of the television or radio up so high that others complain  Feel that people are mumbling when they re talking to you  Find that the effort to hear leaves you feeling tired and irritated  Notice, when using the phone, that you hear better with one ear than the other  [employer] last reviewed this educational content on 1/1/2020 2000-2021 [employer]. All rights reserved. This information is not intended as a substitute for professional medical care. Always follow your healthcare professional's instructions.          Urinary Incontinence (Male)    Urinary incontinence means not being able to control the release of urine from the bladder.   Causes  Common causes of urinary incontinence in men include:  Infection  Certain  medicines  Aging  Poor pelvic muscle tone  Bladder spasms  Obesity  Trouble urinating and fully emptying the bladder (urinary retention)  Other things that can cause incontinence are:   Nervous system diseases  Diabetes  Sleep apnea  Urinary tract infections  Prostate surgery  Pelvic injury  Constipation and smoking have also been identified as risk factors.   Symptoms  Urge incontinence (overactive bladder). This is a sudden urge to urinate. It occurs even though there may not be much urine in the bladder. The need to urinate often during the night is common. It's due to bladder spasms.  Stress incontinence. This is urine leakage that you can't control. It can occur with sneezing, coughing, and other actions that put stress on the bladder.    Treatment  Treatment depends on what is causing the condition. Bladder infections are treated with antibiotics. Urinary retention is treated with a bladder catheter.   Home care  Follow these guidelines when caring for yourself at home:  Don't have any foods and drinks that may irritate the bladder. This includes:  Chocolate  Alcohol  Caffeine  Carbonated drinks  Acidic fruits and juices  Limit fluids to 6 to 8 cups a day.  Lose weight if you are overweight. This will reduce your symptoms.  If advised, do regular pelvic muscle-strengthening exercises such as Kegel exercises.  If needed, wear absorbent pads to catch urine. Change the pads often. This is for good hygiene and to prevent skin and bladder infections.  Bathe daily for good hygiene.  If an antibiotic was prescribed to treat a bladder infection, take it until it's finished. Keep taking it even if you are feeling better. This is to make sure your infection has cleared.  If a catheter was left in place, keep bacteria from getting into the collection bag. Don't disconnect the catheter from the collection bag.  Use a leg band to secure the catheter drainage tube, so it does not pull on the catheter. Drain the collection  bag when it becomes full. To do this, use the drain spout at the bottom of the bag. Don't disconnect the bag from the catheter.  Don't pull on or try to remove a catheter. The catheter must be removed by a healthcare provider.  If you smoke, stop. Ask your provider for help if you can't do this on your own.  Follow-up care  Follow up with your healthcare provider, or as advised.  When to get medical advice  Call your healthcare provider right away if any of these occur:  Fever over 100.4 F (38 C), or as directed by your provider  Bladder pain or fullness  Belly swelling, nausea, or vomiting  Back pain  Weakness, dizziness, or fainting  If a catheter was left in place, return if:  The catheter falls out  The catheter stops draining for 6 hours  Your urine gets cloudy or smells bad  [employer] last reviewed this educational content on 1/1/2020 2000-2021 [employer]. All rights reserved. This information is not intended as a substitute for professional medical care. Always follow your healthcare professional's instructions.

## 2023-02-09 NOTE — PROGRESS NOTES
"SUBJECTIVE:   Newton is a 77 year old who presents for Preventive Visit.    Patient has been advised of split billing requirements and indicates understanding: Yes     Are you in the first 12 months of your Medicare coverage?  No    Healthy Habits:     In general, how would you rate your overall health?  Good    Frequency of exercise:  2-3 days/week    Duration of exercise:  Less than 15 minutes    Do you usually eat at least 4 servings of fruit and vegetables a day, include whole grains    & fiber and avoid regularly eating high fat or \"junk\" foods?  No    Taking medications regularly:  Yes    Medication side effects:  None    Ability to successfully perform activities of daily living:  No assistance needed    Home Safety:  No safety concerns identified    Hearing Impairment:  Need to ask people to speak up or repeat themselves    In the past 6 months, have you been bothered by leaking of urine? Yes    In general, how would you rate your overall mental or emotional health?  Good      PHQ-2 Total Score: 0    Additional concerns today:  Yes    No concerns about memory  C/o of nasal congestion, cough, runny nose.     Have you ever done Advance Care Planning? (For example, a Health Directive, POLST, or a discussion with a medical provider or your loved ones about your wishes): No, advance care planning information given to patient to review.  Patient plans to discuss their wishes with loved ones or provider.      Fall risk  Fallen 2 or more times in the past year?: No  Any fall with injury in the past year?: No  Lives alone, 6 step set of stairs, has handrails  No loose cords or rugs     Cognitive Screening   1) Repeat 3 items (Leader, Season, Table)    2) Clock draw: NORMAL  3) 3 item recall: Recalls 2 objects   Results: NORMAL clock, 1-2 items recalled: COGNITIVE IMPAIRMENT LESS LIKELY  Mini-CogTM Copyright S Sandy. Licensed by the author for use in [facility]; reprinted with permission (coleen@.[email or web address]). " All rights reserved.      Do you have sleep apnea, excessive snoring or daytime drowsiness?: yes    Reviewed and updated as needed this visit by clinical staff   Tobacco  Allergies  Meds  Problems  Med Hx  Surg Hx  Fam Hx          Social History     Tobacco Use     Smoking status: Never     Smokeless tobacco: Never   Substance Use Topics     Alcohol use: Yes     Comment: occas     If you drink alcohol do you typically have >3 drinks per day or >7 drinks per week? Yes    AUDIT - Alcohol Use Disorders Identification Test - Reproduced from the World Health Organization Audit 2001 (Second Edition) 2/8/2023   1.  How often do you have a drink containing alcohol? 4 or more times a week   2.  How many drinks containing alcohol do you have on a typical day when you are drinking? 1 or 2   3.  How often do you have five or more drinks on one occasion? Weekly   4.  How often during the last year have you found that you were not able to stop drinking once you had started? Never   5.  How often during the last year have you failed to do what was normally expected of you because of drinking? Never   6.  How often during the last year have you needed a first drink in the morning to get yourself going after a heavy drinking session? Never   7.  How often during the last year have you had a feeling of guilt or remorse after drinking? Never   8.  How often during the last year have you been unable to remember what happened the night before because of your drinking? Never   9.  Have you or someone else been injured because of your drinking? No   10. Has a relative, friend, doctor or other health care worker been concerned about your drinking or suggested you cut down? No   TOTAL SCORE 7     Possible referrals   -Derm   -Dexa     Current providers sharing in care for this patient include:   Patient Care Team:  Luis Antonio Santiago PA-C as PCP - General (Family Medicine)  Geetha Grossman RN as Specialty Care Coordinator  (Oncology)  Ronen Johns MD as MD (Radiation Oncology)  Marcial Blunt MD as Assigned Sleep Provider  Marcial Cutler MD as Assigned PCP  Kiko Bland DPM as Assigned Musculoskeletal Provider  Migue Kelly MD as Assigned Surgical Provider  Alan Ruiz MD as MD (Hematology & Oncology)  Alan Ruiz MD as Assigned Cancer Care Provider    The following health maintenance items are reviewed in Epic and correct as of today:  Health Maintenance   Topic Date Due     ZOSTER IMMUNIZATION (2 of 2) 03/04/2021     MEDICARE ANNUAL WELLNESS VISIT  02/09/2024     ANNUAL REVIEW OF HM ORDERS  02/09/2024     FALL RISK ASSESSMENT  02/09/2024     LIPID  02/07/2028     ADVANCE CARE PLANNING  02/09/2028     DTAP/TDAP/TD IMMUNIZATION (3 - Td or Tdap) 02/08/2031     HEPATITIS C SCREENING  Completed     PHQ-2 (once per calendar year)  Completed     INFLUENZA VACCINE  Completed     Pneumococcal Vaccine: 65+ Years  Completed     COVID-19 Vaccine  Completed     IPV IMMUNIZATION  Aged Out     MENINGITIS IMMUNIZATION  Aged Out     COLORECTAL CANCER SCREENING  Discontinued     Review of Systems   Constitutional: Negative for chills and fever.   HENT: Positive for congestion and hearing loss. Negative for ear pain and sore throat.    Eyes: Negative for pain and visual disturbance.   Respiratory: Positive for cough. Negative for shortness of breath.    Cardiovascular: Negative for chest pain, palpitations and peripheral edema.   Gastrointestinal: Negative for abdominal pain, constipation, diarrhea, heartburn, hematochezia and nausea.   Genitourinary: Positive for impotence. Negative for dysuria, frequency, genital sores, hematuria, penile discharge and urgency.   Musculoskeletal: Negative for arthralgias, joint swelling and myalgias.   Skin: Positive for rash.   Neurological: Negative for dizziness, weakness, headaches and paresthesias.   Psychiatric/Behavioral: Negative for mood changes. The patient is not  "nervous/anxious.      OBJECTIVE:   /68   Pulse 58   Temp 97.8  F (36.6  C) (Tympanic)   Resp 18   Ht 1.81 m (5' 11.25\")   Wt 84.6 kg (186 lb 6.4 oz)   SpO2 100%   BMI 25.82 kg/m   Estimated body mass index is 25.82 kg/m  as calculated from the following:    Height as of this encounter: 1.81 m (5' 11.25\").    Weight as of this encounter: 84.6 kg (186 lb 6.4 oz).     Physical Exam  GENERAL: healthy, alert and no distress  EYES: Eyes grossly normal to inspection, PERRL and conjunctivae and sclerae normal  HENT: ear canals and TM's normal, nose and mouth without ulcers or lesions  NECK: no adenopathy, no asymmetry, masses, or scars and thyroid normal to palpation  RESP: lungs clear to auscultation - no rales, rhonchi or wheezes  CV: regular rate and rhythm, normal S1 S2, no S3 or S4, no murmur, click or rub, no peripheral edema and peripheral pulses strong  ABDOMEN: soft, nontender, no hepatosplenomegaly, no masses and bowel sounds normal  MS: no gross musculoskeletal defects noted, no edema  SKIN: + seborrheic keratosis on back and chest/anterior neck. Otherwiseno suspicious lesions or rashes  NEURO: Normal strength and tone, mentation intact and speech normal  PSYCH: mentation appears normal, affect normal/bright    Diagnostic Test Results:  Labs reviewed in Epic    ASSESSMENT / PLAN:   (Z00.00) Encounter for Medicare annual wellness exam  (primary encounter diagnosis)  77 year old here for a routine physical exam. Last physical exam was 1 year ago. Discussed preventative screenings which are updated below. Counseled on healthy lifestyle. Follow-up in 1 year for repeat physical exam.    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 Currently on immunotherapy with Keytruda after possible recurrence in 12/2022. Did receive XRT to right neck 12/2020.    (M81.0) Age-related " "osteoporosis without current pathological fracture  Was treated with Fosamax and then Prolia in the past, but has been off of treatment for 3 years. Did have good response with bisphosphonates. Recheck Dexa.   Plan: DX Hip/Pelvis/Spine    (I10) Essential hypertension with goal blood pressure less than 140/90  Normotensive after second attempt in clinic. Recommended to continue Losartan as prescribed.   Plan: losartan (COZAAR) 25 MG tablet    (E78.5) Hyperlipidemia LDL goal <100  Last Lipid panel 2 days ago was stable. Continue Pravastatin.   Plan: pravastatin (PRAVACHOL) 20 MG tablet    (J31.0) Chronic rhinitis  Discussed that nasal congestion, rhinorrhea, and cough are likely related to chronic rhinitis. Recommended over the counter Zyrtec or Claritin and to continue Flonase.   Plan: fluticasone (FLONASE) 50 MCG/ACT nasal spray    (N52.9) Erectile dysfunction, unspecified erectile dysfunction type  Well controlled. Viagra refill sent.  Plan: sildenafil (VIAGRA) 100 MG tablet    Patient has been advised of split billing requirements and indicates understanding: Yes    COUNSELING:  Reviewed preventive health counseling, as reflected in patient instructions    BMI:   Estimated body mass index is 25.82 kg/m  as calculated from the following:    Height as of this encounter: 1.81 m (5' 11.25\").    Weight as of this encounter: 84.6 kg (186 lb 6.4 oz).   Weight management plan: Discussed healthy diet and exercise guidelines    He reports that he has never smoked. He has never used smokeless tobacco.    Appropriate preventive services were discussed with this patient, including applicable screening as appropriate for cardiovascular disease, diabetes, osteopenia/osteoporosis, and glaucoma.  As appropriate for age/gender, discussed screening for colorectal cancer, prostate cancer, breast cancer, and cervical cancer. Checklist reviewing preventive services available has been given to the patient.    Reviewed patients plan of " care and provided an AVS. The Intermediate Care Plan ( asthma action plan, low back pain action plan, and migraine action plan) for Newton meets the Care Plan requirement. This Care Plan has been established and reviewed with the Patient.    YOLANDA Hanna Student  [facility] Medicine  2/9/2023    Physician Attestation   I, Luis Antonio Santiago PA-C, was present with the medical/DARIUSZ student who participated in the service and in the documentation of the note.  I have verified the history and personally performed the physical exam and medical decision making.  I agree with the assessment and plan of care as documented in the note.      Luis Antonio Santiago PA-C

## 2023-02-15 ENCOUNTER — INFUSION THERAPY VISIT (OUTPATIENT)
Dept: INFUSION THERAPY | Facility: CLINIC | Age: 78
End: 2023-02-15
Attending: INTERNAL MEDICINE
Payer: MEDICARE

## 2023-02-15 ENCOUNTER — APPOINTMENT (OUTPATIENT)
Dept: LAB | Facility: CLINIC | Age: 78
End: 2023-02-15
Payer: MEDICARE

## 2023-02-15 VITALS
HEART RATE: 52 BPM | WEIGHT: 189 LBS | BODY MASS INDEX: 26.18 KG/M2 | SYSTOLIC BLOOD PRESSURE: 146 MMHG | RESPIRATION RATE: 16 BRPM | TEMPERATURE: 97.8 F | DIASTOLIC BLOOD PRESSURE: 74 MMHG

## 2023-02-15 DIAGNOSIS — C76.0 HEAD AND NECK CANCER (H): Primary | ICD-10-CM

## 2023-02-15 LAB
ALBUMIN SERPL BCG-MCNC: 3.8 G/DL (ref 3.5–5.2)
ALP SERPL-CCNC: 53 U/L (ref 40–129)
ALT SERPL W P-5'-P-CCNC: 28 U/L (ref 10–50)
ANION GAP SERPL CALCULATED.3IONS-SCNC: 8 MMOL/L (ref 7–15)
AST SERPL W P-5'-P-CCNC: 25 U/L (ref 10–50)
BILIRUB SERPL-MCNC: 0.5 MG/DL
BUN SERPL-MCNC: 12.2 MG/DL (ref 8–23)
CALCIUM SERPL-MCNC: 9.7 MG/DL (ref 8.8–10.2)
CHLORIDE SERPL-SCNC: 106 MMOL/L (ref 98–107)
CREAT SERPL-MCNC: 0.96 MG/DL (ref 0.67–1.17)
DEPRECATED HCO3 PLAS-SCNC: 28 MMOL/L (ref 22–29)
GFR SERPL CREATININE-BSD FRML MDRD: 82 ML/MIN/1.73M2
GLUCOSE SERPL-MCNC: 93 MG/DL (ref 70–99)
POTASSIUM SERPL-SCNC: 4.3 MMOL/L (ref 3.4–5.3)
PROT SERPL-MCNC: 6.6 G/DL (ref 6.4–8.3)
SODIUM SERPL-SCNC: 142 MMOL/L (ref 136–145)
TSH SERPL DL<=0.005 MIU/L-ACNC: 2.16 UIU/ML (ref 0.3–4.2)

## 2023-02-15 PROCEDURE — 84443 ASSAY THYROID STIM HORMONE: CPT | Performed by: INTERNAL MEDICINE

## 2023-02-15 PROCEDURE — 250N000011 HC RX IP 250 OP 636: Performed by: INTERNAL MEDICINE

## 2023-02-15 PROCEDURE — 36415 COLL VENOUS BLD VENIPUNCTURE: CPT | Performed by: INTERNAL MEDICINE

## 2023-02-15 PROCEDURE — 80053 COMPREHEN METABOLIC PANEL: CPT | Performed by: INTERNAL MEDICINE

## 2023-02-15 PROCEDURE — 96413 CHEMO IV INFUSION 1 HR: CPT

## 2023-02-15 PROCEDURE — 258N000003 HC RX IP 258 OP 636: Performed by: INTERNAL MEDICINE

## 2023-02-15 RX ADMIN — SODIUM CHLORIDE 250 ML: 9 INJECTION, SOLUTION INTRAVENOUS at 11:17

## 2023-02-15 RX ADMIN — SODIUM CHLORIDE 200 MG: 9 INJECTION, SOLUTION INTRAVENOUS at 11:35

## 2023-02-15 NOTE — PROGRESS NOTES
Infusion Nursing Note:  Newton Buchanan presents today for C23 D1 Keytruda.    Patient seen by provider today: No   present during visit today: Not Applicable.    Note: Pt denies any new health changes or concerns.    Intravenous Access:  Peripheral IV placed.    Treatment Conditions:  Lab Results   Component Value Date     02/15/2023    POTASSIUM 4.3 02/15/2023    CR 0.96 02/15/2023    JÚNIOR 9.7 02/15/2023    BILITOTAL 0.5 02/15/2023    ALBUMIN 3.8 02/15/2023    ALT 28 02/15/2023    AST 25 02/15/2023     Results reviewed, labs MET treatment parameters, ok to proceed with treatment.    Post Infusion Assessment:  Patient tolerated infusion without incident.  Blood return noted pre and post infusion.  Site patent and intact, free from redness, edema or discomfort.  No evidence of extravasations.  Access discontinued per protocol.     Discharge Plan:   Discharge instructions reviewed with: Patient.  Patient and/or family verbalized understanding of discharge instructions and all questions answered.  Patient discharged in stable condition accompanied by: self.  Departure Mode: Ambulatory.      Miley Escobar RN

## 2023-03-02 ENCOUNTER — HOSPITAL ENCOUNTER (OUTPATIENT)
Dept: PET IMAGING | Facility: CLINIC | Age: 78
Discharge: HOME | End: 2023-03-02
Attending: INTERNAL MEDICINE | Admitting: INTERNAL MEDICINE
Payer: MEDICARE

## 2023-03-02 DIAGNOSIS — C76.0 HEAD AND NECK CANCER (H): ICD-10-CM

## 2023-03-02 PROCEDURE — 74177 CT ABD & PELVIS W/CONTRAST: CPT

## 2023-03-02 PROCEDURE — A9552 F18 FDG: HCPCS | Performed by: INTERNAL MEDICINE

## 2023-03-02 PROCEDURE — 343N000001 HC RX 343: Performed by: INTERNAL MEDICINE

## 2023-03-02 PROCEDURE — 250N000011 HC RX IP 250 OP 636: Performed by: INTERNAL MEDICINE

## 2023-03-02 PROCEDURE — G1010 CDSM STANSON: HCPCS | Mod: PS

## 2023-03-02 RX ORDER — IOPAMIDOL 755 MG/ML
10-135 INJECTION, SOLUTION INTRAVASCULAR ONCE
Status: COMPLETED | OUTPATIENT
Start: 2023-03-02 | End: 2023-03-02

## 2023-03-02 RX ADMIN — IOPAMIDOL 104 ML: 755 INJECTION, SOLUTION INTRAVENOUS at 08:42

## 2023-03-02 RX ADMIN — FLUDEOXYGLUCOSE F-18 13.05 MILLICURIE: 500 INJECTION, SOLUTION INTRAVENOUS at 08:41

## 2023-03-08 ENCOUNTER — APPOINTMENT (OUTPATIENT)
Dept: LAB | Facility: CLINIC | Age: 78
End: 2023-03-08
Attending: INTERNAL MEDICINE
Payer: MEDICARE

## 2023-03-08 ENCOUNTER — INFUSION THERAPY VISIT (OUTPATIENT)
Dept: INFUSION THERAPY | Facility: CLINIC | Age: 78
End: 2023-03-08
Attending: INTERNAL MEDICINE
Payer: MEDICARE

## 2023-03-08 VITALS
TEMPERATURE: 97.7 F | DIASTOLIC BLOOD PRESSURE: 64 MMHG | BODY MASS INDEX: 26.31 KG/M2 | SYSTOLIC BLOOD PRESSURE: 130 MMHG | HEART RATE: 64 BPM | WEIGHT: 190 LBS

## 2023-03-08 DIAGNOSIS — C76.0 HEAD AND NECK CANCER (H): Primary | ICD-10-CM

## 2023-03-08 LAB
ALBUMIN SERPL BCG-MCNC: 3.8 G/DL (ref 3.5–5.2)
ALP SERPL-CCNC: 47 U/L (ref 40–129)
ALT SERPL W P-5'-P-CCNC: 23 U/L (ref 10–50)
ANION GAP SERPL CALCULATED.3IONS-SCNC: 6 MMOL/L (ref 7–15)
AST SERPL W P-5'-P-CCNC: 23 U/L (ref 10–50)
BILIRUB SERPL-MCNC: 0.5 MG/DL
BUN SERPL-MCNC: 13.9 MG/DL (ref 8–23)
CALCIUM SERPL-MCNC: 9.4 MG/DL (ref 8.8–10.2)
CHLORIDE SERPL-SCNC: 107 MMOL/L (ref 98–107)
CREAT SERPL-MCNC: 1.11 MG/DL (ref 0.67–1.17)
DEPRECATED HCO3 PLAS-SCNC: 29 MMOL/L (ref 22–29)
GFR SERPL CREATININE-BSD FRML MDRD: 69 ML/MIN/1.73M2
GLUCOSE SERPL-MCNC: 90 MG/DL (ref 70–99)
POTASSIUM SERPL-SCNC: 4.3 MMOL/L (ref 3.4–5.3)
PROT SERPL-MCNC: 6.3 G/DL (ref 6.4–8.3)
SODIUM SERPL-SCNC: 142 MMOL/L (ref 136–145)
TSH SERPL DL<=0.005 MIU/L-ACNC: 2.53 UIU/ML (ref 0.3–4.2)

## 2023-03-08 PROCEDURE — 250N000011 HC RX IP 250 OP 636: Performed by: INTERNAL MEDICINE

## 2023-03-08 PROCEDURE — 96413 CHEMO IV INFUSION 1 HR: CPT

## 2023-03-08 PROCEDURE — 36415 COLL VENOUS BLD VENIPUNCTURE: CPT | Performed by: INTERNAL MEDICINE

## 2023-03-08 PROCEDURE — 80053 COMPREHEN METABOLIC PANEL: CPT | Performed by: INTERNAL MEDICINE

## 2023-03-08 PROCEDURE — 84443 ASSAY THYROID STIM HORMONE: CPT | Performed by: INTERNAL MEDICINE

## 2023-03-08 PROCEDURE — 258N000003 HC RX IP 258 OP 636: Performed by: INTERNAL MEDICINE

## 2023-03-08 RX ADMIN — SODIUM CHLORIDE 250 ML: 9 INJECTION, SOLUTION INTRAVENOUS at 13:05

## 2023-03-08 RX ADMIN — SODIUM CHLORIDE 200 MG: 9 INJECTION, SOLUTION INTRAVENOUS at 13:10

## 2023-03-08 NOTE — PROGRESS NOTES
Infusion Nursing Note:  Newton FERREIRA Chanel presents today for C24D1 Keytruda.    Patient seen by provider today: No   present during visit today: Not Applicable.    Note: N/A.    Intravenous Access:  Peripheral IV placed.    Treatment Conditions:  Results reviewed, labs MET treatment parameters, ok to proceed with treatment.    Post Infusion Assessment:  Patient tolerated infusion without incident.  Blood return noted pre and post infusion.  Site patent and intact, free from redness, edema or discomfort.  No evidence of extravasations.  Access discontinued per protocol.     Discharge Plan:   Patient discharged in stable condition accompanied by: self.  Departure Mode: Ambulatory.      Alejandra Monaco RN

## 2023-03-12 NOTE — PROGRESS NOTES
Chief Complaint   Patient presents with     RECHECK     History of Present Illness  Newton Buchanan is a 77 year old male who presents today for follow up. 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  The patient had richard disease in the neck and lung metastasis.  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  His pretreatment PET scan showed his cervical of adenopathy on the right and numerous bilateral lung metastases with hilar lymphadenopathy.  No primary site was identified on his PET scan physical examination.  The tumor stayed highly with PD1 so he was started on Keytruda in May 2020.  He received radiation to the right neck and oropharynx for unknown primary completing in December 2020.  Follow-up PET/CT in April 2021 showed stable/improved disease.  The patient underwent a PET scan 1/31/2022.  My review of the PET/CT shows some slight asymmetric FDG uptake in the left lingual tonsil and left palatine tonsil.  Near the glossotonsillar sulcus, there appears to be a calcification consistent with a tonsillolith.  There is no abnormal FDG avid uptake in the neck or chest.  Prior FDG uptake at the tonsil tongue base was 4.4 SUV which increased to 6.4 on this current study.  He had no evidence of recurrent or persistent disease on his clinical examination and we decided for observation.     He underwent follow-up PET/CT imaging on 3/2/2023.  My review of the PET/CT does not show any signs of recurrent or persistent disease in the head and neck area. On his chest imaging, there is a subcarinal lymph node that is mildly hypermetabolic with an SUV max of 2.9 (previously 8.5<--5.9). The patient was last seen for oncologic surveillance on 11/14/2022 and he was doing well.  He returns today for oncologic surveillance.     From a symptom standpoint, the patient reports  that overall he is doing well without any significant symptomatology.  The patient denies any dysphagia, odynophagia, pharyngodynia, otalgia, dysphonia, hemoptysis, neck lumps/bumps/swelling.  No unintentional weight loss.  He does report some intermittent dry throat/dry mouth, some of which he contributes to his CPAP.  The patient is a lifetime non-smoker.       He has still noticed some intermittent congestion and nasal dryness but has not had any epistaxis since we cauterized him.  He does use CPAP at nighttime and does fill his chamber every evening.   He does take a daily baby aspirin but no other blood thinners.  He does struggle with intermittent runny nose that seems to be worse in the morning after using his CPAP, with eating, and with temperature change.    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)     Age-related osteoporosis without current pathological fracture     Current Medications    Current Outpatient Medications:      aspirin 81 MG tablet, Take by mouth daily, Disp: 30 tablet, Rfl:      fluticasone (FLONASE) 50 MCG/ACT nasal spray, Spray 1-2 sprays in nostril daily, Disp: 18.2 mL, Rfl: 3     ipratropium (ATROVENT) 0.06 % nasal spray, Spray 2 sprays into both nostrils 4 times daily as needed for rhinitis (runny nose), Disp: 15 mL, Rfl: 3     losartan (COZAAR) 25 MG tablet, Take 1 tablet (25 mg) by mouth daily, Disp: 90 tablet, Rfl: 3     pembrolizumab (KEYTRUDA) 25 MG/ML, , Disp: , Rfl:      pravastatin (PRAVACHOL) 20 MG tablet, Take 1 tablet (20 mg) by mouth daily, Disp: 90 tablet, Rfl: 3      sildenafil (VIAGRA) 100 MG tablet, Take 0.5-1 tablets ( mg) by mouth daily as needed (at least 30 minutes prior to intercourse), Disp: 30 tablet, Rfl: 3     VITAMIN D, CHOLECALCIFEROL, PO, Take 1,000 Units by mouth daily, Disp: , Rfl:     Allergies  Allergies   Allergen Reactions     Atorvastatin      Other reaction(s): Confusion  unusual behavior and dizziness         Social History  Social History     Socioeconomic History     Marital status: Single   Tobacco Use     Smoking status: Never     Smokeless tobacco: Never   Vaping Use     Vaping Use: Never used   Substance and Sexual Activity     Alcohol use: Yes     Comment: occas     Drug use: No     Sexual activity: Not Currently     Partners: Female     Birth control/protection: None   Other Topics Concern     Parent/sibling w/ CABG, MI or angioplasty before 65F 55M? No       Family History  Family History   Problem Relation Age of Onset     Diabetes Type 1 Son          at 25     Diabetes Son      Breast Cancer Sister      Coronary Artery Disease No family hx of      Colon Cancer No family hx of        Review of Systems  As per HPI and PMHx, otherwise 10 system review including the head and neck, constitutional, eyes, respiratory, GI, skin, neurologic, lymphatic, endocrine, and allergy systems is negative.    Physical Exam  /67   Pulse 68   Temp 97.5  F (36.4  C) (Tympanic)   GENERAL: Patient is a pleasant, cooperative 77 year old male in no acute distress.  HEAD: Normocephalic, atraumatic.  Hair and scalp are normal.  EYES: Pupils are equal, round, reactive to light and accommodation.  Extraocular movements are intact.  The sclera nonicteric without injection.  The extraocular structures are normal.  EARS: Normal shape and symmetry.  No tenderness when palpating the mastoid or tragal areas bilaterally.  Otoscopic exam reveals a minimal amount of cerumen bilaterally.  The bilateral tympanic membranes are round, intact without evidence of effusion,  good landmarks.  No retraction, granulation, or drainage.  NOSE: Nares are patent.  Nasal mucosa is significantly dry with some crusting more so on the left-hand side.  The patient has a rightward nasal septal deviation anteriorly and mid septum.  No nasal cavity masses, polyps, or mucopurulence on anterior rhinoscopy.  ORAL CAVITY: Dentition is in good repair.  Mucous membranes are dry.  Tongue is mobile, protrudes to the midline.  Palate elevates symmetrically.  Tonsils are 1+, symmetric.  No erythema or exudate.  No oral cavity or oropharyngeal masses, lesions, ulcerations, leukoplakia.  NECK: Supple, trachea is midline. Palpation of the right neck does reveal some fullness in the right level 2A just anterior to sternocleidomastoid muscle adjacent to the posterior border of the submandibular gland. There no obvious palpable cervical lymphadenopathy or masses bilaterally. The bilateral parotid and submandibular areas reveal no masses.  No thyromegaly.    NEUROLOGIC: Cranial nerves II through XII are grossly intact.  Voice is strong.  Patient is House-Brackmann I/VI bilaterally.  CARDIOVASCULAR: Extremities are warm and well-perfused.  No significant peripheral edema.  RESPIRATORY: Patient has nonlabored breathing without cough, wheeze, stridor.  PSYCHIATRIC: Patient is alert and oriented.  Mood and affect appear normal.  SKIN: Warm and dry.  No scalp, face, or neck lesions noted.     Procedure: Flexible Laryngoscopy  Indication: History of oropharyngeal squamous cell carcinoma of unknown primary, asymmetric FDG avid uptake in the left tonsil/tongue base     To best visualize the upper airway anatomy and due to the chief complaint and HPI, I proceeded with flexible fiberoptic laryngoscopy examination.  The bilateral nasal cavities were anesthetized and decongested with a mixture of lidocaine and neosynephrine.  The bilateral nasal cavities were examined using a flexible fiberoptic laryngoscope.  There were no nasal  cavity masses, polyps, or mucopurulence bilaterally.  The nasal septum deviates to the right anteriorly.  The nasopharynx had a normal appearance with normal Eustachian tube openings and fossa of Rosenmuller bilaterally.  Minimal adenoid tissue.  Posttreatment changes of the oropharynx and supraglottic larynx.  The base of tongue, vallecula, epiglottis, aryepiglottic folds, arytenoids, and piriform sinuses were without mass or lesion.  The bilateral true vocal folds were symmetrically mobile without nodules or masses.  The visualized portions of the infraglottic and subglottic airway are unremarkable.  The scope was removed.  The patient tolerated the procedure well.                        Assessment and Plan     ICD-10-CM    1. 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 History of head and neck radiation  Z92.3       3. Encounter for follow-up surveillance of head and neck cancer  Z08     Z85.89       4. Vasomotor rhinitis  J30.0 LARYNGOSCOPY FLEX FIBEROPTIC, DIAGNOSTIC     ipratropium (ATROVENT) 0.06 % nasal spray         It was my pleasure seeing Newton Buchanan today in clinic.  The patient is roughly 27 months from completing primary radiation therapy for P16+ squamous cell carcinoma of unknown primary with distant metastatic disease in the lungs and mediastinum (T0 N1 M1). The patient has no evidence of oropharyngeal squamous cell carcinoma on physical exam or endoscopic exam today.  His PET scan from 3/2/2023 was stable showing near-complete resolution of the FDG avidity. I would recommend continued observation with clinical follow-up in 6 months.     The patient is reporting intermittent clear runny nose that seems to be worse in the morning, after eating, and sometimes with temperature change.  Clinically this sounds like vasomotor rhinitis.  We discussed trying Atrovent nasal spray 0.06% 2 sprays each side up to 4 times daily as needed for  runny nose.  He will try this and let me know if he is improving.    Migue Kelly MD  Department of Otolaryngology-Head and Neck Surgery  [facility]

## 2023-03-13 ENCOUNTER — OFFICE VISIT (OUTPATIENT)
Dept: OTOLARYNGOLOGY | Facility: CLINIC | Age: 78
End: 2023-03-13
Payer: MEDICARE

## 2023-03-13 VITALS — HEART RATE: 68 BPM | DIASTOLIC BLOOD PRESSURE: 67 MMHG | SYSTOLIC BLOOD PRESSURE: 128 MMHG | TEMPERATURE: 97.5 F

## 2023-03-13 DIAGNOSIS — J30.0 VASOMOTOR RHINITIS: ICD-10-CM

## 2023-03-13 DIAGNOSIS — C44.92 SQUAMOUS CELL CARCINOMA METASTATIC TO HEAD AND NECK WITH UNKNOWN PRIMARY SITE (H): Primary | ICD-10-CM

## 2023-03-13 DIAGNOSIS — Z08 ENCOUNTER FOR FOLLOW-UP SURVEILLANCE OF HEAD AND NECK CANCER: ICD-10-CM

## 2023-03-13 DIAGNOSIS — Z85.89 ENCOUNTER FOR FOLLOW-UP SURVEILLANCE OF HEAD AND NECK CANCER: ICD-10-CM

## 2023-03-13 DIAGNOSIS — C79.89 SQUAMOUS CELL CARCINOMA METASTATIC TO HEAD AND NECK WITH UNKNOWN PRIMARY SITE (H): Primary | ICD-10-CM

## 2023-03-13 DIAGNOSIS — Z92.3 HISTORY OF HEAD AND NECK RADIATION: ICD-10-CM

## 2023-03-13 PROCEDURE — 99214 OFFICE O/P EST MOD 30 MIN: CPT | Mod: 25 | Performed by: OTOLARYNGOLOGY

## 2023-03-13 PROCEDURE — 31575 DIAGNOSTIC LARYNGOSCOPY: CPT | Performed by: OTOLARYNGOLOGY

## 2023-03-13 RX ORDER — IPRATROPIUM BROMIDE 42 UG/1
2 SPRAY, METERED NASAL 4 TIMES DAILY PRN
Qty: 15 ML | Refills: 3 | Status: SHIPPED | OUTPATIENT
Start: 2023-03-13 | End: 2024-03-19

## 2023-03-13 ASSESSMENT — PAIN SCALES - GENERAL: PAINLEVEL: NO PAIN (0)

## 2023-03-13 NOTE — LETTER
3/13/2023         RE: Newton Buchanan  [address]  [place] [address]        Dear Colleague,    Thank you for referring your patient, Newton Buchanan, to the [facility]. Please see a copy of my visit note below.    Chief Complaint   Patient presents with     RECHECK     History of Present Illness  Newton Buchanan is a 77 year old male who presents today for follow up. 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  The patient had richard disease in the neck and lung metastasis.  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  His pretreatment PET scan showed his cervical of adenopathy on the right and numerous bilateral lung metastases with hilar lymphadenopathy.  No primary site was identified on his PET scan physical examination.  The tumor stayed highly with PD1 so he was started on Keytruda in May 2020.  He received radiation to the right neck and oropharynx for unknown primary completing in December 2020.  Follow-up PET/CT in April 2021 showed stable/improved disease.  The patient underwent a PET scan 1/31/2022.  My review of the PET/CT shows some slight asymmetric FDG uptake in the left lingual tonsil and left palatine tonsil.  Near the glossotonsillar sulcus, there appears to be a calcification consistent with a tonsillolith.  There is no abnormal FDG avid uptake in the neck or chest.  Prior FDG uptake at the tonsil tongue base was 4.4 SUV which increased to 6.4 on this current study.  He had no evidence of recurrent or persistent disease on his clinical examination and we decided for observation.     He underwent follow-up PET/CT imaging on 3/2/2023.  My review of the PET/CT does not show any signs of recurrent or persistent disease in the head and neck area. On his chest imaging, there is a subcarinal lymph node that is mildly  hypermetabolic with an SUV max of 2.9 (previously 8.5<--5.9). The patient was last seen for oncologic surveillance on 11/14/2022 and he was doing well.  He returns today for oncologic surveillance.     From a symptom standpoint, the patient reports that overall he is doing well without any significant symptomatology.  The patient denies any dysphagia, odynophagia, pharyngodynia, otalgia, dysphonia, hemoptysis, neck lumps/bumps/swelling.  No unintentional weight loss.  He does report some intermittent dry throat/dry mouth, some of which he contributes to his CPAP.  The patient is a lifetime non-smoker.       He has still noticed some intermittent congestion and nasal dryness but has not had any epistaxis since we cauterized him.  He does use CPAP at nighttime and does fill his chamber every evening.   He does take a daily baby aspirin but no other blood thinners.  He does struggle with intermittent runny nose that seems to be worse in the morning after using his CPAP, with eating, and with temperature change.    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)     Age-related osteoporosis without current pathological fracture     Current Medications    Current Outpatient Medications:      aspirin 81 MG tablet, Take by mouth daily, Disp: 30 tablet, Rfl:      fluticasone (FLONASE) 50 MCG/ACT nasal spray, Spray 1-2 sprays in nostril daily, Disp: 18.2 mL, Rfl: 3     ipratropium (ATROVENT) 0.06 % nasal spray, Spray 2 sprays into both nostrils 4 times daily as needed for rhinitis (runny nose), Disp: 15 mL, Rfl: 3     losartan  (COZAAR) 25 MG tablet, Take 1 tablet (25 mg) by mouth daily, Disp: 90 tablet, Rfl: 3     pembrolizumab (KEYTRUDA) 25 MG/ML, , Disp: , Rfl:      pravastatin (PRAVACHOL) 20 MG tablet, Take 1 tablet (20 mg) by mouth daily, Disp: 90 tablet, Rfl: 3     sildenafil (VIAGRA) 100 MG tablet, Take 0.5-1 tablets ( mg) by mouth daily as needed (at least 30 minutes prior to intercourse), Disp: 30 tablet, Rfl: 3     VITAMIN D, CHOLECALCIFEROL, PO, Take 1,000 Units by mouth daily, Disp: , Rfl:     Allergies  Allergies   Allergen Reactions     Atorvastatin      Other reaction(s): Confusion  unusual behavior and dizziness         Social History  Social History     Socioeconomic History     Marital status: Single   Tobacco Use     Smoking status: Never     Smokeless tobacco: Never   Vaping Use     Vaping Use: Never used   Substance and Sexual Activity     Alcohol use: Yes     Comment: occas     Drug use: No     Sexual activity: Not Currently     Partners: Female     Birth control/protection: None   Other Topics Concern     Parent/sibling w/ CABG, MI or angioplasty before 65F 55M? No       Family History  Family History   Problem Relation Age of Onset     Diabetes Type 1 Son          at 25     Diabetes Son      Breast Cancer Sister      Coronary Artery Disease No family hx of      Colon Cancer No family hx of        Review of Systems  As per HPI and PMHx, otherwise 10 system review including the head and neck, constitutional, eyes, respiratory, GI, skin, neurologic, lymphatic, endocrine, and allergy systems is negative.    Physical Exam  /67   Pulse 68   Temp 97.5  F (36.4  C) (Tympanic)   GENERAL: Patient is a pleasant, cooperative 77 year old male in no acute distress.  HEAD: Normocephalic, atraumatic.  Hair and scalp are normal.  EYES: Pupils are equal, round, reactive to light and accommodation.  Extraocular movements are intact.  The sclera nonicteric without injection.  The extraocular structures are  normal.  EARS: Normal shape and symmetry.  No tenderness when palpating the mastoid or tragal areas bilaterally.  Otoscopic exam reveals a minimal amount of cerumen bilaterally.  The bilateral tympanic membranes are round, intact without evidence of effusion, good landmarks.  No retraction, granulation, or drainage.  NOSE: Nares are patent.  Nasal mucosa is significantly dry with some crusting more so on the left-hand side.  The patient has a rightward nasal septal deviation anteriorly and mid septum.  No nasal cavity masses, polyps, or mucopurulence on anterior rhinoscopy.  ORAL CAVITY: Dentition is in good repair.  Mucous membranes are dry.  Tongue is mobile, protrudes to the midline.  Palate elevates symmetrically.  Tonsils are 1+, symmetric.  No erythema or exudate.  No oral cavity or oropharyngeal masses, lesions, ulcerations, leukoplakia.  NECK: Supple, trachea is midline. Palpation of the right neck does reveal some fullness in the right level 2A just anterior to sternocleidomastoid muscle adjacent to the posterior border of the submandibular gland. There no obvious palpable cervical lymphadenopathy or masses bilaterally. The bilateral parotid and submandibular areas reveal no masses.  No thyromegaly.    NEUROLOGIC: Cranial nerves II through XII are grossly intact.  Voice is strong.  Patient is House-Brackmann I/VI bilaterally.  CARDIOVASCULAR: Extremities are warm and well-perfused.  No significant peripheral edema.  RESPIRATORY: Patient has nonlabored breathing without cough, wheeze, stridor.  PSYCHIATRIC: Patient is alert and oriented.  Mood and affect appear normal.  SKIN: Warm and dry.  No scalp, face, or neck lesions noted.     Procedure: Flexible Laryngoscopy  Indication: History of oropharyngeal squamous cell carcinoma of unknown primary, asymmetric FDG avid uptake in the left tonsil/tongue base     To best visualize the upper airway anatomy and due to the chief complaint and HPI, I proceeded with  flexible fiberoptic laryngoscopy examination.  The bilateral nasal cavities were anesthetized and decongested with a mixture of lidocaine and neosynephrine.  The bilateral nasal cavities were examined using a flexible fiberoptic laryngoscope.  There were no nasal cavity masses, polyps, or mucopurulence bilaterally.  The nasal septum deviates to the right anteriorly.  The nasopharynx had a normal appearance with normal Eustachian tube openings and fossa of Rosenmuller bilaterally.  Minimal adenoid tissue.  Posttreatment changes of the oropharynx and supraglottic larynx.  The base of tongue, vallecula, epiglottis, aryepiglottic folds, arytenoids, and piriform sinuses were without mass or lesion.  The bilateral true vocal folds were symmetrically mobile without nodules or masses.  The visualized portions of the infraglottic and subglottic airway are unremarkable.  The scope was removed.  The patient tolerated the procedure well.                        Assessment and Plan     ICD-10-CM    1. 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 History of head and neck radiation  Z92.3       3. Encounter for follow-up surveillance of head and neck cancer  Z08     Z85.89       4. Vasomotor rhinitis  J30.0 LARYNGOSCOPY FLEX FIBEROPTIC, DIAGNOSTIC     ipratropium (ATROVENT) 0.06 % nasal spray         It was my pleasure seeing Newton Santanaberg today in clinic.  The patient is roughly 27 months from completing primary radiation therapy for P16+ squamous cell carcinoma of unknown primary with distant metastatic disease in the lungs and mediastinum (T0 N1 M1). The patient has no evidence of oropharyngeal squamous cell carcinoma on physical exam or endoscopic exam today.  His PET scan from 3/2/2023 was stable showing near-complete resolution of the FDG avidity. I would recommend continued observation with clinical follow-up in 6 months.     The patient is reporting  intermittent clear runny nose that seems to be worse in the morning, after eating, and sometimes with temperature change.  Clinically this sounds like vasomotor rhinitis.  We discussed trying Atrovent nasal spray 0.06% 2 sprays each side up to 4 times daily as needed for runny nose.  He will try this and let me know if he is improving.    Migue Kelly MD  Department of Otolaryngology-Head and Neck Surgery  [facility]       Again, thank you for allowing me to participate in the care of your patient.        Sincerely,        Migue Kelly MD

## 2023-03-13 NOTE — NURSING NOTE
"Initial Temp 97.5  F (36.4  C) (Tympanic)  Estimated body mass index is 26.31 kg/m  as calculated from the following:    Height as of 2/9/23: 1.81 m (5' 11.25\").    Weight as of 3/8/23: 86.2 kg (190 lb). .    Abiola Perez LPN on 3/13/2023 at 9:33 AM    "

## 2023-03-24 ENCOUNTER — HOSPITAL ENCOUNTER (OUTPATIENT)
Dept: BONE DENSITY | Facility: CLINIC | Age: 78
Discharge: HOME | End: 2023-03-24
Attending: PHYSICIAN ASSISTANT | Admitting: PHYSICIAN ASSISTANT
Payer: MEDICARE

## 2023-03-24 DIAGNOSIS — M81.0 AGE-RELATED OSTEOPOROSIS WITHOUT CURRENT PATHOLOGICAL FRACTURE: ICD-10-CM

## 2023-03-24 PROCEDURE — 77080 DXA BONE DENSITY AXIAL: CPT

## 2023-03-24 ASSESSMENT — SLEEP AND FATIGUE QUESTIONNAIRES
HOW LIKELY ARE YOU TO NOD OFF OR FALL ASLEEP IN A CAR, WHILE STOPPED FOR A FEW MINUTES IN TRAFFIC: WOULD NEVER DOZE
HOW LIKELY ARE YOU TO NOD OFF OR FALL ASLEEP WHILE LYING DOWN TO REST IN THE AFTERNOON WHEN CIRCUMSTANCES PERMIT: HIGH CHANCE OF DOZING
HOW LIKELY ARE YOU TO NOD OFF OR FALL ASLEEP WHILE SITTING INACTIVE IN A PUBLIC PLACE: MODERATE CHANCE OF DOZING
HOW LIKELY ARE YOU TO NOD OFF OR FALL ASLEEP WHILE WATCHING TV: SLIGHT CHANCE OF DOZING
HOW LIKELY ARE YOU TO NOD OFF OR FALL ASLEEP WHILE SITTING AND READING: SLIGHT CHANCE OF DOZING
HOW LIKELY ARE YOU TO NOD OFF OR FALL ASLEEP WHILE SITTING AND TALKING TO SOMEONE: WOULD NEVER DOZE
HOW LIKELY ARE YOU TO NOD OFF OR FALL ASLEEP WHILE SITTING QUIETLY AFTER LUNCH WITHOUT ALCOHOL: SLIGHT CHANCE OF DOZING
HOW LIKELY ARE YOU TO NOD OFF OR FALL ASLEEP WHEN YOU ARE A PASSENGER IN A CAR FOR AN HOUR WITHOUT A BREAK: SLIGHT CHANCE OF DOZING

## 2023-03-27 ENCOUNTER — OFFICE VISIT (OUTPATIENT)
Dept: SLEEP MEDICINE | Facility: CLINIC | Age: 78
End: 2023-03-27
Payer: MEDICARE

## 2023-03-27 VITALS
DIASTOLIC BLOOD PRESSURE: 68 MMHG | BODY MASS INDEX: 26.25 KG/M2 | OXYGEN SATURATION: 98 % | HEART RATE: 65 BPM | SYSTOLIC BLOOD PRESSURE: 132 MMHG | WEIGHT: 189.56 LBS

## 2023-03-27 DIAGNOSIS — G47.33 OBSTRUCTIVE SLEEP APNEA: Primary | ICD-10-CM

## 2023-03-27 PROBLEM — K57.30 DIVERTICULA, COLON: Status: ACTIVE | Noted: 2017-04-13

## 2023-03-27 PROBLEM — C79.89: Status: ACTIVE | Noted: 2020-03-20

## 2023-03-27 PROBLEM — K57.90 DIVERTICULAR DISEASE: Status: ACTIVE | Noted: 2023-03-27

## 2023-03-27 PROBLEM — C44.92: Status: ACTIVE | Noted: 2020-03-20

## 2023-03-27 PROCEDURE — 99213 OFFICE O/P EST LOW 20 MIN: CPT | Performed by: NURSE PRACTITIONER

## 2023-03-27 NOTE — NURSING NOTE
"Chief Complaint   Patient presents with     CPAP Follow Up     Annual       Initial /68   Pulse 65   Wt 86 kg (189 lb 9 oz)   SpO2 98%   BMI 26.25 kg/m   Estimated body mass index is 26.25 kg/m  as calculated from the following:    Height as of 2/9/23: 1.81 m (5' 11.25\").    Weight as of this encounter: 86 kg (189 lb 9 oz).    Medication Reconciliation: complete    DME: [facility]    KODY Raygoza  [facility]      "

## 2023-03-27 NOTE — PATIENT INSTRUCTIONS
Drive Safe... Drive Alive     Sleep health profoundly affects your health, mood, and your safety. 33% of the population (one in three of us) is not getting enough sleep and many have a sleep disorder. Not getting enough sleep or having an untreated / undertreated sleep condition may make us sleepy without even knowing it. In fact, our driving could be dramatically impaired due to our sleep health. As your provider, here are some things I would like you to know about driving:     Here are some warning signs for impairment and dangerous drowsy driving:              -Having been awake more than 16 hours               -Looking tired               -Eyelid drooping              -Head nodding (it could be too late at this point)              -Driving for more than 30 minutes     Some things you could do to make the driving safer if you are experiencing some drowsiness:              -Stop driving and rest              -Call for transportation              -Make sure your sleep disorder is adequately treated     Some things that have been shown NOT to work when experiencing drowsiness while driving:              -Turning on the radio              -Opening windows              -Eating any  distracting  /  entertaining  foods (e.g., sunflower seeds, candy, or any other)              -Talking on the phone      Your decision may not only impact your life, but also the life of others. Please, remember to drive safe for yourself and all of us.

## 2023-03-27 NOTE — NURSING NOTE
Return in about 1 year reminder created and to be send via [employer]. Face sheet and DME order send via fax to [facility] at [phone].    Update pressure changed in clinic from original settings of Mode: Auto 5.0 Min - 12.0 Max to new settings of Mode: Auto 5.0 Min - 15.0 Max     [facility]er[facility]

## 2023-03-27 NOTE — PROGRESS NOTES
Sleep Apnea - Follow-up Visit:    Compliance download for dates of 2/26/2023-3/27/2023 reviewed with patient.  Patient does express an occasional blowing sensation with his CPAP mask.  Patient is excellent compliance usage data, at 97%, and 10 hours, 25 minutes of average usage per days used.  However, maximum pressure is topping out at 11.9 events/hour.  And 95th percentile at 11.2 events/hour.  Plan is to change settings from 5-12 cm H2O to 5-15 cm H2O.  Patient agreeable with plan of care.    Assessment/Plan:  1.  Severe obstructive sleep apnea  Severe Sleep apnea. Tolerating PAP well. Daytime symptoms are improved.    -We will adjust the auto titrate CPAP setting to 5-15 cm H2O from 5-12 cm H2O   -An order will be placed for needed supplies and equipment for the coming year.    Newton Buchanan will follow up in about 1 year(s), sooner if needed.     Fifteen minutes spent with patient, all of which were spent face-to-face counseling, consulting, coordinating plan of care.  An additional 10 minutes was spent reviewing past medical records, sleep studies, as well as documentation.    CC:  Luis Antonio Santiago,         History of Present Illness:  Chief Complaint   Patient presents with     CPAP Follow Up     Annual   Newton Buchanan is a very pleasant 77-year-old gentleman with a history of severe obstructive sleep apnea without sleep associated hypoxemia.  Also has a past medical history notable for comorbid oropharyngeal cancer, allergic rhinitis, GERD without esophagitis, hyperlipidemia, hypertension, osteoporosis.  Patient presents to the sleep medicine clinic today for an evaluation of efficacy and compliance.  Newton would like to also be able to obtain needed supplies and equipment during the coming year.    He was diagnosed with obstructive sleep apnea roughly 10 years ago.  Prior to moving back to [place], he follows with Dr. Palmer in [place].  He did have a repeat split-night sleep study performed  November 15, 2017 with AHI 64.2, RDI 75.8, mean SPO2 94%, cristina SPO2 90%.  Treated with CPAP auto titrate 5-12 cm H2O.    Newton Buchanan presents for follow-up of their severe sleep apnea, managed with CPAP.     DME: [facility] Fax#: [phone] Phone#: [phone]    Do you use a CPAP Machine at home: Yes  Overall, on a scale of 0-10 how would you rate your CPAP (0 poor, 10 great): 8    What type of mask do you use: Nasal Pillow  Is your mask comfortable: Yes  If not, why:      Is your mask leaking: No  If yes, where do you feel it:    How many night per week does the mask leak (0-7):      Do you notice snoring with mask on: No  Do you notice gasping arousals with mask on: No  Are you having significant oral or nasal dryness: Yes  Is the pressure setting comfortable: Yes  If not, why:      What is your typical bedtime: 9:30 PM  How long does it take you to go to sleep on PAP therapy: 10  What time do you typically get out of bed for the day: 7:30 AM  How many hours on average per night are you using PAP therapy: 9+  How many hours are you sleeping per night: 9+  Do you feel well rested in the morning: Yes      ResMed     Auto-PAP 5 - 12 cmH2O 30 day usage data:    97% of days with > 4 hours of use. 29/30 days with no use.   Average use 10 hours, 25 minutes per day.   95%ile Leak 28.0 l/min.   CPAP 95% pressure 11.9 cm.   AHI 2.5 events per hour.            EPWORTH SLEEPINESS SCALE      Leroy Sleepiness Scale ( MOIRA Carcamo  [id number]<br>ESS - [place]/English - Final version - 21 Nov 07 - [facility] [place].) 3/24/2023   Sitting and reading Slight chance of dozing   Watching TV Slight chance of dozing   Sitting, inactive in a public place (e.g. a theatre or a meeting) Moderate chance of dozing   As a passenger in a car for an hour without a break Slight chance of dozing   Lying down to rest in the afternoon when circumstances permit High chance of dozing   Sitting and talking to someone Would never  doze   Sitting quietly after a lunch without alcohol Slight chance of dozing   In a car, while stopped for a few minutes in traffic Would never doze   [place] Score (MC) 9   [place] Score (Sleep) 9       INSOMNIA SEVERITY INDEX (MIKE)      Insomnia Severity Index (MIKE) 3/24/2023   Difficulty falling asleep 0   Difficulty staying asleep 1   Problems waking up too early 0   How SATISFIED/DISSATISFIED are you with your CURRENT sleep pattern? 2   How NOTICEABLE to others do you think your sleep problem is in terms of impairing the quality of your life? 3   How WORRIED/DISTRESSED are you about your current sleep problem? 2   To what extent do you consider your sleep problem to INTERFERE with your daily functioning (e.g. daytime fatigue, mood, ability to function at work/daily chores, concentration, memory, mood, etc.) CURRENTLY? 1   MIKE Total Score 9       Guidelines for Scoring/Interpretation:  Total score categories:  0-7 = No clinically significant insomnia   8-14 = Subthreshold insomnia   15-21 = Clinical insomnia (moderate severity)  22-28 = Clinical insomnia (severe)  Used via courtesy of www.LogLogicealth.va.gov with permission from Juventino Jones PhD., [facility]      Past medical/surgical history, family history, social history, medications and allergies were reviewed.        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 (H) 02/09/2023     Priority: Medium     Age-related osteoporosis without current pathological fracture 02/09/2023     Priority: Medium     Long-term use of high-risk medication 01/25/2023     Priority: Medium     Oropharyngeal cancer (H)      Priority: Medium     Head and neck cancer (H) 02/11/2020     Priority: Medium     2/27/2020 [place] [facility] notes:Stage IV metastatic squamous cell carcinoma   There is no obvious primary on PET/CT but given CK7 +, P16 positivity, suspicion is high  "that origin is still of H&N (right cervical LAD) with metastatic spread to lung with bilateral multiple lung nodules.  We discussed that treatment would be palliative and not curative- goals would be to reduce tumor burden, decrease symptoms and improve quality of life.   10/26/2020  Transferred care to Dr. Gustavo CORRAL Oncology notes:Presumed  H&N (right cervical LAD) with metastatic spread to lung with bilateral multiple lung nodules. He is first-line palliative treatment with Keytruda was started when he was in [place] early May 2020.  1/18/21 Oncology notes Dr. Johns:He was treated with systemic immunotherapy with good response in the chest with local progression in the right neck.  He subsequently completed palliative radiation therapy to the right neck-OPX region (50 Gy in 20 fractions, 12/22/20).   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 FNAs benign. Followed.   -2/2020: progressive right neck swelling, no other symptoms.   -2/11/20 FNA biosy right neck mass: metastatic squamous cell cancer, HPV16+  -PET: hypermetabolic right cervical adenopathy, numerous bilateral lung mets, right hilar nodes.   -No evident primary site identified, but highly suspicious of H/N given IHC staining and radiographical findings/spread  High PD-1 70% on biopsy         Essential hypertension with goal blood pressure less than 140/90 06/08/2016     Priority: Medium     Gastroesophageal reflux disease without esophagitis 06/08/2016     Priority: Medium     CRYSTAL (obstructive sleep apnea) 06/08/2016     Priority: Medium     Uses CPAP.  Uses prn nasal sprays for nasal congestion at times so he can use his CPAP.       Personal history of prostate cancer 06/08/2016     Priority: Medium     2008:Robotic prostate removal.  6/8/2016:He reports PSA has been oK.           /68   Pulse 65   Wt 86 kg (189 lb 9 oz)   SpO2 98%   BMI 26.25 kg/m         [place]" GERALD Quiroz, CNP  Sleep Medicine    This note was written with the assistance of the Dragon voice-dictation technology software. The final document, although reviewed, may contain errors. For corrections, please contact the office.

## 2023-03-29 ENCOUNTER — ONCOLOGY VISIT (OUTPATIENT)
Dept: ONCOLOGY | Facility: CLINIC | Age: 78
End: 2023-03-29
Attending: INTERNAL MEDICINE
Payer: MEDICARE

## 2023-03-29 ENCOUNTER — APPOINTMENT (OUTPATIENT)
Dept: LAB | Facility: CLINIC | Age: 78
End: 2023-03-29
Attending: INTERNAL MEDICINE
Payer: MEDICARE

## 2023-03-29 ENCOUNTER — INFUSION THERAPY VISIT (OUTPATIENT)
Dept: INFUSION THERAPY | Facility: CLINIC | Age: 78
End: 2023-03-29
Attending: INTERNAL MEDICINE
Payer: MEDICARE

## 2023-03-29 VITALS — SYSTOLIC BLOOD PRESSURE: 110 MMHG | HEART RATE: 60 BPM | DIASTOLIC BLOOD PRESSURE: 70 MMHG

## 2023-03-29 VITALS
WEIGHT: 192.2 LBS | SYSTOLIC BLOOD PRESSURE: 137 MMHG | RESPIRATION RATE: 16 BRPM | OXYGEN SATURATION: 96 % | HEART RATE: 60 BPM | BODY MASS INDEX: 26.62 KG/M2 | TEMPERATURE: 97.7 F | DIASTOLIC BLOOD PRESSURE: 72 MMHG

## 2023-03-29 DIAGNOSIS — C76.0 HEAD AND NECK CANCER (H): Primary | ICD-10-CM

## 2023-03-29 DIAGNOSIS — C78.02 MALIGNANT NEOPLASM METASTATIC TO BOTH LUNGS (H): ICD-10-CM

## 2023-03-29 DIAGNOSIS — C78.01 MALIGNANT NEOPLASM METASTATIC TO BOTH LUNGS (H): ICD-10-CM

## 2023-03-29 LAB
ALBUMIN SERPL BCG-MCNC: 3.8 G/DL (ref 3.5–5.2)
ALP SERPL-CCNC: 46 U/L (ref 40–129)
ALT SERPL W P-5'-P-CCNC: 25 U/L (ref 10–50)
ANION GAP SERPL CALCULATED.3IONS-SCNC: 10 MMOL/L (ref 7–15)
AST SERPL W P-5'-P-CCNC: 25 U/L (ref 10–50)
BILIRUB SERPL-MCNC: 0.6 MG/DL
BUN SERPL-MCNC: 12.9 MG/DL (ref 8–23)
CALCIUM SERPL-MCNC: 9.3 MG/DL (ref 8.8–10.2)
CHLORIDE SERPL-SCNC: 104 MMOL/L (ref 98–107)
CREAT SERPL-MCNC: 0.92 MG/DL (ref 0.67–1.17)
DEPRECATED HCO3 PLAS-SCNC: 25 MMOL/L (ref 22–29)
GFR SERPL CREATININE-BSD FRML MDRD: 86 ML/MIN/1.73M2
GLUCOSE SERPL-MCNC: 97 MG/DL (ref 70–99)
POTASSIUM SERPL-SCNC: 4.1 MMOL/L (ref 3.4–5.3)
PROT SERPL-MCNC: 6.5 G/DL (ref 6.4–8.3)
SODIUM SERPL-SCNC: 139 MMOL/L (ref 136–145)
TSH SERPL DL<=0.005 MIU/L-ACNC: 2.28 UIU/ML (ref 0.3–4.2)

## 2023-03-29 PROCEDURE — 250N000011 HC RX IP 250 OP 636: Performed by: NURSE PRACTITIONER

## 2023-03-29 PROCEDURE — 99214 OFFICE O/P EST MOD 30 MIN: CPT | Performed by: NURSE PRACTITIONER

## 2023-03-29 PROCEDURE — 80053 COMPREHEN METABOLIC PANEL: CPT | Performed by: NURSE PRACTITIONER

## 2023-03-29 PROCEDURE — G0463 HOSPITAL OUTPT CLINIC VISIT: HCPCS | Mod: 25 | Performed by: NURSE PRACTITIONER

## 2023-03-29 PROCEDURE — 258N000003 HC RX IP 258 OP 636: Performed by: NURSE PRACTITIONER

## 2023-03-29 PROCEDURE — 36415 COLL VENOUS BLD VENIPUNCTURE: CPT | Performed by: NURSE PRACTITIONER

## 2023-03-29 PROCEDURE — 96413 CHEMO IV INFUSION 1 HR: CPT

## 2023-03-29 PROCEDURE — 84443 ASSAY THYROID STIM HORMONE: CPT | Performed by: NURSE PRACTITIONER

## 2023-03-29 RX ORDER — DIPHENHYDRAMINE HYDROCHLORIDE 50 MG/ML
50 INJECTION INTRAMUSCULAR; INTRAVENOUS
Status: CANCELLED
Start: 2023-03-29

## 2023-03-29 RX ORDER — LORAZEPAM 2 MG/ML
0.5 INJECTION INTRAMUSCULAR EVERY 4 HOURS PRN
Status: CANCELLED
Start: 2023-03-29

## 2023-03-29 RX ORDER — ALBUTEROL SULFATE 90 UG/1
1-2 AEROSOL, METERED RESPIRATORY (INHALATION)
Status: CANCELLED
Start: 2023-04-17

## 2023-03-29 RX ORDER — ALBUTEROL SULFATE 0.83 MG/ML
2.5 SOLUTION RESPIRATORY (INHALATION)
Status: CANCELLED | OUTPATIENT
Start: 2023-04-17

## 2023-03-29 RX ORDER — METHYLPREDNISOLONE SODIUM SUCCINATE 125 MG/2ML
125 INJECTION, POWDER, LYOPHILIZED, FOR SOLUTION INTRAMUSCULAR; INTRAVENOUS
Status: CANCELLED
Start: 2023-04-17

## 2023-03-29 RX ORDER — SODIUM CHLORIDE 9 MG/ML
1000 INJECTION, SOLUTION INTRAVENOUS CONTINUOUS PRN
Status: CANCELLED
Start: 2023-03-29

## 2023-03-29 RX ORDER — NALOXONE HYDROCHLORIDE 0.4 MG/ML
.1-.4 INJECTION, SOLUTION INTRAMUSCULAR; INTRAVENOUS; SUBCUTANEOUS
Status: CANCELLED | OUTPATIENT
Start: 2023-03-29

## 2023-03-29 RX ORDER — DIPHENHYDRAMINE HYDROCHLORIDE 50 MG/ML
50 INJECTION INTRAMUSCULAR; INTRAVENOUS
Status: CANCELLED
Start: 2023-04-17

## 2023-03-29 RX ORDER — ALBUTEROL SULFATE 90 UG/1
1-2 AEROSOL, METERED RESPIRATORY (INHALATION)
Status: CANCELLED
Start: 2023-03-29

## 2023-03-29 RX ORDER — EPINEPHRINE 1 MG/ML
0.3 INJECTION, SOLUTION, CONCENTRATE INTRAVENOUS EVERY 5 MIN PRN
Status: CANCELLED | OUTPATIENT
Start: 2023-04-17

## 2023-03-29 RX ORDER — ALBUTEROL SULFATE 0.83 MG/ML
2.5 SOLUTION RESPIRATORY (INHALATION)
Status: CANCELLED | OUTPATIENT
Start: 2023-03-29

## 2023-03-29 RX ORDER — METHYLPREDNISOLONE SODIUM SUCCINATE 125 MG/2ML
125 INJECTION, POWDER, LYOPHILIZED, FOR SOLUTION INTRAMUSCULAR; INTRAVENOUS
Status: CANCELLED
Start: 2023-03-29

## 2023-03-29 RX ORDER — SODIUM CHLORIDE 9 MG/ML
1000 INJECTION, SOLUTION INTRAVENOUS CONTINUOUS PRN
Status: CANCELLED
Start: 2023-04-17

## 2023-03-29 RX ORDER — NALOXONE HYDROCHLORIDE 0.4 MG/ML
.1-.4 INJECTION, SOLUTION INTRAMUSCULAR; INTRAVENOUS; SUBCUTANEOUS
Status: CANCELLED | OUTPATIENT
Start: 2023-04-17

## 2023-03-29 RX ORDER — EPINEPHRINE 1 MG/ML
0.3 INJECTION, SOLUTION, CONCENTRATE INTRAVENOUS EVERY 5 MIN PRN
Status: CANCELLED | OUTPATIENT
Start: 2023-03-29

## 2023-03-29 RX ORDER — MEPERIDINE HYDROCHLORIDE 25 MG/ML
25 INJECTION INTRAMUSCULAR; INTRAVENOUS; SUBCUTANEOUS EVERY 30 MIN PRN
Status: CANCELLED | OUTPATIENT
Start: 2023-03-29

## 2023-03-29 RX ORDER — MEPERIDINE HYDROCHLORIDE 25 MG/ML
25 INJECTION INTRAMUSCULAR; INTRAVENOUS; SUBCUTANEOUS EVERY 30 MIN PRN
Status: CANCELLED | OUTPATIENT
Start: 2023-04-17

## 2023-03-29 RX ORDER — LORAZEPAM 2 MG/ML
0.5 INJECTION INTRAMUSCULAR EVERY 4 HOURS PRN
Status: CANCELLED
Start: 2023-04-17

## 2023-03-29 RX ADMIN — SODIUM CHLORIDE 200 MG: 9 INJECTION, SOLUTION INTRAVENOUS at 10:18

## 2023-03-29 RX ADMIN — SODIUM CHLORIDE 250 ML: 9 INJECTION, SOLUTION INTRAVENOUS at 10:21

## 2023-03-29 ASSESSMENT — PAIN SCALES - GENERAL: PAINLEVEL: NO PAIN (0)

## 2023-03-29 NOTE — PROGRESS NOTES
[facility] Hematology and Oncology Progress Note    Patient: Newton Buchanan  MRN: [id number]        Reason for Visit    1. 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 Newton Buchanan  is a 77 year old with metastatic head/neck cancer to cervical nodes, bilateral lung and subcarinal node, diagnosed almost 3 yrs ago. He had first line  Keytruda x 2 yrs through 7/2022, resulting in CR. He was on treatment holiday until 12/2022 when he had recurrence in subcarinal LN by PET. He resumed Keytruda every 3 weeks and returns for 3-month PET and reassessement.      He is tolerating immunotherapy very well.  Mild taste changes, generally good appetite and stable weight.   No diarrhea, dyspnea, rash. Mild pruritis/dry skin on right back.   No new head/neck symptoms. No new pain.      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 FNAs benign. Followed.   -2/2020: progressive right neck swelling, no other symptoms.   -2/11/20 FNA biosy right neck mass: metastatic squamous cell cancer, HPV16+  -PET: hypermetabolic right cervical adenopathy, numerous bilateral lung mets, right hilar nodes.   -No evident primary site identified, but highly suspicious of H/N given IHC staining and radiographical findings/spread  -Neogenomic testing - CPS 70    Treatment:  5/2020 - 7/2022: Keytruda (initiated in [place], then transferred care to [place]). Completed 2 yrs of immunotherapy, resulted in CR by PET     11/2020: local progression in right neck only  -12/2020: palliative RT to right neck (3750 cGy/15)    1/2021: palpable right jaw/parotid lesion, PET+. Remainder of right neck disease improved after RT and lung mets stable. US-guided biopsy right parotid lesion attempted, but no  lesion seen to biopsy so cancelled.    7/2022 - 12/2022: observation, treatment holiday until progression.   -12/2022 PET/CT: solitary recurrence in subcarinal LN    12/12/2022- present: Keytruda resumed      Physical Exam    GENERAL: Alert and oriented to time place and person. Seated comfortably. In no distress.  HEAD: Atraumatic and normocephalic. No alopecia.  EYES: COSMO, EOMI. No erythema. No icterus.  NECK: Not assessed.   LYMPH NODES: Not assessed.   CHEST: regular respiratory effort.   SKIN: no rash, or bruising or purpura.   NEURO: No gross deficit noted.       Lab Results    CMP WNL  TSH pending    Imaging    PET: Since 12/2022, near CR in subcarinal LN. No new cancer noted.     Assessment/Plan  1. 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 In December, subcarinal LN recurrence by PET was noted so he resumed Keytruda every 3 weeks at that time. He's getting every 3 weeks, with excellent tolerance.    Labwork unremarkable.    PET shows near CR; interval resolution of prior subcarinal LN and no new sites of disease.    He may be interested in taking a treatment break over the summer if next PET scan shows continued response to do some travelling.     Plan:  -Continue with Keytruda every 3 weeks for now  -See Janay in 6 weeks for reassessment  -Repeat PET scan in 12 weeks with MD visit (establish new MD since Dr. Ruiz has left the practice). At that time, if cancer still under good control, he wants to consider a treatment holiday over the summer for traveling. I also discussed the option of transitioning to 400 mg every 6 week dosing maintenance option, which he thinks could also work for him.    2.   Osteoporosis  Managed by PCP and Endocrinologist.      Billing  Start time: 0915/ End time: 0935  Platform: [employer]el[employer]  Patient location: [facility]  Provider location:  Off-site      Signed by: Janay Fierro, NP

## 2023-03-29 NOTE — PROGRESS NOTES
"Oncology Rooming Note    March 29, 2023 9:11 AM   Newton Buchanan is a 77 year old male who presents for:    Chief Complaint   Patient presents with     Oncology Clinic Visit     Head and neck cancer - Lab provider and infusion     Initial Vitals: /72 (BP Location: Right arm, Patient Position: Sitting, Cuff Size: Adult Regular)   Pulse 60   Temp 97.7  F (36.5  C) (Tympanic)   Resp 16   Wt 87.2 kg (192 lb 3.2 oz)   SpO2 96%   BMI 26.62 kg/m   Estimated body mass index is 26.62 kg/m  as calculated from the following:    Height as of 2/9/23: 1.81 m (5' 11.25\").    Weight as of this encounter: 87.2 kg (192 lb 3.2 oz). Body surface area is 2.09 meters squared.  No Pain (0) Comment: Data Unavailable   No LMP for male patient.  Allergies reviewed: Yes, patient did e-check in  Medications reviewed: Yes, patient did e-check in, added Calcium 600/Vit D as patient started taking it    Medications: Medication refills not needed today.  Pharmacy name entered into [employer]:    [id number] - [place], [place] - [address] AT [facility] OF [address] [place] [address] HOME DELIVERY - [facility], [place] - [address] [facility]    Clinical concerns:  None      Dayana Bradshaw CMA            "

## 2023-03-29 NOTE — PROGRESS NOTES
Infusion Nursing Note:  Newton Santanaberg presents today for Keytruda.    Patient seen by provider today: Yes: Janay Fan NP   present during visit today: Not Applicable.    Note: N/A.    Intravenous Access:  Peripheral IV placed.    Treatment Conditions:  Lab Results   Component Value Date     03/29/2023    POTASSIUM 4.1 03/29/2023    CR 0.92 03/29/2023    JÚNIOR 9.3 03/29/2023    BILITOTAL 0.6 03/29/2023    ALBUMIN 3.8 03/29/2023    ALT 25 03/29/2023    AST 25 03/29/2023     Results reviewed, labs MET treatment parameters, ok to proceed with treatment.    Post Infusion Assessment:  Patient tolerated injection without incident.  Blood return noted pre and post infusion.  Site patent and intact, free from redness, edema or discomfort.  No evidence of extravasations.  Access discontinued per protocol.     Discharge Plan:   Patient discharged in stable condition accompanied by: self.  Departure Mode: Ambulatory.      Crescencio Cervantes RN

## 2023-03-29 NOTE — LETTER
3/29/2023         RE: Newton Buchanan  [address]  [place] [address]        Dear Colleague,    Thank you for referring your patient, Newton Buchanan, to the [facility]. Please see a copy of my visit note below.    [facility] Hematology and Oncology Progress Note    Patient: Newton Buchanan  MRN: [id number]        Reason for Visit    1. 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 Newton Buchanan  is a 77 year old with metastatic head/neck cancer to cervical nodes, bilateral lung and subcarinal node, diagnosed almost 3 yrs ago. He had first line  Keytruda x 2 yrs through 7/2022, resulting in CR. He was on treatment holiday until 12/2022 when he had recurrence in subcarinal LN by PET. He resumed Keytruda every 3 weeks and returns for 3-month PET and reassessement.      He is tolerating immunotherapy very well.  Mild taste changes, generally good appetite and stable weight.   No diarrhea, dyspnea, rash. Mild pruritis/dry skin on right back.   No new head/neck symptoms. No new pain.      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 FNAs benign. Followed.   -2/2020: progressive right neck swelling, no other symptoms.   -2/11/20 FNA biosy right neck mass: metastatic squamous cell cancer, HPV16+  -PET: hypermetabolic right cervical adenopathy, numerous bilateral lung mets, right hilar nodes.   -No evident primary site identified, but highly suspicious of H/N given IHC staining and radiographical findings/spread  -Neogenomic testing - CPS 70    Treatment:  5/2020 - 7/2022: Keytruda (initiated in [place], then transferred care to [place]). Completed 2 yrs of immunotherapy, resulted in CR by PET     11/2020: local progression in  right neck only  -12/2020: palliative RT to right neck (3750 cGy/15)    1/2021: palpable right jaw/parotid lesion, PET+. Remainder of right neck disease improved after RT and lung mets stable. US-guided biopsy right parotid lesion attempted, but no lesion seen to biopsy so cancelled.    7/2022 - 12/2022: observation, treatment holiday until progression.   -12/2022 PET/CT: solitary recurrence in subcarinal LN    12/12/2022- present: Keytruda resumed      Physical Exam    GENERAL: Alert and oriented to time place and person. Seated comfortably. In no distress.  HEAD: Atraumatic and normocephalic. No alopecia.  EYES: COSMO, EOMI. No erythema. No icterus.  NECK: Not assessed.   LYMPH NODES: Not assessed.   CHEST: regular respiratory effort.   SKIN: no rash, or bruising or purpura.   NEURO: No gross deficit noted.       Lab Results    CMP WNL  TSH pending    Imaging    PET: Since 12/2022, near CR in subcarinal LN. No new cancer noted.     Assessment/Plan  1. 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 In December, subcarinal LN recurrence by PET was noted so he resumed Keytruda every 3 weeks at that time. He's getting every 3 weeks, with excellent tolerance.    Labwork unremarkable.    PET shows near CR; interval resolution of prior subcarinal LN and no new sites of disease.    He may be interested in taking a treatment break over the summer if next PET scan shows continued response to do some travelling.     Plan:  -Continue with Keytruda every 3 weeks for now  -See Janay in 6 weeks for reassessment  -Repeat PET scan in 12 weeks with MD visit (establish new MD since Dr. Ruiz has left the practice). At that time, if cancer still under good control, he wants to consider a treatment holiday over the summer for traveling. I also discussed the option of transitioning to 400 mg every 6 week dosing maintenance option,  "which he thinks could also work for him.    2.   Osteoporosis  Managed by PCP and Endocrinologist.      Billing  Start time: 0915/ End time: 0935  Platform: [employer]  Patient location: [facility]  Provider location: Off-site      Signed by: Janay Fierro NP    Oncology Rooming Note    March 29, 2023 9:11 AM   Newton Buchanan is a 77 year old male who presents for:    Chief Complaint   Patient presents with     Oncology Clinic Visit     Head and neck cancer - Lab provider and infusion     Initial Vitals: /72 (BP Location: Right arm, Patient Position: Sitting, Cuff Size: Adult Regular)   Pulse 60   Temp 97.7  F (36.5  C) (Tympanic)   Resp 16   Wt 87.2 kg (192 lb 3.2 oz)   SpO2 96%   BMI 26.62 kg/m   Estimated body mass index is 26.62 kg/m  as calculated from the following:    Height as of 2/9/23: 1.81 m (5' 11.25\").    Weight as of this encounter: 87.2 kg (192 lb 3.2 oz). Body surface area is 2.09 meters squared.  No Pain (0) Comment: Data Unavailable   No LMP for male patient.  Allergies reviewed: Yes, patient did e-check in  Medications reviewed: Yes, patient did e-check in, added Calcium 600/Vit D as patient started taking it    Medications: Medication refills not needed today.  Pharmacy name entered into [email or web address]:    [id number] - [place], [place] - [address] AT [facility] OF [address] [place] [address] HOME DELIVERY - [place], [place] - [address] [place] [address]    Clinical concerns:  None      Dayana Bradshaw CMA                Again, thank you for allowing me to participate in the care of your patient.        Sincerely,        Janay Fan NP    "

## 2023-04-19 ENCOUNTER — INFUSION THERAPY VISIT (OUTPATIENT)
Dept: INFUSION THERAPY | Facility: CLINIC | Age: 78
End: 2023-04-19
Attending: NURSE PRACTITIONER
Payer: MEDICARE

## 2023-04-19 ENCOUNTER — APPOINTMENT (OUTPATIENT)
Dept: LAB | Facility: CLINIC | Age: 78
End: 2023-04-19
Payer: MEDICARE

## 2023-04-19 VITALS
BODY MASS INDEX: 26.59 KG/M2 | SYSTOLIC BLOOD PRESSURE: 121 MMHG | WEIGHT: 192 LBS | DIASTOLIC BLOOD PRESSURE: 65 MMHG | TEMPERATURE: 98.3 F | HEART RATE: 64 BPM | RESPIRATION RATE: 16 BRPM

## 2023-04-19 DIAGNOSIS — C76.0 HEAD AND NECK CANCER (H): Primary | ICD-10-CM

## 2023-04-19 LAB
ALBUMIN SERPL BCG-MCNC: 3.7 G/DL (ref 3.5–5.2)
ALP SERPL-CCNC: 45 U/L (ref 40–129)
ALT SERPL W P-5'-P-CCNC: 23 U/L (ref 10–50)
ANION GAP SERPL CALCULATED.3IONS-SCNC: 12 MMOL/L (ref 7–15)
AST SERPL W P-5'-P-CCNC: 25 U/L (ref 10–50)
BILIRUB SERPL-MCNC: 0.6 MG/DL
BUN SERPL-MCNC: 10.6 MG/DL (ref 8–23)
CALCIUM SERPL-MCNC: 9.3 MG/DL (ref 8.8–10.2)
CHLORIDE SERPL-SCNC: 103 MMOL/L (ref 98–107)
CREAT SERPL-MCNC: 0.94 MG/DL (ref 0.67–1.17)
DEPRECATED HCO3 PLAS-SCNC: 25 MMOL/L (ref 22–29)
GFR SERPL CREATININE-BSD FRML MDRD: 84 ML/MIN/1.73M2
GLUCOSE SERPL-MCNC: 91 MG/DL (ref 70–99)
POTASSIUM SERPL-SCNC: 4.4 MMOL/L (ref 3.4–5.3)
PROT SERPL-MCNC: 6 G/DL (ref 6.4–8.3)
SODIUM SERPL-SCNC: 140 MMOL/L (ref 136–145)
TSH SERPL DL<=0.005 MIU/L-ACNC: 2.35 UIU/ML (ref 0.3–4.2)

## 2023-04-19 PROCEDURE — 258N000003 HC RX IP 258 OP 636: Performed by: NURSE PRACTITIONER

## 2023-04-19 PROCEDURE — 96413 CHEMO IV INFUSION 1 HR: CPT

## 2023-04-19 PROCEDURE — 80053 COMPREHEN METABOLIC PANEL: CPT | Performed by: NURSE PRACTITIONER

## 2023-04-19 PROCEDURE — 84443 ASSAY THYROID STIM HORMONE: CPT | Performed by: NURSE PRACTITIONER

## 2023-04-19 PROCEDURE — 36415 COLL VENOUS BLD VENIPUNCTURE: CPT | Performed by: NURSE PRACTITIONER

## 2023-04-19 PROCEDURE — 250N000011 HC RX IP 250 OP 636: Performed by: NURSE PRACTITIONER

## 2023-04-19 RX ADMIN — SODIUM CHLORIDE 250 ML: 9 INJECTION, SOLUTION INTRAVENOUS at 11:41

## 2023-04-19 RX ADMIN — SODIUM CHLORIDE 200 MG: 9 INJECTION, SOLUTION INTRAVENOUS at 11:41

## 2023-04-19 NOTE — PROGRESS NOTES
Infusion Nursing Note:  Newton Buchanan presents today for Keytruda.    Patient seen by provider today: No   present during visit today: Not Applicable.    Note: Labs drawn peripherally.      Intravenous Access:  Peripheral IV placed.    Treatment Conditions:  Lab Results   Component Value Date     04/19/2023    POTASSIUM 4.4 04/19/2023    CR 0.94 04/19/2023    JÚNIOR 9.3 04/19/2023    BILITOTAL 0.6 04/19/2023    ALBUMIN 3.7 04/19/2023    ALT 23 04/19/2023    AST 25 04/19/2023     Results reviewed, labs MET treatment parameters, ok to proceed with treatment.      Post Infusion Assessment:  Patient tolerated infusion without incident.  Blood return noted pre and post infusion.  Site patent and intact, free from redness, edema or discomfort.  No evidence of extravasations.  Access discontinued per protocol.       Discharge Plan:   Patient discharged in stable condition accompanied by: self.  Departure Mode: Ambulatory.      Shannan Melchor RN

## 2023-05-09 NOTE — LETTER
3/18/2021         RE: Newton Buchanan  [address]  [place] [address]        Dear Colleague,    Thank you for referring your patient, Newton Buchanan, to the [facility]. Please see a copy of my visit note below.    [facility] Hematology and Oncology Progress Note    Patient: Newton Buchanan  MRN: [id number]        Reason for Visit    1. 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 He completed palliative RT to right neck 3 months ago with improvement in the adenopathy. He's continued on Keytruda since last visit with excellent tolerance.     In January, he palpated right parotid lump which was PET avid. US with biopsy was planned, but no correlative lesion noted so biopsy aborted. This has resolved.     No other new symptoms.    He is returning to [place] in the next few weeks and will be there 3-4 months, selling his home there as he is transitioning his permanent residency to [place]. 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 FNAs benign. Followed.   -2/2020: progressive right neck swelling, no other symptoms.   -2/11/20 FNA biosy right neck mass: metastatic squamous cell cancer, HPV16+  -PET: hypermetabolic right cervical adenopathy, numerous bilateral lung mets, right hilar nodes.   -No evident primary site identified, but highly suspicious of H/N given IHC staining and radiographical findings/spread  -Neogenomic testing - CPS 70    Treatment:  5/2020 - present: Keytruda (initiated in [place], then transferred care to [place])  -mixed  May 9, 2023       Aga Mendez MD  [address] [facility] [address]  Via In Basket      Patient: Raj Spencer   YOB: 1953   Date of Visit: 5/9/2023       Dear Dr. Mendez:    Thank you for referring Raj Spencer to me for evaluation. Below are my notes for this visit with him.    If you have questions, please do not hesitate to call me. I look forward to following your patient along with you.      Sincerely,        Maria Antonia Bradford MD        CC: No Recipients  Maria Antonia Bradford MD  5/9/2023  4:06 PM  Signed        DATE: 5/9/2023  PATIENT: Raj Spencer  YOB: 1953  MRN: [id number]  Referred By: Aga Mendez MD   PCP: Aga Mendez MD  HEME/ONC PHYSICIAN: Dr. Wendy Bradford    Diagnosis:   Hepatocellular carcinoma     CHIEF COMPLAINT  Raj Spencer is a 69 year old male whom I am seeing for follow up for hepatocellular carcinoma.      HISTORY OF PRESENT ILLNESS  5/9/23:  Patient had a CT scan done of the chest that did not show any metastatic disease. C/o some intermittent abd pain and this resolved with OTC tylenol this AM. He is on methadone program for many years. He is planning on weaning down on the dose.     ONCOLOGIC HISTORY:  In November 2022, patient was admitted for abdominal pain.  During this time he had a CT scan of abdomen pelvis done which showed new consolidative infiltrates posterior aspect right lower lobe partially imaged, 2 cm hypodense lesion in the liver and smaller hypodense lesions in the liver anteriorly as well.  An MRI was recommended.  He had a follow-up MRI done on 2/14/2023 which showed \"multiple slightly T2 hyperintense lesions in the posterior right hepatic lobe, all of which demonstrate corresponding peripheral enhancement concerning for metastases.\"  He was referred to medical oncology for further management.    He states that he has had poor appetite for the past few months and has lost about 30 pounds in the last 3 to 4 years.  He has a history  of heroin use and is currently on methadone 50.  He has a history of smoking about half pack per day for 50 years.  He is also experiencing some fatigue.  Denies any bleeding.  He has been also referred to GI and he has an appointment later this month.    4/5/23  He had biopsy done of the liver. Unable to get the CT chest done for staging.   Doing well.      Patient's medications, allergies, past medical, surgical, social and family histories were reviewed and updated as appropriate.    PAST MEDICAL HISTORY  Past Medical History:   Diagnosis Date   • A-fib (CMD)    • BPH (benign prostatic hyperplasia)    • Chronic insomnia    • COPD (chronic obstructive pulmonary disease) (CMD)    • COVID-19 virus infection 11/10/2022   • Depression    • Emphysema, unspecified (CMD)    • Essential (primary) hypertension    • Gastroesophageal reflux disease    • Heroin abuse (CMD)    • History of esophagogastroduodenoscopy (EGD) 02/09/2021    Repeat EGD in 3 Years. (02/09/2024)   • History of home oxygen therapy    • HTN (hypertension)    • Hx of hepatitis C     s/p treatment in 7/2019   • Methadone dependence (CMD)    • Sepsis due to pneumonia (CMD) 3/30/2022   • Underweight        PAST SURGICAL HISTORY  Past Surgical History:   Procedure Laterality Date   • Abdomen surgery     • Anes ercp w place endoscopic stent in [place] pancreatic oscar  03/20/2020    Dr. Jamie Morton   • Cholecystectomy     • Ercp w/ sphicterotomy  03/20/2020    Dr. Jamie Morton   • Gallbladder surgery     • Gastrectomy     • Service to gastroenterology     • Stomach surgery         FAMILY MEDICAL HISTORY  Family History   Problem Relation Age of Onset   • Stroke Mother    • Hypertension Mother    • Stroke Sister    • Hypertension Sister        SOCIAL HISTORY   Social History     Socioeconomic History   • Marital status: Single     Spouse name: Not on file   • Number of children: Not on file   • Years of education: Not on file   • Highest education level: Not on file  response on PET 8/2020 - could be pseudoprogression, continued  -11/2020: local progression in right neck    12/2020: palliative RT to right neck (3750 cGy/15)    1/2021: palpable right jaw/parotid lesion, PET+. Remainder of right neck disease improved after RT and lung mets stable. US-guided biopsy right parotid lesion attempted, but no lesion seen to biopsy so cancelled.      Physical Exam    GENERAL: Alert and oriented to time place and person. Seated comfortably. In no distress.  HEAD: Atraumatic and normocephalic. No alopecia.  EYES: COSMO, EOMI. No erythema. No icterus.  ORAL CAVITY: Moist. No mucosal lesion or tonsillar enlargement.  NECK: Radiation fibrosis in right neck, minimal. Right parotid nodule resolved since last visit.  LYMPH NODES: No palpable discrete supraclavicular, cervical lymphadenopathy.   CHEST: clear to auscultation bilaterally. Resonant to percussion throughout bilaterally. Symmetrical breath movements bilaterally.  CVS: S1 and S2 are heard. Regular rate and rhythm. No murmur or gallop or rub heard.  ABDOMEN: Soft. Not tender. Not distended. No palpable hepatomegaly or splenomegaly. No other mass palpable. Bowel sounds present.  EXTREMITIES: Warm. No peripheral edema.  SKIN: no rash, or bruising or purpura.   NEURO: No gross deficit noted. Non-antalgic gait.      Lab Results    CMP WNL  TSH pending    Imaging    None  (Last PET 1/21/21; US right parotid negative 2/8/21)    Assessment/Plan  1. 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   Labwork unremarkable.  No clinical evidence of progression. Right cervical nodes improved after RT.    Plan:  -Proceed with Keytruda cycle today.  -Continue Keytruda every 3 weeks, as tolerated, in [place] (will establish with Dr. Menon in [place])  -If here, we would plan a repeat PET scan in 9-12 weeks (about 4 months since his January scan). This can be done in [place]; or, if otherwise    Occupational History   • Not on file   Tobacco Use   • Smoking status: Every Day     Current packs/day: 0.50     Types: Cigarettes   • Smokeless tobacco: Never   Vaping Use   • Vaping status: never used     Passive vaping exposure: Yes   Substance and Sexual Activity   • Alcohol use: Not Currently   • Drug use: Not Currently     Types: Heroin, Opioids, Marijuana     Comment: [facility]    • Sexual activity: Yes   Other Topics Concern   • Not on file   Social History Narrative    Pt lives with his fiance. He has two adult daughters. (info as of 10/7/19)        Pt's significant other passed away on 9/12/20 (info as of 11/10/20)     Social Determinants of Health     Financial Resource Strain: Low Risk  (2/7/2022)    Financial Resource Strain    • Social Determinants: Financial Resource Strain: None   Food Insecurity: No Food Insecurity (2/7/2022)    Food Insecurity    • Social Determinants: Food Insecurity: Rarely   Transportation Needs: No Transportation Needs (2/7/2022)    PRAPARE - Transportation    • Lack of Transportation (Medical): No    • Lack of Transportation (Non-Medical): No   Physical Activity: Inactive (12/29/2022)    Exercise Vital Sign    • Days of Exercise per Week: 0 days    • Minutes of Exercise per Session: 0 min   Stress: Low Risk  (2/7/2022)    Stress    • Social Determinants: Stress: A little bit   Social Connections: Somewhat Isolated (2/7/2022)    Social Connections    • Social Determinants: Social Connections: 1 or 2 times a week   Intimate Partner Violence: Not At Risk (11/11/2022)    Intimate Partner Violence    • Social Determinants: Intimate Partner Violence Past Fear: No    • Social Determinants: Intimate Partner Violence Current Fear: No       MEDICATIONS  Current Outpatient Medications   Medication Sig   • bisacodyl (Dulcolax) 5 MG EC tablet Take both tablets at 10 PM the night before your colonoscopy.   • Sodium Sulfate-Mag Sulfate-KCl (Sutab) 3561-732-918 MG Tab Take 12  "clinically stable, could be deferred until his return to [place].   -His plan is to return to [place] in 3-4 months, but no specific date. He will notify us 3-4 weeks ahead of his known return to establish follow-up and ongoing care. Since Dr. Chen has left the practice, he will need to re-establish with a new primary oncologist.    2.   Right parotid lesion, resolved  This was likely inflammatory post-RT.    Billing  Total time:35 min; to include face to face, review of EMR, ordering/documetentation and coordination of care    Signed by: Janay Fierro NP    Oncology Rooming Note    March 18, 2021 9:53 AM   Newton Buchanan is a 75 year old male who presents for:    Chief Complaint   Patient presents with     Oncology Clinic Visit     6 week return Squamous cell carcinoma of oropharynx, review US & Labs      Initial Vitals: BP (!) 145/63 (BP Location: Left arm, Patient Position: Sitting, Cuff Size: Adult Large)   Pulse 63   Temp 97.3  F (36.3  C) (Oral)   Resp 18   Ht 1.821 m (5' 11.69\")   Wt 88.3 kg (194 lb 9.6 oz)   SpO2 97%   BMI 26.62 kg/m   Estimated body mass index is 26.62 kg/m  as calculated from the following:    Height as of this encounter: 1.821 m (5' 11.69\").    Weight as of this encounter: 88.3 kg (194 lb 9.6 oz). Body surface area is 2.11 meters squared.  No Pain (0) Comment: Data Unavailable   No LMP for male patient.  Allergies reviewed: Yes  Medications reviewed: Yes    Medications: Medication refills not needed today.  Pharmacy name entered into [employer]:    [id number] - [place], [place] - [address] AT [facility] [address]  [facility] - [place], [place] - [address]    Clinical concerns: 6 week return Squamous cell carcinoma of oropharynx, review US & Labs.  What is the status of tumor.       Sandy Carrillo, [facility]                  Again, thank you for allowing me to participate in the care of your patient.        Sincerely,        Janay Fierro, " NP     tablets by mouth every 10 hours.   • albuterol 108 (90 Base) MCG/ACT inhaler Inhale 2 puffs into the lungs every 4 hours as needed for Shortness of Breath or Wheezing.   • carvedilol (COREG) 6.25 MG tablet Take 1 tablet by mouth in the morning and 1 tablet in the evening. Take with meals.   • pantoprazole (PROTONIX) 40 MG tablet Take 1 tablet by mouth daily. .   • buPROPion (WELLBUTRIN SR) 150 MG 12 hr tablet Take 1 tablet by mouth 2 times daily.   • amLODIPine (NORVASC) 5 MG tablet Take 1 tablet by mouth daily.   • mirtazapine (REMERON) 30 MG tablet 1 tab once daily for with dinner for sleep- increase from 15mg on 9/1/21- do NOT take with any med that makes you sleepy   • apixaBAN (ELIQUIS) 5 MG Tab Take 1 tablet by mouth every 12 hours.   • polyethylene glycol (MIRALAX) 17 g packet Take 17 g by mouth daily. Stir and dissolve powder in any 4 to 8 ounces of beverage, then drink.   • fluticasone propionate (FLOVENT HFA) 220 MCG/ACT inhaler Inhale 1 puff into the lungs in the morning and 1 puff in the evening.   • METHADONE HCL PO Take 50 mg by mouth daily.   • naLOXone (NARCAN) 4 MG/0.1ML nasal spray Spray the content of 1 device into 1 nostril. Call 911. May repeat with 2nd device in alternate nostril if no response in 2-3 minutes.     No current facility-administered medications for this visit.       ALLERGIES  ALLERGIES:  No Known Allergies      REVIEW OF SYSTEMS    Psychosocial assessment was obtained. Intervention was not necessary.    ECOG Performance Status: 1 - No physically strenuous activity, but ambulatory and able to carry out light or sedentary work.    Pain Scale:   Vitals:    05/09/23 0849   PainSc: 6        Complete review of systems is Negative except stated in HPI    VITALS  Visit Vitals  /83 (BP Location: LUE - Left upper extremity, Patient Position: Sitting, Cuff Size: Regular)   Pulse 85   Temp 98.5 °F (36.9 °C) (Oral)   Ht 6' (1.829 m)   Wt 63.3 kg (139 lb 10.6 oz)   SpO2 97%   BMI 18.94  kg/m²     Body Surface Area: Body surface area is 1.83 meters squared.    PHYSICAL EXAM  Physical Exam  Vitals reviewed.   Constitutional:       General: He is not in acute distress.     Comments: thin   HENT:      Head: Normocephalic and atraumatic.      Nose: No congestion or rhinorrhea.      Mouth/Throat:      Mouth: Mucous membranes are moist.      Neck: Normal range of motion and neck supple.   Eyes:      General: No scleral icterus.        Right eye: No discharge.         Left eye: No discharge.      Extraocular Movements: Extraocular movements intact.   Cardiovascular:      Rate and Rhythm: Normal rate and regular rhythm.      Pulses: Normal pulses.      Heart sounds: Normal heart sounds. No murmur heard.     No friction rub. No gallop.   Pulmonary:      Effort: Pulmonary effort is normal. No respiratory distress.      Breath sounds: Normal breath sounds. No stridor.   Abdominal:      General: Abdomen is flat. Bowel sounds are normal. There is no distension.      Palpations: Abdomen is soft. There is no mass.   Musculoskeletal:         General: No swelling or tenderness. Normal range of motion.   Skin:     General: Skin is warm and dry.      Coloration: Skin is not jaundiced or pale.   Neurological:      General: No focal deficit present.      Mental Status: He is alert and oriented to person, place, and time.   Psychiatric:         Mood and Affect: Mood normal.         Behavior: Behavior normal.         LABS  Recent Labs   Lab 04/05/23  1521 03/24/23  0926 11/23/22  1112   WBC 6.5 5.9 10.1   HGB 12.7* 14.3 11.2*   HCT 39.4 45.4 35.0*   MCV 82.6 85.8 86.6    289 433   Absolute Neutrophils 2.8 2.6 6.2     Recent Labs   Lab 04/05/23  1521 11/23/22  1112 11/15/22  0741 11/13/22  0855 11/12/22  0555   Sodium 142 137 135   < > 135   Potassium 4.5 4.5 4.2   < > 4.2   Chloride 111* 102 100   < > 100   BUN 19 16 17   < > 19   Creatinine 1.01 0.79 0.68   < > 0.66*   Glucose 95 93 104*   < > 149*   Calcium 9.5  9.0 8.7   < > 8.5   Albumin 3.6 2.7*  --   --  2.3*   Globulin 3.8 4.6*  --   --  5.1*   Bilirubin, Total 0.2 0.4  --   --  0.6   GOT/AST 41* 28  --   --  19   Alkaline Phosphatase 167* 134*  --   --  124*   GPT 38 37  --   --  11    < > = values in this interval not displayed.     Recent Labs   Lab 11/12/22  0555 11/11/22  0257   LD, Total 205 136     Cytology, Fine Needle Aspirate: [id number]  Order: [id number]   Collected 3/27/2023 09:35     Status: Final result     Visible to patient: No (inaccessible in Patient Portal)     0 Result Notes       Component  Resulting Agency   Cytologic Interpretation   Liver, right; FNA, ThinPrep, and core biopsy:   -Satisfactory specimen.  -Positive for malignancy.  -Hepatocellular carcinoma, trabecular type, moderately differentiated.  -See comment.     Comment:   Immunoperoxidase study, performed on block A3 (core biopsy), shows the tumor cells to be positive for arginase, glypican-3, and HepPar 1 while negative for keratin AE1/AE3.  Reticulin stain shows the abnormal reticulin framework surrounding the hepatocellular carcinoma nests.  These findings are consistent with a hepatocellular carcinoma.  The liver, immediately adjacent to the tumors, shows intense lymphocytic portal inflammation with portal fibrosis and expansion of the portal areas with occasional bridging fibrosis, however these changes may be due to the presence of the adjacent tumor masses.  Trichrome stain, and reticulin stain confirm the relative preservation of the hepatic architecture with portal fibrosis and occasional expansion of the portal tracts. PAS and PAS-D show no evidence of alpha-1 antitrypsin deficiency.  Iron stain is negative.  A significant, dense, small lymphocytic infiltrate, is seen expanding the portal areas.  Immunoperoxidase study performed on block A2 (core biopsy), using CD3, CD20, CD5, CD23, Bcl-2, CD10, BCL6, cyclin D1, and Ki-67, shows no evidence of a lymphoproliferative disorder,  and is consistent with a reactive inflammatory infiltrate, most likely due to the presence of the adjacent tumor masses.  The biopsies of the benign liver parenchyma adjacent to the tumor nodule shows no convincing evidence of cirrhosis however appropriate clinical evaluation of the liver function is recommended if clinically indicated.  This case is peer-reviewed intradepartmentally.     Results are reported to Dr. Shaver, via [employer], on 4/4/2023, at 10:53 AM.     List of special procedures:  Immunoperoxidase study:  Block A2: CD3, CD20, CD5, CD23, Bcl-2, BCL6, CD10, cyclin D1, Ki-67.  Block A3 (core biopsy): Arginase, glypican-3, HepPar 1, monoclonal CEA, keratin AE1/AE3.     Special stains:  Block A3 (core biopsy): Reticulin  Block A2: Reticulin, trichrome, PAS, PAS-D, iron.          ASSESSMENT/PLAN  Rja Spencer is a 69 year old male    Hepatocellular carcinoma  -advanced with multiple lesions.   -I have discussed starting atezolizumab and bevacizumab with the patient.  -I have also discussed getting new MRI of liver to restage for better comparison after starting treatment. Treatment will start however given the time it has taken to get all these things completed.     -Intent of Treatment: palliative. The natural history of this disease and prognosis in the absence of therapy was discussed with the patient and understanding was verbalized. The impact of therapy on the course of disease and prognosis was then reviewed and an understanding of the benefits of each therapy were confirmed.  We subsequently reviewed the short-term and long-terms toxicities/side-effects of the treatment options presented and the patient acknowledged the concept of balancing the risks and benefits of the options, including the option for observation or no treatment. After jointly selecting a plan of care, we reviewed the risk/benefit considerations and schedule for the selected treatment plan. Understanding was confirmed by the  “read-back” technique. Informed consent signed.      Hep C  Hep B    Anemia, unspecified type  - AOCD and iron def     ORDERS  Orders Placed This Encounter   • MRI LIVER W WO CONTRAST   • IR CHEST PORT LINE INSERTION AGE 5 OR OLDER   • SERVICE TO OUTPATIENT PALLIATIVE CARE NON HOSPICE     FOLLOW UP  Return in about 4 weeks (around 6/6/2023).    Above plan was discussed with patient and family and all pertinent questions were answered. At the end of the evaluation, the patient was asked if all complaints had been addressed today to his satisfaction and he responded affirmatively.    Thank you for allowing me to participate in your patient's healthcare management. Please feel free to contact me with any questions.    Sincerely,  Maria Antonia Bradford MD    This is a medical document intended as peer to peer communication. It is written in medical language and may contain abbreviations or verbiage that are unfamiliar. It may appear blunt or direct. This note may have been transcribed using a voice dictation system. Voice recognition errors may occur. This should not be taken to alter the content or meaning of this note.

## 2023-05-10 ENCOUNTER — ONCOLOGY VISIT (OUTPATIENT)
Dept: ONCOLOGY | Facility: CLINIC | Age: 78
End: 2023-05-10
Attending: NURSE PRACTITIONER
Payer: MEDICARE

## 2023-05-10 ENCOUNTER — APPOINTMENT (OUTPATIENT)
Dept: LAB | Facility: CLINIC | Age: 78
End: 2023-05-10
Payer: MEDICARE

## 2023-05-10 ENCOUNTER — INFUSION THERAPY VISIT (OUTPATIENT)
Dept: INFUSION THERAPY | Facility: CLINIC | Age: 78
End: 2023-05-10
Attending: NURSE PRACTITIONER
Payer: MEDICARE

## 2023-05-10 VITALS
DIASTOLIC BLOOD PRESSURE: 66 MMHG | SYSTOLIC BLOOD PRESSURE: 138 MMHG | WEIGHT: 189 LBS | RESPIRATION RATE: 16 BRPM | BODY MASS INDEX: 26.46 KG/M2 | TEMPERATURE: 98.3 F | HEART RATE: 68 BPM | OXYGEN SATURATION: 97 % | HEIGHT: 71 IN

## 2023-05-10 VITALS — SYSTOLIC BLOOD PRESSURE: 135 MMHG | HEART RATE: 55 BPM | DIASTOLIC BLOOD PRESSURE: 73 MMHG

## 2023-05-10 DIAGNOSIS — C76.0 HEAD AND NECK CANCER (H): ICD-10-CM

## 2023-05-10 DIAGNOSIS — C10.9 OROPHARYNGEAL CANCER (H): Primary | ICD-10-CM

## 2023-05-10 DIAGNOSIS — C76.0 HEAD AND NECK CANCER (H): Primary | ICD-10-CM

## 2023-05-10 LAB
ALBUMIN SERPL BCG-MCNC: 3.6 G/DL (ref 3.5–5.2)
ALP SERPL-CCNC: 51 U/L (ref 40–129)
ALT SERPL W P-5'-P-CCNC: 24 U/L (ref 10–50)
ANION GAP SERPL CALCULATED.3IONS-SCNC: 12 MMOL/L (ref 7–15)
AST SERPL W P-5'-P-CCNC: 27 U/L (ref 10–50)
BILIRUB SERPL-MCNC: 0.6 MG/DL
BUN SERPL-MCNC: 10.1 MG/DL (ref 8–23)
CALCIUM SERPL-MCNC: 9.3 MG/DL (ref 8.8–10.2)
CHLORIDE SERPL-SCNC: 103 MMOL/L (ref 98–107)
CREAT SERPL-MCNC: 0.93 MG/DL (ref 0.67–1.17)
DEPRECATED HCO3 PLAS-SCNC: 24 MMOL/L (ref 22–29)
GFR SERPL CREATININE-BSD FRML MDRD: 85 ML/MIN/1.73M2
GLUCOSE SERPL-MCNC: 111 MG/DL (ref 70–99)
POTASSIUM SERPL-SCNC: 4 MMOL/L (ref 3.4–5.3)
PROT SERPL-MCNC: 6.1 G/DL (ref 6.4–8.3)
SODIUM SERPL-SCNC: 139 MMOL/L (ref 136–145)
TSH SERPL DL<=0.005 MIU/L-ACNC: 1.54 UIU/ML (ref 0.3–4.2)

## 2023-05-10 PROCEDURE — 99214 OFFICE O/P EST MOD 30 MIN: CPT | Performed by: NURSE PRACTITIONER

## 2023-05-10 PROCEDURE — G0463 HOSPITAL OUTPT CLINIC VISIT: HCPCS | Mod: 25 | Performed by: NURSE PRACTITIONER

## 2023-05-10 PROCEDURE — 258N000003 HC RX IP 258 OP 636: Performed by: NURSE PRACTITIONER

## 2023-05-10 PROCEDURE — 36415 COLL VENOUS BLD VENIPUNCTURE: CPT | Performed by: NURSE PRACTITIONER

## 2023-05-10 PROCEDURE — 250N000011 HC RX IP 250 OP 636: Performed by: NURSE PRACTITIONER

## 2023-05-10 PROCEDURE — 80053 COMPREHEN METABOLIC PANEL: CPT | Performed by: NURSE PRACTITIONER

## 2023-05-10 PROCEDURE — 96413 CHEMO IV INFUSION 1 HR: CPT

## 2023-05-10 PROCEDURE — 84443 ASSAY THYROID STIM HORMONE: CPT | Performed by: NURSE PRACTITIONER

## 2023-05-10 RX ORDER — EPINEPHRINE 1 MG/ML
0.3 INJECTION, SOLUTION, CONCENTRATE INTRAVENOUS EVERY 5 MIN PRN
Status: CANCELLED | OUTPATIENT
Start: 2023-05-30

## 2023-05-10 RX ORDER — MEPERIDINE HYDROCHLORIDE 25 MG/ML
25 INJECTION INTRAMUSCULAR; INTRAVENOUS; SUBCUTANEOUS EVERY 30 MIN PRN
Status: CANCELLED | OUTPATIENT
Start: 2023-05-30

## 2023-05-10 RX ORDER — SODIUM CHLORIDE 9 MG/ML
1000 INJECTION, SOLUTION INTRAVENOUS CONTINUOUS PRN
Status: CANCELLED
Start: 2023-05-10

## 2023-05-10 RX ORDER — MEPERIDINE HYDROCHLORIDE 25 MG/ML
25 INJECTION INTRAMUSCULAR; INTRAVENOUS; SUBCUTANEOUS EVERY 30 MIN PRN
Status: CANCELLED | OUTPATIENT
Start: 2023-05-10

## 2023-05-10 RX ORDER — ALBUTEROL SULFATE 90 UG/1
1-2 AEROSOL, METERED RESPIRATORY (INHALATION)
Status: CANCELLED
Start: 2023-05-30

## 2023-05-10 RX ORDER — ALBUTEROL SULFATE 0.83 MG/ML
2.5 SOLUTION RESPIRATORY (INHALATION)
Status: CANCELLED | OUTPATIENT
Start: 2023-05-30

## 2023-05-10 RX ORDER — LORAZEPAM 2 MG/ML
0.5 INJECTION INTRAMUSCULAR EVERY 4 HOURS PRN
Status: CANCELLED
Start: 2023-05-10

## 2023-05-10 RX ORDER — NALOXONE HYDROCHLORIDE 0.4 MG/ML
.1-.4 INJECTION, SOLUTION INTRAMUSCULAR; INTRAVENOUS; SUBCUTANEOUS
Status: CANCELLED | OUTPATIENT
Start: 2023-05-30

## 2023-05-10 RX ORDER — ALBUTEROL SULFATE 90 UG/1
1-2 AEROSOL, METERED RESPIRATORY (INHALATION)
Status: CANCELLED
Start: 2023-05-10

## 2023-05-10 RX ORDER — EPINEPHRINE 1 MG/ML
0.3 INJECTION, SOLUTION, CONCENTRATE INTRAVENOUS EVERY 5 MIN PRN
Status: CANCELLED | OUTPATIENT
Start: 2023-05-10

## 2023-05-10 RX ORDER — LORAZEPAM 2 MG/ML
0.5 INJECTION INTRAMUSCULAR EVERY 4 HOURS PRN
Status: CANCELLED
Start: 2023-05-30

## 2023-05-10 RX ORDER — SODIUM CHLORIDE 9 MG/ML
1000 INJECTION, SOLUTION INTRAVENOUS CONTINUOUS PRN
Status: CANCELLED
Start: 2023-05-30

## 2023-05-10 RX ORDER — METHYLPREDNISOLONE SODIUM SUCCINATE 125 MG/2ML
125 INJECTION, POWDER, LYOPHILIZED, FOR SOLUTION INTRAMUSCULAR; INTRAVENOUS
Status: CANCELLED
Start: 2023-05-10

## 2023-05-10 RX ORDER — DIPHENHYDRAMINE HYDROCHLORIDE 50 MG/ML
50 INJECTION INTRAMUSCULAR; INTRAVENOUS
Status: CANCELLED
Start: 2023-05-10

## 2023-05-10 RX ORDER — DIPHENHYDRAMINE HYDROCHLORIDE 50 MG/ML
50 INJECTION INTRAMUSCULAR; INTRAVENOUS
Status: CANCELLED
Start: 2023-05-30

## 2023-05-10 RX ORDER — NALOXONE HYDROCHLORIDE 0.4 MG/ML
.1-.4 INJECTION, SOLUTION INTRAMUSCULAR; INTRAVENOUS; SUBCUTANEOUS
Status: CANCELLED | OUTPATIENT
Start: 2023-05-10

## 2023-05-10 RX ORDER — ALBUTEROL SULFATE 0.83 MG/ML
2.5 SOLUTION RESPIRATORY (INHALATION)
Status: CANCELLED | OUTPATIENT
Start: 2023-05-10

## 2023-05-10 RX ORDER — METHYLPREDNISOLONE SODIUM SUCCINATE 125 MG/2ML
125 INJECTION, POWDER, LYOPHILIZED, FOR SOLUTION INTRAMUSCULAR; INTRAVENOUS
Status: CANCELLED
Start: 2023-05-30

## 2023-05-10 RX ADMIN — SODIUM CHLORIDE 200 MG: 9 INJECTION, SOLUTION INTRAVENOUS at 11:40

## 2023-05-10 RX ADMIN — SODIUM CHLORIDE 250 ML: 9 INJECTION, SOLUTION INTRAVENOUS at 11:25

## 2023-05-10 ASSESSMENT — PAIN SCALES - GENERAL: PAINLEVEL: NO PAIN (0)

## 2023-05-10 NOTE — LETTER
"    5/10/2023         RE: Newton Buchanan  [address]  [facility] [address]        Dear Colleague,    Thank you for referring your patient, Newton Buchanan, to the [facility]. Please see a copy of my visit note below.    Oncology Rooming Note    May 10, 2023 10:26 AM   Newton Buchanan is a 77 year old male who presents for:    Chief Complaint   Patient presents with     Oncology Clinic Visit     Head and neck cancer - Labs provider and infusion     Initial Vitals: /66 (BP Location: Right arm, Patient Position: Sitting, Cuff Size: Adult Regular)   Pulse 68   Temp 98.3  F (36.8  C) (Oral)   Resp 16   Ht 1.81 m (5' 11.25\")   Wt 85.7 kg (189 lb)   SpO2 97%   BMI 26.18 kg/m   Estimated body mass index is 26.18 kg/m  as calculated from the following:    Height as of this encounter: 1.81 m (5' 11.25\").    Weight as of this encounter: 85.7 kg (189 lb). Body surface area is 2.08 meters squared.  No Pain (0) Comment: Data Unavailable   No LMP for male patient.  Allergies reviewed: Yes  Medications reviewed: Yes    Medications: Medication refills not needed today.  Pharmacy name entered into [employer]:    [facility] #[id number] - [address] AT [address] HOME DELIVERY - [address]    Clinical concerns:  None      Meli Cornejo, PAUL              [facility] Hematology and Oncology Progress Note    Patient: Newton Buchanan  MRN: [id number]  May 10, 2023          Reason for Visit    1. 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 Ruiz    _____________________________________________________________________________    History of Present Illness/ Interval History    Mr. 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 He had first line  Keytruda x 2 yrs through " 7/2022, resulting in CR. He was on treatment holidayx 5 months until 12/2022 when he had recurrence in solitary subcarinal LN by PET. He resumed Keytruda every 3 weeks. Repeat PET scan in March showed near CR. Returns for 6 week re-evaluation.    He is tolerating immunotherapy very well.  Mild taste changes, generally good appetite and pretty stable weight.   No diarrhea, dyspnea, rash. Mild pruritis/dry skin on right back.   No new head/neck symptoms. No new pain.      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 FNAs benign. Followed.   -2/2020: progressive right neck swelling, no other symptoms.   -2/11/20 FNA biosy right neck mass: metastatic squamous cell cancer, HPV16+  -PET: hypermetabolic right cervical adenopathy, numerous bilateral lung mets, right hilar nodes.   -No evident primary site identified, but highly suspicious of H/N given IHC staining and radiographical findings/spread  -Neogenomic testing - CPS 70    Treatment:  5/2020 - 7/2022: Keytruda (initiated in [place], then transferred care to [place]). Completed 2 yrs of immunotherapy, resulted in CR by PET     11/2020: local progression in right neck only  -12/2020: palliative RT to right neck (3750 cGy/15)    1/2021: palpable right jaw/parotid lesion, PET+. Remainder of right neck disease improved after RT and lung mets stable. US-guided biopsy right parotid lesion attempted, but no lesion seen to biopsy so cancelled.    7/2022 - 12/2022: observation, treatment holiday until progression.   -12/2022 PET/CT: solitary recurrence in subcarinal LN    12/12/2022- present: Keytruda resumed      Physical Exam    GENERAL: Alert and oriented to time place and person. Seated comfortably. In no distress. Alone.  HEAD: Atraumatic and normocephalic. No alopecia.  EYES: COSMO, EOMI. No erythema. No icterus.  LYMPH NODES:  No palpable cervical nor supraclavicular  nodes.  CHEST: regular respiratory effort. Clear lung sounds bilaterally.  SKIN: no rash, or bruising or purpura.   NEURO: No gross deficit noted.       Lab Results    CMP WNL  TSH pending    Imaging    PET: Since 12/2022, near CR in subcarinal LN. No new cancer noted.     Assessment/Plan  1. 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 In December, subcarinal LN recurrence by PET was noted so he resumed Keytruda every 3 weeks at that time. He's getting every 3 weeks, with excellent tolerance.    Labwork unremarkable.    Last PET showed near CR; interval resolution of prior subcarinal LN and no new sites of disease.    He may be interested in taking a treatment break over the summer if next PET scan shows continued response to do some travelling.     Plan:  -Continue with Keytruda every 3 weeks  -Repeat PET scan in 6 weeks with MD visit (establish new MD since Dr. Ruiz has left the practice). At that time, if cancer still under good control, he wants to consider a treatment holiday over the summer for traveling. I also discussed the option of transitioning to 400 mg every 6 week dosing maintenance option, which he thinks could also work for him.    2.   Osteoporosis  Managed by PCP and Endocrinologist.      Billing  Total time 35 minutes, to include face to face visit, review of EMR, ordering, documentation and coordination of care on date of service        Signed by: Janay Fierro NP      Again, thank you for allowing me to participate in the care of your patient.        Sincerely,        Janay Fan NP

## 2023-05-10 NOTE — PROGRESS NOTES
[facility] Hematology and Oncology Progress Note    Patient: Newton Buchanan  MRN: [id number]  May 10, 2023          Reason for Visit    1. 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 Ruiz    _____________________________________________________________________________    History of Present Illness/ Interval History    Mr. 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 He had first line  Keytruda x 2 yrs through 7/2022, resulting in CR. He was on treatment holidayx 5 months until 12/2022 when he had recurrence in solitary subcarinal LN by PET. He resumed Keytruda every 3 weeks. Repeat PET scan in March showed near CR. Returns for 6 week re-evaluation.    He is tolerating immunotherapy very well.  Mild taste changes, generally good appetite and pretty stable weight.   No diarrhea, dyspnea, rash. Mild pruritis/dry skin on right back.   No new head/neck symptoms. No new pain.      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 FNAs benign. Followed.   -2/2020: progressive right neck swelling, no other symptoms.   -2/11/20 FNA biosy right neck mass: metastatic squamous cell cancer, HPV16+  -PET: hypermetabolic right cervical adenopathy, numerous bilateral lung mets, right hilar nodes.   -No evident primary site identified, but highly suspicious of H/N given IHC staining and radiographical findings/spread  -Neogenomic testing - CPS 70    Treatment:  5/2020 - 7/2022: Keytruda (initiated in [place], then transferred care to [place]). Completed 2 yrs of immunotherapy, resulted in CR by PET     11/2020: local progression in right neck only  -12/2020: palliative RT to right neck (3750 cGy/15)    1/2021: palpable right jaw/parotid lesion, PET+. Remainder of right neck disease improved  after RT and lung mets stable. US-guided biopsy right parotid lesion attempted, but no lesion seen to biopsy so cancelled.    7/2022 - 12/2022: observation, treatment holiday until progression.   -12/2022 PET/CT: solitary recurrence in subcarinal LN    12/12/2022- present: Keytruda resumed      Physical Exam    GENERAL: Alert and oriented to time place and person. Seated comfortably. In no distress. Alone.  HEAD: Atraumatic and normocephalic. No alopecia.  EYES: COSMO, EOMI. No erythema. No icterus.  LYMPH NODES:  No palpable cervical nor supraclavicular nodes.  CHEST: regular respiratory effort. Clear lung sounds bilaterally.  SKIN: no rash, or bruising or purpura.   NEURO: No gross deficit noted.       Lab Results    CMP WNL  TSH pending    Imaging    PET: Since 12/2022, near CR in subcarinal LN. No new cancer noted.     Assessment/Plan  1. 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 In December, subcarinal LN recurrence by PET was noted so he resumed Keytruda every 3 weeks at that time. He's getting every 3 weeks, with excellent tolerance.    Labwork unremarkable.    Last PET showed near CR; interval resolution of prior subcarinal LN and no new sites of disease.    He may be interested in taking a treatment break over the summer if next PET scan shows continued response to do some travelling.     Plan:  -Continue with Keytruda every 3 weeks  -Repeat PET scan in 6 weeks with MD visit (establish new MD since Dr. Ruiz has left the practice). At that time, if cancer still under good control, he wants to consider a treatment holiday over the summer for traveling. I also discussed the option of transitioning to 400 mg every 6 week dosing maintenance option, which he thinks could also work for him.    2.   Osteoporosis  Managed by PCP and Endocrinologist.      Billing  Total time 35 minutes, to include face to  face visit, review of EMR, ordering, documentation and coordination of care on date of service        Signed by: Janay Fierro NP

## 2023-05-10 NOTE — PROGRESS NOTES
Infusion Nursing Note:  Newton Buchanan presents today for Keytruda.    Patient seen by provider today: Yes: Janay Fan NP   present during visit today: Not Applicable.    Note: N/A.      Intravenous Access:  Peripheral IV placed.    Treatment Conditions:  Results reviewed, labs MET treatment parameters, ok to proceed with treatment.      Post Infusion Assessment:  Patient tolerated infusion without incident.  Blood return noted pre and post infusion.  Site patent and intact, free from redness, edema or discomfort.  No evidence of extravasations.  Access discontinued per protocol.       Discharge Plan:   Patient discharged in stable condition accompanied by: self.  Departure Mode: Ambulatory.  Pt was given new treatment schedule upon discharge.      Alejandra Monaco RN

## 2023-05-10 NOTE — PROGRESS NOTES
"Oncology Rooming Note    May 10, 2023 10:26 AM   Newton Buchanan is a 77 year old male who presents for:    Chief Complaint   Patient presents with     Oncology Clinic Visit     Head and neck cancer - Labs provider and infusion     Initial Vitals: /66 (BP Location: Right arm, Patient Position: Sitting, Cuff Size: Adult Regular)   Pulse 68   Temp 98.3  F (36.8  C) (Oral)   Resp 16   Ht 1.81 m (5' 11.25\")   Wt 85.7 kg (189 lb)   SpO2 97%   BMI 26.18 kg/m   Estimated body mass index is 26.18 kg/m  as calculated from the following:    Height as of this encounter: 1.81 m (5' 11.25\").    Weight as of this encounter: 85.7 kg (189 lb). Body surface area is 2.08 meters squared.  No Pain (0) Comment: Data Unavailable   No LMP for male patient.  Allergies reviewed: Yes  Medications reviewed: Yes    Medications: Medication refills not needed today.  Pharmacy name entered into [facility]:    [facility][id number] - [place], [place] - [address] AT [facility] [place] [address] [place] [address]  [facility] - [facility], [place] - [address]    Clinical concerns:  None      Meli Cornejo CMA            "

## 2023-05-22 ENCOUNTER — TELEPHONE (OUTPATIENT)
Dept: OTOLARYNGOLOGY | Facility: CLINIC | Age: 78
End: 2023-05-22
Payer: MEDICARE

## 2023-05-22 NOTE — TELEPHONE ENCOUNTER
Reason for Call:  Other appointment    Detailed comments: Patient calling requesting sooner appt. with Dr. Kelly as patient states he may have had some exposure to Round-up spray. Patient having severe issues with mucus and deep throat coughing. Patient states he has been seen for Dr. Kelly in the past and has been treated through oncology with KEYTRUDA. Please advise.     Phone Number Patient can be reached at: Home number on file [phone] (home)    Best Time: any    Can we leave a detailed message on this number? YES    Call taken on 5/22/2023 at 8:51 AM by Andrea Fairbanks

## 2023-05-22 NOTE — TELEPHONE ENCOUNTER
Hx of oropharyngeal cancer of unknown primary    Patient states he is having increased nasal/sinus congestion and irritation with drainage. Also noting worsening cough. Unsure if cough is related to PND or other concern. Atrovent not helpful. Would like to discuss options prior to leaving [facility]    Thanks,   Kim GARCIA RN  [facility]

## 2023-05-25 ENCOUNTER — OFFICE VISIT (OUTPATIENT)
Dept: OTOLARYNGOLOGY | Facility: CLINIC | Age: 78
End: 2023-05-25
Payer: MEDICARE

## 2023-05-25 ENCOUNTER — MYC MEDICAL ADVICE (OUTPATIENT)
Dept: OTOLARYNGOLOGY | Facility: CLINIC | Age: 78
End: 2023-05-25

## 2023-05-25 VITALS
DIASTOLIC BLOOD PRESSURE: 69 MMHG | SYSTOLIC BLOOD PRESSURE: 120 MMHG | TEMPERATURE: 98.4 F | HEART RATE: 58 BPM | OXYGEN SATURATION: 97 %

## 2023-05-25 DIAGNOSIS — Z92.3 HISTORY OF HEAD AND NECK RADIATION: ICD-10-CM

## 2023-05-25 DIAGNOSIS — Z77.098 CHEMICAL EXPOSURE: ICD-10-CM

## 2023-05-25 DIAGNOSIS — Z00.00 NORMAL THROAT EXAM: ICD-10-CM

## 2023-05-25 DIAGNOSIS — Z85.89 ENCOUNTER FOR FOLLOW-UP SURVEILLANCE OF HEAD AND NECK CANCER: ICD-10-CM

## 2023-05-25 DIAGNOSIS — J30.0 VASOMOTOR RHINITIS: ICD-10-CM

## 2023-05-25 DIAGNOSIS — R09.81 NASAL CONGESTION: Primary | ICD-10-CM

## 2023-05-25 DIAGNOSIS — Z08 ENCOUNTER FOR FOLLOW-UP SURVEILLANCE OF HEAD AND NECK CANCER: ICD-10-CM

## 2023-05-25 DIAGNOSIS — C79.89 SQUAMOUS CELL CARCINOMA METASTATIC TO HEAD AND NECK WITH UNKNOWN PRIMARY SITE (H): ICD-10-CM

## 2023-05-25 DIAGNOSIS — C44.92 SQUAMOUS CELL CARCINOMA METASTATIC TO HEAD AND NECK WITH UNKNOWN PRIMARY SITE (H): ICD-10-CM

## 2023-05-25 DIAGNOSIS — R09.82 POST-NASAL DRAINAGE: ICD-10-CM

## 2023-05-25 PROCEDURE — 99213 OFFICE O/P EST LOW 20 MIN: CPT | Performed by: OTOLARYNGOLOGY

## 2023-05-25 RX ORDER — TRIAMCINOLONE ACETONIDE 40 MG/ML
80 INJECTION, SUSPENSION INTRA-ARTICULAR; INTRAMUSCULAR ONCE
Status: COMPLETED | OUTPATIENT
Start: 2023-05-25 | End: 2023-05-25

## 2023-05-25 RX ADMIN — TRIAMCINOLONE ACETONIDE 40 MG: 40 INJECTION, SUSPENSION INTRA-ARTICULAR; INTRAMUSCULAR at 14:17

## 2023-05-25 NOTE — NURSING NOTE
"Chief Complaint   Patient presents with     RECHECK     Increased throat irritation, cough and nasal discomfort        Initial /69 (BP Location: Left arm, Patient Position: Chair, Cuff Size: Adult Regular)   Pulse 58   Temp 98.4  F (36.9  C) (Tympanic)   SpO2 97%  Estimated body mass index is 26.18 kg/m  as calculated from the following:    Height as of 5/10/23: 1.81 m (5' 11.25\").    Weight as of 5/10/23: 85.7 kg (189 lb).  BP completed using cuff size: regular   Medications and allergies reviewed.      Brittany HUTCHINSON CMA     "

## 2023-05-25 NOTE — LETTER
5/25/2023         RE: Newton Buchanan  [address]  [place] [address]        Dear Colleague,    Thank you for referring your patient, Newton Buchanan, to the [facility]. Please see a copy of my visit note below.    Chief Complaint   Patient presents with     RECHECK     Increased throat irritation, cough and nasal discomfort      History of Present Illness  Newton Buchanan is a 77 year old male who presents today for follow up.  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  The patient had richard disease in the neck and lung metastasis.  He was last seen on 3/13/2023 for oncologic surveillance.  He has been doing quite well with treatment and does not have any signs of recurrent or persistent disease on his follow-up imaging.  The patient contacted me with new symptoms involving sinus issues, postnasal drainage, and cough.  He presents today for further evaluation.    From a symptom standpoint, the patient describes developing symptoms of upper nasal congestion, throat congestion, postnasal drainage, and cough after getting exposed to some garden chemical ([place]) when they were tilling and working on the field.  When the patient made the appointment, his symptoms were bit worse but now his symptoms seem to be improving.  He has had symptoms over the past 2 weeks or so.  The cough is improving, the postnasal drainage is improving some.  He still is having some mild nasal congestion.  No history of nose or sinus surgery.      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)     Age-related  osteoporosis without current pathological fracture     Allergic rhinitis     Benign neoplasm of colon     Diverticula, colon     Diverticular disease     Impotence of organic origin     Hyperlipidemia     Osteoporosis     Peripheral neuropathy     Current Medications    Current Outpatient Medications:      aspirin 81 MG tablet, Take by mouth daily, Disp: 30 tablet, Rfl:      Calcium Carb-Cholecalciferol (CALCIUM 600 + D PO), Take 1 tablet by mouth daily, Disp: , Rfl:      fluticasone (FLONASE) 50 MCG/ACT nasal spray, Spray 1-2 sprays in nostril daily, Disp: 18.2 mL, Rfl: 3     ipratropium (ATROVENT) 0.06 % nasal spray, Spray 2 sprays into both nostrils 4 times daily as needed for rhinitis (runny nose), Disp: 15 mL, Rfl: 3     losartan (COZAAR) 25 MG tablet, Take 1 tablet (25 mg) by mouth daily, Disp: 90 tablet, Rfl: 3     pembrolizumab (KEYTRUDA) 25 MG/ML, , Disp: , Rfl:      pravastatin (PRAVACHOL) 20 MG tablet, Take 1 tablet (20 mg) by mouth daily, Disp: 90 tablet, Rfl: 3     sildenafil (VIAGRA) 100 MG tablet, Take 0.5-1 tablets ( mg) by mouth daily as needed (at least 30 minutes prior to intercourse), Disp: 30 tablet, Rfl: 3     VITAMIN D, CHOLECALCIFEROL, PO, Take 1,000 Units by mouth daily, Disp: , Rfl:   No current facility-administered medications for this visit.    Allergies  Allergies   Allergen Reactions     Atorvastatin      Other reaction(s): Confusion  unusual behavior and dizziness         Social History  Social History     Socioeconomic History     Marital status: Single   Tobacco Use     Smoking status: Never     Smokeless tobacco: Never   Vaping Use     Vaping status: Never Used   Substance and Sexual Activity     Alcohol use: Yes     Comment: occas     Drug use: No     Sexual activity: Not Currently     Partners: Female     Birth control/protection: None   Other Topics Concern     Parent/sibling w/ CABG, MI or angioplasty before 65F 55M? No       Family History  Family History   Problem  Relation Age of Onset     Diabetes Type 1 Son          at 25     Diabetes Son      Breast Cancer Sister      Coronary Artery Disease No family hx of      Colon Cancer No family hx of        Review of Systems  As per HPI and PMHx, otherwise 10 system review including the head and neck, constitutional, eyes, respiratory, GI, skin, neurologic, lymphatic, endocrine, and allergy systems is negative.    Physical Exam  /69 (BP Location: Left arm, Patient Position: Chair, Cuff Size: Adult Regular)   Pulse 58   Temp 98.4  F (36.9  C) (Tympanic)   SpO2 97%   GENERAL: Patient is a pleasant, cooperative 77 year old male in no acute distress.  HEAD: Normocephalic, atraumatic.  Hair and scalp are normal.  EYES: Pupils are equal, round, reactive to light and accommodation.  Extraocular movements are intact.  The sclera nonicteric without injection.  The extraocular structures are normal.  EARS: Normal shape and symmetry.  No tenderness when palpating the mastoid or tragal areas bilaterally.    NOSE: Nares are patent.  Nasal mucosa is significantly dry with some crusting more so on the left-hand side.  The patient has a rightward nasal septal deviation anteriorly and mid septum.  No nasal cavity masses, polyps, or mucopurulence on anterior rhinoscopy.  ORAL CAVITY: Dentition is in good repair.  Mucous membranes are dry.  Tongue is mobile, protrudes to the midline.  Palate elevates symmetrically.  Tonsils are 1+, symmetric.  No erythema or exudate.  No oral cavity or oropharyngeal masses, lesions, ulcerations, leukoplakia.  NECK: Supple, trachea is midline. Palpation of the right neck does reveal some fullness in the right level 2A just anterior to sternocleidomastoid muscle adjacent to the posterior border of the submandibular gland. There no obvious palpable cervical lymphadenopathy or masses bilaterally. The bilateral parotid and submandibular areas reveal no masses.  No thyromegaly.    NEUROLOGIC: Cranial nerves II  through XII are grossly intact.  Voice is strong.  Patient is House-Brackmann I/VI bilaterally.  CARDIOVASCULAR: Extremities are warm and well-perfused.  No significant peripheral edema.  RESPIRATORY: Patient has nonlabored breathing without cough, wheeze, stridor.  PSYCHIATRIC: Patient is alert and oriented.  Mood and affect appear normal.  SKIN: Warm and dry.  No scalp, face, or neck lesions noted.    Assessment and Plan     ICD-10-CM    1. Nasal congestion  R09.81 triamcinolone (KENALOG-40) injection 80 mg      2. Post-nasal drainage  R09.82 triamcinolone (KENALOG-40) injection 80 mg      3. Chemical exposure  Z77.098 triamcinolone (KENALOG-40) injection 80 mg      4. Squamous cell carcinoma metastatic to head and neck with unknown primary site (H)  C79.89 triamcinolone (KENALOG-40) injection 80 mg    C80.1       5. History of head and neck radiation  Z92.3 triamcinolone (KENALOG-40) injection 80 mg      6. Encounter for follow-up surveillance of head and neck cancer  Z08 triamcinolone (KENALOG-40) injection 80 mg    Z85.89       7. Vasomotor rhinitis  J30.0 triamcinolone (KENALOG-40) injection 80 mg      8. Normal throat exam  Z00.00 triamcinolone (KENALOG-40) injection 80 mg         It was my pleasure seeing Newton Buchanan today in clinic.  The patient presents with recent symptoms of nasal congestion, throat congestion, chest congestion, postnasal drainage, and cough after exposure to a recently tilled field and some heart and chemical ([place]).  His symptoms seem to be slowly improving.  He is afebrile.  My suspicion for sinusitis or pneumonia would be quite low.  We did discuss observation given that he is slowly improving.  We also discussed a short burst of prednisone or Kenalog injection to help with some of the congestion and upper airway inflammatory issues.  After some discussion, the patient would be interested in a Kenalog injection.  He will receive an 80 mg injection of Kenalog today. We  discussed the risks, benefits, options, goals of a intramuscular Kenalog injection today in office including, but not limited to: Risk of pain at injection site, risk of infection, side effects of systemic steroids including blood pressure problems, insulin resistance, weight gain, bone/joint issues, mood alteration.  Patient voiced understanding and is willing to proceed.    We discussed continuing nasal saline irrigation and topical nasal steroid spray such as Flonase to help with symptoms.  He can use the Atrovent nasal spray as needed for symptoms.  I will send a message in 2 weeks to recheck his symptoms.      From a head and neck cancer standpoint, we plan to see the patient back in September 2023 for routine surveillance.    Migue Kelly MD  Department of Otolaryngology-Head and Neck Surgery  [facility]         Again, thank you for allowing me to participate in the care of your patient.        Sincerely,        Migue Kelly MD

## 2023-05-25 NOTE — PATIENT INSTRUCTIONS
Per physician instructions.    If you have questions or concerns on any instructions given to you by your provider today or if you need to schedule an appointment, you can reach us at [phone].  Listen to the menu for the Specialty Clinic option.      Thank you!

## 2023-05-25 NOTE — PROGRESS NOTES
Chief Complaint   Patient presents with     RECHECK     Increased throat irritation, cough and nasal discomfort      History of Present Illness  Newton Buchanan is a 77 year old male who presents today for follow up.  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  The patient had richard disease in the neck and lung metastasis.  He was last seen on 3/13/2023 for oncologic surveillance.  He has been doing quite well with treatment and does not have any signs of recurrent or persistent disease on his follow-up imaging.  The patient contacted me with new symptoms involving sinus issues, postnasal drainage, and cough.  He presents today for further evaluation.    From a symptom standpoint, the patient describes developing symptoms of upper nasal congestion, throat congestion, postnasal drainage, and cough after getting exposed to some garden chemical ([place]) when they were tilling and working on the field.  When the patient made the appointment, his symptoms were bit worse but now his symptoms seem to be improving.  He has had symptoms over the past 2 weeks or so.  The cough is improving, the postnasal drainage is improving some.  He still is having some mild nasal congestion.  No history of nose or sinus surgery.      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)     Age-related osteoporosis without current pathological fracture     Allergic rhinitis     Benign neoplasm of colon     Diverticula, colon     Diverticular disease     Impotence of organic origin     Hyperlipidemia     Osteoporosis     Peripheral neuropathy      Current Medications    Current Outpatient Medications:      aspirin 81 MG tablet, Take by mouth daily, Disp: 30 tablet, Rfl:      Calcium Carb-Cholecalciferol (CALCIUM 600 + D PO), Take 1 tablet by mouth daily, Disp: , Rfl:      fluticasone (FLONASE) 50 MCG/ACT nasal spray, Spray 1-2 sprays in nostril daily, Disp: 18.2 mL, Rfl: 3     ipratropium (ATROVENT) 0.06 % nasal spray, Spray 2 sprays into both nostrils 4 times daily as needed for rhinitis (runny nose), Disp: 15 mL, Rfl: 3     losartan (COZAAR) 25 MG tablet, Take 1 tablet (25 mg) by mouth daily, Disp: 90 tablet, Rfl: 3     pembrolizumab (KEYTRUDA) 25 MG/ML, , Disp: , Rfl:      pravastatin (PRAVACHOL) 20 MG tablet, Take 1 tablet (20 mg) by mouth daily, Disp: 90 tablet, Rfl: 3     sildenafil (VIAGRA) 100 MG tablet, Take 0.5-1 tablets ( mg) by mouth daily as needed (at least 30 minutes prior to intercourse), Disp: 30 tablet, Rfl: 3     VITAMIN D, CHOLECALCIFEROL, PO, Take 1,000 Units by mouth daily, Disp: , Rfl:   No current facility-administered medications for this visit.    Allergies  Allergies   Allergen Reactions     Atorvastatin      Other reaction(s): Confusion  unusual behavior and dizziness         Social History  Social History     Socioeconomic History     Marital status: Single   Tobacco Use     Smoking status: Never     Smokeless tobacco: Never   Vaping Use     Vaping status: Never Used   Substance and Sexual Activity     Alcohol use: Yes     Comment: occas     Drug use: No     Sexual activity: Not Currently     Partners: Female     Birth control/protection: None   Other Topics Concern     Parent/sibling w/ CABG, MI or angioplasty before 65F 55M? No       Family History  Family History   Problem Relation Age of Onset     Diabetes Type 1 Son          at 25     Diabetes Son      Breast Cancer Sister      Coronary Artery Disease No family hx of      Colon Cancer No family hx of        Review of Systems  As per HPI and PMHx, otherwise 10  system review including the head and neck, constitutional, eyes, respiratory, GI, skin, neurologic, lymphatic, endocrine, and allergy systems is negative.    Physical Exam  /69 (BP Location: Left arm, Patient Position: Chair, Cuff Size: Adult Regular)   Pulse 58   Temp 98.4  F (36.9  C) (Tympanic)   SpO2 97%   GENERAL: Patient is a pleasant, cooperative 77 year old male in no acute distress.  HEAD: Normocephalic, atraumatic.  Hair and scalp are normal.  EYES: Pupils are equal, round, reactive to light and accommodation.  Extraocular movements are intact.  The sclera nonicteric without injection.  The extraocular structures are normal.  EARS: Normal shape and symmetry.  No tenderness when palpating the mastoid or tragal areas bilaterally.    NOSE: Nares are patent.  Nasal mucosa is significantly dry with some crusting more so on the left-hand side.  The patient has a rightward nasal septal deviation anteriorly and mid septum.  No nasal cavity masses, polyps, or mucopurulence on anterior rhinoscopy.  ORAL CAVITY: Dentition is in good repair.  Mucous membranes are dry.  Tongue is mobile, protrudes to the midline.  Palate elevates symmetrically.  Tonsils are 1+, symmetric.  No erythema or exudate.  No oral cavity or oropharyngeal masses, lesions, ulcerations, leukoplakia.  NECK: Supple, trachea is midline. Palpation of the right neck does reveal some fullness in the right level 2A just anterior to sternocleidomastoid muscle adjacent to the posterior border of the submandibular gland. There no obvious palpable cervical lymphadenopathy or masses bilaterally. The bilateral parotid and submandibular areas reveal no masses.  No thyromegaly.    NEUROLOGIC: Cranial nerves II through XII are grossly intact.  Voice is strong.  Patient is House-Brackmann I/VI bilaterally.  CARDIOVASCULAR: Extremities are warm and well-perfused.  No significant peripheral edema.  RESPIRATORY: Patient has nonlabored breathing without  cough, wheeze, stridor.  PSYCHIATRIC: Patient is alert and oriented.  Mood and affect appear normal.  SKIN: Warm and dry.  No scalp, face, or neck lesions noted.    Assessment and Plan     ICD-10-CM    1. Nasal congestion  R09.81 triamcinolone (KENALOG-40) injection 80 mg      2. Post-nasal drainage  R09.82 triamcinolone (KENALOG-40) injection 80 mg      3. Chemical exposure  Z77.098 triamcinolone (KENALOG-40) injection 80 mg      4. Squamous cell carcinoma metastatic to head and neck with unknown primary site (H)  C79.89 triamcinolone (KENALOG-40) injection 80 mg    C80.1       5. History of head and neck radiation  Z92.3 triamcinolone (KENALOG-40) injection 80 mg      6. Encounter for follow-up surveillance of head and neck cancer  Z08 triamcinolone (KENALOG-40) injection 80 mg    Z85.89       7. Vasomotor rhinitis  J30.0 triamcinolone (KENALOG-40) injection 80 mg      8. Normal throat exam  Z00.00 triamcinolone (KENALOG-40) injection 80 mg         It was my pleasure seeing Newton Buchanan today in clinic.  The patient presents with recent symptoms of nasal congestion, throat congestion, chest congestion, postnasal drainage, and cough after exposure to a recently tilled field and some heart and chemical ([place]).  His symptoms seem to be slowly improving.  He is afebrile.  My suspicion for sinusitis or pneumonia would be quite low.  We did discuss observation given that he is slowly improving.  We also discussed a short burst of prednisone or Kenalog injection to help with some of the congestion and upper airway inflammatory issues.  After some discussion, the patient would be interested in a Kenalog injection.  He will receive an 80 mg injection of Kenalog today. We discussed the risks, benefits, options, goals of a intramuscular Kenalog injection today in office including, but not limited to: Risk of pain at injection site, risk of infection, side effects of systemic steroids including blood pressure problems,  insulin resistance, weight gain, bone/joint issues, mood alteration.  Patient voiced understanding and is willing to proceed.    We discussed continuing nasal saline irrigation and topical nasal steroid spray such as Flonase to help with symptoms.  He can use the Atrovent nasal spray as needed for symptoms.  I will send a message in 2 weeks to recheck his symptoms.      From a head and neck cancer standpoint, we plan to see the patient back in September 2023 for routine surveillance.    Migue Kelly MD  Department of Otolaryngology-Head and Neck Surgery  [facility]

## 2023-05-30 ENCOUNTER — LAB (OUTPATIENT)
Dept: LAB | Facility: CLINIC | Age: 78
End: 2023-05-30
Payer: MEDICARE

## 2023-05-30 ENCOUNTER — INFUSION THERAPY VISIT (OUTPATIENT)
Dept: INFUSION THERAPY | Facility: CLINIC | Age: 78
End: 2023-05-30
Attending: NURSE PRACTITIONER
Payer: MEDICARE

## 2023-05-30 VITALS
HEART RATE: 53 BPM | WEIGHT: 182.2 LBS | DIASTOLIC BLOOD PRESSURE: 69 MMHG | TEMPERATURE: 98 F | SYSTOLIC BLOOD PRESSURE: 134 MMHG | BODY MASS INDEX: 25.23 KG/M2

## 2023-05-30 DIAGNOSIS — C76.0 HEAD AND NECK CANCER (H): Primary | ICD-10-CM

## 2023-05-30 LAB
ALBUMIN SERPL BCG-MCNC: 3.6 G/DL (ref 3.5–5.2)
ALP SERPL-CCNC: 45 U/L (ref 40–129)
ALT SERPL W P-5'-P-CCNC: 21 U/L (ref 10–50)
ANION GAP SERPL CALCULATED.3IONS-SCNC: 7 MMOL/L (ref 7–15)
AST SERPL W P-5'-P-CCNC: 20 U/L (ref 10–50)
BILIRUB SERPL-MCNC: 0.7 MG/DL
BUN SERPL-MCNC: 12.4 MG/DL (ref 8–23)
CALCIUM SERPL-MCNC: 9.4 MG/DL (ref 8.8–10.2)
CHLORIDE SERPL-SCNC: 105 MMOL/L (ref 98–107)
CREAT SERPL-MCNC: 0.94 MG/DL (ref 0.67–1.17)
DEPRECATED HCO3 PLAS-SCNC: 27 MMOL/L (ref 22–29)
GFR SERPL CREATININE-BSD FRML MDRD: 84 ML/MIN/1.73M2
GLUCOSE SERPL-MCNC: 89 MG/DL (ref 70–99)
HOLD SPECIMEN: NORMAL
POTASSIUM SERPL-SCNC: 4.1 MMOL/L (ref 3.4–5.3)
PROT SERPL-MCNC: 6.2 G/DL (ref 6.4–8.3)
SODIUM SERPL-SCNC: 139 MMOL/L (ref 136–145)
TSH SERPL DL<=0.005 MIU/L-ACNC: 1.3 UIU/ML (ref 0.3–4.2)

## 2023-05-30 PROCEDURE — 36415 COLL VENOUS BLD VENIPUNCTURE: CPT | Performed by: NURSE PRACTITIONER

## 2023-05-30 PROCEDURE — 84443 ASSAY THYROID STIM HORMONE: CPT | Performed by: NURSE PRACTITIONER

## 2023-05-30 PROCEDURE — 250N000011 HC RX IP 250 OP 636: Performed by: NURSE PRACTITIONER

## 2023-05-30 PROCEDURE — 258N000003 HC RX IP 258 OP 636: Performed by: NURSE PRACTITIONER

## 2023-05-30 PROCEDURE — 96413 CHEMO IV INFUSION 1 HR: CPT

## 2023-05-30 PROCEDURE — 80053 COMPREHEN METABOLIC PANEL: CPT | Performed by: NURSE PRACTITIONER

## 2023-05-30 RX ADMIN — SODIUM CHLORIDE 250 ML: 9 INJECTION, SOLUTION INTRAVENOUS at 11:56

## 2023-05-30 RX ADMIN — SODIUM CHLORIDE 200 MG: 9 INJECTION, SOLUTION INTRAVENOUS at 11:54

## 2023-05-30 NOTE — PROGRESS NOTES
Infusion Nursing Note:  Newton HANNA SantanaChanel presents today for Keytruda.    Patient seen by provider today: No   present during visit today: Not Applicable.    Note: N/A.    Intravenous Access:  Peripheral IV placed.    Treatment Conditions:  Lab Results   Component Value Date     05/30/2023    POTASSIUM 4.1 05/30/2023    CR 0.94 05/30/2023    JÚNIOR 9.4 05/30/2023    BILITOTAL 0.7 05/30/2023    ALBUMIN 3.6 05/30/2023    ALT 21 05/30/2023    AST 20 05/30/2023     Results reviewed, labs MET treatment parameters, ok to proceed with treatment.    Post Infusion Assessment:  Patient tolerated infusion without incident.  Blood return noted pre and post infusion.  Site patent and intact, free from redness, edema or discomfort.  No evidence of extravasations.  Access discontinued per protocol.     Discharge Plan:   Patient discharged in stable condition accompanied by: self.  Departure Mode: Ambulatory.    Crescencio Cervantes RN

## 2023-06-12 ENCOUNTER — VIRTUAL VISIT (OUTPATIENT)
Dept: URGENT CARE | Facility: CLINIC | Age: 78
End: 2023-06-12
Payer: MEDICARE

## 2023-06-12 DIAGNOSIS — U07.1 INFECTION DUE TO 2019 NOVEL CORONAVIRUS: Primary | ICD-10-CM

## 2023-06-12 PROCEDURE — 99442 PR PHYSICIAN TELEPHONE EVALUATION 11-20 MIN: CPT | Mod: 95

## 2023-06-12 NOTE — PROGRESS NOTES
"Newton is a 77 year old who is being evaluated via a billable telephone visit.      Tested + For COVID yesterday.  On Keytruda  Hx of head and neck CA and prostate CA.  Fully vaccinated.  Was in [place] for wedding last weekend-- got message people at wedding tested + for COVID.  Thursday as sx start 6/8.    What phone number would you like to be contacted at? cell  How would you like to obtain your AVS? MyChart    Distant Location (provider location):  Off-site    Assessment & Plan     Infection due to 2019 novel coronavirus    - nirmatrelvir and ritonavir (PAXLOVID) 300 mg/100 mg therapy pack; Take 3 tablets by mouth 2 times daily for 5 days (Take 2 Nirmatrelvir tablets and 1 Ritonavir tablet twice daily for 5 days):[id number]}     COVID-19 positive patient.  Encounter for consideration of medication intervention. Patient does qualify for a prescription. Full discussion with patient including medication options, risks and benefits. Potential drug interactions reviewed with patient.     Treatment Planned   Paxlovid sent to [facility]    Temporary change to home medications:   Hold Viagra while on Paxlovid x 5 days.  Hold Flonase nasal spray while on Paxlovid x 5 days.  Hold pravastatin x 10 days while completing the Paxlovid.  Restart day #11.    Estimated body mass index is 25.23 kg/m  as calculated from the following:    Height as of 5/10/23: 1.81 m (5' 11.25\").    Weight as of 5/30/23: 82.6 kg (182 lb 3.2 oz).  GFR Estimate   Date Value Ref Range Status   05/30/2023 84 >60 mL/min/1.73m2 Final     Comment:     eGFR calculated using 2021 CKD-EPI equation.   07/05/2021 85 >60 mL/min/[1.73_m2] Final     Comment:     Non  GFR Calc  Starting 12/18/2018, serum creatinine based estimated GFR (eGFR) will be   calculated using the Chronic Kidney Disease Epidemiology Collaboration   (CKD-EPI) equation.       GFR, ESTIMATED POCT   Date Value Ref Range Status   05/24/2022 >60 >60 mL/min/1.73m2 Final "     Jessica Powell MD  Virtual Urgent Care  [facility] VIRTUAL URGENT CARE    Morenita Glez is a 77 year old, presenting for the following health issues:  No chief complaint on file.    HPI       Tested + For COVID yesterday.  On Keytruda  Hx of head and neck CA and prostate CA.  Fully vaccinated.  Was in [place] for wedding last weekend-- got message people at wedding tested + for COVID.  Thursday as sx start 6/8.      Review of Systems   Constitutional, HEENT, cardiovascular, pulmonary, GI, , musculoskeletal, neuro, skin, endocrine and psych systems are negative, except as otherwise noted.      Objective           Vitals:  No vitals were obtained today due to virtual visit.    Physical Exam   healthy, alert and no distress  PSYCH: Alert and oriented times 3; coherent speech, normal   rate and volume, able to articulate logical thoughts, able   to abstract reason, no tangential thoughts, no hallucinations   or delusions  His affect is normal and pleasant  RESP: No cough, no audible wheezing, able to talk in full sentences  Remainder of exam unable to be completed due to telephone visits        Phone call duration: 15 minutes

## 2023-06-14 NOTE — TELEPHONE ENCOUNTER
Migue Kelly MD  P Fl Adrianna Bowman Rn Pool  Please reach out to this patient.  He did not read his [facility] message.     IJL

## 2023-06-14 NOTE — TELEPHONE ENCOUNTER
Left message for patient requesting an update. Advised he could check his mychart and respond or call us back to let us know.    Arabella Angelo RN on 6/14/2023 at 9:36 AM

## 2023-06-15 ENCOUNTER — HOSPITAL ENCOUNTER (OUTPATIENT)
Dept: PET IMAGING | Facility: CLINIC | Age: 78
Discharge: HOME | End: 2023-06-15
Attending: NURSE PRACTITIONER | Admitting: NURSE PRACTITIONER
Payer: MEDICARE

## 2023-06-15 DIAGNOSIS — C76.0 HEAD AND NECK CANCER (H): ICD-10-CM

## 2023-06-15 DIAGNOSIS — C78.02 MALIGNANT NEOPLASM METASTATIC TO BOTH LUNGS (H): ICD-10-CM

## 2023-06-15 DIAGNOSIS — C78.01 MALIGNANT NEOPLASM METASTATIC TO BOTH LUNGS (H): ICD-10-CM

## 2023-06-15 PROCEDURE — G1010 CDSM STANSON: HCPCS | Mod: PS

## 2023-06-15 PROCEDURE — 343N000001 HC RX 343: Performed by: NURSE PRACTITIONER

## 2023-06-15 PROCEDURE — A9552 F18 FDG: HCPCS | Performed by: NURSE PRACTITIONER

## 2023-06-15 RX ADMIN — FLUDEOXYGLUCOSE F-18 14.98 MILLICURIE: 500 INJECTION, SOLUTION INTRAVENOUS at 08:07

## 2023-06-16 ENCOUNTER — VIRTUAL VISIT (OUTPATIENT)
Dept: ONCOLOGY | Facility: HOSPITAL | Age: 78
End: 2023-06-16
Attending: INTERNAL MEDICINE
Payer: MEDICARE

## 2023-06-16 DIAGNOSIS — C76.0 HEAD AND NECK CANCER (H): Primary | ICD-10-CM

## 2023-06-16 PROCEDURE — 99215 OFFICE O/P EST HI 40 MIN: CPT | Mod: 95 | Performed by: INTERNAL MEDICINE

## 2023-06-16 RX ORDER — SODIUM CHLORIDE 9 MG/ML
1000 INJECTION, SOLUTION INTRAVENOUS CONTINUOUS PRN
Status: CANCELLED
Start: 2023-06-20

## 2023-06-16 RX ORDER — ALBUTEROL SULFATE 90 UG/1
1-2 AEROSOL, METERED RESPIRATORY (INHALATION)
Status: CANCELLED
Start: 2023-06-20

## 2023-06-16 RX ORDER — LORAZEPAM 2 MG/ML
0.5 INJECTION INTRAMUSCULAR EVERY 4 HOURS PRN
Status: CANCELLED
Start: 2023-06-20

## 2023-06-16 RX ORDER — NALOXONE HYDROCHLORIDE 0.4 MG/ML
.1-.4 INJECTION, SOLUTION INTRAMUSCULAR; INTRAVENOUS; SUBCUTANEOUS
Status: CANCELLED | OUTPATIENT
Start: 2023-06-20

## 2023-06-16 RX ORDER — MEPERIDINE HYDROCHLORIDE 25 MG/ML
25 INJECTION INTRAMUSCULAR; INTRAVENOUS; SUBCUTANEOUS EVERY 30 MIN PRN
Status: CANCELLED | OUTPATIENT
Start: 2023-06-20

## 2023-06-16 RX ORDER — ALBUTEROL SULFATE 0.83 MG/ML
2.5 SOLUTION RESPIRATORY (INHALATION)
Status: CANCELLED | OUTPATIENT
Start: 2023-06-20

## 2023-06-16 RX ORDER — EPINEPHRINE 1 MG/ML
0.3 INJECTION, SOLUTION INTRAMUSCULAR; SUBCUTANEOUS EVERY 5 MIN PRN
Status: CANCELLED | OUTPATIENT
Start: 2023-06-20

## 2023-06-16 RX ORDER — METHYLPREDNISOLONE SODIUM SUCCINATE 125 MG/2ML
125 INJECTION, POWDER, LYOPHILIZED, FOR SOLUTION INTRAMUSCULAR; INTRAVENOUS
Status: CANCELLED
Start: 2023-06-20

## 2023-06-16 RX ORDER — DIPHENHYDRAMINE HYDROCHLORIDE 50 MG/ML
50 INJECTION INTRAMUSCULAR; INTRAVENOUS
Status: CANCELLED
Start: 2023-06-20

## 2023-06-16 NOTE — PROGRESS NOTES
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 Location): Home    Distant Location (provider location):  On-site  Platform used for Video Visit: Well     [facility] Hematology and Oncology Progress Note    Patient: Newton Buchanan  MRN: [id number]  Jun 16, 2023          Reason for Visit    1. 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 Ruiz    _____________________________________________________________________________    History of Present Illness/ Interval History    Mr. 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     He had first line  Keytruda x 2 yrs through 7/2022, resulting in CR. He was on treatment holidayx 5 months until 12/2022 when he had recurrence in solitary subcarinal LN by PET. He resumed Keytruda every 3 weeks. Repeat PET scan in March showed near CR.  He has been on 3 weekly pembrolizumab since then.  This is my first time meeting with Newton.  This was a video visit.    He has been tolerating immunotherapy really well without any major side effects.  Since his last visit in oncology he was diagnosed with COVID-19 infection last week.  He had some URI symptoms.  Has been treated with Paxlovid.  Also he reports having an exposure to [place] chemical a few weeks ago.  He is here with repeat PET scan yesterday.  Denies any new issues today.  Denies any URI symptoms today.  No abdominal pain.  No cough or shortness of breath reported.      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 FNAs benign. Followed.   -2/2020: progressive right neck swelling, no other symptoms.   -2/11/20 FNA biosy right  neck mass: metastatic squamous cell cancer, HPV16+  -PET: hypermetabolic right cervical adenopathy, numerous bilateral lung mets, right hilar nodes.   -No evident primary site identified, but highly suspicious of H/N given IHC staining and radiographical findings/spread  -Neogenomic testing - CPS 70    Treatment:  5/2020 - 7/2022: Keytruda (initiated in [place], then transferred care to [place]). Completed 2 yrs of immunotherapy, resulted in CR by PET     11/2020: local progression in right neck only  -12/2020: palliative RT to right neck (3750 cGy/15)    1/2021: palpable right jaw/parotid lesion, PET+. Remainder of right neck disease improved after RT and lung mets stable. US-guided biopsy right parotid lesion attempted, but no lesion seen to biopsy so cancelled.    7/2022 - 12/2022: observation, treatment holiday until progression.   -12/2022 PET/CT: solitary recurrence in subcarinal LN    12/12/2022- present: Keytruda resumed      Physical Exam    GENERAL: Alert and oriented to time place and person. Seated comfortably. In no distress. Alone.      Lab Results    CMP WNL  TSH pending    Imaging    PET: Since 12/2022, near CR in subcarinal LN. No new cancer noted.     Assessment/Plan  1. 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  He was diagnosed with stage IV HPV positive tonsillar cancer back in February 2020.  He had evidence of lung metastasis.  He was started on single agent Keytruda in May 2020.  He has been on that since then.  After 2 years of immunotherapy he achieved a CR.  He took holiday from treatment for a few months.  In December 2022 he had evidence of local recurrence with FDG avid lymph node in the right neck.  Received palliative RT to the neck.  After that he resumed Keytruda.  Has been on that since then.  PET scan in March again showed complete response.    I reviewed his recent PET scan images.  It continues to show no evidence of any  malignancy.  Today we again discussed management going forward.  He was again contemplating a treatment holiday.  I explained to him that since he has been tolerating chemotherapy really well without any major side effects and the fact that he has gotten a significant response to treatment I would continue therapy without any interruptions as much as possible.  I reviewed the rationale behind this.  We could potentially go to every 6-week infusion to minimize the number of visits.  He is agreeable to the plan.  We will switch his pembrolizumab to once every 6-week treatment.  Already scheduled for his infusion next week.    2.   Osteoporosis  Managed by PCP and Endocrinologist.      Billing  A total of 45 min were spent today on this visit which included face to face conversation with the patient, EMR review, counseling and co-ordination of care and medical documentation.        Signed by:   Jensen Royal MD  Hematology and Medical Oncology  [facility]

## 2023-06-21 ENCOUNTER — INFUSION THERAPY VISIT (OUTPATIENT)
Dept: INFUSION THERAPY | Facility: CLINIC | Age: 78
End: 2023-06-21
Attending: NURSE PRACTITIONER
Payer: MEDICARE

## 2023-06-21 ENCOUNTER — APPOINTMENT (OUTPATIENT)
Dept: LAB | Facility: CLINIC | Age: 78
End: 2023-06-21
Payer: MEDICARE

## 2023-06-21 VITALS
HEART RATE: 55 BPM | WEIGHT: 179 LBS | BODY MASS INDEX: 24.79 KG/M2 | SYSTOLIC BLOOD PRESSURE: 126 MMHG | TEMPERATURE: 97.7 F | DIASTOLIC BLOOD PRESSURE: 67 MMHG

## 2023-06-21 DIAGNOSIS — C76.0 HEAD AND NECK CANCER (H): Primary | ICD-10-CM

## 2023-06-21 LAB
ALBUMIN SERPL BCG-MCNC: 3.5 G/DL (ref 3.5–5.2)
ALP SERPL-CCNC: 44 U/L (ref 40–129)
ALT SERPL W P-5'-P-CCNC: 89 U/L (ref 0–70)
ANION GAP SERPL CALCULATED.3IONS-SCNC: 8 MMOL/L (ref 7–15)
AST SERPL W P-5'-P-CCNC: 49 U/L (ref 0–45)
BILIRUB SERPL-MCNC: 0.5 MG/DL
BUN SERPL-MCNC: 20.1 MG/DL (ref 8–23)
CALCIUM SERPL-MCNC: 8.9 MG/DL (ref 8.8–10.2)
CHLORIDE SERPL-SCNC: 103 MMOL/L (ref 98–107)
CREAT SERPL-MCNC: 0.92 MG/DL (ref 0.67–1.17)
DEPRECATED HCO3 PLAS-SCNC: 27 MMOL/L (ref 22–29)
GFR SERPL CREATININE-BSD FRML MDRD: 86 ML/MIN/1.73M2
GLUCOSE SERPL-MCNC: 99 MG/DL (ref 70–99)
POTASSIUM SERPL-SCNC: 4 MMOL/L (ref 3.4–5.3)
PROT SERPL-MCNC: 5.5 G/DL (ref 6.4–8.3)
SODIUM SERPL-SCNC: 138 MMOL/L (ref 136–145)
TSH SERPL DL<=0.005 MIU/L-ACNC: 1.57 UIU/ML (ref 0.3–4.2)

## 2023-06-21 PROCEDURE — 96413 CHEMO IV INFUSION 1 HR: CPT

## 2023-06-21 PROCEDURE — 84443 ASSAY THYROID STIM HORMONE: CPT | Performed by: NURSE PRACTITIONER

## 2023-06-21 PROCEDURE — 80053 COMPREHEN METABOLIC PANEL: CPT | Performed by: NURSE PRACTITIONER

## 2023-06-21 PROCEDURE — 250N000011 HC RX IP 250 OP 636: Mod: JZ | Performed by: INTERNAL MEDICINE

## 2023-06-21 PROCEDURE — 36415 COLL VENOUS BLD VENIPUNCTURE: CPT | Performed by: NURSE PRACTITIONER

## 2023-06-21 PROCEDURE — 258N000003 HC RX IP 258 OP 636: Performed by: INTERNAL MEDICINE

## 2023-06-21 RX ADMIN — SODIUM CHLORIDE 250 ML: 9 INJECTION, SOLUTION INTRAVENOUS at 11:52

## 2023-06-21 RX ADMIN — SODIUM CHLORIDE 400 MG: 9 INJECTION, SOLUTION INTRAVENOUS at 11:57

## 2023-06-21 NOTE — PROGRESS NOTES
Infusion Nursing Note:  Newton Buchanan presents today for Keytruda    Patient seen by provider today: No   present during visit today: Not Applicable.    Note: Pt's Keytruda dose got increased to 400 mg so he'll now be coming in every 6 weeks, instead of 3 weeks.  Pt's scheduled updated to reflect the change.       Intravenous Access:  Labs drawn without difficulty.  Peripheral IV placed.    Treatment Conditions:  Lab Results   Component Value Date     06/21/2023    POTASSIUM 4.0 06/21/2023    CR 0.92 06/21/2023    JÚNIOR 8.9 06/21/2023    BILITOTAL 0.5 06/21/2023    ALBUMIN 3.5 06/21/2023    ALT 89 (H) 06/21/2023    AST 49 (H) 06/21/2023     Results reviewed, labs MET treatment parameters, ok to proceed with treatment.      Post Infusion Assessment:  Patient tolerated infusion without incident.  Blood return noted pre and post infusion.  Site patent and intact, free from redness, edema or discomfort.  No evidence of extravasations.  Access discontinued per protocol.       Discharge Plan:   Patient discharged in stable condition accompanied by: self.  Departure Mode: Ambulatory.  Pt to return 8/1/23 at 10:00 am for labs followed by       Adriana Carroll RN

## 2023-06-22 ENCOUNTER — MYC MEDICAL ADVICE (OUTPATIENT)
Dept: ONCOLOGY | Facility: HOSPITAL | Age: 78
End: 2023-06-22
Payer: MEDICARE

## 2023-06-22 DIAGNOSIS — C76.0 HEAD AND NECK CANCER (H): Primary | ICD-10-CM

## 2023-06-29 ENCOUNTER — LAB (OUTPATIENT)
Dept: LAB | Facility: CLINIC | Age: 78
End: 2023-06-29
Payer: MEDICARE

## 2023-06-29 DIAGNOSIS — C76.0 HEAD AND NECK CANCER (H): ICD-10-CM

## 2023-06-29 LAB
ALBUMIN SERPL BCG-MCNC: 3.8 G/DL (ref 3.5–5.2)
ALP SERPL-CCNC: 62 U/L (ref 40–129)
ALT SERPL W P-5'-P-CCNC: 42 U/L (ref 0–70)
ANION GAP SERPL CALCULATED.3IONS-SCNC: 7 MMOL/L (ref 7–15)
AST SERPL W P-5'-P-CCNC: 27 U/L (ref 0–45)
BILIRUB SERPL-MCNC: 0.5 MG/DL
BUN SERPL-MCNC: 13.5 MG/DL (ref 8–23)
CALCIUM SERPL-MCNC: 9.4 MG/DL (ref 8.8–10.2)
CHLORIDE SERPL-SCNC: 104 MMOL/L (ref 98–107)
CREAT SERPL-MCNC: 0.94 MG/DL (ref 0.67–1.17)
DEPRECATED HCO3 PLAS-SCNC: 27 MMOL/L (ref 22–29)
GFR SERPL CREATININE-BSD FRML MDRD: 84 ML/MIN/1.73M2
GLUCOSE SERPL-MCNC: 83 MG/DL (ref 70–99)
POTASSIUM SERPL-SCNC: 3.9 MMOL/L (ref 3.4–5.3)
PROT SERPL-MCNC: 6.1 G/DL (ref 6.4–8.3)
SODIUM SERPL-SCNC: 138 MMOL/L (ref 136–145)

## 2023-06-29 PROCEDURE — 36415 COLL VENOUS BLD VENIPUNCTURE: CPT

## 2023-06-29 PROCEDURE — 80053 COMPREHEN METABOLIC PANEL: CPT

## 2023-08-01 ENCOUNTER — LAB (OUTPATIENT)
Dept: LAB | Facility: CLINIC | Age: 78
End: 2023-08-01
Attending: NURSE PRACTITIONER
Payer: MEDICARE

## 2023-08-01 ENCOUNTER — INFUSION THERAPY VISIT (OUTPATIENT)
Dept: INFUSION THERAPY | Facility: CLINIC | Age: 78
End: 2023-08-01
Attending: NURSE PRACTITIONER
Payer: MEDICARE

## 2023-08-01 ENCOUNTER — ONCOLOGY VISIT (OUTPATIENT)
Dept: ONCOLOGY | Facility: CLINIC | Age: 78
End: 2023-08-01
Attending: INTERNAL MEDICINE
Payer: MEDICARE

## 2023-08-01 VITALS
WEIGHT: 179 LBS | HEIGHT: 71 IN | TEMPERATURE: 97.7 F | DIASTOLIC BLOOD PRESSURE: 73 MMHG | RESPIRATION RATE: 12 BRPM | BODY MASS INDEX: 25.06 KG/M2 | OXYGEN SATURATION: 98 % | HEART RATE: 59 BPM | SYSTOLIC BLOOD PRESSURE: 148 MMHG

## 2023-08-01 DIAGNOSIS — C76.0 HEAD AND NECK CANCER (H): Primary | ICD-10-CM

## 2023-08-01 DIAGNOSIS — Z00.00 PREVENTATIVE HEALTH CARE: ICD-10-CM

## 2023-08-01 LAB
ALBUMIN SERPL BCG-MCNC: 4 G/DL (ref 3.5–5.2)
ALP SERPL-CCNC: 47 U/L (ref 40–129)
ALT SERPL W P-5'-P-CCNC: 21 U/L (ref 0–70)
ANION GAP SERPL CALCULATED.3IONS-SCNC: 9 MMOL/L (ref 7–15)
AST SERPL W P-5'-P-CCNC: 25 U/L (ref 0–45)
BILIRUB SERPL-MCNC: 0.9 MG/DL
BUN SERPL-MCNC: 15.2 MG/DL (ref 8–23)
CALCIUM SERPL-MCNC: 9.7 MG/DL (ref 8.8–10.2)
CHLORIDE SERPL-SCNC: 104 MMOL/L (ref 98–107)
CREAT SERPL-MCNC: 0.85 MG/DL (ref 0.67–1.17)
DEPRECATED HCO3 PLAS-SCNC: 26 MMOL/L (ref 22–29)
GFR SERPL CREATININE-BSD FRML MDRD: 90 ML/MIN/1.73M2
GLUCOSE SERPL-MCNC: 94 MG/DL (ref 70–99)
HOLD SPECIMEN: NORMAL
POTASSIUM SERPL-SCNC: 4.2 MMOL/L (ref 3.4–5.3)
PROT SERPL-MCNC: 6.5 G/DL (ref 6.4–8.3)
SODIUM SERPL-SCNC: 139 MMOL/L (ref 136–145)
TSH SERPL DL<=0.005 MIU/L-ACNC: 1.51 UIU/ML (ref 0.3–4.2)

## 2023-08-01 PROCEDURE — 96413 CHEMO IV INFUSION 1 HR: CPT

## 2023-08-01 PROCEDURE — 250N000011 HC RX IP 250 OP 636: Mod: JZ | Performed by: NURSE PRACTITIONER

## 2023-08-01 PROCEDURE — 258N000003 HC RX IP 258 OP 636: Performed by: NURSE PRACTITIONER

## 2023-08-01 PROCEDURE — 84443 ASSAY THYROID STIM HORMONE: CPT | Performed by: NURSE PRACTITIONER

## 2023-08-01 PROCEDURE — 99214 OFFICE O/P EST MOD 30 MIN: CPT | Performed by: NURSE PRACTITIONER

## 2023-08-01 PROCEDURE — G0463 HOSPITAL OUTPT CLINIC VISIT: HCPCS | Performed by: NURSE PRACTITIONER

## 2023-08-01 PROCEDURE — 36415 COLL VENOUS BLD VENIPUNCTURE: CPT | Performed by: NURSE PRACTITIONER

## 2023-08-01 PROCEDURE — 80053 COMPREHEN METABOLIC PANEL: CPT | Performed by: NURSE PRACTITIONER

## 2023-08-01 RX ORDER — DIPHENHYDRAMINE HYDROCHLORIDE 50 MG/ML
50 INJECTION INTRAMUSCULAR; INTRAVENOUS
Status: CANCELLED
Start: 2023-08-01

## 2023-08-01 RX ORDER — ALBUTEROL SULFATE 90 UG/1
1-2 AEROSOL, METERED RESPIRATORY (INHALATION)
Status: CANCELLED
Start: 2023-08-01

## 2023-08-01 RX ORDER — NALOXONE HYDROCHLORIDE 0.4 MG/ML
.1-.4 INJECTION, SOLUTION INTRAMUSCULAR; INTRAVENOUS; SUBCUTANEOUS
Status: CANCELLED | OUTPATIENT
Start: 2023-08-01

## 2023-08-01 RX ORDER — METHYLPREDNISOLONE SODIUM SUCCINATE 125 MG/2ML
125 INJECTION, POWDER, LYOPHILIZED, FOR SOLUTION INTRAMUSCULAR; INTRAVENOUS
Status: CANCELLED
Start: 2023-08-01

## 2023-08-01 RX ORDER — SODIUM CHLORIDE 9 MG/ML
1000 INJECTION, SOLUTION INTRAVENOUS CONTINUOUS PRN
Status: CANCELLED
Start: 2023-08-01

## 2023-08-01 RX ORDER — EPINEPHRINE 1 MG/ML
0.3 INJECTION, SOLUTION, CONCENTRATE INTRAVENOUS EVERY 5 MIN PRN
Status: CANCELLED | OUTPATIENT
Start: 2023-08-01

## 2023-08-01 RX ORDER — ALBUTEROL SULFATE 0.83 MG/ML
2.5 SOLUTION RESPIRATORY (INHALATION)
Status: CANCELLED | OUTPATIENT
Start: 2023-08-01

## 2023-08-01 RX ORDER — LORAZEPAM 2 MG/ML
0.5 INJECTION INTRAMUSCULAR EVERY 4 HOURS PRN
Status: CANCELLED
Start: 2023-08-01

## 2023-08-01 RX ORDER — MEPERIDINE HYDROCHLORIDE 25 MG/ML
25 INJECTION INTRAMUSCULAR; INTRAVENOUS; SUBCUTANEOUS EVERY 30 MIN PRN
Status: CANCELLED | OUTPATIENT
Start: 2023-08-01

## 2023-08-01 RX ADMIN — SODIUM CHLORIDE 400 MG: 9 INJECTION, SOLUTION INTRAVENOUS at 11:58

## 2023-08-01 RX ADMIN — SODIUM CHLORIDE 250 ML: 9 INJECTION, SOLUTION INTRAVENOUS at 12:00

## 2023-08-01 ASSESSMENT — PAIN SCALES - GENERAL: PAINLEVEL: NO PAIN (0)

## 2023-08-01 NOTE — LETTER
8/1/2023         RE: Newton Buchanan  [address]  [place] [address]        Dear Colleague,    Thank you for referring your patient, Newton Buchanan, to the [facility]. Please see a copy of my visit note below.    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 Royal    _____________________________________________________________________________    History of Present Illness/ Interval History    Mr. 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     He had first line Keytruda x 2 yrs through 7/2022, resulting in CR. He was on treatment holidayx 5 months until 12/2022 when he had recurrence in solitary subcarinal LN by PET. He resumed Keytruda every 3 weeks, which resulted in CR per most recent PET scan. He transitioned to Keytruda every 6 week maintenance dosing with his last cycle. Returns ahead of next cycle.     He has been tolerating immunotherapy really well without any side effects.    He had Covid in June, treated with Paxlovid. He is recovered.   No new respiratory symptoms, diarrhea, rash.     He's been very active this summer.  [place]    Requesting Derm consult for routine skin check. He has no particular concerns, but is in the sun a lot.      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 FNAs benign. Followed.   -2/2020: progressive right neck swelling, no other symptoms.   -2/11/20 FNA biosy right neck mass: metastatic squamous cell cancer, HPV16+  -PET: hypermetabolic right cervical adenopathy, numerous bilateral lung mets, right  hilar nodes.   -No evident primary site identified, but highly suspicious of H/N given IHC staining and radiographical findings/spread  -Neogenomic testing - CPS 70    Treatment:  5/2020 - 7/2022: Keytruda (initiated in [place], then transferred care to [place]). Completed 2 yrs of immunotherapy, resulted in CR by PET     11/2020: local progression in right neck only  -12/2020: palliative RT to right neck (3750 cGy/15)    1/2021: palpable right jaw/parotid lesion, PET+. Remainder of right neck disease improved after RT and lung mets stable. US-guided biopsy right parotid lesion attempted, but no lesion seen to biopsy so cancelled.    7/2022 - 12/2022: observation, treatment holiday until progression.   -12/2022 PET/CT: solitary recurrence in subcarinal LN    12/12/2022- present: Keytruda resumed  --Resulted in CR (PET) by 6/2023  --6/2023: changed to every 6 week cycles      Physical Exam    GENERAL: Alert and oriented to time place and person. Seated comfortably. In no distress. Alone.  HEENT: No icterus.  LYMPH: No palpable cervical, supraclavicular nor axillary adenopathy.  HEART: RRR.  LUNGS: Clear bilaterally.  ABD: Soft, nontender, nondistended.  No  EXTREMITIES: No edema.  NEURO: Nonfocal.      Lab Results    CMP and TSH WNL    Imaging    6/15/2023 PET: CR. No evident malignancy.    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  Since resuming immunotherapy last December he has, again, achieved a complete response and continues on maintenance therapy.    He continues tolerating this well, no side effects.  Labwork: Within normal limits.    No evidence for clinical progression.    Plan:  -Proceed with next cycle of Keytruda for 400 mg every 6 weeks  -Return in 6 weeks with myself ahead of his next cycle.  He may be doing some traveling around this time, okay to defer his next cycle a few weeks if needed.  -Repeat PET  "scan and visit with Dr. Royal in 12 weeks    2.   Osteoporosis  Managed by PCP and Endocrinologist.    3.   Preventative care  He has requested a dermatology consult for routine skin check since has not had this done in some time.  Derm consult was placed      Billing  A total of 30 min were spent today on this visit which included face to face conversation with the patient, EMR review, counseling and co-ordination of care and medical documentation.      Signed by:   Janay Fan NP    Oncology Rooming Note    August 1, 2023 10:18 AM   Newton Buchanan is a 77 year old male who presents for:    Chief Complaint   Patient presents with     Oncology Clinic Visit     Head and neck cancer - Labs provider and infusion     Initial Vitals: BP (!) 148/73 (BP Location: Right arm, Patient Position: Sitting, Cuff Size: Adult Regular)   Pulse 59   Temp 97.7  F (36.5  C) (Tympanic)   Resp 12   Ht 1.81 m (5' 11.25\")   Wt 81.2 kg (179 lb)   SpO2 98%   BMI 24.79 kg/m   Estimated body mass index is 24.79 kg/m  as calculated from the following:    Height as of this encounter: 1.81 m (5' 11.25\").    Weight as of this encounter: 81.2 kg (179 lb). Body surface area is 2.02 meters squared.  No Pain (0) Comment: Data Unavailable   No LMP for male patient.  Allergies reviewed: Yes  Medications reviewed: Yes    Medications: Medication refills not needed today.  Pharmacy name entered into [employer]:    [id number] - [place], [place] - [address] [facility] [facility] AT [facility] OF [address] [place] [address] HOME DELIVERY - [place], [place] - [address] [place] [address]    Clinical concerns:  None      Meli Cornejo [facility]                Again, thank you for allowing me to participate in the care of your patient.        Sincerely,        Janay Fan NP  "

## 2023-08-01 NOTE — PROGRESS NOTES
Infusion Nursing Note:  Newton Buchanan presents today for Keytruda.    Patient seen by provider today: Yes: Janay Fan NP   present during visit today: Not Applicable.    Note: Pt denies any new health concerns or changes.      Intravenous Access:  Peripheral IV placed.    Treatment Conditions:  Lab Results   Component Value Date     08/01/2023    POTASSIUM 4.2 08/01/2023    CR 0.85 08/01/2023    JÚNIOR 9.7 08/01/2023    BILITOTAL 0.9 08/01/2023    ALBUMIN 4.0 08/01/2023    ALT 21 08/01/2023    AST 25 08/01/2023       Results reviewed, labs MET treatment parameters, ok to proceed with treatment.      Post Infusion Assessment:  Patient tolerated infusion without incident.  Blood return noted pre and post infusion.  Site patent and intact, free from redness, edema or discomfort.  No evidence of extravasations.  Access discontinued per protocol.       Discharge Plan:   Discharge instructions reviewed with: Patient.  Patient and/or family verbalized understanding of discharge instructions and all questions answered.  Patient discharged in stable condition accompanied by: self.  Departure Mode: Ambulatory.      Miley Escobar RN

## 2023-08-01 NOTE — PROGRESS NOTES
"Oncology Rooming Note    August 1, 2023 10:18 AM   Newton Buchanan is a 77 year old male who presents for:    Chief Complaint   Patient presents with    Oncology Clinic Visit     Head and neck cancer - Labs provider and infusion     Initial Vitals: BP (!) 148/73 (BP Location: Right arm, Patient Position: Sitting, Cuff Size: Adult Regular)   Pulse 59   Temp 97.7  F (36.5  C) (Tympanic)   Resp 12   Ht 1.81 m (5' 11.25\")   Wt 81.2 kg (179 lb)   SpO2 98%   BMI 24.79 kg/m   Estimated body mass index is 24.79 kg/m  as calculated from the following:    Height as of this encounter: 1.81 m (5' 11.25\").    Weight as of this encounter: 81.2 kg (179 lb). Body surface area is 2.02 meters squared.  No Pain (0) Comment: Data Unavailable   No LMP for male patient.  Allergies reviewed: Yes  Medications reviewed: Yes    Medications: Medication refills not needed today.  Pharmacy name entered into [facility]:    [id number] - [place], [place] - [address] AT [address] [facility] HOME DELIVERY - [facility], [place] - [facility]    Clinical concerns:  None      Meli Cornejo CMA              "

## 2023-08-01 NOTE — PROGRESS NOTES
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 Royal    _____________________________________________________________________________    History of Present Illness/ Interval History    Mr. 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     He had first line Keytruda x 2 yrs through 7/2022, resulting in CR. He was on treatment holidayx 5 months until 12/2022 when he had recurrence in solitary subcarinal LN by PET. He resumed Keytruda every 3 weeks, which resulted in CR per most recent PET scan. He transitioned to Keytruda every 6 week maintenance dosing with his last cycle. Returns ahead of next cycle.     He has been tolerating immunotherapy really well without any side effects.    He had Covid in June, treated with Paxlovid. He is recovered.   No new respiratory symptoms, diarrhea, rash.     He's been very active this summer.  [place]    Requesting Derm consult for routine skin check. He has no particular concerns, but is in the sun a lot.      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 FNAs benign. Followed.   -2/2020: progressive right neck swelling, no other symptoms.   -2/11/20 FNA biosy right neck mass: metastatic squamous cell cancer, HPV16+  -PET: hypermetabolic right cervical adenopathy, numerous bilateral lung mets, right hilar nodes.   -No evident primary site identified, but highly suspicious of H/N given IHC staining and radiographical findings/spread  -Neogenomic testing - CPS 70    Treatment:  5/2020 - 7/2022: Keytruda (initiated in [place], then transferred care  to [place]). Completed 2 yrs of immunotherapy, resulted in CR by PET     11/2020: local progression in right neck only  -12/2020: palliative RT to right neck (3750 cGy/15)    1/2021: palpable right jaw/parotid lesion, PET+. Remainder of right neck disease improved after RT and lung mets stable. US-guided biopsy right parotid lesion attempted, but no lesion seen to biopsy so cancelled.    7/2022 - 12/2022: observation, treatment holiday until progression.   -12/2022 PET/CT: solitary recurrence in subcarinal LN    12/12/2022- present: Keytruda resumed  --Resulted in CR (PET) by 6/2023  --6/2023: changed to every 6 week cycles      Physical Exam    GENERAL: Alert and oriented to time place and person. Seated comfortably. In no distress. Alone.  HEENT: No icterus.  LYMPH: No palpable cervical, supraclavicular nor axillary adenopathy.  HEART: RRR.  LUNGS: Clear bilaterally.  ABD: Soft, nontender, nondistended.  No  EXTREMITIES: No edema.  NEURO: Nonfocal.      Lab Results    CMP and TSH WNL    Imaging    6/15/2023 PET: CR. No evident malignancy.    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  Since resuming immunotherapy last December he has, again, achieved a complete response and continues on maintenance therapy.    He continues tolerating this well, no side effects.  Labwork: Within normal limits.    No evidence for clinical progression.    Plan:  -Proceed with next cycle of Keytruda for 400 mg every 6 weeks  -Return in 6 weeks with myself ahead of his next cycle.  He may be doing some traveling around this time, okay to defer his next cycle a few weeks if needed.  -Repeat PET scan and visit with Dr. Royal in 12 weeks    2.   Osteoporosis  Managed by PCP and Endocrinologist.    3.   Preventative care  He has requested a dermatology consult for routine skin check since has not had this done in some time.  Derm consult was  placed      Billing  A total of 30 min were spent today on this visit which included face to face conversation with the patient, EMR review, counseling and co-ordination of care and medical documentation.      Signed by:   Janay Fan NP

## 2023-09-12 NOTE — PROGRESS NOTES
Chief Complaint   Patient presents with    RECHECK     Still have a little nasal congestion. Not a lot to report, symptoms are stable     History of Present Illness  Newton Buchanan is a 77 year old male who presents today for follow up.  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  The patient had richard disease in the neck and lung metastasis.  He was last seen on 3/13/2023 for oncologic surveillance.  He has been doing quite well with treatment and does not have any signs of recurrent or persistent disease on his follow-up imaging.  The patient was last seen unrelated to his cancer surveillance on 5/25/2023 with new symptoms involving sinus issues, postnasal drainage, and cough after getting exposed to some garden chemical (Niotaze)   He received a Kenalog injection and we started him on nasal irrigations and Flonase. I reached out via [employer] and phone, but was unable to contact the patient. He presents today for routine surveillance.      From a symptom standpoint, the patient reports that overall he is doing well without any significant symptomatology.  The patient denies any dysphagia, odynophagia, pharyngodynia, otalgia, dysphonia, hemoptysis, neck lumps/bumps/swelling.  Feels like his sense of taste has changed a bit on his immunotherapy and has lost about 15 pounds over the past several months.  He does report some intermittent dry throat/dry mouth, some of which he contributes to his CPAP.  The patient is a lifetime non-smoker.       He underwent follow-up PET/CT imaging on 6/15/2023.  My review of the PET/CT does not show any signs of recurrent or persistent disease in the head and neck area.     He has still noticed some intermittent congestion and nasal dryness but has not had any epistaxis since we cauterized him.  He does use CPAP at nighttime and does fill his chamber every evening.   He does take a daily baby aspirin but no other  blood thinners.  He does struggle with intermittent runny nose that seems to be worse in the morning after using his CPAP, with eating, and with temperature change.    Past Medical History  Patient Active Problem List   Diagnosis    Hypertension    Gastroesophageal reflux disease without esophagitis    Obstructive sleep apnea    Personal history of prostate cancer    Head and neck cancer (H)    Oropharyngeal cancer (H)    Long-term use of high-risk medication    Secondary squamous cell carcinoma of head and neck with unknown primary site (H)    Age-related osteoporosis without current pathological fracture    Allergic rhinitis    Benign neoplasm of colon    Diverticula, colon    Diverticular disease    Impotence of organic origin    Hyperlipidemia    Osteoporosis    Peripheral neuropathy     Current Medications    Current Outpatient Medications:     Ascorbic Acid (VITAMIN C) 500 MG CAPS, Take by mouth daily, Disp: , Rfl:     aspirin 81 MG tablet, Take by mouth daily, Disp: 30 tablet, Rfl:     Calcium Carb-Cholecalciferol (CALCIUM 600 + D PO), Take 1 tablet by mouth daily, Disp: , Rfl:     fluticasone (FLONASE) 50 MCG/ACT nasal spray, Spray 1-2 sprays in nostril daily, Disp: 18.2 mL, Rfl: 3    ipratropium (ATROVENT) 0.06 % nasal spray, Spray 2 sprays into both nostrils 4 times daily as needed for rhinitis (runny nose), Disp: 15 mL, Rfl: 3    losartan (COZAAR) 25 MG tablet, Take 1 tablet (25 mg) by mouth daily, Disp: 90 tablet, Rfl: 3    pembrolizumab (KEYTRUDA) 25 MG/ML, , Disp: , Rfl:     pravastatin (PRAVACHOL) 20 MG tablet, Take 1 tablet (20 mg) by mouth daily, Disp: 90 tablet, Rfl: 3    sildenafil (VIAGRA) 100 MG tablet, Take 0.5-1 tablets ( mg) by mouth daily as needed (at least 30 minutes prior to intercourse), Disp: 30 tablet, Rfl: 3    VITAMIN D, CHOLECALCIFEROL, PO, Take 1,000 Units by mouth daily, Disp: , Rfl:   No current facility-administered medications for this visit.    Facility-Administered  "Medications Ordered in Other Visits:     pembrolizumab (KEYTRUDA) 400 mg in sodium chloride 0.9 % 71 mL infusion, 400 mg, Intravenous, Once, Janay Fan NP, Last Rate: 142 mL/hr at 23 1031, 400 mg at 23 1031    Allergies  Allergies   Allergen Reactions    Atorvastatin      Other reaction(s): Confusion  unusual behavior and dizziness         Social History  Social History     Socioeconomic History    Marital status: Single   Tobacco Use    Smoking status: Never    Smokeless tobacco: Never   Vaping Use    Vaping Use: Never used   Substance and Sexual Activity    Alcohol use: Yes     Comment: occas    Drug use: No    Sexual activity: Not Currently     Partners: Female     Birth control/protection: None   Other Topics Concern    Parent/sibling w/ CABG, MI or angioplasty before 65F 55M? No       Family History  Family History   Problem Relation Age of Onset    Diabetes Type 1 Son          at 25    Diabetes Son     Breast Cancer Sister     Coronary Artery Disease No family hx of     Colon Cancer No family hx of        Review of Systems  As per HPI and PMHx, otherwise 10 system review including the head and neck, constitutional, eyes, respiratory, GI, skin, neurologic, lymphatic, endocrine, and allergy systems is negative.    Physical Exam  /73   Pulse 59   Temp 98.2  F (36.8  C) (Tympanic)   Ht 1.81 m (5' 11.25\")   Wt 79.4 kg (175 lb)   SpO2 98%   BMI 24.24 kg/m    GENERAL: Patient is a pleasant, cooperative 77 year old male in no acute distress.  HEAD: Normocephalic, atraumatic.  Hair and scalp are normal.  EYES: Pupils are equal, round, reactive to light and accommodation.  Extraocular movements are intact.  The sclera nonicteric without injection.  The extraocular structures are normal.  EARS: Normal shape and symmetry.  No tenderness when palpating the mastoid or tragal areas bilaterally.    NOSE: Nares are patent.  Nasal mucosa is significantly dry with some crusting more so on the " left-hand side.  The patient has a rightward nasal septal deviation anteriorly and mid septum.  No nasal cavity masses, polyps, or mucopurulence on anterior rhinoscopy.  ORAL CAVITY: Dentition is in good repair.  Mucous membranes are dry.  Tongue is mobile, protrudes to the midline.  Palate elevates symmetrically.  Tonsils are 1+, symmetric.  No erythema or exudate.  No oral cavity or oropharyngeal masses, lesions, ulcerations, leukoplakia.  NECK: Supple, trachea is midline. Palpation of the right neck does reveal some fullness in the right level 2A just anterior to sternocleidomastoid muscle adjacent to the posterior border of the submandibular gland. There no obvious palpable cervical lymphadenopathy or masses bilaterally. The bilateral parotid and submandibular areas reveal no masses.  No thyromegaly.    NEUROLOGIC: Cranial nerves II through XII are grossly intact.  Voice is strong.  Patient is House-Brackmann I/VI bilaterally.  CARDIOVASCULAR: Extremities are warm and well-perfused.  No significant peripheral edema.  RESPIRATORY: Patient has nonlabored breathing without cough, wheeze, stridor.  PSYCHIATRIC: Patient is alert and oriented.  Mood and affect appear normal.  SKIN: Warm and dry.  No scalp, face, or neck lesions noted.     Procedure: Flexible Laryngoscopy  Indication: History of oropharyngeal squamous cell carcinoma of unknown primary     To best visualize the upper airway anatomy and due to the chief complaint and HPI, I proceeded with flexible fiberoptic laryngoscopy examination.  The bilateral nasal cavities were anesthetized and decongested with a mixture of lidocaine and neosynephrine.  The bilateral nasal cavities were examined using a flexible fiberoptic laryngoscope.  There were no nasal cavity masses, polyps, or mucopurulence bilaterally.  The nasal septum deviates to the right anteriorly.  The nasopharynx had a normal appearance with normal Eustachian tube openings and fossa of Rosenmuller  bilaterally.  Minimal adenoid tissue.  Posttreatment changes of the oropharynx and supraglottic larynx.  The base of tongue, vallecula, epiglottis, aryepiglottic folds, arytenoids, and piriform sinuses were without mass or lesion.  The bilateral true vocal folds were symmetrically mobile without nodules or masses.  The visualized portions of the infraglottic and subglottic airway are unremarkable.  The scope was removed.  The patient tolerated the procedure well.                Assessment and Plan     ICD-10-CM    1. Squamous cell carcinoma metastatic to head and neck with unknown primary site (H)  C79.89 LARYNGOSCOPY FLEX FIBEROPTIC, DIAGNOSTIC    C80.1       2. History of head and neck radiation  Z92.3 LARYNGOSCOPY FLEX FIBEROPTIC, DIAGNOSTIC      3. Encounter for follow-up surveillance of head and neck cancer  Z08 LARYNGOSCOPY FLEX FIBEROPTIC, DIAGNOSTIC    Z85.89          It was my pleasure seeing Newton Buchanan today in clinic.  The patient is roughly 33 months from completing primary radiation therapy for P16+ squamous cell carcinoma of unknown primary with distant metastatic disease in the lungs and mediastinum (T0 N1 M1). The patient has no evidence of oropharyngeal squamous cell carcinoma on physical exam or endoscopic exam today.  His PET scan from 6/15/2023 shows complete resolution of the FDG avidity. I would recommend continued observation with clinical follow-up in 6 months.     Migue Kelly MD  Department of Otolaryngology-Head and Neck Surgery  [facility]

## 2023-09-13 ENCOUNTER — ONCOLOGY VISIT (OUTPATIENT)
Dept: ONCOLOGY | Facility: CLINIC | Age: 78
End: 2023-09-13
Attending: NURSE PRACTITIONER
Payer: MEDICARE

## 2023-09-13 ENCOUNTER — LAB (OUTPATIENT)
Dept: LAB | Facility: CLINIC | Age: 78
End: 2023-09-13
Payer: MEDICARE

## 2023-09-13 ENCOUNTER — INFUSION THERAPY VISIT (OUTPATIENT)
Dept: INFUSION THERAPY | Facility: CLINIC | Age: 78
End: 2023-09-13
Attending: INTERNAL MEDICINE
Payer: MEDICARE

## 2023-09-13 ENCOUNTER — OFFICE VISIT (OUTPATIENT)
Dept: OTOLARYNGOLOGY | Facility: CLINIC | Age: 78
End: 2023-09-13
Payer: MEDICARE

## 2023-09-13 VITALS — HEART RATE: 57 BPM | SYSTOLIC BLOOD PRESSURE: 119 MMHG | DIASTOLIC BLOOD PRESSURE: 68 MMHG

## 2023-09-13 VITALS
OXYGEN SATURATION: 98 % | HEIGHT: 71 IN | DIASTOLIC BLOOD PRESSURE: 73 MMHG | TEMPERATURE: 98.2 F | WEIGHT: 175 LBS | SYSTOLIC BLOOD PRESSURE: 128 MMHG | RESPIRATION RATE: 12 BRPM | BODY MASS INDEX: 24.5 KG/M2 | HEART RATE: 59 BPM

## 2023-09-13 VITALS
SYSTOLIC BLOOD PRESSURE: 128 MMHG | WEIGHT: 175 LBS | OXYGEN SATURATION: 98 % | DIASTOLIC BLOOD PRESSURE: 73 MMHG | TEMPERATURE: 98.2 F | BODY MASS INDEX: 24.5 KG/M2 | HEART RATE: 59 BPM | HEIGHT: 71 IN

## 2023-09-13 DIAGNOSIS — Z08 ENCOUNTER FOR FOLLOW-UP SURVEILLANCE OF HEAD AND NECK CANCER: ICD-10-CM

## 2023-09-13 DIAGNOSIS — Z85.89 ENCOUNTER FOR FOLLOW-UP SURVEILLANCE OF HEAD AND NECK CANCER: ICD-10-CM

## 2023-09-13 DIAGNOSIS — C44.92 SQUAMOUS CELL CARCINOMA METASTATIC TO HEAD AND NECK WITH UNKNOWN PRIMARY SITE (H): Primary | ICD-10-CM

## 2023-09-13 DIAGNOSIS — C76.0 HEAD AND NECK CANCER (H): Primary | ICD-10-CM

## 2023-09-13 DIAGNOSIS — C79.89 SQUAMOUS CELL CARCINOMA METASTATIC TO HEAD AND NECK WITH UNKNOWN PRIMARY SITE (H): Primary | ICD-10-CM

## 2023-09-13 DIAGNOSIS — R63.4 WEIGHT LOSS: ICD-10-CM

## 2023-09-13 DIAGNOSIS — Z92.3 HISTORY OF HEAD AND NECK RADIATION: ICD-10-CM

## 2023-09-13 LAB
ALBUMIN SERPL BCG-MCNC: 3.8 G/DL (ref 3.5–5.2)
ALP SERPL-CCNC: 46 U/L (ref 40–129)
ALT SERPL W P-5'-P-CCNC: 20 U/L (ref 0–70)
ANION GAP SERPL CALCULATED.3IONS-SCNC: 8 MMOL/L (ref 7–15)
AST SERPL W P-5'-P-CCNC: 24 U/L (ref 0–45)
BILIRUB SERPL-MCNC: 0.8 MG/DL
BUN SERPL-MCNC: 15.3 MG/DL (ref 8–23)
CALCIUM SERPL-MCNC: 9.3 MG/DL (ref 8.8–10.2)
CHLORIDE SERPL-SCNC: 105 MMOL/L (ref 98–107)
CREAT SERPL-MCNC: 0.89 MG/DL (ref 0.67–1.17)
DEPRECATED HCO3 PLAS-SCNC: 27 MMOL/L (ref 22–29)
EGFRCR SERPLBLD CKD-EPI 2021: 89 ML/MIN/1.73M2
GLUCOSE SERPL-MCNC: 87 MG/DL (ref 70–99)
HOLD SPECIMEN: NORMAL
POTASSIUM SERPL-SCNC: 4.3 MMOL/L (ref 3.4–5.3)
PROT SERPL-MCNC: 5.8 G/DL (ref 6.4–8.3)
SODIUM SERPL-SCNC: 140 MMOL/L (ref 136–145)
TSH SERPL DL<=0.005 MIU/L-ACNC: 1.9 UIU/ML (ref 0.3–4.2)

## 2023-09-13 PROCEDURE — 84443 ASSAY THYROID STIM HORMONE: CPT | Performed by: NURSE PRACTITIONER

## 2023-09-13 PROCEDURE — 258N000003 HC RX IP 258 OP 636: Performed by: NURSE PRACTITIONER

## 2023-09-13 PROCEDURE — 96413 CHEMO IV INFUSION 1 HR: CPT

## 2023-09-13 PROCEDURE — G0463 HOSPITAL OUTPT CLINIC VISIT: HCPCS | Mod: 25 | Performed by: NURSE PRACTITIONER

## 2023-09-13 PROCEDURE — 99214 OFFICE O/P EST MOD 30 MIN: CPT | Performed by: NURSE PRACTITIONER

## 2023-09-13 PROCEDURE — 36415 COLL VENOUS BLD VENIPUNCTURE: CPT | Performed by: NURSE PRACTITIONER

## 2023-09-13 PROCEDURE — 80053 COMPREHEN METABOLIC PANEL: CPT | Performed by: NURSE PRACTITIONER

## 2023-09-13 PROCEDURE — 250N000011 HC RX IP 250 OP 636: Mod: JZ | Performed by: NURSE PRACTITIONER

## 2023-09-13 PROCEDURE — 99213 OFFICE O/P EST LOW 20 MIN: CPT | Mod: 25 | Performed by: OTOLARYNGOLOGY

## 2023-09-13 PROCEDURE — 31575 DIAGNOSTIC LARYNGOSCOPY: CPT | Performed by: OTOLARYNGOLOGY

## 2023-09-13 RX ORDER — ALBUTEROL SULFATE 0.83 MG/ML
2.5 SOLUTION RESPIRATORY (INHALATION)
Status: CANCELLED | OUTPATIENT
Start: 2023-09-13

## 2023-09-13 RX ORDER — MULTIVIT-MIN/IRON/FOLIC ACID/K 18-600-40
CAPSULE ORAL DAILY
COMMUNITY
End: 2024-02-14

## 2023-09-13 RX ORDER — EPINEPHRINE 1 MG/ML
0.3 INJECTION, SOLUTION, CONCENTRATE INTRAVENOUS EVERY 5 MIN PRN
Status: CANCELLED | OUTPATIENT
Start: 2023-09-13

## 2023-09-13 RX ORDER — LORAZEPAM 2 MG/ML
0.5 INJECTION INTRAMUSCULAR EVERY 4 HOURS PRN
Status: CANCELLED
Start: 2023-09-13

## 2023-09-13 RX ORDER — NALOXONE HYDROCHLORIDE 0.4 MG/ML
.1-.4 INJECTION, SOLUTION INTRAMUSCULAR; INTRAVENOUS; SUBCUTANEOUS
Status: CANCELLED | OUTPATIENT
Start: 2023-09-13

## 2023-09-13 RX ORDER — ALBUTEROL SULFATE 90 UG/1
1-2 AEROSOL, METERED RESPIRATORY (INHALATION)
Status: CANCELLED
Start: 2023-09-13

## 2023-09-13 RX ORDER — MEPERIDINE HYDROCHLORIDE 25 MG/ML
25 INJECTION INTRAMUSCULAR; INTRAVENOUS; SUBCUTANEOUS EVERY 30 MIN PRN
Status: CANCELLED | OUTPATIENT
Start: 2023-09-13

## 2023-09-13 RX ORDER — DIPHENHYDRAMINE HYDROCHLORIDE 50 MG/ML
50 INJECTION INTRAMUSCULAR; INTRAVENOUS
Status: CANCELLED
Start: 2023-09-13

## 2023-09-13 RX ORDER — METHYLPREDNISOLONE SODIUM SUCCINATE 125 MG/2ML
125 INJECTION, POWDER, LYOPHILIZED, FOR SOLUTION INTRAMUSCULAR; INTRAVENOUS
Status: CANCELLED
Start: 2023-09-13

## 2023-09-13 RX ORDER — SODIUM CHLORIDE 9 MG/ML
1000 INJECTION, SOLUTION INTRAVENOUS CONTINUOUS PRN
Status: CANCELLED
Start: 2023-09-13

## 2023-09-13 RX ADMIN — SODIUM CHLORIDE 400 MG: 9 INJECTION, SOLUTION INTRAVENOUS at 10:31

## 2023-09-13 RX ADMIN — SODIUM CHLORIDE 250 ML: 9 INJECTION, SOLUTION INTRAVENOUS at 10:31

## 2023-09-13 ASSESSMENT — PAIN SCALES - GENERAL
PAINLEVEL: NO PAIN (0)
PAINLEVEL: NO PAIN (0)

## 2023-09-13 NOTE — PROGRESS NOTES
"Oncology Rooming Note    September 13, 2023 9:12 AM   Newton Buchanan is a 77 year old male who presents for:    Chief Complaint   Patient presents with    Oncology Clinic Visit     Head and neck cancer - Labs provider and infusion     Initial Vitals: /73 (BP Location: Right arm, Patient Position: Sitting, Cuff Size: Adult Regular)   Pulse 59   Temp 98.2  F (36.8  C) (Tympanic)   Resp 12   Ht 1.81 m (5' 11.25\")   Wt 79.4 kg (175 lb)   SpO2 98%   BMI 24.24 kg/m   Estimated body mass index is 24.24 kg/m  as calculated from the following:    Height as of this encounter: 1.81 m (5' 11.25\").    Weight as of this encounter: 79.4 kg (175 lb). Body surface area is 2 meters squared.  No Pain (0) Comment: Data Unavailable   No LMP for male patient.  Allergies reviewed: Yes  Medications reviewed: Yes    Medications: Medication refills not needed today.  Pharmacy name entered into [facility]:    [id number] - [place], [place] - [address] [facility] AT [address] [facility]IV[facility] - [facility], [place] - [address] [facility]    Clinical concerns:  None      Meli Cornejo CMA              "

## 2023-09-13 NOTE — LETTER
9/13/2023         RE: Newton Buchanan  [address]  [place] [address]        Dear Colleague,    Thank you for referring your patient, Newton Buchanan, to the [facility]. Please see a copy of my visit note below.    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 Royal    _____________________________________________________________________________    History of Present Illness/ Interval History    Mr. 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     He had first line Keytruda x 2 yrs through 7/2022, resulting in CR. He was on treatment holidayx 5 months until 12/2022 when he had recurrence in solitary subcarinal LN by PET. He resumed Keytruda every 3 weeks, which resulted in CR per most recent PET scan. He transitioned to Keytruda every 6 week maintenance dosing with his last cycle. Returns ahead of next cycle.     He has been tolerating immunotherapy really well without any side effects.    He has had 15 lb weight loss over 3-4 months. He's happy with this weight loss, but it has been unintentional. He's noted a lesser appetite/early satiety since on immunotherapy, so has been eating less.  No dysphagia/odynophagia.  He's also been very busy with purchase of a new home this summer, so may also be contributing to weight loss.  No new respiratory symptoms, diarrhea, nausea, rash, headaches.     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 FNAs benign. Followed.   -2/2020: progressive right neck swelling, no other symptoms.   -2/11/20 FNA biosy right neck mass: metastatic squamous cell cancer, HPV16+  -PET:  hypermetabolic right cervical adenopathy, numerous bilateral lung mets, right hilar nodes.   -No evident primary site identified, but highly suspicious of H/N given IHC staining and radiographical findings/spread  -Neogenomic testing - CPS 70    Treatment:  5/2020 - 7/2022: Keytruda (initiated in [place], then transferred care to [place]). Completed 2 yrs of immunotherapy, resulted in CR by PET     11/2020: local progression in right neck only  -12/2020: palliative RT to right neck (3750 cGy/15)    1/2021: palpable right jaw/parotid lesion, PET+. Remainder of right neck disease improved after RT and lung mets stable. US-guided biopsy right parotid lesion attempted, but no lesion seen to biopsy so cancelled.    7/2022 - 12/2022: observation, treatment holiday until progression.   -12/2022 PET/CT: solitary recurrence in subcarinal LN    12/12/2022- present: Keytruda resumed  --Resulted in CR (PET) by 6/2023  --6/2023: changed to every 6 week cycles      Physical Exam    GENERAL: Alert and oriented to time place and person. Seated comfortably. In no distress. Alone.  HEENT: No icterus.  LYMPH: No palpable cervical, supraclavicular nor axillary adenopathy.  HEART: RRR.  LUNGS: Clear bilaterally.  ABD: Soft, nontender, nondistended.  No  EXTREMITIES: No edema.  NEURO: Nonfocal.      Lab Results    CMP and TSH WNL    Imaging    6/15/2023 PET: CR. No evident malignancy.    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  Since resuming immunotherapy last December he has, again, achieved a complete response and continues on maintenance therapy.    He continues tolerating this quite well.  He has had a lesser appetite with some weight loss which could be a side effect.  Labwork: Within normal limits.    No evidence for clinical progression.    Plan:  -Proceed with next cycle of Keytruda for 400 mg every 6 weeks  -Return in 6 weeks  "with repeat PET scan and visit with Dr. Royal, ahead of next cycle  -Overall plan would be to continue with maintenance immunotherapy as long as getting a sustained response and tolerating    2.    Weight loss  Lower appetite/early satiety may be a side effect of the immunotherapy.  Is also been more active this summer which may be contributing to some degree.  He feels well.  No swallowing difficulties.  No thyroid dysfunction.    Plan:  -Recommended he try to increase his calories by adding 1-2 snacks/day to keep his weight stable    3.    Osteoporosis  Managed by PCP and Endocrinologist.      Billing  A total of 35 min were spent today on this visit which included face to face conversation with the patient, EMR review, counseling and co-ordination of care and medical documentation.      Signed by:   Janay Fan NP    Oncology Rooming Note    September 13, 2023 9:12 AM   Newton Buchanan is a 77 year old male who presents for:    Chief Complaint   Patient presents with     Oncology Clinic Visit     Head and neck cancer - Labs provider and infusion     Initial Vitals: /73 (BP Location: Right arm, Patient Position: Sitting, Cuff Size: Adult Regular)   Pulse 59   Temp 98.2  F (36.8  C) (Tympanic)   Resp 12   Ht 1.81 m (5' 11.25\")   Wt 79.4 kg (175 lb)   SpO2 98%   BMI 24.24 kg/m   Estimated body mass index is 24.24 kg/m  as calculated from the following:    Height as of this encounter: 1.81 m (5' 11.25\").    Weight as of this encounter: 79.4 kg (175 lb). Body surface area is 2 meters squared.  No Pain (0) Comment: Data Unavailable   No LMP for male patient.  Allergies reviewed: Yes  Medications reviewed: Yes    Medications: Medication refills not needed today.  Pharmacy name entered into [employer]:    [id number] - [place], [place] - [address] AT [facility] [place] [address] [place] [address]  [facility] - [place], [place] - [address] [facility]    Clinical concerns:  None      Meli BIRMINGHAM" PAUL Cornejo                Again, thank you for allowing me to participate in the care of your patient.        Sincerely,        Janay Fan NP

## 2023-09-13 NOTE — PROGRESS NOTES
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 Royal    _____________________________________________________________________________    History of Present Illness/ Interval History    Mr. 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     He had first line Keytruda x 2 yrs through 7/2022, resulting in CR. He was on treatment holidayx 5 months until 12/2022 when he had recurrence in solitary subcarinal LN by PET. He resumed Keytruda every 3 weeks, which resulted in CR per most recent PET scan. He transitioned to Keytruda every 6 week maintenance dosing with his last cycle. Returns ahead of next cycle.     He has been tolerating immunotherapy really well without any side effects.    He has had 15 lb weight loss over 3-4 months. He's happy with this weight loss, but it has been unintentional. He's noted a lesser appetite/early satiety since on immunotherapy, so has been eating less.  No dysphagia/odynophagia.  He's also been very busy with purchase of a new home this summer, so may also be contributing to weight loss.  No new respiratory symptoms, diarrhea, nausea, rash, headaches.     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 FNAs benign. Followed.   -2/2020: progressive right neck swelling, no other symptoms.   -2/11/20 FNA biosy right neck mass: metastatic squamous cell cancer, HPV16+  -PET: hypermetabolic right cervical adenopathy, numerous bilateral lung mets, right hilar nodes.   -No evident primary site identified, but highly suspicious of H/N given IHC staining and radiographical findings/spread  -Neogenomic testing - CPS  70    Treatment:  5/2020 - 7/2022: Keytruda (initiated in [place], then transferred care to [place]). Completed 2 yrs of immunotherapy, resulted in CR by PET     11/2020: local progression in right neck only  -12/2020: palliative RT to right neck (3750 cGy/15)    1/2021: palpable right jaw/parotid lesion, PET+. Remainder of right neck disease improved after RT and lung mets stable. US-guided biopsy right parotid lesion attempted, but no lesion seen to biopsy so cancelled.    7/2022 - 12/2022: observation, treatment holiday until progression.   -12/2022 PET/CT: solitary recurrence in subcarinal LN    12/12/2022- present: Keytruda resumed  --Resulted in CR (PET) by 6/2023  --6/2023: changed to every 6 week cycles      Physical Exam    GENERAL: Alert and oriented to time place and person. Seated comfortably. In no distress. Alone.  HEENT: No icterus.  LYMPH: No palpable cervical, supraclavicular nor axillary adenopathy.  HEART: RRR.  LUNGS: Clear bilaterally.  ABD: Soft, nontender, nondistended.  No  EXTREMITIES: No edema.  NEURO: Nonfocal.      Lab Results    CMP and TSH WNL    Imaging    6/15/2023 PET: CR. No evident malignancy.    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  Since resuming immunotherapy last December he has, again, achieved a complete response and continues on maintenance therapy.    He continues tolerating this quite well.  He has had a lesser appetite with some weight loss which could be a side effect.  Labwork: Within normal limits.    No evidence for clinical progression.    Plan:  -Proceed with next cycle of Keytruda for 400 mg every 6 weeks  -Return in 6 weeks with repeat PET scan and visit with Dr. Royal, ahead of next cycle  -Overall plan would be to continue with maintenance immunotherapy as long as getting a sustained response and tolerating    2.    Weight loss  Lower appetite/early satiety  may be a side effect of the immunotherapy.  Is also been more active this summer which may be contributing to some degree.  He feels well.  No swallowing difficulties.  No thyroid dysfunction.    Plan:  -Recommended he try to increase his calories by adding 1-2 snacks/day to keep his weight stable    3.    Osteoporosis  Managed by PCP and Endocrinologist.      Billing  A total of 35 min were spent today on this visit which included face to face conversation with the patient, EMR review, counseling and co-ordination of care and medical documentation.      Signed by:   Janay Fan NP

## 2023-09-13 NOTE — PROGRESS NOTES
Infusion Nursing Note:  Newton Buchanan presents today for Keytruda.    Patient seen by provider today: Yes: Janay Fan   present during visit today: Not Applicable.    Note: N/A.      Intravenous Access:  Labs drawn without difficulty per   Peripheral IV placed.    Treatment Conditions:  Lab Results   Component Value Date     09/13/2023    POTASSIUM 4.3 09/13/2023    CR 0.89 09/13/2023    JÚNIOR 9.3 09/13/2023    BILITOTAL 0.8 09/13/2023    ALBUMIN 3.8 09/13/2023    ALT 20 09/13/2023    AST 24 09/13/2023       Results reviewed, labs MET treatment parameters, ok to proceed with treatment.      Post Infusion Assessment:  Patient tolerated infusion without incident.  Blood return noted pre and post infusion.  Site patent and intact, free from redness, edema or discomfort.  No evidence of extravasations.  Access discontinued per protocol.       Discharge Plan:     Patient discharged in stable condition accompanied by: self.  Departure Mode: Ambulatory.      Jessica Cooper RN

## 2023-09-13 NOTE — LETTER
9/13/2023         RE: Newton Buchanan  [address]  [place] [address]        Dear Colleague,    Thank you for referring your patient, Newton Buchanan, to the [facility]. Please see a copy of my visit note below.    Chief Complaint   Patient presents with     RECHECK     Still have a little nasal congestion. Not a lot to report, symptoms are stable     History of Present Illness  Newton Buchanan is a 77 year old male who presents today for follow up.  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  The patient had richard disease in the neck and lung metastasis.  He was last seen on 3/13/2023 for oncologic surveillance.  He has been doing quite well with treatment and does not have any signs of recurrent or persistent disease on his follow-up imaging.  The patient was last seen unrelated to his cancer surveillance on 5/25/2023 with new symptoms involving sinus issues, postnasal drainage, and cough after getting exposed to some garden chemical ([place])   He received a Kenalog injection and we started him on nasal irrigations and Flonase. I reached out via [employer] and phone, but was unable to contact the patient. He presents today for routine surveillance.      From a symptom standpoint, the patient reports that overall he is doing well without any significant symptomatology.  The patient denies any dysphagia, odynophagia, pharyngodynia, otalgia, dysphonia, hemoptysis, neck lumps/bumps/swelling.  Feels like his sense of taste has changed a bit on his immunotherapy and has lost about 15 pounds over the past several months.  He does report some intermittent dry throat/dry mouth, some of which he contributes to his CPAP.  The patient is a lifetime non-smoker.       He underwent follow-up PET/CT imaging on 6/15/2023.  My review of the PET/CT does not show any signs of recurrent or persistent disease in the head and neck area.     He  has still noticed some intermittent congestion and nasal dryness but has not had any epistaxis since we cauterized him.  He does use CPAP at nighttime and does fill his chamber every evening.   He does take a daily baby aspirin but no other blood thinners.  He does struggle with intermittent runny nose that seems to be worse in the morning after using his CPAP, with eating, and with temperature change.    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)     Age-related osteoporosis without current pathological fracture     Allergic rhinitis     Benign neoplasm of colon     Diverticula, colon     Diverticular disease     Impotence of organic origin     Hyperlipidemia     Osteoporosis     Peripheral neuropathy     Current Medications    Current Outpatient Medications:      Ascorbic Acid (VITAMIN C) 500 MG CAPS, Take by mouth daily, Disp: , Rfl:      aspirin 81 MG tablet, Take by mouth daily, Disp: 30 tablet, Rfl:      Calcium Carb-Cholecalciferol (CALCIUM 600 + D PO), Take 1 tablet by mouth daily, Disp: , Rfl:      fluticasone (FLONASE) 50 MCG/ACT nasal spray, Spray 1-2 sprays in nostril daily, Disp: 18.2 mL, Rfl: 3     ipratropium (ATROVENT) 0.06 % nasal spray, Spray 2 sprays into both nostrils 4 times daily as needed for rhinitis (runny nose), Disp: 15 mL, Rfl: 3     losartan (COZAAR) 25 MG tablet, Take 1 tablet (25 mg) by mouth daily, Disp: 90 tablet, Rfl: 3     pembrolizumab (KEYTRUDA) 25 MG/ML, , Disp: , Rfl:      pravastatin (PRAVACHOL) 20 MG tablet, Take 1 tablet (20 mg) by mouth daily, Disp: 90 tablet, Rfl: 3     sildenafil (VIAGRA) 100 MG tablet, Take 0.5-1 tablets ( mg)  "by mouth daily as needed (at least 30 minutes prior to intercourse), Disp: 30 tablet, Rfl: 3     VITAMIN D, CHOLECALCIFEROL, PO, Take 1,000 Units by mouth daily, Disp: , Rfl:   No current facility-administered medications for this visit.    Facility-Administered Medications Ordered in Other Visits:      pembrolizumab (KEYTRUDA) 400 mg in sodium chloride 0.9 % 71 mL infusion, 400 mg, Intravenous, Once, Janay Fan, NP, Last Rate: 142 mL/hr at 23 1031, 400 mg at 23 1031    Allergies  Allergies   Allergen Reactions     Atorvastatin      Other reaction(s): Confusion  unusual behavior and dizziness         Social History  Social History     Socioeconomic History     Marital status: Single   Tobacco Use     Smoking status: Never     Smokeless tobacco: Never   Vaping Use     Vaping Use: Never used   Substance and Sexual Activity     Alcohol use: Yes     Comment: occas     Drug use: No     Sexual activity: Not Currently     Partners: Female     Birth control/protection: None   Other Topics Concern     Parent/sibling w/ CABG, MI or angioplasty before 65F 55M? No       Family History  Family History   Problem Relation Age of Onset     Diabetes Type 1 Son          at 25     Diabetes Son      Breast Cancer Sister      Coronary Artery Disease No family hx of      Colon Cancer No family hx of        Review of Systems  As per HPI and PMHx, otherwise 10 system review including the head and neck, constitutional, eyes, respiratory, GI, skin, neurologic, lymphatic, endocrine, and allergy systems is negative.    Physical Exam  /73   Pulse 59   Temp 98.2  F (36.8  C) (Tympanic)   Ht 1.81 m (5' 11.25\")   Wt 79.4 kg (175 lb)   SpO2 98%   BMI 24.24 kg/m    GENERAL: Patient is a pleasant, cooperative 77 year old male in no acute distress.  HEAD: Normocephalic, atraumatic.  Hair and scalp are normal.  EYES: Pupils are equal, round, reactive to light and accommodation.  Extraocular movements are intact.  The sclera " nonicteric without injection.  The extraocular structures are normal.  EARS: Normal shape and symmetry.  No tenderness when palpating the mastoid or tragal areas bilaterally.    NOSE: Nares are patent.  Nasal mucosa is significantly dry with some crusting more so on the left-hand side.  The patient has a rightward nasal septal deviation anteriorly and mid septum.  No nasal cavity masses, polyps, or mucopurulence on anterior rhinoscopy.  ORAL CAVITY: Dentition is in good repair.  Mucous membranes are dry.  Tongue is mobile, protrudes to the midline.  Palate elevates symmetrically.  Tonsils are 1+, symmetric.  No erythema or exudate.  No oral cavity or oropharyngeal masses, lesions, ulcerations, leukoplakia.  NECK: Supple, trachea is midline. Palpation of the right neck does reveal some fullness in the right level 2A just anterior to sternocleidomastoid muscle adjacent to the posterior border of the submandibular gland. There no obvious palpable cervical lymphadenopathy or masses bilaterally. The bilateral parotid and submandibular areas reveal no masses.  No thyromegaly.    NEUROLOGIC: Cranial nerves II through XII are grossly intact.  Voice is strong.  Patient is House-Brackmann I/VI bilaterally.  CARDIOVASCULAR: Extremities are warm and well-perfused.  No significant peripheral edema.  RESPIRATORY: Patient has nonlabored breathing without cough, wheeze, stridor.  PSYCHIATRIC: Patient is alert and oriented.  Mood and affect appear normal.  SKIN: Warm and dry.  No scalp, face, or neck lesions noted.     Procedure: Flexible Laryngoscopy  Indication: History of oropharyngeal squamous cell carcinoma of unknown primary     To best visualize the upper airway anatomy and due to the chief complaint and HPI, I proceeded with flexible fiberoptic laryngoscopy examination.  The bilateral nasal cavities were anesthetized and decongested with a mixture of lidocaine and neosynephrine.  The bilateral nasal cavities were examined  using a flexible fiberoptic laryngoscope.  There were no nasal cavity masses, polyps, or mucopurulence bilaterally.  The nasal septum deviates to the right anteriorly.  The nasopharynx had a normal appearance with normal Eustachian tube openings and fossa of Rosenmuller bilaterally.  Minimal adenoid tissue.  Posttreatment changes of the oropharynx and supraglottic larynx.  The base of tongue, vallecula, epiglottis, aryepiglottic folds, arytenoids, and piriform sinuses were without mass or lesion.  The bilateral true vocal folds were symmetrically mobile without nodules or masses.  The visualized portions of the infraglottic and subglottic airway are unremarkable.  The scope was removed.  The patient tolerated the procedure well.                Assessment and Plan     ICD-10-CM    1. Squamous cell carcinoma metastatic to head and neck with unknown primary site (H)  C79.89 LARYNGOSCOPY FLEX FIBEROPTIC, DIAGNOSTIC    C80.1       2. History of head and neck radiation  Z92.3 LARYNGOSCOPY FLEX FIBEROPTIC, DIAGNOSTIC      3. Encounter for follow-up surveillance of head and neck cancer  Z08 LARYNGOSCOPY FLEX FIBEROPTIC, DIAGNOSTIC    Z85.89          It was my pleasure seeing Newton Buchanan today in clinic.  The patient is roughly 33 months from completing primary radiation therapy for P16+ squamous cell carcinoma of unknown primary with distant metastatic disease in the lungs and mediastinum (T0 N1 M1). The patient has no evidence of oropharyngeal squamous cell carcinoma on physical exam or endoscopic exam today.  His PET scan from 6/15/2023 shows complete resolution of the FDG avidity. I would recommend continued observation with clinical follow-up in 6 months.     Migue Kelly MD  Department of Otolaryngology-Head and Neck Surgery  [facility]       Again, thank you for allowing me to participate in the care of your patient.        Sincerely,        Migue Kelly MD

## 2023-10-19 ENCOUNTER — HOSPITAL ENCOUNTER (OUTPATIENT)
Dept: PET IMAGING | Facility: CLINIC | Age: 78
Discharge: HOME | End: 2023-10-19
Attending: NURSE PRACTITIONER
Payer: MEDICARE

## 2023-10-19 ENCOUNTER — LAB (OUTPATIENT)
Dept: LAB | Facility: CLINIC | Age: 78
End: 2023-10-19
Attending: NURSE PRACTITIONER
Payer: MEDICARE

## 2023-10-19 DIAGNOSIS — C76.0 HEAD AND NECK CANCER (H): Primary | ICD-10-CM

## 2023-10-19 DIAGNOSIS — C76.0 HEAD AND NECK CANCER (H): ICD-10-CM

## 2023-10-19 LAB
ALBUMIN SERPL BCG-MCNC: 3.8 G/DL (ref 3.5–5.2)
ALP SERPL-CCNC: 50 U/L (ref 40–129)
ALT SERPL W P-5'-P-CCNC: 29 U/L (ref 0–70)
ANION GAP SERPL CALCULATED.3IONS-SCNC: 9 MMOL/L (ref 7–15)
AST SERPL W P-5'-P-CCNC: 31 U/L (ref 0–45)
BILIRUB SERPL-MCNC: 0.7 MG/DL
BUN SERPL-MCNC: 13.8 MG/DL (ref 8–23)
CALCIUM SERPL-MCNC: 9.4 MG/DL (ref 8.8–10.2)
CHLORIDE SERPL-SCNC: 102 MMOL/L (ref 98–107)
CREAT SERPL-MCNC: 0.89 MG/DL (ref 0.67–1.17)
DEPRECATED HCO3 PLAS-SCNC: 25 MMOL/L (ref 22–29)
EGFRCR SERPLBLD CKD-EPI 2021: 89 ML/MIN/1.73M2
GLUCOSE SERPL-MCNC: 101 MG/DL (ref 70–99)
POTASSIUM SERPL-SCNC: 4.2 MMOL/L (ref 3.4–5.3)
PROT SERPL-MCNC: 6.3 G/DL (ref 6.4–8.3)
SODIUM SERPL-SCNC: 136 MMOL/L (ref 135–145)
TSH SERPL DL<=0.005 MIU/L-ACNC: 1.8 UIU/ML (ref 0.3–4.2)

## 2023-10-19 PROCEDURE — 84443 ASSAY THYROID STIM HORMONE: CPT | Performed by: NURSE PRACTITIONER

## 2023-10-19 PROCEDURE — 343N000001 HC RX 343: Performed by: NURSE PRACTITIONER

## 2023-10-19 PROCEDURE — 80053 COMPREHEN METABOLIC PANEL: CPT | Performed by: NURSE PRACTITIONER

## 2023-10-19 PROCEDURE — A9552 F18 FDG: HCPCS | Performed by: NURSE PRACTITIONER

## 2023-10-19 PROCEDURE — 78816 PET IMAGE W/CT FULL BODY: CPT | Mod: MG,PS

## 2023-10-19 PROCEDURE — 36415 COLL VENOUS BLD VENIPUNCTURE: CPT | Performed by: NURSE PRACTITIONER

## 2023-10-19 RX ADMIN — FLUDEOXYGLUCOSE F-18 13.9 MILLICURIE: 500 INJECTION, SOLUTION INTRAVENOUS at 10:09

## 2023-10-26 ENCOUNTER — ONCOLOGY VISIT (OUTPATIENT)
Dept: ONCOLOGY | Facility: CLINIC | Age: 78
End: 2023-10-26
Attending: INTERNAL MEDICINE
Payer: MEDICARE

## 2023-10-26 ENCOUNTER — INFUSION THERAPY VISIT (OUTPATIENT)
Dept: INFUSION THERAPY | Facility: CLINIC | Age: 78
End: 2023-10-26
Attending: INTERNAL MEDICINE
Payer: MEDICARE

## 2023-10-26 ENCOUNTER — LAB (OUTPATIENT)
Dept: LAB | Facility: CLINIC | Age: 78
End: 2023-10-26
Payer: MEDICARE

## 2023-10-26 VITALS
WEIGHT: 176 LBS | BODY MASS INDEX: 24.64 KG/M2 | OXYGEN SATURATION: 100 % | TEMPERATURE: 97.8 F | HEART RATE: 64 BPM | SYSTOLIC BLOOD PRESSURE: 123 MMHG | RESPIRATION RATE: 12 BRPM | DIASTOLIC BLOOD PRESSURE: 65 MMHG | HEIGHT: 71 IN

## 2023-10-26 VITALS — HEART RATE: 60 BPM | SYSTOLIC BLOOD PRESSURE: 119 MMHG | DIASTOLIC BLOOD PRESSURE: 70 MMHG

## 2023-10-26 DIAGNOSIS — C78.02 MALIGNANT NEOPLASM METASTATIC TO BOTH LUNGS (H): ICD-10-CM

## 2023-10-26 DIAGNOSIS — C78.01 MALIGNANT NEOPLASM METASTATIC TO BOTH LUNGS (H): Primary | ICD-10-CM

## 2023-10-26 DIAGNOSIS — C76.0 HEAD AND NECK CANCER (H): Primary | ICD-10-CM

## 2023-10-26 DIAGNOSIS — C76.0 HEAD AND NECK CANCER (H): ICD-10-CM

## 2023-10-26 DIAGNOSIS — C78.01 MALIGNANT NEOPLASM METASTATIC TO BOTH LUNGS (H): ICD-10-CM

## 2023-10-26 DIAGNOSIS — C78.02 MALIGNANT NEOPLASM METASTATIC TO BOTH LUNGS (H): Primary | ICD-10-CM

## 2023-10-26 LAB
BASOPHILS # BLD AUTO: 0.1 10E3/UL (ref 0–0.2)
BASOPHILS NFR BLD AUTO: 1 %
CORTIS SERPL-MCNC: 9.7 UG/DL
EOSINOPHIL # BLD AUTO: 0.3 10E3/UL (ref 0–0.7)
EOSINOPHIL NFR BLD AUTO: 5 %
ERYTHROCYTE [DISTWIDTH] IN BLOOD BY AUTOMATED COUNT: 12.3 % (ref 10–15)
FERRITIN SERPL-MCNC: 91 NG/ML (ref 31–409)
HCT VFR BLD AUTO: 39.8 % (ref 40–53)
HGB BLD-MCNC: 13.5 G/DL (ref 13.3–17.7)
IMM GRANULOCYTES # BLD: 0 10E3/UL
IMM GRANULOCYTES NFR BLD: 1 %
IRON BINDING CAPACITY (ROCHE): 222 UG/DL (ref 240–430)
IRON SATN MFR SERPL: 37 % (ref 15–46)
IRON SERPL-MCNC: 82 UG/DL (ref 61–157)
LYMPHOCYTES # BLD AUTO: 1 10E3/UL (ref 0.8–5.3)
LYMPHOCYTES NFR BLD AUTO: 16 %
MCH RBC QN AUTO: 30.8 PG (ref 26.5–33)
MCHC RBC AUTO-ENTMCNC: 33.9 G/DL (ref 31.5–36.5)
MCV RBC AUTO: 91 FL (ref 78–100)
MONOCYTES # BLD AUTO: 0.6 10E3/UL (ref 0–1.3)
MONOCYTES NFR BLD AUTO: 10 %
NEUTROPHILS # BLD AUTO: 4.1 10E3/UL (ref 1.6–8.3)
NEUTROPHILS NFR BLD AUTO: 67 %
NRBC # BLD AUTO: 0 10E3/UL
NRBC BLD AUTO-RTO: 0 /100
PLATELET # BLD AUTO: 321 10E3/UL (ref 150–450)
RBC # BLD AUTO: 4.38 10E6/UL (ref 4.4–5.9)
WBC # BLD AUTO: 6 10E3/UL (ref 4–11)

## 2023-10-26 PROCEDURE — 82728 ASSAY OF FERRITIN: CPT

## 2023-10-26 PROCEDURE — 250N000011 HC RX IP 250 OP 636: Mod: JZ | Performed by: INTERNAL MEDICINE

## 2023-10-26 PROCEDURE — 36415 COLL VENOUS BLD VENIPUNCTURE: CPT

## 2023-10-26 PROCEDURE — 85004 AUTOMATED DIFF WBC COUNT: CPT

## 2023-10-26 PROCEDURE — 258N000003 HC RX IP 258 OP 636: Performed by: INTERNAL MEDICINE

## 2023-10-26 PROCEDURE — G0463 HOSPITAL OUTPT CLINIC VISIT: HCPCS | Performed by: INTERNAL MEDICINE

## 2023-10-26 PROCEDURE — 83550 IRON BINDING TEST: CPT

## 2023-10-26 PROCEDURE — 99214 OFFICE O/P EST MOD 30 MIN: CPT | Performed by: INTERNAL MEDICINE

## 2023-10-26 PROCEDURE — 96413 CHEMO IV INFUSION 1 HR: CPT

## 2023-10-26 PROCEDURE — 82533 TOTAL CORTISOL: CPT

## 2023-10-26 RX ORDER — NALOXONE HYDROCHLORIDE 0.4 MG/ML
.1-.4 INJECTION, SOLUTION INTRAMUSCULAR; INTRAVENOUS; SUBCUTANEOUS
Status: CANCELLED | OUTPATIENT
Start: 2023-10-26

## 2023-10-26 RX ORDER — ALBUTEROL SULFATE 90 UG/1
1-2 AEROSOL, METERED RESPIRATORY (INHALATION)
Status: CANCELLED
Start: 2023-10-26

## 2023-10-26 RX ORDER — EPINEPHRINE 1 MG/ML
0.3 INJECTION, SOLUTION, CONCENTRATE INTRAVENOUS EVERY 5 MIN PRN
Status: CANCELLED | OUTPATIENT
Start: 2023-10-26

## 2023-10-26 RX ORDER — ALBUTEROL SULFATE 0.83 MG/ML
2.5 SOLUTION RESPIRATORY (INHALATION)
Status: CANCELLED | OUTPATIENT
Start: 2023-10-26

## 2023-10-26 RX ORDER — SODIUM CHLORIDE 9 MG/ML
1000 INJECTION, SOLUTION INTRAVENOUS CONTINUOUS PRN
Status: CANCELLED
Start: 2023-10-26

## 2023-10-26 RX ORDER — MEPERIDINE HYDROCHLORIDE 25 MG/ML
25 INJECTION INTRAMUSCULAR; INTRAVENOUS; SUBCUTANEOUS EVERY 30 MIN PRN
Status: CANCELLED | OUTPATIENT
Start: 2023-10-26

## 2023-10-26 RX ORDER — METHYLPREDNISOLONE SODIUM SUCCINATE 125 MG/2ML
125 INJECTION, POWDER, LYOPHILIZED, FOR SOLUTION INTRAMUSCULAR; INTRAVENOUS
Status: CANCELLED
Start: 2023-10-26

## 2023-10-26 RX ORDER — LORAZEPAM 2 MG/ML
0.5 INJECTION INTRAMUSCULAR EVERY 4 HOURS PRN
Status: CANCELLED
Start: 2023-10-26

## 2023-10-26 RX ORDER — DIPHENHYDRAMINE HYDROCHLORIDE 50 MG/ML
50 INJECTION INTRAMUSCULAR; INTRAVENOUS
Status: CANCELLED
Start: 2023-10-26

## 2023-10-26 RX ADMIN — SODIUM CHLORIDE 400 MG: 9 INJECTION, SOLUTION INTRAVENOUS at 11:50

## 2023-10-26 RX ADMIN — SODIUM CHLORIDE 250 ML: 9 INJECTION, SOLUTION INTRAVENOUS at 11:52

## 2023-10-26 ASSESSMENT — PAIN SCALES - GENERAL: PAINLEVEL: NO PAIN (0)

## 2023-10-26 NOTE — PROGRESS NOTES
Infusion Nursing Note:  Newton Buchanan presents today for Keytruda.    Patient seen by provider today: Yes; Dr. Royal; therefore, assessment deferred   present during visit today: Not Applicable.    Note: N/A.    Intravenous Access:  Peripheral IV placed.    Treatment Conditions:  Lab Results   Component Value Date     10/19/2023    POTASSIUM 4.2 10/19/2023    CR 0.89 10/19/2023    JÚNIOR 9.4 10/19/2023    BILITOTAL 0.7 10/19/2023    ALBUMIN 3.8 10/19/2023    ALT 29 10/19/2023    AST 31 10/19/2023   Results reviewed, labs MET treatment parameters, ok to proceed with treatment.    Post Infusion Assessment:  Patient tolerated infusion without incident.  Blood return noted pre and post infusion.  Site patent and intact, free from redness, edema or discomfort.  No evidence of extravasations.  Access discontinued per protocol.     Discharge Plan:   Discharge instructions reviewed with: Patient.  Patient and/or family verbalized understanding of discharge instructions and all questions answered.  AVS to patient via logtrust.  Patient will return 1/18/2024 for next appointment.   Patient discharged in stable condition accompanied by: self.  Departure Mode: Ambulatory.    Mercy Carvalho RN

## 2023-10-26 NOTE — LETTER
10/26/2023         RE: Newton Buchanan  [address]  [place] [address]        Dear Colleague,    Thank you for referring your patient, Newton Buchanan, to the [facility]. Please see a copy of my visit note below.    Video-Visit Details    Type of service:  Video Visit    Video Start Time:  10:39AM  Video End Time:  10:49AM    Originating Location (pt. 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 Royal    _____________________________________________________________________________    History of Present Illness/ Interval History    Mr. 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     He had first line Keytruda x 2 yrs through 7/2022, resulting in CR. He was on treatment holidayx 5 months until 12/2022 when he had recurrence in solitary subcarinal LN by PET. He resumed Keytruda every 3 weeks, which resulted in CR per most recent PET scan. He transitioned to Keytruda every 6 week maintenance dosing.     Doing well on chemotherapy without any major side effects.  He is here with repeat PET scan.  He has had some weight loss in the recent months.  Unintentional.  His appetite is low.  Also complains of leg cramps especially at night.  Denies any significant diarrhea.  No skin rashes.  He is a bit concerned about his weight loss.    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 FNAs benign. Followed.   -2/2020: progressive right neck swelling, no other symptoms.   -2/11/20  FNA biosy right neck mass: metastatic squamous cell cancer, HPV16+  -PET: hypermetabolic right cervical adenopathy, numerous bilateral lung mets, right hilar nodes.   -No evident primary site identified, but highly suspicious of H/N given IHC staining and radiographical findings/spread  -Neogenomic testing - CPS 70    Treatment:  5/2020 - 7/2022: Keytruda (initiated in [place], then transferred care to [place]). Completed 2 yrs of immunotherapy, resulted in CR by PET     11/2020: local progression in right neck only  -12/2020: palliative RT to right neck (3750 cGy/15)    1/2021: palpable right jaw/parotid lesion, PET+. Remainder of right neck disease improved after RT and lung mets stable. US-guided biopsy right parotid lesion attempted, but no lesion seen to biopsy so cancelled.    7/2022 - 12/2022: observation, treatment holiday until progression.   -12/2022 PET/CT: solitary recurrence in subcarinal LN    12/12/2022- present: Keytruda resumed  --Resulted in CR (PET) by 6/2023  --6/2023: changed to every 6 week cycles      Physical Exam    GENERAL: Alert and oriented to time place and person. Seated comfortably. In no distress. Alone.      Lab Results    CMP and TSH WNL    Imaging    6/15/2023 PET: CR. No evident malignancy.    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  Tolerating this pretty well.  He has had an excellent response after resuming Keytruda with complete response seen on the previous PET scan.  In the recent months he has had some weight loss which is unintentional.  Has some low appetite.  He is here with repeat PET scan.  I personally reviewed the PET scan images and report and independently interpreted the results.  There is no evidence of any metabolically active disease on the recent PET scan.  Has mild uptake in the esophagus consistent with esophagitis.  He does have some small scattered  non-FDG avid pulmonary nodules.  This could not be well characterized on the current PET scan.  We will continue to monitor this in the future.  He had these nodules in the past also.  Nothing new.    His biggest issue is unintentional weight loss.  He has lost about 20 pounds since over the last few months.  Low appetite.  Also complains of significant leg cramps especially at night.  Potentially this could be a side effect of Keytruda.  We discussed about continuing treatment going forward.  He was thinking about giving Keytruda a break.  Considering that he has complete response on the recent PET scan, I am okay with it.  But I would like to check a few other things.  I will check his CBC along with iron studies and ferritin today to see if he is iron deficient but could potentially cause significant leg cramps.  I will also check a random cortisol level today.  Hypocortisolism can sometimes lead to weight loss.  He wants to continue with today's infusion. Will hold next infusion and see him back in 12 weeks.       3.    Osteoporosis  Managed by PCP and Endocrinologist.      Billing  A total of 30 min were spent today on this visit which included face to face conversation with the patient, EMR review, counseling and co-ordination of care and medical documentation.      Jensen Royal MD  Hematology and Medical Oncology  [facility]      Again, thank you for allowing me to participate in the care of your patient.        Sincerely,        Jensen Royal MD

## 2023-10-26 NOTE — PROGRESS NOTES
Virtual Visit Details    Type of service:  Video Visit   Video Start Time: {video visit start/end time for provider to select:202935}  Video End Time:{video visit start/end time for provider to select:882580}    Originating Location (pt. Location): Other in clinic    Distant Location (provider location):  On-site  Platform used for Video Visit: Enid Cornejo CMA on 10/26/2023 at 10:32 AM

## 2023-10-26 NOTE — PROGRESS NOTES
Video-Visit Details    Type of service:  Video Visit    Video Start Time:  10:39AM  Video End Time:  10:49AM    Originating Location (pt. 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 Royal    _____________________________________________________________________________    History of Present Illness/ Interval History    Mr. 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     He had first line Keytruda x 2 yrs through 7/2022, resulting in CR. He was on treatment holidayx 5 months until 12/2022 when he had recurrence in solitary subcarinal LN by PET. He resumed Keytruda every 3 weeks, which resulted in CR per most recent PET scan. He transitioned to Keytruda every 6 week maintenance dosing.     Doing well on chemotherapy without any major side effects.  He is here with repeat PET scan.  He has had some weight loss in the recent months.  Unintentional.  His appetite is low.  Also complains of leg cramps especially at night.  Denies any significant diarrhea.  No skin rashes.  He is a bit concerned about his weight loss.    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 FNAs benign. Followed.   -2/2020: progressive right neck swelling, no other symptoms.   -2/11/20 FNA biosy right neck mass: metastatic squamous cell cancer, HPV16+  -PET: hypermetabolic right cervical adenopathy, numerous bilateral lung mets, right hilar nodes.   -No evident primary site identified, but highly suspicious of H/N given IHC staining  and radiographical findings/spread  -Neogenomic testing - CPS 70    Treatment:  5/2020 - 7/2022: Keytruda (initiated in [place], then transferred care to [place]). Completed 2 yrs of immunotherapy, resulted in CR by PET     11/2020: local progression in right neck only  -12/2020: palliative RT to right neck (3750 cGy/15)    1/2021: palpable right jaw/parotid lesion, PET+. Remainder of right neck disease improved after RT and lung mets stable. US-guided biopsy right parotid lesion attempted, but no lesion seen to biopsy so cancelled.    7/2022 - 12/2022: observation, treatment holiday until progression.   -12/2022 PET/CT: solitary recurrence in subcarinal LN    12/12/2022- present: Keytruda resumed  --Resulted in CR (PET) by 6/2023  --6/2023: changed to every 6 week cycles      Physical Exam    GENERAL: Alert and oriented to time place and person. Seated comfortably. In no distress. Alone.      Lab Results    CMP and TSH WNL    Imaging    6/15/2023 PET: CR. No evident malignancy.    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  Tolerating this pretty well.  He has had an excellent response after resuming Keytruda with complete response seen on the previous PET scan.  In the recent months he has had some weight loss which is unintentional.  Has some low appetite.  He is here with repeat PET scan.  I personally reviewed the PET scan images and report and independently interpreted the results.  There is no evidence of any metabolically active disease on the recent PET scan.  Has mild uptake in the esophagus consistent with esophagitis.  He does have some small scattered non-FDG avid pulmonary nodules.  This could not be well characterized on the current PET scan.  We will continue to monitor this in the future.  He had these nodules in the past also.  Nothing new.    His biggest issue is unintentional weight loss.  He  has lost about 20 pounds since over the last few months.  Low appetite.  Also complains of significant leg cramps especially at night.  Potentially this could be a side effect of Keytruda.  We discussed about continuing treatment going forward.  He was thinking about giving Keytruda a break.  Considering that he has complete response on the recent PET scan, I am okay with it.  But I would like to check a few other things.  I will check his CBC along with iron studies and ferritin today to see if he is iron deficient but could potentially cause significant leg cramps.  I will also check a random cortisol level today.  Hypocortisolism can sometimes lead to weight loss.  He wants to continue with today's infusion. Will hold next infusion and see him back in 12 weeks.       3.    Osteoporosis  Managed by PCP and Endocrinologist.      Billing  A total of 30 min were spent today on this visit which included face to face conversation with the patient, EMR review, counseling and co-ordination of care and medical documentation.      Jensen oRyal MD  Hematology and Medical Oncology  [facility]

## 2023-12-20 ENCOUNTER — TELEPHONE (OUTPATIENT)
Dept: SLEEP MEDICINE | Facility: CLINIC | Age: 78
End: 2023-12-20
Payer: MEDICARE

## 2023-12-20 DIAGNOSIS — G47.33 OSA (OBSTRUCTIVE SLEEP APNEA): Primary | ICD-10-CM

## 2023-12-20 NOTE — TELEPHONE ENCOUNTER
Last ov 3/27/23. Order for replacement device pended for provider consideration.    Patient uses [facility]    Ana REYES RN  [facility]

## 2023-12-20 NOTE — TELEPHONE ENCOUNTER
General Call    Contacts         Type Contact Phone/Fax    12/20/2023 11:01 AM CST Phone (Incoming) Newton Buchanan (Self) [phone] (M)     CPAP motor life exceeded - looking to get a new order for replacement - scheduled next follow-up 3/29/24          Reason for Call:  CPAP     What are your questions or concerns:  CPAP motor life exceeded - looking to get a new order for replacement - scheduled next follow-up 3/29/24     Fell on floor 12/20/23 and that caused the error message - believes there may be some water damage    Date of last appointment with provider: 3/27/23    Could we send this information to you in [employer] or would you prefer to receive a phone call?:   Patient would prefer a phone call   Okay to leave a detailed message?: Yes at Cell number on file:    Telephone Information:   Mobile [phone]

## 2024-01-02 ENCOUNTER — OFFICE VISIT (OUTPATIENT)
Dept: DERMATOLOGY | Facility: CLINIC | Age: 79
End: 2024-01-02
Payer: MEDICARE

## 2024-01-02 DIAGNOSIS — L81.4 LENTIGO: ICD-10-CM

## 2024-01-02 DIAGNOSIS — L85.3 XEROSIS OF SKIN: ICD-10-CM

## 2024-01-02 DIAGNOSIS — L82.1 SEBORRHEIC KERATOSIS: Primary | ICD-10-CM

## 2024-01-02 DIAGNOSIS — D18.01 CHERRY ANGIOMA: ICD-10-CM

## 2024-01-02 PROCEDURE — 99203 OFFICE O/P NEW LOW 30 MIN: CPT | Performed by: PHYSICIAN ASSISTANT

## 2024-01-02 NOTE — LETTER
2024         RE: Newton Buchanan  [address]  [place] [address]        Dear Colleague,    Thank you for referring your patient, Newton Buchanan, to the [facility]. Please see a copy of my visit note below.    Newton Buchanan is an extremely pleasant 78 year old year old male patient here today for upper body skin check. He notes some more dark spots on body. No painful or bleeding skin lesions. No changing nevi. Patient has no other skin complaints today.  Remainder of the HPI, Meds, PMH, Allergies, FH, and SH was reviewed in chart.    Pertinent Hx:   No personal history of skin cancer.   Past Medical History:   Diagnosis Date     Benign essential hypertension      Oropharyngeal cancer (H)        Past Surgical History:   Procedure Laterality Date     BIOPSY  2020     COLONOSCOPY  2020     DAVINCI PROSTATECTOMY       ENT SURGERY  2020    Biopsy of neck     GENITOURINARY SURGERY  2008    Prostate removed        Family History   Problem Relation Age of Onset     Breast Cancer Sister      Diabetes Type 1 Son          at 25     Diabetes Son      Coronary Artery Disease No family hx of      Colon Cancer No family hx of      Melanoma No family hx of        Social History     Socioeconomic History     Marital status: Single     Spouse name: Not on file     Number of children: Not on file     Years of education: Not on file     Highest education level: Not on file   Occupational History     Not on file   Tobacco Use     Smoking status: Never     Smokeless tobacco: Never   Vaping Use     Vaping Use: Never used   Substance and Sexual Activity     Alcohol use: Yes     Comment: occas     Drug use: No     Sexual activity: Not Currently     Partners: Female     Birth control/protection: None   Other Topics Concern     Parent/sibling w/ CABG, MI or angioplasty before 65F 55M? No   Social History Narrative     Not on file     Social Determinants of Health     Financial Resource  Strain: Not on file   Food Insecurity: Not on file   Transportation Needs: Not on file   Physical Activity: Not on file   Stress: Not on file   Social Connections: Not on file   Interpersonal Safety: Not on file   Housing Stability: Not on file       Outpatient Encounter Medications as of 1/2/2024   Medication Sig Dispense Refill     Ascorbic Acid (VITAMIN C) 500 MG CAPS Take by mouth daily       aspirin 81 MG tablet Take by mouth daily 30 tablet      Calcium Carb-Cholecalciferol (CALCIUM 600 + D PO) Take 1 tablet by mouth daily       fluticasone (FLONASE) 50 MCG/ACT nasal spray Spray 1-2 sprays in nostril daily 18.2 mL 3     ipratropium (ATROVENT) 0.06 % nasal spray Spray 2 sprays into both nostrils 4 times daily as needed for rhinitis (runny nose) 15 mL 3     losartan (COZAAR) 25 MG tablet Take 1 tablet (25 mg) by mouth daily 90 tablet 3     pembrolizumab (KEYTRUDA) 25 MG/ML        pravastatin (PRAVACHOL) 20 MG tablet Take 1 tablet (20 mg) by mouth daily 90 tablet 3     sildenafil (VIAGRA) 100 MG tablet Take 0.5-1 tablets ( mg) by mouth daily as needed (at least 30 minutes prior to intercourse) 30 tablet 3     VITAMIN D, CHOLECALCIFEROL, PO Take 1,000 Units by mouth daily       No facility-administered encounter medications on file as of 1/2/2024.             O:   NAD, WDWN, Alert & Oriented, Mood & Affect wnl, Vitals stable   Here today alone   There were no vitals taken for this visit.   General appearance normal   Vitals stable   Alert, oriented and in no acute distress      Brown stuck on papules on face and scalp  Stuck on papules and brown macules on trunk and ext   Red papules on trunk  Xerosis on chest and back    The remainder of upper body skin exam is normal      Eyes: Conjunctivae/lids:Normal     ENT: Lips: normal    MSK:Normal    Pulm: Breathing Normal    Neuro/Psych: Orientation:Alert and Orientedx3 ; Mood/Affect:normal  A/P:  1. Seborrheic keratosis, lentigo, angioma, xerosis   It was a  pleasure speaking to Newton Buchanan today.  Discussed need for moisturizing.   BENIGN LESIONS DISCUSSED WITH PATIENT:  I discussed the specifics of tumor, prognosis, and genetics of benign lesions.  I explained that treatment of these lesions would be purely cosmetic and not medically neccessary.  I discussed with patient different removal options including excision, cautery and /or laser.      Nature and genetics of benign skin lesions dicussed with patient.  Signs and Symptoms of skin cancer discussed with patient.  ABCDEs of melanoma reviewed with patient.  Patient encouraged to perform monthly skin exams.  UV precautions reviewed with patient.  Risks of non-melanoma skin cancer discussed with patient   Return to clinic in one year or sooner if needed.       Again, thank you for allowing me to participate in the care of your patient.        Sincerely,        Melita Mondragon PA-C

## 2024-01-03 NOTE — PROGRESS NOTES
Newton Buchanan is an extremely pleasant 78 year old year old male patient here today for upper body skin check. He notes some more dark spots on body. No painful or bleeding skin lesions. No changing nevi. Patient has no other skin complaints today.  Remainder of the HPI, Meds, PMH, Allergies, FH, and SH was reviewed in chart.    Pertinent Hx:   No personal history of skin cancer.   Past Medical History:   Diagnosis Date    Benign essential hypertension     Oropharyngeal cancer (H)        Past Surgical History:   Procedure Laterality Date    BIOPSY  2020    COLONOSCOPY  2020    DAVINCI PROSTATECTOMY      ENT SURGERY  2020    Biopsy of neck    GENITOURINARY SURGERY  2008    Prostate removed        Family History   Problem Relation Age of Onset    Breast Cancer Sister     Diabetes Type 1 Son          at 25    Diabetes Son     Coronary Artery Disease No family hx of     Colon Cancer No family hx of     Melanoma No family hx of        Social History     Socioeconomic History    Marital status: Single     Spouse name: Not on file    Number of children: Not on file    Years of education: Not on file    Highest education level: Not on file   Occupational History    Not on file   Tobacco Use    Smoking status: Never    Smokeless tobacco: Never   Vaping Use    Vaping Use: Never used   Substance and Sexual Activity    Alcohol use: Yes     Comment: occas    Drug use: No    Sexual activity: Not Currently     Partners: Female     Birth control/protection: None   Other Topics Concern    Parent/sibling w/ CABG, MI or angioplasty before 65F 55M? No   Social History Narrative    Not on file     Social Determinants of Health     Financial Resource Strain: Not on file   Food Insecurity: Not on file   Transportation Needs: Not on file   Physical Activity: Not on file   Stress: Not on file   Social Connections: Not on file   Interpersonal Safety: Not on file   Housing Stability: Not on file       Outpatient  Encounter Medications as of 1/2/2024   Medication Sig Dispense Refill    Ascorbic Acid (VITAMIN C) 500 MG CAPS Take by mouth daily      aspirin 81 MG tablet Take by mouth daily 30 tablet     Calcium Carb-Cholecalciferol (CALCIUM 600 + D PO) Take 1 tablet by mouth daily      fluticasone (FLONASE) 50 MCG/ACT nasal spray Spray 1-2 sprays in nostril daily 18.2 mL 3    ipratropium (ATROVENT) 0.06 % nasal spray Spray 2 sprays into both nostrils 4 times daily as needed for rhinitis (runny nose) 15 mL 3    losartan (COZAAR) 25 MG tablet Take 1 tablet (25 mg) by mouth daily 90 tablet 3    pembrolizumab (KEYTRUDA) 25 MG/ML       pravastatin (PRAVACHOL) 20 MG tablet Take 1 tablet (20 mg) by mouth daily 90 tablet 3    sildenafil (VIAGRA) 100 MG tablet Take 0.5-1 tablets ( mg) by mouth daily as needed (at least 30 minutes prior to intercourse) 30 tablet 3    VITAMIN D, CHOLECALCIFEROL, PO Take 1,000 Units by mouth daily       No facility-administered encounter medications on file as of 1/2/2024.             O:   NAD, WDWN, Alert & Oriented, Mood & Affect wnl, Vitals stable   Here today alone   There were no vitals taken for this visit.   General appearance normal   Vitals stable   Alert, oriented and in no acute distress      Brown stuck on papules on face and scalp  Stuck on papules and brown macules on trunk and ext   Red papules on trunk  Xerosis on chest and back    The remainder of upper body skin exam is normal      Eyes: Conjunctivae/lids:Normal     ENT: Lips: normal    MSK:Normal    Pulm: Breathing Normal    Neuro/Psych: Orientation:Alert and Orientedx3 ; Mood/Affect:normal  A/P:  1. Seborrheic keratosis, lentigo, angioma, xerosis   It was a pleasure speaking to Newton Buchanan today.  Discussed need for moisturizing.   BENIGN LESIONS DISCUSSED WITH PATIENT:  I discussed the specifics of tumor, prognosis, and genetics of benign lesions.  I explained that treatment of these lesions would be purely cosmetic and not  medically neccessary.  I discussed with patient different removal options including excision, cautery and /or laser.      Nature and genetics of benign skin lesions dicussed with patient.  Signs and Symptoms of skin cancer discussed with patient.  ABCDEs of melanoma reviewed with patient.  Patient encouraged to perform monthly skin exams.  UV precautions reviewed with patient.  Risks of non-melanoma skin cancer discussed with patient   Return to clinic in one year or sooner if needed.

## 2024-01-11 ENCOUNTER — ONCOLOGY VISIT (OUTPATIENT)
Dept: ONCOLOGY | Facility: CLINIC | Age: 79
End: 2024-01-11
Attending: INTERNAL MEDICINE
Payer: MEDICARE

## 2024-01-11 ENCOUNTER — APPOINTMENT (OUTPATIENT)
Dept: LAB | Facility: CLINIC | Age: 79
End: 2024-01-11
Attending: INTERNAL MEDICINE
Payer: MEDICARE

## 2024-01-11 ENCOUNTER — INFUSION THERAPY VISIT (OUTPATIENT)
Dept: INFUSION THERAPY | Facility: CLINIC | Age: 79
End: 2024-01-11
Attending: INTERNAL MEDICINE
Payer: MEDICARE

## 2024-01-11 VITALS
DIASTOLIC BLOOD PRESSURE: 66 MMHG | HEART RATE: 61 BPM | OXYGEN SATURATION: 99 % | WEIGHT: 179 LBS | TEMPERATURE: 97.8 F | BODY MASS INDEX: 25.06 KG/M2 | RESPIRATION RATE: 12 BRPM | SYSTOLIC BLOOD PRESSURE: 139 MMHG | HEIGHT: 71 IN

## 2024-01-11 DIAGNOSIS — R63.0 LACK OF APPETITE: ICD-10-CM

## 2024-01-11 DIAGNOSIS — C78.01 MALIGNANT NEOPLASM METASTATIC TO BOTH LUNGS (H): Primary | ICD-10-CM

## 2024-01-11 DIAGNOSIS — C78.02 MALIGNANT NEOPLASM METASTATIC TO BOTH LUNGS (H): Primary | ICD-10-CM

## 2024-01-11 DIAGNOSIS — C76.0 HEAD AND NECK CANCER (H): Primary | ICD-10-CM

## 2024-01-11 DIAGNOSIS — C10.9 SQUAMOUS CELL CARCINOMA OF OROPHARYNX (H): ICD-10-CM

## 2024-01-11 LAB
ALBUMIN SERPL BCG-MCNC: 3.7 G/DL (ref 3.5–5.2)
ALP SERPL-CCNC: 49 U/L (ref 40–150)
ALT SERPL W P-5'-P-CCNC: 26 U/L (ref 0–70)
ANION GAP SERPL CALCULATED.3IONS-SCNC: 8 MMOL/L (ref 7–15)
AST SERPL W P-5'-P-CCNC: 22 U/L (ref 0–45)
BILIRUB SERPL-MCNC: 0.5 MG/DL
BUN SERPL-MCNC: 12.6 MG/DL (ref 8–23)
CALCIUM SERPL-MCNC: 9 MG/DL (ref 8.8–10.2)
CHLORIDE SERPL-SCNC: 105 MMOL/L (ref 98–107)
CREAT SERPL-MCNC: 0.94 MG/DL (ref 0.67–1.17)
DEPRECATED HCO3 PLAS-SCNC: 27 MMOL/L (ref 22–29)
EGFRCR SERPLBLD CKD-EPI 2021: 83 ML/MIN/1.73M2
GLUCOSE SERPL-MCNC: 77 MG/DL (ref 70–99)
POTASSIUM SERPL-SCNC: 4.2 MMOL/L (ref 3.4–5.3)
PROT SERPL-MCNC: 6 G/DL (ref 6.4–8.3)
SODIUM SERPL-SCNC: 140 MMOL/L (ref 135–145)
TSH SERPL DL<=0.005 MIU/L-ACNC: 2.87 UIU/ML (ref 0.3–4.2)

## 2024-01-11 PROCEDURE — 99214 OFFICE O/P EST MOD 30 MIN: CPT | Performed by: INTERNAL MEDICINE

## 2024-01-11 PROCEDURE — 80053 COMPREHEN METABOLIC PANEL: CPT | Performed by: INTERNAL MEDICINE

## 2024-01-11 PROCEDURE — 36415 COLL VENOUS BLD VENIPUNCTURE: CPT | Performed by: INTERNAL MEDICINE

## 2024-01-11 PROCEDURE — 84443 ASSAY THYROID STIM HORMONE: CPT | Performed by: INTERNAL MEDICINE

## 2024-01-11 PROCEDURE — G0463 HOSPITAL OUTPT CLINIC VISIT: HCPCS | Performed by: INTERNAL MEDICINE

## 2024-01-11 ASSESSMENT — PAIN SCALES - GENERAL: PAINLEVEL: NO PAIN (0)

## 2024-01-11 NOTE — PROGRESS NOTES
Virtual Visit Details    Type of service:  Video Visit   Video Start Time: {video visit start/end time for provider to select:355659}  Video End Time:{video visit start/end time for provider to select:613567}    Originating Location (pt. Location): Other In clinic    Distant Location (provider location):  Off-site  Platform used for Video Visit: Enid Cornejo CMA on 1/11/2024 at 8:50 AM

## 2024-01-11 NOTE — LETTER
1/11/2024         RE: Newton Buchanan  [address]  [place] [address]        Dear Colleague,    Thank you for referring your patient, Newton Buchanan, to the [facility]. Please see a copy of my visit note below.    Video-Visit Details    Type of service:  Video Visit    Video Start Time: 9:05 AM  Video End Time: 9:18 AM    Originating Location (pt. 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 Royal    _____________________________________________________________________________    History of Present Illness/ Interval History    Mr. 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     He had first line Keytruda x 2 yrs through 7/2022, resulting in CR. He was on treatment holidayx 5 months until 12/2022 when he had recurrence in solitary subcarinal LN by PET. He resumed Keytruda every 3 weeks, which resulted in CR per most recent PET scan. He transitioned to Keytruda every 6 week maintenance dosing.     His last dose of Keytruda was on 10/26/2023.  He wanted a break from treatment.  He was also experiencing some weight loss and fatigue issues etiology of which was not clear.  We thought that this could be potentially a side effect from Keytruda.  I obtained serum cortisol level which was normal.  He also had some leg cramps.  His hemoglobin was normal at 13.5.  I obtained iron studies which were within normal limits.    Despite holding Keytruda infusions she continues to have issues with low appetite and lack of taste.  He is not losing weight significantly but not gaining  either.  He is here with repeat labs.      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 FNAs benign. Followed.   -2/2020: progressive right neck swelling, no other symptoms.   -2/11/20 FNA biosy right neck mass: metastatic squamous cell cancer, HPV16+  -PET: hypermetabolic right cervical adenopathy, numerous bilateral lung mets, right hilar nodes.   -No evident primary site identified, but highly suspicious of H/N given IHC staining and radiographical findings/spread  -Neogenomic testing - CPS 70    Treatment:  5/2020 - 7/2022: Keytruda (initiated in [place], then transferred care to [place]). Completed 2 yrs of immunotherapy, resulted in CR by PET     11/2020: local progression in right neck only  -12/2020: palliative RT to right neck (3750 cGy/15)    1/2021: palpable right jaw/parotid lesion, PET+. Remainder of right neck disease improved after RT and lung mets stable. US-guided biopsy right parotid lesion attempted, but no lesion seen to biopsy so cancelled.    7/2022 - 12/2022: observation, treatment holiday until progression.   -12/2022 PET/CT: solitary recurrence in subcarinal LN    12/12/2022- present: Keytruda resumed  --Resulted in CR (PET) by 6/2023  --6/2023: changed to every 6 week cycles      Physical Exam    GENERAL: Alert and oriented to time place and person. Seated comfortably. In no distress. Alone.      Lab Results    CMP and TSH WNL    Imaging    6/15/2023 PET: CR. No evident malignancy.    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  After resuming Keytruda he again had a complete response.  In June 2023 we switched him to every 6-week Keytruda infusions.  But he continued to have significant side effects including unintentional  weight loss, fatigue and other issues which was thought to be due to Keytruda.  We gave him a break from Keytruda after his last dose on 10/26/2023.  He is here with repeat labs.    Despite holding Keytruda infusion his symptoms have not significantly changed.  Continues to have issues with low appetite and taste issues.  So I doubt the symptoms are from Keytruda side effect.  Labs reviewed today.  Normal CMP.  Will plan on repeating a PET scan in the next couple of weeks.  If he he has evidence of disease recurrence then we will continue Keytruda every 6-week infusion.  If not then we will discuss regarding potentially holding it further and monitoring with serial PET scans.  He is agreeable to the plan.    3.    Osteoporosis  Managed by PCP and Endocrinologist.      Jensen Royal MD  Hematology and Medical Oncology  [facility]      Again, thank you for allowing me to participate in the care of your patient.        Sincerely,        Jensen Royal MD

## 2024-01-11 NOTE — PROGRESS NOTES
Video-Visit Details    Type of service:  Video Visit    Video Start Time: 9:05 AM  Video End Time: 9:18 AM    Originating Location (pt. Location): 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 Royal    _____________________________________________________________________________    History of Present Illness/ Interval History    Mr. 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     He had first line Keytruda x 2 yrs through 7/2022, resulting in CR. He was on treatment holidayx 5 months until 12/2022 when he had recurrence in solitary subcarinal LN by PET. He resumed Keytruda every 3 weeks, which resulted in CR per most recent PET scan. He transitioned to Keytruda every 6 week maintenance dosing.     His last dose of Keytruda was on 10/26/2023.  He wanted a break from treatment.  He was also experiencing some weight loss and fatigue issues etiology of which was not clear.  We thought that this could be potentially a side effect from Keytruda.  I obtained serum cortisol level which was normal.  He also had some leg cramps.  His hemoglobin was normal at 13.5.  I obtained iron studies which were within normal limits.    Despite holding Keytruda infusions she continues to have issues with low appetite and lack of taste.  He is not losing weight significantly but not gaining either.  He is here with repeat labs.      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 FNAs benign. Followed.   -2/2020: progressive right neck  swelling, no other symptoms.   -2/11/20 FNA biosy right neck mass: metastatic squamous cell cancer, HPV16+  -PET: hypermetabolic right cervical adenopathy, numerous bilateral lung mets, right hilar nodes.   -No evident primary site identified, but highly suspicious of H/N given IHC staining and radiographical findings/spread  -Neogenomic testing - CPS 70    Treatment:  5/2020 - 7/2022: Keytruda (initiated in [place], then transferred care to [place]). Completed 2 yrs of immunotherapy, resulted in CR by PET     11/2020: local progression in right neck only  -12/2020: palliative RT to right neck (3750 cGy/15)    1/2021: palpable right jaw/parotid lesion, PET+. Remainder of right neck disease improved after RT and lung mets stable. US-guided biopsy right parotid lesion attempted, but no lesion seen to biopsy so cancelled.    7/2022 - 12/2022: observation, treatment holiday until progression.   -12/2022 PET/CT: solitary recurrence in subcarinal LN    12/12/2022- present: Keytruda resumed  --Resulted in CR (PET) by 6/2023  --6/2023: changed to every 6 week cycles      Physical Exam    GENERAL: Alert and oriented to time place and person. Seated comfortably. In no distress. Alone.      Lab Results    CMP and TSH WNL    Imaging    6/15/2023 PET: CR. No evident malignancy.    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  After resuming Keytruda he again had a complete response.  In June 2023 we switched him to every 6-week Keytruda infusions.  But he continued to have significant side effects including unintentional weight loss, fatigue and other issues which was thought to be due to Keytruda.  We gave him a break from Keytruda after his last dose on 10/26/2023.  He is here with repeat labs.    Despite holding Keytruda infusion his symptoms have not  significantly changed.  Continues to have issues with low appetite and taste issues.  So I doubt the symptoms are from Keytruda side effect.  Labs reviewed today.  Normal CMP.  Will plan on repeating a PET scan in the next couple of weeks.  If he he has evidence of disease recurrence then we will continue Keytruda every 6-week infusion.  If not then we will discuss regarding potentially holding it further and monitoring with serial PET scans.  He is agreeable to the plan.    3.    Osteoporosis  Managed by PCP and Endocrinologist.      Jensen Royal MD  Hematology and Medical Oncology  [facility]

## 2024-01-11 NOTE — PROGRESS NOTES
Pt not seen in infusion today, per Dr. Lele Orourke until PET scan completed.    ANGELIKA FERRARI RN on 1/11/2024 at 9:38 AM

## 2024-02-08 ENCOUNTER — HOSPITAL ENCOUNTER (OUTPATIENT)
Dept: PET IMAGING | Facility: CLINIC | Age: 79
Discharge: HOME | End: 2024-02-08
Attending: INTERNAL MEDICINE | Admitting: INTERNAL MEDICINE
Payer: MEDICARE

## 2024-02-08 DIAGNOSIS — C78.02 MALIGNANT NEOPLASM METASTATIC TO BOTH LUNGS (H): ICD-10-CM

## 2024-02-08 DIAGNOSIS — C10.9 SQUAMOUS CELL CARCINOMA OF OROPHARYNX (H): ICD-10-CM

## 2024-02-08 DIAGNOSIS — C78.01 MALIGNANT NEOPLASM METASTATIC TO BOTH LUNGS (H): ICD-10-CM

## 2024-02-08 PROCEDURE — 78816 PET IMAGE W/CT FULL BODY: CPT | Mod: MG,PS,KX

## 2024-02-08 PROCEDURE — G1010 CDSM STANSON: HCPCS | Performed by: RADIOLOGY

## 2024-02-08 PROCEDURE — 78816 PET IMAGE W/CT FULL BODY: CPT | Mod: 26 | Performed by: RADIOLOGY

## 2024-02-08 PROCEDURE — 343N000001 HC RX 343: Performed by: INTERNAL MEDICINE

## 2024-02-08 PROCEDURE — A9552 F18 FDG: HCPCS | Performed by: INTERNAL MEDICINE

## 2024-02-08 RX ADMIN — FLUDEOXYGLUCOSE F-18 13.52 MILLICURIE: 500 INJECTION, SOLUTION INTRAVENOUS at 10:24

## 2024-02-11 SDOH — HEALTH STABILITY: PHYSICAL HEALTH: ON AVERAGE, HOW MANY DAYS PER WEEK DO YOU ENGAGE IN MODERATE TO STRENUOUS EXERCISE (LIKE A BRISK WALK)?: 2 DAYS

## 2024-02-11 SDOH — HEALTH STABILITY: PHYSICAL HEALTH: ON AVERAGE, HOW MANY MINUTES DO YOU ENGAGE IN EXERCISE AT THIS LEVEL?: 30 MIN

## 2024-02-11 ASSESSMENT — SOCIAL DETERMINANTS OF HEALTH (SDOH): HOW OFTEN DO YOU GET TOGETHER WITH FRIENDS OR RELATIVES?: TWICE A WEEK

## 2024-02-14 ENCOUNTER — OFFICE VISIT (OUTPATIENT)
Dept: FAMILY MEDICINE | Facility: CLINIC | Age: 79
End: 2024-02-14
Payer: MEDICARE

## 2024-02-14 VITALS
WEIGHT: 174 LBS | RESPIRATION RATE: 20 BRPM | BODY MASS INDEX: 24.36 KG/M2 | HEART RATE: 66 BPM | HEIGHT: 71 IN | SYSTOLIC BLOOD PRESSURE: 114 MMHG | DIASTOLIC BLOOD PRESSURE: 72 MMHG | TEMPERATURE: 97.3 F | OXYGEN SATURATION: 100 %

## 2024-02-14 DIAGNOSIS — E78.5 HYPERLIPIDEMIA LDL GOAL <100: ICD-10-CM

## 2024-02-14 DIAGNOSIS — H91.93 DECREASED HEARING OF BOTH EARS: ICD-10-CM

## 2024-02-14 DIAGNOSIS — N52.9 ERECTILE DYSFUNCTION, UNSPECIFIED ERECTILE DYSFUNCTION TYPE: ICD-10-CM

## 2024-02-14 DIAGNOSIS — Z00.00 ENCOUNTER FOR MEDICARE ANNUAL WELLNESS EXAM: ICD-10-CM

## 2024-02-14 DIAGNOSIS — I10 ESSENTIAL HYPERTENSION WITH GOAL BLOOD PRESSURE LESS THAN 140/90: Primary | ICD-10-CM

## 2024-02-14 PROBLEM — K92.2 LOWER GASTROINTESTINAL HEMORRHAGE: Status: ACTIVE | Noted: 2024-02-14

## 2024-02-14 PROBLEM — K57.30 DIVERTICULA, COLON: Status: RESOLVED | Noted: 2017-04-13 | Resolved: 2024-02-14

## 2024-02-14 PROBLEM — K55.9 ISCHEMIC COLITIS (H): Status: ACTIVE | Noted: 2024-02-14

## 2024-02-14 LAB
ALBUMIN MFR UR ELPH: 6.6 MG/DL
CHOLEST SERPL-MCNC: 141 MG/DL
CREAT UR-MCNC: 49.8 MG/DL
FASTING STATUS PATIENT QL REPORTED: YES
HDLC SERPL-MCNC: 55 MG/DL
LDLC SERPL CALC-MCNC: 72 MG/DL
NONHDLC SERPL-MCNC: 86 MG/DL
PROT/CREAT 24H UR: 0.13 MG/MG CR (ref 0–0.2)
TRIGL SERPL-MCNC: 72 MG/DL

## 2024-02-14 PROCEDURE — 99214 OFFICE O/P EST MOD 30 MIN: CPT | Mod: 25 | Performed by: PHYSICIAN ASSISTANT

## 2024-02-14 PROCEDURE — 36415 COLL VENOUS BLD VENIPUNCTURE: CPT | Performed by: PHYSICIAN ASSISTANT

## 2024-02-14 PROCEDURE — 84156 ASSAY OF PROTEIN URINE: CPT | Performed by: PHYSICIAN ASSISTANT

## 2024-02-14 PROCEDURE — G0439 PPPS, SUBSEQ VISIT: HCPCS | Performed by: PHYSICIAN ASSISTANT

## 2024-02-14 PROCEDURE — 80061 LIPID PANEL: CPT | Performed by: PHYSICIAN ASSISTANT

## 2024-02-14 RX ORDER — SILDENAFIL 100 MG/1
50-100 TABLET, FILM COATED ORAL DAILY PRN
Qty: 30 TABLET | Refills: 3 | Status: SHIPPED | OUTPATIENT
Start: 2024-02-14 | End: 2024-06-10

## 2024-02-14 RX ORDER — RESPIRATORY SYNCYTIAL VIRUS VACCINE 120MCG/0.5
0.5 KIT INTRAMUSCULAR ONCE
Qty: 1 EACH | Refills: 0 | Status: CANCELLED | OUTPATIENT
Start: 2024-02-14 | End: 2024-02-14

## 2024-02-14 RX ORDER — LOSARTAN POTASSIUM 25 MG/1
25 TABLET ORAL DAILY
Qty: 90 TABLET | Refills: 3 | Status: SHIPPED | OUTPATIENT
Start: 2024-02-14 | End: 2024-03-26

## 2024-02-14 RX ORDER — PRAVASTATIN SODIUM 20 MG
20 TABLET ORAL DAILY
Qty: 90 TABLET | Refills: 3 | Status: SHIPPED | OUTPATIENT
Start: 2024-02-14 | End: 2024-05-14

## 2024-02-14 ASSESSMENT — PAIN SCALES - GENERAL: PAINLEVEL: NO PAIN (0)

## 2024-02-14 NOTE — PROGRESS NOTES
Preventive Care Visit  [facility]  Luis Antonio Santiago PA-C, Family Medicine  Feb 14, 2024    Assessment & Plan     Essential hypertension with goal blood pressure less than 140/90  Stable, continue current regimen. Last CMP preformed 1/11/24 and wnl. Educated that his cancer is the most likely the reason for decrease total protein in body. Will get urine sample to rule out other potential causes.   - losartan (COZAAR) 25 MG tablet; Take 1 tablet (25 mg) by mouth daily  - Protein  random urine; Future    Hyperlipidemia LDL goal <100  Stable, continue current regimen.   - Lipid panel reflex to direct LDL Non-fasting; Future  - pravastatin (PRAVACHOL) 20 MG tablet; Take 1 tablet (20 mg) by mouth daily    Erectile dysfunction, unspecified erectile dysfunction type  Stable. Refilled.   - sildenafil (VIAGRA) 100 MG tablet; Take 0.5-1 tablets ( mg) by mouth daily as needed (at least 30 minutes prior to intercourse)    Decreased hearing of both ears  Referral for hearing aid evaluation placed today.   - Adult Audiology  Referral; Future    Encounter for Medicare annual wellness exam  78 year old here for a routine physical exam. Last physical exam was 1 year ago. Discussed preventative screenings which are updated below. Counseled on healthy lifestyle. Follow-up in 1 year for repeat physical exam.     Counseling  Appropriate preventive services were discussed with this patient, including applicable screening as appropriate for fall prevention, nutrition, physical activity, Tobacco-use cessation, weight loss and cognition.  Checklist reviewing preventive services available has been given to the patient.  Reviewed patient's diet, addressing concerns and/or questions.   He is at risk for lack of exercise and has been provided with information to increase physical activity for the benefit of his well-being.   The patient was instructed to see the dentist every 6 months.   He is at risk  for psychosocial distress and has been provided with information to reduce risk.   The patient reports drinking more than one alcoholic drink per day and sometimes engages in binge or excessive drinking. The patient was counseled and given information about possible harmful effects of excessive alcohol intake as well as where to get help for alcohol problems. Information on urinary incontinence and treatment options given to patient.     Follow-Up   Return back in 1 month for routine physical    Subjective   Newton is a 78 year old, presenting for the following:  Physical        2/14/2024     9:42 AM   Additional Questions   Roomed by PAUL Wang  Concerns about low protein levels, no edema in feet, frothy urine, and recent illness.         2/11/2024   General Health   How would you rate your overall physical health? (!) FAIR   Feel stress (tense, anxious, or unable to sleep) Only a little   (!) STRESS CONCERN      2/11/2024   Nutrition   Diet: Regular (no restrictions)         2/11/2024   Exercise   Days per week of moderate/strenous exercise 2 days   Average minutes spent exercising at this level 30 min   (!) EXERCISE CONCERN      2/11/2024   Social Factors   Frequency of gathering with friends or relatives Twice a week   Worry food won't last until get money to buy more No   Food not last or not have enough money for food? No   Do you have housing?  Yes   Are you worried about losing your housing? No   Lack of transportation? No   Unable to get utilities (heat,electricity)? No         2/11/2024   Fall Risk   Fallen 2 or more times in the past year? No    No   Trouble with walking or balance? No    No          2/11/2024   Activities of Daily Living- Home Safety   Needs help with the following daily activites None of the above   Safety concerns in the home None of the above         2/11/2024   Dental   Dentist two times every year? (!) NO         2/11/2024   Hearing Screening   Hearing concerns? None of  the above         2/11/2024   Driving Risk Screening   Patient/family members have concerns about driving No         2/11/2024   General Alertness/Fatigue Screening   Have you been more tired than usual lately? No         2/11/2024   Urinary Incontinence Screening   Bothered by leaking urine in past 6 months Yes         2/11/2024   TB Screening   Were you born outside of [place]?  No         Today's PHQ-2 Score:       2/13/2024     7:41 PM   PHQ-2 ( 1999 [employer])   Q1: Little interest or pleasure in doing things 0   Q2: Feeling down, depressed or hopeless 0   PHQ-2 Score 0   Q1: Little interest or pleasure in doing things Not at all   Q2: Feeling down, depressed or hopeless Not at all   PHQ-2 Score 0           2/11/2024   Substance Use   Alcohol more than 3/day or more than 7/wk Yes   How often do you have a drink containing alcohol 4 or more times a week   How many alcohol drinks on typical day 1 or 2   How often do you have 5+ drinks at one occasion Less than monthly   Audit 2/3 Score 1   How often not able to stop drinking once started Never   How often failed to do what normally expected Never   How often needed first drink in am after a heavy drinking session Never   How often feeling of guilt or remorse after drinking Never   How often unable to remember what happened the night before Never   Have you or someone else been injured because of your drinking No   Has anyone been concerned or suggested you cut down on drinking No   TOTAL SCORE - AUDIT 5   Do you have a current opioid prescription? No   How severe/bad is pain from 1 to 10? 1/10   Do you use any other substances recreationally? (!) ALCOHOL     Social History     Tobacco Use    Smoking status: Never    Smokeless tobacco: Never   Vaping Use    Vaping Use: Never used   Substance Use Topics    Alcohol use: Yes     Comment: occas    Drug use: No       The 10-year ASCVD risk score (Ralf SHELL, et al., 2019) is: 25.1%    Values used to calculate the score:       "Age: 78 years      Sex: Male      Is Non- : No      Diabetic: No      Tobacco smoker: No      Systolic Blood Pressure: 114 mmHg      Is BP treated: Yes      HDL Cholesterol: 50 mg/dL      Total Cholesterol: 155 mg/dL          Reviewed and updated as needed this visit by Provider                    Current providers sharing in care for this patient include:  Patient Care Team:  Luis Antonio Santiago PA-C as PCP - General (Family Medicine)  Geetha Grossman, RN as Specialty Care Coordinator (Oncology)  Ronen Johns MD as MD (Radiation Oncology)  Migue Kelly MD as Assigned Surgical Provider  Alan Ruiz MD as MD (Hematology & Oncology)  Luis Antonio Santiago PA-C as Assigned PCP  Janay Fan NP as Assigned Cancer Care Provider  Arminda Quiroz APRN CNP as Assigned Pulmonology Provider    The following health maintenance items are reviewed in Epic and correct as of today:  Health Maintenance   Topic Date Due    RSV VACCINE (Pregnancy & 60+) (1 - 1-dose 60+ series) Never done    ZOSTER IMMUNIZATION (2 of 2) 03/04/2021    LIPID  02/07/2024    ANNUAL REVIEW OF HM ORDERS  02/09/2024    MEDICARE ANNUAL WELLNESS VISIT  02/09/2024    FALL RISK ASSESSMENT  02/14/2025    GLUCOSE  01/11/2027    ADVANCE CARE PLANNING  02/09/2028    DTAP/TDAP/TD IMMUNIZATION (3 - Td or Tdap) 02/08/2031    HEPATITIS C SCREENING  Completed    PHQ-2 (once per calendar year)  Completed    INFLUENZA VACCINE  Completed    Pneumococcal Vaccine: 65+ Years  Completed    COVID-19 Vaccine  Completed    IPV IMMUNIZATION  Aged Out    HPV IMMUNIZATION  Aged Out    MENINGITIS IMMUNIZATION  Aged Out    RSV MONOCLONAL ANTIBODY  Aged Out    COLORECTAL CANCER SCREENING  Discontinued       Review of Systems   See HPI      Objective    Exam  /72 (BP Location: Right arm, Patient Position: Sitting, Cuff Size: Adult Regular)   Pulse 66   Temp 97.3  F (36.3  C) (Tympanic)   Resp 20   Ht 1.803 m (5' 11\")   Wt 78.9 kg (174 " "lb)   SpO2 100%   BMI 24.27 kg/m     Estimated body mass index is 24.27 kg/m  as calculated from the following:    Height as of this encounter: 1.803 m (5' 11\").    Weight as of this encounter: 78.9 kg (174 lb).    Physical Exam  GENERAL: alert and no distress  EYES: Eyes grossly normal to inspection, PERRL and conjunctivae and sclerae normal  HENT: ear canals and TM's normal, nose and mouth without ulcers or lesions  NECK: no adenopathy, no asymmetry, masses, or scars  RESP: lungs clear to auscultation - no rales, rhonchi or wheezes  CV: regular rate and rhythm, normal S1 S2, no murmur, click or rub, no peripheral edema  ABDOMEN: soft, nontender, no hepatosplenomegaly, no masses and bowel sounds normal  MS: no gross musculoskeletal defects noted, no edema  NEURO: normal gait and speech   PSYCH: mentation appears normal, affect normal/bright        2/14/2024   Mini Cog   Clock Draw Score 2 Normal   3 Item Recall 3 objects recalled   Mini Cog Total Score 5         Seen by ROMAN Gilbert in collaboration with Luis Antonio Santiago PA-C.       Physician Attestation   I, Luis Antonio Santiago PA-C, was present with the medical/DARIUSZ student who participated in the service and in the documentation of the note.  I have verified the history and personally performed the physical exam and medical decision making.  I agree with the assessment and plan of care as documented in the note.      Luis Antonio Santiago PA-C     "

## 2024-02-14 NOTE — PATIENT INSTRUCTIONS
Your Health Risk Assessment indicates you feel you are not in good health    A healthy lifestyle helps keep the body fit and the mind alert. It helps protect you from disease, helps you fight disease, and helps prevent chronic disease (disease that doesn't go away) from getting worse. This is important as you get older and begin to notice twinges in muscles and joints and a decline in the strength and stamina you once took for granted. A healthy lifestyle includes good healthcare, good nutrition, weight control, recreation, and regular exercise. Avoid harmful substances and do what you can to keep safe. Another part of a healthy lifestyle is stay mentally active and socially involved.    Good healthcare   Have a wellness visit every year.   If you have new symptoms, let us know right away. Don't wait until the next checkup.   Take medicines exactly as prescribed and keep your medicines in a safe place. Tell us if your medicine causes problems.   Healthy diet and weight control   Eat 3 or 4 small, nutritious, low-fat, high-fiber meals a day. Include a variety of fruits, vegetables, and whole-grain foods.   Make sure you get enough calcium in your diet. Calcium, vitamin D, and exercise help prevent osteoporosis (bone thinning).   If you live alone, try eating with others when you can. That way you get a good meal and have company while you eat it.   Try to keep a healthy weight. If you eat more calories than your body uses for energy, it will be stored as fat and you will gain weight.     Recreation   Recreation is not limited to sports and team events. It includes any activity that provides relaxation, interest, enjoyment, and exercise. Recreation provides an outlet for physical, mental, and social energy. It can give a sense of worth and achievement. It can help you stay healthy.    Mental Exercise and Social Involvement  Mental and emotional health is as important as physical health. Keep in touch with friends and  family. Stay as active as possible. Continue to learn and challenge yourself.   Things you can do to stay mentally active are:  Learn something new, like a foreign language or musical instrument.   Play SCRABBLE or do crossword puzzles. If you cannot find people to play these games with you at home, you can play them with others on your computer through the Internet.   Join a games club--anything from card games to chess or checkers or lawn bowling.   Start a new hobby.   Go back to school.   Volunteer.   Read.   Keep up with world events.  Eating Healthy Foods: Care Instructions  With every meal, you can make healthy food choices. Try to eat a variety of fruits, vegetables, whole grains, lean proteins, and low-fat dairy products. This can help you get the right balance of nutrients, including vitamins and minerals. Small changes add up over time. You can start by adding one healthy food to your meals each day.    Try to make half your plate fruits and vegetables, one-fourth whole grains, and one-fourth lean proteins. Try including dairy with your meals.   Eat more fruits and vegetables. Try to have them with most meals and snacks.   Foods for healthy eating    Fruits    These can be fresh, frozen, canned, or dried.  Try to choose whole fruit rather than fruit juice.  Eat a variety of colors.    Vegetables    These can be fresh, frozen, canned, or dried.  Beans, peas, and lentils count too.    Whole grains    Choose whole-grain breads, cereals, and noodles.  Try brown rice.    Lean proteins    These can include lean meat, poultry, fish, and eggs.  You can also have tofu, beans, peas, lentils, nuts, and seeds.    Dairy    Try milk, yogurt, and cheese.  Choose low-fat or fat-free when you can.  If you need to, use lactose-free milk or fortified plant-based milk products, such as soy milk.    Water    Drink water when you're thirsty.  Limit sugar-sweetened drinks, including soda, fruit drinks, and sports drinks.  Where  "can you learn more?  Go to https://www.[employer].net/patiented  Enter T756 in the search box to learn more about \"Eating Healthy Foods: Care Instructions.\"  Current as of: February 28, 2023               Content Version: 13.8    6158-2837 [employer].   Care instructions adapted under license by your healthcare professional. If you have questions about a medical condition or this instruction, always ask your healthcare professional. [employer] disclaims any warranty or liability for your use of this information.      Nutrition for Older Adults: Care Instructions  Overview     Good nutrition is important at any age. But it is especially important for older adults. Eating a healthy diet helps keep your body strong. And it can help lower your risk for disease.  As you get older, your body needs more of certain nutrients. These include vitamin B12, calcium, and vitamin D. But it may be harder for you to get these and other important nutrients. This could be for many reasons. You may not feel as hungry as you used to. Or you could have problems with your teeth or mouth that make it hard to chew. Or you may not enjoy planning and preparing meals, especially if you live alone.  Talk with your doctor if you want help getting the most nutrition from what you eat. The doctor may have you work with a dietitian to help you plan meals.  Follow-up care is a key part of your treatment and safety. Be sure to make and go to all appointments, and call your doctor if you are having problems. It's also a good idea to know your test results and keep a list of the medicines you take.  How can you care for yourself at home?  To stay healthy  Eat a variety of foods. The more you vary the foods you eat, the more vitamins, minerals, and other nutrients you get.  Take a multivitamin every day. Choose one with about 100% of the daily value (DV) for vitamins and minerals. Do not take more than 100% of the daily value " for any vitamin or mineral unless your doctor tells you to. Talk with your doctor if you are not sure which multivitamin is right for you.  Eat lots of fruits and vegetables. Fresh, frozen, or no-salt canned vegetables and fruits in their own juice or light syrup are good choices.  Include foods that are high in vitamin B12 in your diet. Good choices are fortified breakfast cereal, nonfat or low-fat milk and other dairy products, meat, poultry, fish, and eggs.  Get enough calcium and vitamin D. Good choices include nonfat or low-fat milk, cheese, and yogurt. Other good options are tofu, orange juice with added calcium, and some leafy green vegetables, such as luis greens and kale. If you don't use milk products, talk to your doctor about calcium and vitamin D supplements.  Eat protein foods every day. Good choices include lean meat, fish, poultry, eggs, and cheese. Other good options are cooked beans, peanut butter, and nuts and seeds.  Choose whole grains for half of the grains you eat. Look for 100% whole wheat bread, whole-grain cereals, brown rice, and other whole grains.  If you have constipation  Eat high-fiber foods every day. These include fruits, vegetables, cooked dried beans, and whole grains.  Drink plenty of fluids. If you have kidney, heart, or liver disease and have to limit fluids, talk with your doctor before you increase the amount of fluids you drink.  Ask your doctor if stool softeners may help keep your bowels regular.  If you have mouth problems that make chewing hard  Pick canned or cooked fruits and vegetables. These are often softer.  Chop or shred meat, poultry, and fish. Add sauce or gravy to the meat to help keep it moist.  Pick other protein foods that are soft. These include cheese, peanut butter, cooked beans, cottage cheese, and eggs.  If you have trouble shopping for yourself  Ask a local food store to deliver groceries to your home.  Contact a volunteer center and ask for  "help.  Ask a family member or neighbor to help you.  If you have trouble preparing meals  If you are able, take a cooking class.  Use a microwave oven to cook TV dinners and other frozen or prepared foods.  Take part in group meal programs. You can find these through senior citizen programs.  Have meals brought to your home. Your community may offer programs that deliver meals, such as Meals on Wheels.  If your appetite is poor  Try eating smaller amounts of food more often. For example, eat 4 or 5 small meals a day instead of 1 or 2 large meals.  Eat with family and friends. Or take part in group meal programs offered through volunteer programs. Eating with others may help your appetite. And it helps you be more social.  Ask your doctor if your medicines could cause appetite or taste problems. If so, ask about changing medicines.  Add spices and herbs to increase the flavor of food.  If you think you are depressed, ask your doctor for help. Depression can affect your appetite. And it can make it hard to do everyday activities like grocery shopping and making meals. Treatment can help.  When should you call for help?  Watch closely for changes in your health, and be sure to contact your doctor if you have any problems.  Where can you learn more?  Go to https://www.[employer].net/patiented  Enter L643 in the search box to learn more about \"Nutrition for Older Adults: Care Instructions.\"  Current as of: February 26, 2023               Content Version: 13.8    1260-7800 [employer].   Care instructions adapted under license by your healthcare professional. If you have questions about a medical condition or this instruction, always ask your healthcare professional. [employer] disclaims any warranty or liability for your use of this information.      Learning About Stress  What is stress?     Stress is your body's response to a hard situation. Your body can have a physical, emotional, or mental " response. Stress is a fact of life for most people, and it affects everyone differently. What causes stress for you may not be stressful for someone else.  A lot of things can cause stress. You may feel stress when you go on a job interview, take a test, or run a race. This kind of short-term stress is normal and even useful. It can help you if you need to work hard or react quickly. For example, stress can help you finish an important job on time.  Long-term stress is caused by ongoing stressful situations or events. Examples of long-term stress include long-term health problems, ongoing problems at work, or conflicts in your family. Long-term stress can harm your health.  How does stress affect your health?  When you are stressed, your body responds as though you are in danger. It makes hormones that speed up your heart, make you breathe faster, and give you a burst of energy. This is called the fight-or-flight stress response. If the stress is over quickly, your body goes back to normal and no harm is done.  But if stress happens too often or lasts too long, it can have bad effects. Long-term stress can make you more likely to get sick, and it can make symptoms of some diseases worse. If you tense up when you are stressed, you may develop neck, shoulder, or low back pain. Stress is linked to high blood pressure and heart disease.  Stress also harms your emotional health. It can make you milton, tense, or depressed. Your relationships may suffer, and you may not do well at work or school.  What can you do to manage stress?  You can try these things to help manage stress:   Do something active. Exercise or activity can help reduce stress. Walking is a great way to get started. Even everyday activities such as housecleaning or yard work can help.  Try yoga or kai chi. These techniques combine exercise and meditation. You may need some training at first to learn them.  Do something you enjoy. For example, listen to  "music or go to a movie. Practice your hobby or do volunteer work.  Meditate. This can help you relax, because you are not worrying about what happened before or what may happen in the future.  Do guided imagery. Imagine yourself in any setting that helps you feel calm. You can use online videos, books, or a teacher to guide you.  Do breathing exercises. For example:  From a standing position, bend forward from the waist with your knees slightly bent. Let your arms dangle close to the floor.  Breathe in slowly and deeply as you return to a standing position. Roll up slowly and lift your head last.  Hold your breath for just a few seconds in the standing position.  Breathe out slowly and bend forward from the waist.  Let your feelings out. Talk, laugh, cry, and express anger when you need to. Talking with supportive friends or family, a counselor, or a ronnell leader about your feelings is a healthy way to relieve stress. Avoid discussing your feelings with people who make you feel worse.  Write. It may help to write about things that are bothering you. This helps you find out how much stress you feel and what is causing it. When you know this, you can find better ways to cope.  What can you do to prevent stress?  You might try some of these things to help prevent stress:  Manage your time. This helps you find time to do the things you want and need to do.  Get enough sleep. Your body recovers from the stresses of the day while you are sleeping.  Get support. Your family, friends, and community can make a difference in how you experience stress.  Limit your news feed. Avoid or limit time on social media or news that may make you feel stressed.  Do something active. Exercise or activity can help reduce stress. Walking is a great way to get started.  Where can you learn more?  Go to https://www.healthwise.net/patiented  Enter N032 in the search box to learn more about \"Learning About Stress.\"  Current as of: February 26, " 2023               Content Version: 13.8    9507-2534 [employer].   Care instructions adapted under license by your healthcare professional. If you have questions about a medical condition or this instruction, always ask your healthcare professional. [employer] disclaims any warranty or liability for your use of this information.      Bladder Training: Care Instructions  Your Care Instructions     Bladder training is used to treat urge incontinence and stress incontinence. Urge incontinence means that the need to urinate comes on so fast that you can't get to a toilet in time. Stress incontinence means that you leak urine because of pressure on your bladder. For example, it may happen when you laugh, cough, or lift something heavy.  Bladder training can increase how long you can wait before you have to urinate. It can also help your bladder hold more urine. And it can give you better control over the urge to urinate.  It is important to remember that bladder training takes a few weeks to a few months to make a difference. You may not see results right away, but don't give up.  Follow-up care is a key part of your treatment and safety. Be sure to make and go to all appointments, and call your doctor if you are having problems. It's also a good idea to know your test results and keep a list of the medicines you take.  How can you care for yourself at home?  Work with your doctor to come up with a bladder training program that is right for you. You may use one or more of the following methods.  Delayed urination  In the beginning, try to keep from urinating for 5 minutes after you first feel the need to go.  While you wait, take deep, slow breaths to relax. Kegel exercises can also help you delay the need to go to the bathroom.  After some practice, when you can easily wait 5 minutes to urinate, try to wait 10 minutes before you urinate.  Slowly increase the waiting period until you are able  "to control when you have to urinate.  Scheduled urination  Empty your bladder when you first wake up in the morning.  Schedule times throughout the day when you will urinate.  Start by going to the bathroom every hour, even if you don't need to go.  Slowly increase the time between trips to the bathroom.  When you have found a schedule that works well for you, keep doing it.  If you wake up during the night and have to urinate, do it. Apply your schedule to waking hours only.  Kegel exercises  These tighten and strengthen pelvic muscles, which can help you control the flow of urine. (If doing these exercises causes pain, stop doing them and talk with your doctor.) To do Kegel exercises:  Squeeze your muscles as if you were trying not to pass gas. Or squeeze your muscles as if you were stopping the flow of urine. Your belly, legs, and buttocks shouldn't move.  Hold the squeeze for 3 seconds, then relax for 5 to 10 seconds.  Start with 3 seconds, then add 1 second each week until you are able to squeeze for 10 seconds.  Repeat the exercise 10 times a session. Do 3 to 8 sessions a day.  When should you call for help?  Watch closely for changes in your health, and be sure to contact your doctor if:    Your incontinence is getting worse.     You do not get better as expected.   Where can you learn more?  Go to https://www.[employer].net/patiented  Enter V684 in the search box to learn more about \"Bladder Training: Care Instructions.\"  Current as of: February 28, 2023               Content Version: 13.8    7111-9050 [employer].   Care instructions adapted under license by your healthcare professional. If you have questions about a medical condition or this instruction, always ask your healthcare professional. [employer] disclaims any warranty or liability for your use of this information.      Learning About Alcohol Use Disorder  What is alcohol use disorder?  Alcohol use disorder means that a " person drinks alcohol even though it causes harm to themselves or others. It can range from mild to severe. The more symptoms of this disorder you have, the more severe it may be. People who have it may find it hard to control their use of alcohol.  People who have this disorder may argue with others about how much they're drinking. Their job may be affected because of drinking. They may drink when it's dangerous or illegal, such as when they drive. They also may have a strong need, or craving, to drink. They may feel like they must drink just to get by. Their drinking may increase their risk of getting hurt or being in a car crash.  Over time, drinking too much alcohol may cause health problems. These may include high blood pressure, liver problems, or problems with digestion.  What are the symptoms?  Maybe you've wondered about your alcohol habits or how to tell if your drinking is becoming a problem.  Here are some of the symptoms of alcohol use disorder. You may have it if you have two or more of the following symptoms:  You drink larger amounts of alcohol than you ever meant to. Or you've been drinking for a longer time than you ever meant to.  You can't cut down or control your use. Or you constantly wish you could cut down.  You spend a lot of time getting or drinking alcohol or recovering from its effects.  You have strong cravings for alcohol.  You can no longer do your main jobs at work, at school, or at home.  You keep drinking alcohol, even though your use hurts your relationships.  You have stopped doing important activities because of your alcohol use.  You drink alcohol in situations where doing so is dangerous.  You keep drinking alcohol even though you know it's causing health problems.  You need more and more alcohol to get the same effect, or you get less effect from the same amount over time. This is called tolerance.  You have uncomfortable symptoms when you stop drinking alcohol or use less.  "This is called withdrawal.  Alcohol use disorder can range from mild to severe. The more symptoms you have, the more severe the disorder may be.  You might not realize that your drinking is a problem. You might not drink large amounts when you drink. Or you might go for days or weeks between drinking episodes. But even if you don't drink very often, your drinking could still be harmful and put you at risk.  How is alcohol use disorder treated?  Getting help is up to you. But you don't have to do it alone. There are many people and kinds of treatments that can help.  Treatment for alcohol use disorder can include:  Group therapy, one or more types of counseling, and alcohol education.  Medicines that help to:  Reduce withdrawal symptoms and help you safely stop drinking.  Reduce cravings for alcohol.  Support groups. These groups include Alcoholics Anonymous and Wi-Chi (Self-Management and Recovery Training).  Some people are able to stop or cut back on drinking with help from a counselor. People who have moderate to severe alcohol use disorder may need medical treatment. They may need to stay in a hospital or treatment center.  You may have a treatment team to help you. This team may include a psychologist or psychiatrist, counselors, doctors, social workers, nurses, and a . A  helps plan and manage your treatment.  Follow-up care is a key part of your treatment and safety. Be sure to make and go to all appointments, and call your doctor if you are having problems. It's also a good idea to know your test results and keep a list of the medicines you take.  Where can you learn more?  Go to https://www.[employer]LightInTheBox.com.net/patiented  Enter H758 in the search box to learn more about \"Learning About Alcohol Use Disorder.\"  Current as of: March 21, 2023               Content Version: 13.8    3902-5038 [employer].   Care instructions adapted under license by your [facility] " professional. If you have questions about a medical condition or this instruction, always ask your healthcare professional. [employer]wise, [facility] disclaims any warranty or liability for your use of this information.      Substance Use Disorder: Care Instructions  Overview     You can improve your life and health by stopping your use of alcohol or drugs. When you don't drink or use drugs, you may feel and sleep better. You may get along better with your family, friends, and coworkers. There are medicines and programs that can help with substance use disorder.  How can you care for yourself at home?  Here are some ways to help you stay sober and prevent relapse.  If you have been given medicine to help keep you sober or reduce your cravings, be sure to take it exactly as prescribed.  Talk to your doctor about programs that can help you stop using drugs or drinking alcohol.  Do not keep alcohol or drugs in your home.  Plan ahead. Think about what you'll say if other people ask you to drink or use drugs. Try not to spend time with people who drink or use drugs.  Use the time and money spent on drinking or drugs to do something that's important to you.  Preventing a relapse  Have a plan to deal with relapse. Learn to recognize changes in your thinking that lead you to drink or use drugs. Get help before you start to drink or use drugs again.  Try to stay away from situations, friends, or places that may lead you to drink or use drugs.  If you feel the need to drink alcohol or use drugs again, seek help right away. Call a trusted friend or family member. Some people get support from organizations such as Narcotics Anonymous or [employer] or from treatment facilities.  If you relapse, get help as soon as you can. Some people make a plan with another person that outlines what they want that person to do for them if they relapse. The plan usually includes how to handle the relapse and who to notify in case of  "relapse.  Don't give up. Remember that a relapse doesn't mean that you have failed. Use the experience to learn the triggers that lead you to drink or use drugs. Then quit again. Recovery is a lifelong process. Many people have several relapses before they are able to quit for good.  Follow-up care is a key part of your treatment and safety. Be sure to make and go to all appointments, and call your doctor if you are having problems. It's also a good idea to know your test results and keep a list of the medicines you take.  When should you call for help?   Call 911  anytime you think you may need emergency care. For example, call if you or someone else:    Has overdosed or has withdrawal signs. Be sure to tell the emergency workers that you are or someone else is using or trying to quit using drugs. Overdose or withdrawal signs may include:  Losing consciousness.  Seizure.  Seeing or hearing things that aren't there (hallucinations).     Is thinking or talking about suicide or harming others.   Where to get help 24 hours a day, 7 days a week   If you or someone you know talks about suicide, self-harm, a mental health crisis, a substance use crisis, or any other kind of emotional distress, get help right away. You can:    Call the Suicide and Crisis Lifeline at 988.     Call 1-414-427-CUTS (1-885.787.5149).     Text HOME to 168722 to access the Crisis Text Line.   Consider saving these numbers in your phone.  Go to [employer] for more information or to chat online.  Call your doctor now or seek immediate medical care if:    You are having withdrawal symptoms. These may include nausea or vomiting, sweating, shakiness, and anxiety.   Watch closely for changes in your health, and be sure to contact your doctor if:    You have a relapse.     You need more help or support to stop.   Where can you learn more?  Go to https://www.healthwise.net/patiented  Enter H573 in the search box to learn more about \"Substance Use " "Disorder: Care Instructions.\"  Current as of: March 21, 2023               Content Version: 13.8    6894-4417 [employer].   Care instructions adapted under license by your healthcare professional. If you have questions about a medical condition or this instruction, always ask your healthcare professional. [employer] disclaims any warranty or liability for your use of this information.      Preventive Care Advice   This is general advice given by our system to help you stay healthy. However, your care team may have specific advice just for you. Please talk to your care team about your preventive care needs.  Nutrition  Eat 5 or more servings of fruits and vegetables each day.  Try wheat bread, brown rice and whole grain pasta (instead of white bread, rice, and pasta).  Get enough calcium and vitamin D. Check the label on foods and aim for 100% of the RDA (recommended daily allowance).  Lifestyle  Exercise at least 150 minutes each week  (30 minutes a day, 5 days a week).  Do muscle strengthening activities 2 days a week. These help control your weight and prevent disease.  No smoking.  Wear sunscreen to prevent skin cancer.  Have a dental exam and cleaning every 6 months.  Yearly exams  See your health care team every year to talk about:  Any changes in your health.  Any medicines your care team has prescribed.  Preventive care, family planning, and ways to prevent chronic diseases.  Shots (vaccines)   HPV shots (up to age 26), if you've never had them before.  Hepatitis B shots (up to age 59), if you've never had them before.  COVID-19 shot: Get this shot when it's due.  Flu shot: Get a flu shot every year.  Tetanus shot: Get a tetanus shot every 10 years.  Pneumococcal, hepatitis A, and RSV shots: Ask your care team if you need these based on your risk.  Shingles shot (for age 50 and up)  General health tests  Diabetes screening:  Starting at age 35, Get screened for diabetes at least " every 3 years.  If you are younger than age 35, ask your care team if you should be screened for diabetes.  Cholesterol test: At age 39, start having a cholesterol test every 5 years, or more often if advised.  Bone density scan (DEXA): At age 50, ask your care team if you should have this scan for osteoporosis (brittle bones).  Hepatitis C: Get tested at least once in your life.  STIs (sexually transmitted infections)  Before age 24: Ask your care team if you should be screened for STIs.  After age 24: Get screened for STIs if you're at risk. You are at risk for STIs (including HIV) if:  You are sexually active with more than one person.  You don't use condoms every time.  You or a partner was diagnosed with a sexually transmitted infection.  If you are at risk for HIV, ask about PrEP medicine to prevent HIV.  Get tested for HIV at least once in your life, whether you are at risk for HIV or not.  Cancer screening tests  Cervical cancer screening: If you have a cervix, begin getting regular cervical cancer screening tests starting at age 21.  Breast cancer scan (mammogram): If you've ever had breasts, begin having regular mammograms starting at age 40. This is a scan to check for breast cancer.  Colon cancer screening: It is important to start screening for colon cancer at age 45.  Have a colonoscopy test every 10 years (or more often if you're at risk) Or, ask your provider about stool tests like a FIT test every year or Cologuard test every 3 years.  To learn more about your testing options, visit:   https://www.[employer]/265532.pdf.  For help making a decision, visit:   https://bit.ly/td45520.  Prostate cancer screening test: If you have a prostate, ask your care team if a prostate cancer screening test (PSA) at age 55 is right for you.  Lung cancer screening: If you are a current or former smoker ages 50 to 80, ask your care team if ongoing lung cancer screenings are right for you.  For informational purposes  only. Not to replace the advice of your health care provider. Copyright   2023 [facility]. All rights reserved. Clinically reviewed by the [facility] Transitions Program. [employer] [id number] - REV 01/24.

## 2024-02-15 ENCOUNTER — LAB (OUTPATIENT)
Dept: LAB | Facility: CLINIC | Age: 79
End: 2024-02-15
Payer: MEDICARE

## 2024-02-15 ENCOUNTER — VIRTUAL VISIT (OUTPATIENT)
Dept: ONCOLOGY | Facility: CLINIC | Age: 79
End: 2024-02-15
Attending: INTERNAL MEDICINE
Payer: MEDICARE

## 2024-02-15 ENCOUNTER — INFUSION THERAPY VISIT (OUTPATIENT)
Dept: INFUSION THERAPY | Facility: CLINIC | Age: 79
End: 2024-02-15
Attending: INTERNAL MEDICINE
Payer: MEDICARE

## 2024-02-15 ENCOUNTER — TELEPHONE (OUTPATIENT)
Dept: FAMILY MEDICINE | Facility: CLINIC | Age: 79
End: 2024-02-15

## 2024-02-15 VITALS
TEMPERATURE: 97.5 F | HEIGHT: 71 IN | HEART RATE: 66 BPM | OXYGEN SATURATION: 100 % | DIASTOLIC BLOOD PRESSURE: 72 MMHG | RESPIRATION RATE: 12 BRPM | WEIGHT: 175 LBS | SYSTOLIC BLOOD PRESSURE: 132 MMHG | BODY MASS INDEX: 24.5 KG/M2

## 2024-02-15 VITALS — DIASTOLIC BLOOD PRESSURE: 74 MMHG | HEART RATE: 58 BPM | SYSTOLIC BLOOD PRESSURE: 124 MMHG

## 2024-02-15 DIAGNOSIS — C79.89 SECONDARY SQUAMOUS CELL CARCINOMA OF HEAD AND NECK WITH UNKNOWN PRIMARY SITE (H): ICD-10-CM

## 2024-02-15 DIAGNOSIS — C76.0 HEAD AND NECK CANCER (H): Primary | ICD-10-CM

## 2024-02-15 DIAGNOSIS — C44.92 SECONDARY SQUAMOUS CELL CARCINOMA OF HEAD AND NECK WITH UNKNOWN PRIMARY SITE (H): ICD-10-CM

## 2024-02-15 LAB
ALBUMIN SERPL BCG-MCNC: 3.8 G/DL (ref 3.5–5.2)
ALP SERPL-CCNC: 44 U/L (ref 40–150)
ALT SERPL W P-5'-P-CCNC: 20 U/L (ref 0–70)
ANION GAP SERPL CALCULATED.3IONS-SCNC: 10 MMOL/L (ref 7–15)
AST SERPL W P-5'-P-CCNC: 20 U/L (ref 0–45)
BILIRUB SERPL-MCNC: 0.4 MG/DL
BUN SERPL-MCNC: 12.9 MG/DL (ref 8–23)
CALCIUM SERPL-MCNC: 9.4 MG/DL (ref 8.8–10.2)
CHLORIDE SERPL-SCNC: 101 MMOL/L (ref 98–107)
CREAT SERPL-MCNC: 0.91 MG/DL (ref 0.67–1.17)
DEPRECATED HCO3 PLAS-SCNC: 28 MMOL/L (ref 22–29)
EGFRCR SERPLBLD CKD-EPI 2021: 87 ML/MIN/1.73M2
GLUCOSE SERPL-MCNC: 79 MG/DL (ref 70–99)
POTASSIUM SERPL-SCNC: 4 MMOL/L (ref 3.4–5.3)
PROT SERPL-MCNC: 6.2 G/DL (ref 6.4–8.3)
SODIUM SERPL-SCNC: 139 MMOL/L (ref 135–145)
TSH SERPL DL<=0.005 MIU/L-ACNC: 2.82 UIU/ML (ref 0.3–4.2)

## 2024-02-15 PROCEDURE — 99214 OFFICE O/P EST MOD 30 MIN: CPT | Mod: 95 | Performed by: INTERNAL MEDICINE

## 2024-02-15 PROCEDURE — 84443 ASSAY THYROID STIM HORMONE: CPT | Performed by: INTERNAL MEDICINE

## 2024-02-15 PROCEDURE — 250N000011 HC RX IP 250 OP 636: Mod: JZ | Performed by: INTERNAL MEDICINE

## 2024-02-15 PROCEDURE — 80053 COMPREHEN METABOLIC PANEL: CPT | Performed by: INTERNAL MEDICINE

## 2024-02-15 PROCEDURE — 96413 CHEMO IV INFUSION 1 HR: CPT

## 2024-02-15 PROCEDURE — 36415 COLL VENOUS BLD VENIPUNCTURE: CPT | Performed by: INTERNAL MEDICINE

## 2024-02-15 PROCEDURE — 258N000003 HC RX IP 258 OP 636: Performed by: INTERNAL MEDICINE

## 2024-02-15 RX ORDER — SODIUM CHLORIDE 9 MG/ML
1000 INJECTION, SOLUTION INTRAVENOUS CONTINUOUS PRN
Status: CANCELLED
Start: 2024-02-15

## 2024-02-15 RX ORDER — ALBUTEROL SULFATE 0.83 MG/ML
2.5 SOLUTION RESPIRATORY (INHALATION)
Status: CANCELLED | OUTPATIENT
Start: 2024-02-15

## 2024-02-15 RX ORDER — NALOXONE HYDROCHLORIDE 0.4 MG/ML
.1-.4 INJECTION, SOLUTION INTRAMUSCULAR; INTRAVENOUS; SUBCUTANEOUS
Status: CANCELLED | OUTPATIENT
Start: 2024-02-15

## 2024-02-15 RX ORDER — METHYLPREDNISOLONE SODIUM SUCCINATE 125 MG/2ML
125 INJECTION, POWDER, LYOPHILIZED, FOR SOLUTION INTRAMUSCULAR; INTRAVENOUS
Status: CANCELLED
Start: 2024-02-15

## 2024-02-15 RX ORDER — ALBUTEROL SULFATE 90 UG/1
1-2 AEROSOL, METERED RESPIRATORY (INHALATION)
Status: CANCELLED
Start: 2024-02-15

## 2024-02-15 RX ORDER — LORAZEPAM 2 MG/ML
0.5 INJECTION INTRAMUSCULAR EVERY 4 HOURS PRN
Status: CANCELLED
Start: 2024-02-15

## 2024-02-15 RX ORDER — DIPHENHYDRAMINE HYDROCHLORIDE 50 MG/ML
50 INJECTION INTRAMUSCULAR; INTRAVENOUS
Status: CANCELLED
Start: 2024-02-15

## 2024-02-15 RX ORDER — EPINEPHRINE 1 MG/ML
0.3 INJECTION, SOLUTION, CONCENTRATE INTRAVENOUS EVERY 5 MIN PRN
Status: CANCELLED | OUTPATIENT
Start: 2024-02-15

## 2024-02-15 RX ORDER — MEPERIDINE HYDROCHLORIDE 25 MG/ML
25 INJECTION INTRAMUSCULAR; INTRAVENOUS; SUBCUTANEOUS EVERY 30 MIN PRN
Status: CANCELLED | OUTPATIENT
Start: 2024-02-15

## 2024-02-15 RX ADMIN — SODIUM CHLORIDE 400 MG: 9 INJECTION, SOLUTION INTRAVENOUS at 09:59

## 2024-02-15 RX ADMIN — SODIUM CHLORIDE 250 ML: 9 INJECTION, SOLUTION INTRAVENOUS at 09:59

## 2024-02-15 ASSESSMENT — PAIN SCALES - GENERAL: PAINLEVEL: NO PAIN (0)

## 2024-02-15 NOTE — TELEPHONE ENCOUNTER
Fax received from pharmacy regarding sildenafil.     Plan does not cover medication. Please call [phone] to initiate prior authorization     Patient ID is [id number]

## 2024-02-15 NOTE — PROGRESS NOTES
Infusion Nursing Note:  Newton Buchanan presents today for Keytruda.    Patient seen by provider today: Yes: Lele therefore assessment deferred   present during visit today: Not Applicable.    Note: N/A.      Intravenous Access:  Peripheral IV placed.    Treatment Conditions:  Lab Results   Component Value Date     02/15/2024    POTASSIUM 4.0 02/15/2024    CR 0.91 02/15/2024    JÚNIOR 9.4 02/15/2024    BILITOTAL 0.4 02/15/2024    ALBUMIN 3.8 02/15/2024    ALT 20 02/15/2024    AST 20 02/15/2024       Results reviewed, labs MET treatment parameters, ok to proceed with treatment.      Post Infusion Assessment:  Patient tolerated infusion without incident.  Blood return noted pre and post infusion.  Site patent and intact, free from redness, edema or discomfort.  No evidence of extravasations.  Access discontinued per protocol.       Discharge Plan:   Copy of AVS reviewed with patient and/or family.  Patient will return 3/28/24 for next appointment.  Patient discharged in stable condition accompanied by: self.  Departure Mode: Ambulatory.      ANGELIKA FERRARI RN

## 2024-02-15 NOTE — PROGRESS NOTES
Virtual Visit Details    Type of service:  Video Visit   Video Start Time: {video visit start/end time for provider to select:633230}  Video End Time:{video visit start/end time for provider to select:591688}    Originating Location (pt. Location): Other in clinic    Distant Location (provider location):  Off-site  Platform used for Video Visit: Enid Cornejo CMA on 2/15/2024 at 8:45 AM     Attending and PA/NP shared services statement (NON-critical care):

## 2024-02-15 NOTE — LETTER
2/15/2024         RE: Newton Buchanan  [address]  [place] [address]        Dear Colleague,    Thank you for referring your patient, Newton Buchanan, to the [facility]. Please see a copy of my visit note below.    Video-Visit Details    Type of service:  Video Visit    Video Start Time: 8:46 AM  Video End Time: 9 AM    Originating Location (pt. 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 Royal    _____________________________________________________________________________    History of Present Illness/ Interval History    Mr. 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     He had first line Keytruda x 2 yrs through 7/2022, resulting in CR. He was on treatment holidayx 5 months until 12/2022 when he had recurrence in solitary subcarinal LN by PET. He resumed Keytruda every 3 weeks, which resulted in CR per most recent PET scan. He transitioned to Keytruda every 6 week maintenance dosing.     His last dose of Keytruda was on 10/26/2023.  He wanted a break from treatment.  He was also experiencing some weight loss and fatigue issues etiology of which was not clear.  We thought that this could be potentially a side effect from Keytruda.  I obtained serum cortisol level which was normal.      We repeated a PET scan recently and this visit was to discuss the results and make further follow-up plans.  He has not had Keytruda since October.  His weight has stabilized.  Taste has improved a little bit.  Denies any new issues today.      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 FNAs benign. Followed.   -2/2020: progressive right neck swelling, no other symptoms.   -2/11/20 FNA biosy right neck mass: metastatic squamous cell cancer, HPV16+  -PET: hypermetabolic right cervical adenopathy, numerous bilateral lung mets, right hilar nodes.   -No evident primary site identified, but highly suspicious of H/N given IHC staining and radiographical findings/spread  -Neogenomic testing - CPS 70    Treatment:  5/2020 - 7/2022: Keytruda (initiated in [place], then transferred care to [place]). Completed 2 yrs of immunotherapy, resulted in CR by PET     11/2020: local progression in right neck only  -12/2020: palliative RT to right neck (3750 cGy/15)    1/2021: palpable right jaw/parotid lesion, PET+. Remainder of right neck disease improved after RT and lung mets stable. US-guided biopsy right parotid lesion attempted, but no lesion seen to biopsy so cancelled.    7/2022 - 12/2022: observation, treatment holiday until progression.   -12/2022 PET/CT: solitary recurrence in subcarinal LN    12/12/2022- present: Keytruda resumed  --Resulted in CR (PET) by 6/2023  --6/2023: changed to every 6 week cycles      Physical Exam    GENERAL: Alert and oriented to time place and person. Seated comfortably. In no distress. Alone.      Lab Results    CMP and TSH WNL    Imaging    6/15/2023 PET: CR. No evident malignancy.    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  After resuming Keytruda he again had a complete response.  In June 2023 we switched him to every 6-week Keytruda infusions.  But he continued to have significant side effects including unintentional weight loss, fatigue and other issues which was  thought to be due to Keytruda.  We gave him a break from Keytruda after his last dose on 10/26/2023.      Repeat PET scan from last week reviewed today.  I personally reviewed the images and report and independently interpreted the results.  PET scan is showing interval enlargement with increased metabolic activity within the subcarinal lymph node which was the previous site of clinical progression.  He also has increased FDG uptake and asymmetry the left oropharynx with mild soft tissue thickening and possible inflammatory versus metastatic cervical lymph node again concerning for disease progression.  Similarly he also has a mild FDG avid lesion within the distal esophagus which could possibly represent reflux versus Garcia's versus malignancy.  Patient all these findings I think we can make a case for clinical disease progression and we should restart Keytruda.  Previously he has had excellent response to Keytruda I hope that continues.  If the lesions do not respond to Keytruda then we may have to consider biopsy of one of the lymph nodes for confirmation and decide treatment based on that.    He is agreeable to the plan.  Will proceed with Keytruda 400 mg today.  He will come back in 6 weeks with repeat labs followed by infusion.  Will repeat a PET scan in 12 weeks.    His weight has stabilized and taste has improved.  Will see if restarting Keytruda makes a difference to this.    3.    Osteoporosis  Managed by PCP and Endocrinologist.      Jensen Royal MD  Hematology and Medical Oncology  [facility]      Again, thank you for allowing me to participate in the care of your patient.        Sincerely,        Jensen Royal MD

## 2024-02-15 NOTE — LETTER
2/15/2024         RE: Newton Buchanan  [address]  [place] [address]        Dear Colleague,    Thank you for referring your patient, Newton Buchanan, to the [facility]. Please see a copy of my visit note below.    Video-Visit Details    Type of service:  Video Visit    Video Start Time: 8:46 AM  Video End Time: 9 AM    Originating Location (pt. 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 Royal    _____________________________________________________________________________    History of Present Illness/ Interval History    Mr. 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     He had first line Keytruda x 2 yrs through 7/2022, resulting in CR. He was on treatment holidayx 5 months until 12/2022 when he had recurrence in solitary subcarinal LN by PET. He resumed Keytruda every 3 weeks, which resulted in CR per most recent PET scan. He transitioned to Keytruda every 6 week maintenance dosing.     His last dose of Keytruda was on 10/26/2023.  He wanted a break from treatment.  He was also experiencing some weight loss and fatigue issues etiology of which was not clear.  We thought that this could be potentially a side effect from Keytruda.  I obtained serum cortisol level which was normal.      We repeated a PET scan recently and this visit was to discuss the results and make further follow-up plans.  He has not had Keytruda since October.  His weight has stabilized.  Taste has improved a little bit.  Denies any new issues today.      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 FNAs benign. Followed.   -2/2020: progressive right neck swelling, no other symptoms.   -2/11/20 FNA biosy right neck mass: metastatic squamous cell cancer, HPV16+  -PET: hypermetabolic right cervical adenopathy, numerous bilateral lung mets, right hilar nodes.   -No evident primary site identified, but highly suspicious of H/N given IHC staining and radiographical findings/spread  -Neogenomic testing - CPS 70    Treatment:  5/2020 - 7/2022: Keytruda (initiated in [place], then transferred care to [place]). Completed 2 yrs of immunotherapy, resulted in CR by PET     11/2020: local progression in right neck only  -12/2020: palliative RT to right neck (3750 cGy/15)    1/2021: palpable right jaw/parotid lesion, PET+. Remainder of right neck disease improved after RT and lung mets stable. US-guided biopsy right parotid lesion attempted, but no lesion seen to biopsy so cancelled.    7/2022 - 12/2022: observation, treatment holiday until progression.   -12/2022 PET/CT: solitary recurrence in subcarinal LN    12/12/2022- present: Keytruda resumed  --Resulted in CR (PET) by 6/2023  --6/2023: changed to every 6 week cycles      Physical Exam    GENERAL: Alert and oriented to time place and person. Seated comfortably. In no distress. Alone.      Lab Results    CMP and TSH WNL    Imaging    6/15/2023 PET: CR. No evident malignancy.    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  After resuming Keytruda he again had a complete response.  In June 2023 we switched him to every 6-week Keytruda infusions.  But he continued to have significant side effects including unintentional weight loss, fatigue and other issues which was  thought to be due to Keytruda.  We gave him a break from Keytruda after his last dose on 10/26/2023.      Repeat PET scan from last week reviewed today.  I personally reviewed the images and report and independently interpreted the results.  PET scan is showing interval enlargement with increased metabolic activity within the subcarinal lymph node which was the previous site of clinical progression.  He also has increased FDG uptake and asymmetry the left oropharynx with mild soft tissue thickening and possible inflammatory versus metastatic cervical lymph node again concerning for disease progression.  Similarly he also has a mild FDG avid lesion within the distal esophagus which could possibly represent reflux versus Garcia's versus malignancy.  Patient all these findings I think we can make a case for clinical disease progression and we should restart Keytruda.  Previously he has had excellent response to Keytruda I hope that continues.  If the lesions do not respond to Keytruda then we may have to consider biopsy of one of the lymph nodes for confirmation and decide treatment based on that.    He is agreeable to the plan.  Will proceed with Keytruda 400 mg today.  He will come back in 6 weeks with repeat labs followed by infusion.  Will repeat a PET scan in 12 weeks.    His weight has stabilized and taste has improved.  Will see if restarting Keytruda makes a difference to this.    3.    Osteoporosis  Managed by PCP and Endocrinologist.      Jensen Royal MD  Hematology and Medical Oncology  [facility]      Again, thank you for allowing me to participate in the care of your patient.        Sincerely,        Jensen Royal MD

## 2024-02-15 NOTE — PROGRESS NOTES
Video-Visit Details    Type of service:  Video Visit    Video Start Time: 8:46 AM  Video End Time: 9 AM    Originating Location (pt. 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 Royal    _____________________________________________________________________________    History of Present Illness/ Interval History    Mr. 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     He had first line Keytruda x 2 yrs through 7/2022, resulting in CR. He was on treatment holidayx 5 months until 12/2022 when he had recurrence in solitary subcarinal LN by PET. He resumed Keytruda every 3 weeks, which resulted in CR per most recent PET scan. He transitioned to Keytruda every 6 week maintenance dosing.     His last dose of Keytruda was on 10/26/2023.  He wanted a break from treatment.  He was also experiencing some weight loss and fatigue issues etiology of which was not clear.  We thought that this could be potentially a side effect from Keytruda.  I obtained serum cortisol level which was normal.      We repeated a PET scan recently and this visit was to discuss the results and make further follow-up plans.  He has not had Keytruda since October.  His weight has stabilized.  Taste has improved a little bit.  Denies any new issues today.      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 FNAs benign. Followed.   -2/2020: progressive right neck swelling, no other symptoms.   -2/11/20 FNA biosy right neck mass: metastatic  squamous cell cancer, HPV16+  -PET: hypermetabolic right cervical adenopathy, numerous bilateral lung mets, right hilar nodes.   -No evident primary site identified, but highly suspicious of H/N given IHC staining and radiographical findings/spread  -Neogenomic testing - CPS 70    Treatment:  5/2020 - 7/2022: Keytruda (initiated in [place], then transferred care to [place]). Completed 2 yrs of immunotherapy, resulted in CR by PET     11/2020: local progression in right neck only  -12/2020: palliative RT to right neck (3750 cGy/15)    1/2021: palpable right jaw/parotid lesion, PET+. Remainder of right neck disease improved after RT and lung mets stable. US-guided biopsy right parotid lesion attempted, but no lesion seen to biopsy so cancelled.    7/2022 - 12/2022: observation, treatment holiday until progression.   -12/2022 PET/CT: solitary recurrence in subcarinal LN    12/12/2022- present: Keytruda resumed  --Resulted in CR (PET) by 6/2023  --6/2023: changed to every 6 week cycles      Physical Exam    GENERAL: Alert and oriented to time place and person. Seated comfortably. In no distress. Alone.      Lab Results    CMP and TSH WNL    Imaging    6/15/2023 PET: CR. No evident malignancy.    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  After resuming Keytruda he again had a complete response.  In June 2023 we switched him to every 6-week Keytruda infusions.  But he continued to have significant side effects including unintentional weight loss, fatigue and other issues which was thought to be due to Keytruda.  We gave him a break from Keytruda after his last dose on 10/26/2023.      Repeat PET scan from last week reviewed today.  I personally reviewed the images and report and independently interpreted the results.  PET scan is showing interval  enlargement with increased metabolic activity within the subcarinal lymph node which was the previous site of clinical progression.  He also has increased FDG uptake and asymmetry the left oropharynx with mild soft tissue thickening and possible inflammatory versus metastatic cervical lymph node again concerning for disease progression.  Similarly he also has a mild FDG avid lesion within the distal esophagus which could possibly represent reflux versus Garcia's versus malignancy.  Patient all these findings I think we can make a case for clinical disease progression and we should restart Keytruda.  Previously he has had excellent response to Keytruda I hope that continues.  If the lesions do not respond to Keytruda then we may have to consider biopsy of one of the lymph nodes for confirmation and decide treatment based on that.    He is agreeable to the plan.  Will proceed with Keytruda 400 mg today.  He will come back in 6 weeks with repeat labs followed by infusion.  Will repeat a PET scan in 12 weeks.    His weight has stabilized and taste has improved.  Will see if restarting Keytruda makes a difference to this.    3.    Osteoporosis  Managed by PCP and Endocrinologist.      Jensen Royal MD  Hematology and Medical Oncology  [facility]

## 2024-02-28 NOTE — TELEPHONE ENCOUNTER
PA Initiation    Medication: SILDENAFIL CITRATE 100 MG PO TABS  Insurance Company: [employer] - Phone [phone] Fax [phone]  Pharmacy Filling the Rx: [id number] - [address] AT [facility] [place] [address] [place] [address]  Filling Pharmacy Phone: [phone]  Filling Pharmacy Fax:    Start Date: 2/28/2024

## 2024-02-29 NOTE — TELEPHONE ENCOUNTER
Prior Authorization Not Needed per Insurance    Medication: SILDENAFIL CITRATE 100 MG PO TABS  Insurance Company:  - Phone [phone] Fax [phone]  Expected CoPay: $    Pharmacy Filling the Rx: [facility] #[id number] - [address] AT [address]  Pharmacy Notified: Yes  Patient Notified: Yes    Patient can only get 10 tablets per 30 days.

## 2024-03-19 DIAGNOSIS — J30.0 VASOMOTOR RHINITIS: ICD-10-CM

## 2024-03-19 RX ORDER — IPRATROPIUM BROMIDE 42 UG/1
SPRAY, METERED NASAL
Qty: 15 ML | Refills: 3 | Status: SHIPPED | OUTPATIENT
Start: 2024-03-19

## 2024-03-26 DIAGNOSIS — I10 ESSENTIAL HYPERTENSION WITH GOAL BLOOD PRESSURE LESS THAN 140/90: ICD-10-CM

## 2024-03-26 RX ORDER — LOSARTAN POTASSIUM 25 MG/1
25 TABLET ORAL DAILY
Qty: 90 TABLET | Refills: 3 | Status: SHIPPED | OUTPATIENT
Start: 2024-03-26

## 2024-03-28 ENCOUNTER — INFUSION THERAPY VISIT (OUTPATIENT)
Dept: INFUSION THERAPY | Facility: CLINIC | Age: 79
End: 2024-03-28
Attending: INTERNAL MEDICINE
Payer: MEDICARE

## 2024-03-28 ENCOUNTER — LAB (OUTPATIENT)
Dept: LAB | Facility: CLINIC | Age: 79
End: 2024-03-28
Payer: MEDICARE

## 2024-03-28 ENCOUNTER — VIRTUAL VISIT (OUTPATIENT)
Dept: ONCOLOGY | Facility: CLINIC | Age: 79
End: 2024-03-28
Attending: INTERNAL MEDICINE
Payer: MEDICARE

## 2024-03-28 VITALS — HEART RATE: 65 BPM | SYSTOLIC BLOOD PRESSURE: 134 MMHG | DIASTOLIC BLOOD PRESSURE: 77 MMHG

## 2024-03-28 VITALS
BODY MASS INDEX: 23.94 KG/M2 | OXYGEN SATURATION: 99 % | TEMPERATURE: 97.8 F | WEIGHT: 171 LBS | HEIGHT: 71 IN | SYSTOLIC BLOOD PRESSURE: 130 MMHG | DIASTOLIC BLOOD PRESSURE: 67 MMHG | RESPIRATION RATE: 12 BRPM | HEART RATE: 57 BPM

## 2024-03-28 DIAGNOSIS — C76.0 HEAD AND NECK CANCER (H): Primary | ICD-10-CM

## 2024-03-28 DIAGNOSIS — Z79.899 LONG-TERM USE OF HIGH-RISK MEDICATION: ICD-10-CM

## 2024-03-28 DIAGNOSIS — C44.92 SECONDARY SQUAMOUS CELL CARCINOMA OF HEAD AND NECK WITH UNKNOWN PRIMARY SITE (H): ICD-10-CM

## 2024-03-28 DIAGNOSIS — C78.01 MALIGNANT NEOPLASM METASTATIC TO BOTH LUNGS (H): ICD-10-CM

## 2024-03-28 DIAGNOSIS — C79.89 SECONDARY SQUAMOUS CELL CARCINOMA OF HEAD AND NECK WITH UNKNOWN PRIMARY SITE (H): ICD-10-CM

## 2024-03-28 DIAGNOSIS — Z79.899 LONG-TERM USE OF HIGH-RISK MEDICATION: Primary | ICD-10-CM

## 2024-03-28 DIAGNOSIS — C76.0 HEAD AND NECK CANCER (H): ICD-10-CM

## 2024-03-28 DIAGNOSIS — C78.02 MALIGNANT NEOPLASM METASTATIC TO BOTH LUNGS (H): ICD-10-CM

## 2024-03-28 LAB
ALBUMIN SERPL BCG-MCNC: 3.7 G/DL (ref 3.5–5.2)
ALP SERPL-CCNC: 46 U/L (ref 40–150)
ALT SERPL W P-5'-P-CCNC: 19 U/L (ref 0–70)
ANION GAP SERPL CALCULATED.3IONS-SCNC: 8 MMOL/L (ref 7–15)
AST SERPL W P-5'-P-CCNC: 17 U/L (ref 0–45)
BILIRUB SERPL-MCNC: 0.6 MG/DL
BUN SERPL-MCNC: 16.9 MG/DL (ref 8–23)
CALCIUM SERPL-MCNC: 9.1 MG/DL (ref 8.8–10.2)
CHLORIDE SERPL-SCNC: 104 MMOL/L (ref 98–107)
CREAT SERPL-MCNC: 0.96 MG/DL (ref 0.67–1.17)
DEPRECATED HCO3 PLAS-SCNC: 27 MMOL/L (ref 22–29)
EGFRCR SERPLBLD CKD-EPI 2021: 81 ML/MIN/1.73M2
GLUCOSE SERPL-MCNC: 93 MG/DL (ref 70–99)
POTASSIUM SERPL-SCNC: 4 MMOL/L (ref 3.4–5.3)
PROT SERPL-MCNC: 5.9 G/DL (ref 6.4–8.3)
SODIUM SERPL-SCNC: 139 MMOL/L (ref 135–145)
TSH SERPL DL<=0.005 MIU/L-ACNC: 2.42 UIU/ML (ref 0.3–4.2)

## 2024-03-28 PROCEDURE — 80053 COMPREHEN METABOLIC PANEL: CPT | Performed by: INTERNAL MEDICINE

## 2024-03-28 PROCEDURE — 99214 OFFICE O/P EST MOD 30 MIN: CPT | Mod: 95 | Performed by: INTERNAL MEDICINE

## 2024-03-28 PROCEDURE — 84443 ASSAY THYROID STIM HORMONE: CPT | Performed by: INTERNAL MEDICINE

## 2024-03-28 PROCEDURE — 36415 COLL VENOUS BLD VENIPUNCTURE: CPT | Performed by: INTERNAL MEDICINE

## 2024-03-28 PROCEDURE — 96413 CHEMO IV INFUSION 1 HR: CPT

## 2024-03-28 PROCEDURE — 250N000011 HC RX IP 250 OP 636: Mod: JZ | Performed by: INTERNAL MEDICINE

## 2024-03-28 PROCEDURE — 258N000003 HC RX IP 258 OP 636: Performed by: INTERNAL MEDICINE

## 2024-03-28 PROCEDURE — G2211 COMPLEX E/M VISIT ADD ON: HCPCS | Mod: 95 | Performed by: INTERNAL MEDICINE

## 2024-03-28 RX ORDER — ALBUTEROL SULFATE 90 UG/1
1-2 AEROSOL, METERED RESPIRATORY (INHALATION)
Status: CANCELLED
Start: 2024-03-28

## 2024-03-28 RX ORDER — EPINEPHRINE 1 MG/ML
0.3 INJECTION, SOLUTION, CONCENTRATE INTRAVENOUS EVERY 5 MIN PRN
Status: CANCELLED | OUTPATIENT
Start: 2024-03-28

## 2024-03-28 RX ORDER — MEPERIDINE HYDROCHLORIDE 25 MG/ML
25 INJECTION INTRAMUSCULAR; INTRAVENOUS; SUBCUTANEOUS EVERY 30 MIN PRN
Status: CANCELLED | OUTPATIENT
Start: 2024-03-28

## 2024-03-28 RX ORDER — ALBUTEROL SULFATE 0.83 MG/ML
2.5 SOLUTION RESPIRATORY (INHALATION)
Status: CANCELLED | OUTPATIENT
Start: 2024-03-28

## 2024-03-28 RX ORDER — SODIUM CHLORIDE 9 MG/ML
1000 INJECTION, SOLUTION INTRAVENOUS CONTINUOUS PRN
Status: CANCELLED
Start: 2024-03-28

## 2024-03-28 RX ORDER — NALOXONE HYDROCHLORIDE 0.4 MG/ML
.1-.4 INJECTION, SOLUTION INTRAMUSCULAR; INTRAVENOUS; SUBCUTANEOUS
Status: CANCELLED | OUTPATIENT
Start: 2024-03-28

## 2024-03-28 RX ORDER — LORAZEPAM 2 MG/ML
0.5 INJECTION INTRAMUSCULAR EVERY 4 HOURS PRN
Status: CANCELLED
Start: 2024-03-28

## 2024-03-28 RX ORDER — DIPHENHYDRAMINE HYDROCHLORIDE 50 MG/ML
50 INJECTION INTRAMUSCULAR; INTRAVENOUS
Status: CANCELLED
Start: 2024-03-28

## 2024-03-28 RX ORDER — METHYLPREDNISOLONE SODIUM SUCCINATE 125 MG/2ML
125 INJECTION, POWDER, LYOPHILIZED, FOR SOLUTION INTRAMUSCULAR; INTRAVENOUS
Status: CANCELLED
Start: 2024-03-28

## 2024-03-28 RX ADMIN — SODIUM CHLORIDE 250 ML: 9 INJECTION, SOLUTION INTRAVENOUS at 10:56

## 2024-03-28 RX ADMIN — SODIUM CHLORIDE 400 MG: 9 INJECTION, SOLUTION INTRAVENOUS at 10:54

## 2024-03-28 ASSESSMENT — PAIN SCALES - GENERAL: PAINLEVEL: NO PAIN (0)

## 2024-03-28 NOTE — PROGRESS NOTES
Infusion Nursing Note:  Newton Buchanan presents today for Keytruda.    Patient seen by provider today: Yes, Dr. Royal; therefore, assessment deferred   present during visit today: Not Applicable.    Note: N/A.    Intravenous Access:  Peripheral IV placed.    Treatment Conditions:  Lab Results   Component Value Date     03/28/2024    POTASSIUM 4.0 03/28/2024    CR 0.96 03/28/2024    JÚNIOR 9.1 03/28/2024    BILITOTAL 0.6 03/28/2024    ALBUMIN 3.7 03/28/2024    ALT 19 03/28/2024    AST 17 03/28/2024   Results reviewed, labs MET treatment parameters, ok to proceed with treatment.    Post Infusion Assessment:  Patient tolerated infusion without incident.  Blood return noted pre and post infusion.  Site patent and intact, free from redness, edema or discomfort.  No evidence of extravasations.  Access discontinued per protocol.     Discharge Plan:   Discharge instructions reviewed with: Patient.  Patient and/or family verbalized understanding of discharge instructions and all questions answered.  AVS to patient via [employer].  Patient will return 5/9/2024 for next appointment.   Patient discharged in stable condition accompanied by: self.  Departure Mode: Ambulatory.    Mrecy Carvalho RN

## 2024-03-28 NOTE — LETTER
3/28/2024         RE: Newton Buchanan  [address]  [place] [address]        Dear Colleague,    Thank you for referring your patient, Newton Buchanan, to the [facility]. Please see a copy of my visit note below.    Virtual Visit Details    Type of service:  Video Visit   Video Start Time:  10 AM  Video End Time: 10:07 AM    Originating Location (pt. 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 Royal    _____________________________________________________________________________    History of Present Illness/ Interval History    Mr. 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    He had first line Keytruda x 2 yrs through 7/2022, resulting in CR.  Since then he has had frequent treatment holidays.  His last dose of Keytruda was on 10/26/2023.  After a break of 4 months repeat PET scan showed evidence of disease progression.  So we restarted Keytruda on 2/15/2024.  Here with repeat labs.  Doing well.  No significant side effects.        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 FNAs benign. Followed.   -2/2020: progressive right neck swelling, no other symptoms.   -2/11/20 FNA biosy right neck mass: metastatic squamous cell cancer, HPV16+  -PET: hypermetabolic right cervical adenopathy, numerous bilateral lung mets, right hilar nodes.   -No evident primary site identified, but highly suspicious of H/N given IHC staining and radiographical  findings/spread  -Neogenomic testing - CPS 70    Treatment:  5/2020 - 7/2022: Keytruda (initiated in [place], then transferred care to [place]). Completed 2 yrs of immunotherapy, resulted in CR by PET     11/2020: local progression in right neck only  -12/2020: palliative RT to right neck (3750 cGy/15)    1/2021: palpable right jaw/parotid lesion, PET+. Remainder of right neck disease improved after RT and lung mets stable. US-guided biopsy right parotid lesion attempted, but no lesion seen to biopsy so cancelled.    7/2022 - 12/2022: observation, treatment holiday until progression.   -12/2022 PET/CT: solitary recurrence in subcarinal LN    12/12/2022- present: Keytruda resumed  --Resulted in CR (PET) by 6/2023  --6/2023: changed to every 6 week cycles    -After dose of Keytruda on 10/26/2023, he took a break.    Disease progression on the PET scan with increased activity and enlargement within the subcarinal lymph node.  Also had increased FDG uptake within the left oropharynx and mildly FDG avid left cervical level 1B lymph node.  Mild FDG uptake within the distal esophagus/GE junction.    Restarted Keytruda on 2/15/2024          Physical Exam    GENERAL: Alert and oriented to time place and person. Seated comfortably. In no distress. Alone.      Lab Results    CMP and TSH WNL    Imaging    6/15/2023 PET: CR. No evident malignancy.    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  After resuming Keytruda he again had a complete response.  In June 2023 we switched him to every 6-week Keytruda infusions.  But he continued to have significant side effects including unintentional weight loss, fatigue and other issues which was thought to be due to Keytruda.  We gave him a break from Keytruda after his last dose on 10/26/2023.      Repeat PET scan in  February of this year showed some disease progression so we restarted Keytruda.  He is with repeat labs and to continue Keytruda treatment.  Doing well.  No significant side effects reported.  Serum chemistries unremarkable.     Plan is to proceed with Keytruda today and see him back in 6 weeks with repeat PET scan.  If continued response then we will continue Keytruda without break.  He is agreeable to the plan.  He is also traveling to [place] in late April.  So his next Keytruda dose will be delayed by a week.      3.    Osteoporosis  Managed by PCP and Endocrinologist.    A total of 25 min were spent today on this visit which included face to face conversation with the patient, EMR review, counseling and co-ordination of care and medical documentation.    The longitudinal plan of care for the diagnosis(es)/condition(s) as documented were addressed during this visit. Due to the added complexity in care, I will continue to support Newton in the subsequent management and with ongoing continuity of care.        Jensen Royal MD  Hematology and Medical Oncology  [facility]            Again, thank you for allowing me to participate in the care of your patient.        Sincerely,        Jensen Royal MD

## 2024-03-28 NOTE — LETTER
3/28/2024         RE: Newton Buchanan  [address]  [place] [address]        Dear Colleague,    Thank you for referring your patient, Newton Buchanan, to the [facility]. Please see a copy of my visit note below.    Virtual Visit Details    Type of service:  Video Visit   Video Start Time:  10 AM  Video End Time: 10:07 AM    Originating Location (pt. 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 Royal    _____________________________________________________________________________    History of Present Illness/ Interval History    Mr. 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    He had first line Keytruda x 2 yrs through 7/2022, resulting in CR.  Since then he has had frequent treatment holidays.  His last dose of Keytruda was on 10/26/2023.  After a break of 4 months repeat PET scan showed evidence of disease progression.  So we restarted Keytruda on 2/15/2024.  Here with repeat labs.  Doing well.  No significant side effects.        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 FNAs benign. Followed.   -2/2020: progressive right neck swelling, no other symptoms.   -2/11/20 FNA biosy right neck mass: metastatic squamous cell cancer, HPV16+  -PET: hypermetabolic right cervical adenopathy, numerous bilateral lung mets, right hilar nodes.   -No evident primary site identified, but highly suspicious of H/N given IHC staining and radiographical  findings/spread  -Neogenomic testing - CPS 70    Treatment:  5/2020 - 7/2022: Keytruda (initiated in [place], then transferred care to [place]). Completed 2 yrs of immunotherapy, resulted in CR by PET     11/2020: local progression in right neck only  -12/2020: palliative RT to right neck (3750 cGy/15)    1/2021: palpable right jaw/parotid lesion, PET+. Remainder of right neck disease improved after RT and lung mets stable. US-guided biopsy right parotid lesion attempted, but no lesion seen to biopsy so cancelled.    7/2022 - 12/2022: observation, treatment holiday until progression.   -12/2022 PET/CT: solitary recurrence in subcarinal LN    12/12/2022- present: Keytruda resumed  --Resulted in CR (PET) by 6/2023  --6/2023: changed to every 6 week cycles    -After dose of Keytruda on 10/26/2023, he took a break.    Disease progression on the PET scan with increased activity and enlargement within the subcarinal lymph node.  Also had increased FDG uptake within the left oropharynx and mildly FDG avid left cervical level 1B lymph node.  Mild FDG uptake within the distal esophagus/GE junction.    Restarted Keytruda on 2/15/2024          Physical Exam    GENERAL: Alert and oriented to time place and person. Seated comfortably. In no distress. Alone.      Lab Results    CMP and TSH WNL    Imaging    6/15/2023 PET: CR. No evident malignancy.    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  After resuming Keytruda he again had a complete response.  In June 2023 we switched him to every 6-week Keytruda infusions.  But he continued to have significant side effects including unintentional weight loss, fatigue and other issues which was thought to be due to Keytruda.  We gave him a break from Keytruda after his last dose on 10/26/2023.      Repeat PET scan in  February of this year showed some disease progression so we restarted Keytruda.  He is with repeat labs and to continue Keytruda treatment.  Doing well.  No significant side effects reported.  Serum chemistries unremarkable.     Plan is to proceed with Keytruda today and see him back in 6 weeks with repeat PET scan.  If continued response then we will continue Keytruda without break.  He is agreeable to the plan.  He is also traveling to [place] in late April.  So his next Keytruda dose will be delayed by a week.      3.    Osteoporosis  Managed by PCP and Endocrinologist.    A total of 25 min were spent today on this visit which included face to face conversation with the patient, EMR review, counseling and co-ordination of care and medical documentation.    The longitudinal plan of care for the diagnosis(es)/condition(s) as documented were addressed during this visit. Due to the added complexity in care, I will continue to support Newton in the subsequent management and with ongoing continuity of care.        Jensen Royal MD  Hematology and Medical Oncology  [facility]            Again, thank you for allowing me to participate in the care of your patient.        Sincerely,        Jensen Royal MD

## 2024-03-28 NOTE — PROGRESS NOTES
Virtual Visit Details    Type of service:  Video Visit   Video Start Time:  10 AM  Video End Time: 10:07 AM    Originating Location (pt. 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 Royal    _____________________________________________________________________________    History of Present Illness/ Interval History    Mr. 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    He had first line Keytruda x 2 yrs through 7/2022, resulting in CR.  Since then he has had frequent treatment holidays.  His last dose of Keytruda was on 10/26/2023.  After a break of 4 months repeat PET scan showed evidence of disease progression.  So we restarted Keytruda on 2/15/2024.  Here with repeat labs.  Doing well.  No significant side effects.        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 FNAs benign. Followed.   -2/2020: progressive right neck swelling, no other symptoms.   -2/11/20 FNA biosy right neck mass: metastatic squamous cell cancer, HPV16+  -PET: hypermetabolic right cervical adenopathy, numerous bilateral lung mets, right hilar nodes.   -No evident primary site identified, but highly suspicious of H/N given IHC staining and radiographical findings/spread  -Neogenomic testing - CPS 70    Treatment:  5/2020 - 7/2022: Keytruda (initiated in [place], then transferred care to [place]). Completed 2 yrs of immunotherapy, resulted in CR by PET     11/2020: local progression in right neck only  -12/2020:  palliative RT to right neck (3750 cGy/15)    1/2021: palpable right jaw/parotid lesion, PET+. Remainder of right neck disease improved after RT and lung mets stable. US-guided biopsy right parotid lesion attempted, but no lesion seen to biopsy so cancelled.    7/2022 - 12/2022: observation, treatment holiday until progression.   -12/2022 PET/CT: solitary recurrence in subcarinal LN    12/12/2022- present: Keytruda resumed  --Resulted in CR (PET) by 6/2023  --6/2023: changed to every 6 week cycles    -After dose of Keytruda on 10/26/2023, he took a break.    Disease progression on the PET scan with increased activity and enlargement within the subcarinal lymph node.  Also had increased FDG uptake within the left oropharynx and mildly FDG avid left cervical level 1B lymph node.  Mild FDG uptake within the distal esophagus/GE junction.    Restarted Keytruda on 2/15/2024          Physical Exam    GENERAL: Alert and oriented to time place and person. Seated comfortably. In no distress. Alone.      Lab Results    CMP and TSH WNL    Imaging    6/15/2023 PET: CR. No evident malignancy.    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  After resuming Keytruda he again had a complete response.  In June 2023 we switched him to every 6-week Keytruda infusions.  But he continued to have significant side effects including unintentional weight loss, fatigue and other issues which was thought to be due to Keytruda.  We gave him a break from Keytruda after his last dose on 10/26/2023.      Repeat PET scan in February of this year showed some disease progression so we restarted Keytruda.  He is with repeat labs and to continue Keytruda treatment.  Doing well.  No significant side effects reported.  Serum chemistries unremarkable.     Plan is to proceed with Keytruda  today and see him back in 6 weeks with repeat PET scan.  If continued response then we will continue Keytruda without break.  He is agreeable to the plan.  He is also traveling to [place] in late April.  So his next Keytruda dose will be delayed by a week.      3.    Osteoporosis  Managed by PCP and Endocrinologist.    A total of 25 min were spent today on this visit which included face to face conversation with the patient, EMR review, counseling and co-ordination of care and medical documentation.    The longitudinal plan of care for the diagnosis(es)/condition(s) as documented were addressed during this visit. Due to the added complexity in care, I will continue to support Newton in the subsequent management and with ongoing continuity of care.        Jensen Royal MD  Hematology and Medical Oncology  [facility]

## 2024-04-04 NOTE — PROGRESS NOTES
Oncology Rooming Note    August 31, 2022 8:06 AM   Newton Buchanan is a 76 year old male who presents for:    Chief Complaint   Patient presents with     Oncology Clinic Visit     Oropharyngeal cancer - Provider visit only     Initial Vitals: /68 (BP Location: Right arm, Patient Position: Sitting, Cuff Size: Adult Regular)   Pulse 61   Temp 97.5  F (36.4  C) (Tympanic)   Resp 12   Wt 86.6 kg (191 lb)   SpO2 97%   BMI 25.90 kg/m   Estimated body mass index is 25.9 kg/m  as calculated from the following:    Height as of 3/14/22: 1.829 m (6').    Weight as of this encounter: 86.6 kg (191 lb). Body surface area is 2.1 meters squared.  No Pain (0) Comment: Data Unavailable   No LMP for male patient.  Allergies reviewed: Yes  Medications reviewed: Yes    Medications: Medication refills not needed today.  Pharmacy name entered into [employer]:    [id number] - [place], [place] - [address] AT [address] HOME DELIVERY - [place], [place] - [address] [place] [address]    Clinical concerns:  None      Meli Cornejo, CMA             Patient requests all Lab, Cardiology, and Radiology Results on their Discharge Instructions

## 2024-05-08 ASSESSMENT — SLEEP AND FATIGUE QUESTIONNAIRES
HOW LIKELY ARE YOU TO NOD OFF OR FALL ASLEEP WHILE SITTING QUIETLY AFTER LUNCH WITHOUT ALCOHOL: SLIGHT CHANCE OF DOZING
HOW LIKELY ARE YOU TO NOD OFF OR FALL ASLEEP WHILE WATCHING TV: SLIGHT CHANCE OF DOZING
HOW LIKELY ARE YOU TO NOD OFF OR FALL ASLEEP WHILE SITTING AND TALKING TO SOMEONE: WOULD NEVER DOZE
HOW LIKELY ARE YOU TO NOD OFF OR FALL ASLEEP WHEN YOU ARE A PASSENGER IN A CAR FOR AN HOUR WITHOUT A BREAK: MODERATE CHANCE OF DOZING
HOW LIKELY ARE YOU TO NOD OFF OR FALL ASLEEP IN A CAR, WHILE STOPPED FOR A FEW MINUTES IN TRAFFIC: WOULD NEVER DOZE
HOW LIKELY ARE YOU TO NOD OFF OR FALL ASLEEP WHILE LYING DOWN TO REST IN THE AFTERNOON WHEN CIRCUMSTANCES PERMIT: HIGH CHANCE OF DOZING
HOW LIKELY ARE YOU TO NOD OFF OR FALL ASLEEP WHILE SITTING AND READING: SLIGHT CHANCE OF DOZING
HOW LIKELY ARE YOU TO NOD OFF OR FALL ASLEEP WHILE SITTING INACTIVE IN A PUBLIC PLACE: SLIGHT CHANCE OF DOZING

## 2024-05-09 ENCOUNTER — HOSPITAL ENCOUNTER (OUTPATIENT)
Dept: PET IMAGING | Facility: CLINIC | Age: 79
Discharge: HOME | End: 2024-05-09
Attending: INTERNAL MEDICINE | Admitting: INTERNAL MEDICINE
Payer: MEDICARE

## 2024-05-09 DIAGNOSIS — C76.0 HEAD AND NECK CANCER (H): ICD-10-CM

## 2024-05-09 DIAGNOSIS — C78.01 MALIGNANT NEOPLASM METASTATIC TO BOTH LUNGS (H): ICD-10-CM

## 2024-05-09 DIAGNOSIS — C44.92 SECONDARY SQUAMOUS CELL CARCINOMA OF HEAD AND NECK WITH UNKNOWN PRIMARY SITE (H): ICD-10-CM

## 2024-05-09 DIAGNOSIS — C78.02 MALIGNANT NEOPLASM METASTATIC TO BOTH LUNGS (H): ICD-10-CM

## 2024-05-09 DIAGNOSIS — C79.89 SECONDARY SQUAMOUS CELL CARCINOMA OF HEAD AND NECK WITH UNKNOWN PRIMARY SITE (H): ICD-10-CM

## 2024-05-09 PROCEDURE — 78816 PET IMAGE W/CT FULL BODY: CPT | Mod: MG,PS

## 2024-05-09 PROCEDURE — A9552 F18 FDG: HCPCS | Performed by: INTERNAL MEDICINE

## 2024-05-09 PROCEDURE — 78816 PET IMAGE W/CT FULL BODY: CPT | Mod: 26 | Performed by: RADIOLOGY

## 2024-05-09 PROCEDURE — 343N000001 HC RX 343: Performed by: INTERNAL MEDICINE

## 2024-05-09 PROCEDURE — G1010 CDSM STANSON: HCPCS | Performed by: RADIOLOGY

## 2024-05-09 RX ORDER — FLUDEOXYGLUCOSE F 18 200 MCI/ML
10-18 INJECTION, SOLUTION INTRAVENOUS ONCE
Status: COMPLETED | OUTPATIENT
Start: 2024-05-09 | End: 2024-05-09

## 2024-05-09 RX ADMIN — FLUDEOXYGLUCOSE F 18 13.5 MILLICURIE: 200 INJECTION, SOLUTION INTRAVENOUS at 10:37

## 2024-05-10 ENCOUNTER — OFFICE VISIT (OUTPATIENT)
Dept: SLEEP MEDICINE | Facility: CLINIC | Age: 79
End: 2024-05-10
Payer: MEDICARE

## 2024-05-10 VITALS
OXYGEN SATURATION: 98 % | HEIGHT: 71 IN | BODY MASS INDEX: 24.44 KG/M2 | DIASTOLIC BLOOD PRESSURE: 68 MMHG | HEART RATE: 58 BPM | RESPIRATION RATE: 16 BRPM | WEIGHT: 174.6 LBS | SYSTOLIC BLOOD PRESSURE: 126 MMHG

## 2024-05-10 DIAGNOSIS — K21.9 GASTROESOPHAGEAL REFLUX DISEASE WITHOUT ESOPHAGITIS: ICD-10-CM

## 2024-05-10 DIAGNOSIS — J30.9 ALLERGIC RHINITIS, UNSPECIFIED SEASONALITY, UNSPECIFIED TRIGGER: ICD-10-CM

## 2024-05-10 DIAGNOSIS — E78.5 HYPERLIPIDEMIA, UNSPECIFIED HYPERLIPIDEMIA TYPE: ICD-10-CM

## 2024-05-10 DIAGNOSIS — G47.33 OSA (OBSTRUCTIVE SLEEP APNEA): Primary | ICD-10-CM

## 2024-05-10 DIAGNOSIS — I10 PRIMARY HYPERTENSION: ICD-10-CM

## 2024-05-10 DIAGNOSIS — G47.33 OBSTRUCTIVE SLEEP APNEA: ICD-10-CM

## 2024-05-10 PROCEDURE — 99214 OFFICE O/P EST MOD 30 MIN: CPT | Performed by: NURSE PRACTITIONER

## 2024-05-10 NOTE — NURSING NOTE
1 year reminder sent to pt via [employer]. Marked to send 1 month before follow up due. Pt will contact [facility] to establish care for cpap supplies and complete transfer.     KODY Best

## 2024-05-10 NOTE — PROGRESS NOTES
Obstructive Sleep Apnea - PAP Follow-Up Visit:    Chief Complaint   Patient presents with    CPAP Follow Up     CPAP follow up, Need a new machine motor life exceeded       Newton Buchanan comes in today for follow-up of their severe sleep apnea, managed with CPAP.  Newton was previously treated for his severe obstructive sleep apnea while he lived in [place].  He presents today to establish care with a new sleep medicine provider as well as to be able to obtain a replacement CPAP machine as well as needed supplies and equipment.    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  He was treated with systemic immunotherapy with good response in the chest and local progression in the right neck.  He subsequently completed palliative radiation therapy to the right neck.  Patient also has a history of allergic rhinitis, osteoporosis, erectile dysfunction, diverticular disease, hyperlipidemia, ischemic colitis, peripheral neuropathy, and GERD.    Do you use a CPAP Machine at home: Yes  Overall, on a scale of 0-10 how would you rate your CPAP (0 poor, 10 great): 9  Is your mask comfortable: Yes  If not, why:    Is you mask leaking: No  If yes, where do you feel it:    How many night per week does the mask leak (0-7):    Do you notice snoring with mask on: No  Do you notice gasping arousals with mask on: No  Are you having significant oral or nasal dryness: Yes  Is the pressure setting comfortable: Yes  In not, why:    What type of mask do you use: Nasal Pillow  What is your typical bedtime: 9:30 PM  How long does it take you to go to sleep on PAP therapy: 5 minutes  What time do you typically get out of bed for the day: 7:00  How many hours on average per night are you using PAP therapy: 8-9  How many hours are you sleeping per night: 8-9  Do you feel well rested in the morning:  Yes    Would like new CPAP machine as his is indicating it is nearing the end of its life expectancy.    EPWORTH SLEEPINESS SCALE         5/8/2024     9:26 PM    [place] Sleepiness Scale ( MOIRA Carcamo  [id number]<br>ESS - [place]/English - Final version - 21 Nov 07 - [facility].)   Sitting and reading Slight chance of dozing   Watching TV Slight chance of dozing   Sitting, inactive in a public place (e.g. a theatre or a meeting) Slight chance of dozing   As a passenger in a car for an hour without a break Moderate chance of dozing   Lying down to rest in the afternoon when circumstances permit High chance of dozing   Sitting and talking to someone Would never doze   Sitting quietly after a lunch without alcohol Slight chance of dozing   In a car, while stopped for a few minutes in traffic Would never doze   [place] Score (MC) 9   [place] Score (Sleep) 9       INSOMNIA SEVERITY INDEX (MIKE)          5/8/2024     9:20 PM   Insomnia Severity Index (MIKE)   Difficulty falling asleep 0   Difficulty staying asleep 1   Problems waking up too early 0   How SATISFIED/DISSATISFIED are you with your CURRENT sleep pattern? 2   How NOTICEABLE to others do you think your sleep problem is in terms of impairing the quality of your life? 0   How WORRIED/DISTRESSED are you about your current sleep problem? 1   To what extent do you consider your sleep problem to INTERFERE with your daily functioning (e.g. daytime fatigue, mood, ability to function at work/daily chores, concentration, memory, mood, etc.) CURRENTLY? 0   MIKE Total Score 4       Guidelines for Scoring/Interpretation:  Total score categories:  0-7 = No clinically significant insomnia   8-14 = Subthreshold insomnia   15-21 = Clinical insomnia (moderate severity)  22-28 = Clinical insomnia (severe)  Used via courtesy of www.Goodreadsth.va.gov with permission from Juventino Jones PhD., [facility]    [employer]   Auto-PAP 5 - 15 cmH2O 30 day usage data:    97% of days  with > 4 hours of use. 0/30 days with no use.   Average use 8 hours, 38 minutes per day.   95%ile Leak 34.3 l/min.   CPAP 95% pressure 11.8 cm.   AHI 2.0 events per hour.       Previous Sleep Studies:  He was diagnosed with obstructive sleep apnea roughly 10 years ago.  Prior to moving back to [place], he followed in [place].  He have a repeat split-night sleep study performed the evening of November 15, 2017 with AHI 64.2, RDI 75.8, mean SPO2 94%, cristina SPO2 90%. Treated with CPAP auto titrate 5-12 cm H2O     Celestino briefly reviewed his initial sleep study as well as data obtained from his CPAP machine reflecting the past 4 weeks usage.    Reviewed by team:  Tobacco  Allergies  Meds  Problems  Med Hx  Surg Hx  Fam Hx        Reviewed by provider:  Tobacco  Allergies  Meds  Problems  Med Hx  Surg Hx  Fam Hx           Problem List:  Patient Active Problem List    Diagnosis Date Noted    Ischemic colitis (H24) 02/14/2024     Priority: Medium    Lower gastrointestinal hemorrhage 02/14/2024     Priority: Medium    Diverticular disease 03/27/2023     Priority: Medium    Age-related osteoporosis without current pathological fracture 02/09/2023     Priority: Medium    Long-term use of high-risk medication 01/25/2023     Priority: Medium    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  We discussed that treatment would be palliative and not curative- goals would be to reduce tumor burden, decrease symptoms and improve quality of life.   10/26/2020  Transferred care to Dr. Gustavo [facility]  "notes:Presumed  H&N (right cervical LAD) with metastatic spread to lung with bilateral multiple lung nodules. He is first-line palliative treatment with Keytruda was started when he was in [place] early May 2020.  1/18/21 Oncology notes Dr. Johns:He was treated with systemic immunotherapy with good response in the chest with local progression in the right neck.  He subsequently completed palliative radiation therapy to the right neck-OPX region (50 Gy in 20 fractions, 12/22/20).   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 FNAs benign. Followed.   -2/2020: progressive right neck swelling, no other symptoms.   -2/11/20 FNA biosy right neck mass: metastatic squamous cell cancer, HPV16+  -PET: hypermetabolic right cervical adenopathy, numerous bilateral lung mets, right hilar nodes.   -No evident primary site identified, but highly suspicious of H/N given IHC staining and radiographical findings/spread  High PD-1 70% on biopsy        Gastroesophageal reflux disease without esophagitis 06/08/2016     Priority: Medium    Personal history of prostate cancer 06/08/2016     Priority: Medium     2008:Robotic prostate removal.  6/8/2016:He reports PSA has been oK.       Peripheral neuropathy 10/19/2014     Priority: Medium     Formatting of this note might be different from the original.  No sensation to monofilament over plantar surface metatarsal heads either foot.      Hypertension 11/22/2011     Priority: Medium    Allergic rhinitis 11/22/2011     Priority: Medium    Obstructive sleep apnea 05/04/2009     Priority: Medium     Uses CPAP.  Uses prn nasal sprays for nasal congestion at times so he can use his CPAP.      Impotence of organic origin 12/29/2008     Priority: Medium     Formatting of this note might be different from the original.  12/29/08: trial cialis 10-20mg      Hyperlipidemia 05/25/2007     Priority: Medium     Formatting " of this note might be different from the original.  10 yr risk 16.7 %. Refuses medication            There were no vitals taken for this visit.    Impression/Plan:    ICD-10-CM    1. CRYSTAL (obstructive sleep apnea)  G47.33 Sleep Comprehensive DME     CANCELED: Sleep Comprehensive DME     CANCELED: [facility] Comprehensive DME      2. Primary hypertension  I10 Sleep Comprehensive DME     CANCELED: Sleep Comprehensive DME      3. Gastroesophageal reflux disease without esophagitis  K21.9 Sleep Comprehensive DME     CANCELED: Sleep Comprehensive DME      4. Allergic rhinitis, unspecified seasonality, unspecified trigger  J30.9 Sleep Comprehensive DME     CANCELED: Sleep Comprehensive DME      5. Hyperlipidemia, unspecified hyperlipidemia type  E78.5 Sleep Comprehensive DME     CANCELED: Sleep Comprehensive DME      6. Obstructive sleep apnea  G47.33 Sleep Comprehensive DME     CANCELED: Sleep Comprehensive DME      Newton admits to using his CPAP on a nightly basis and attempts to do so for the entire duration of his sleep.  He fully realizes the value that CPAP therapy brings to him.  He denies any symptoms commonly associated with obstructive sleep apnea such as daytime sleepiness as well as intrusion of sleepiness into his driving capability.  A comprehensive order for needed supplies and equipment as well as a replacement CPAP machine was placed today.  Instructed patient he may need to make another visit for efficacy and compliance within 90 days.  Recommend that Newton optimize his sleep schedule as well as his sleep hygiene practices to mitigate any further sleep disruption.  Recommend that patient employ safe driving practices such as not driving motor vehicle should he become drowsy.    Newton Buchanan will follow up in about 1 year, sooner    30 minutes spent with patient, all of which were spent face-to-face counseling, consulting, coordinating plan of care.      GERALD Palma CNP  Sleep Medicine    This  note was written with the assistance of the Dragon voice-dictation technology software. The final document, although reviewed, may contain errors. For corrections, please contact the office.      CC:  Luis Antonio Santiago,

## 2024-05-10 NOTE — PATIENT INSTRUCTIONS
Drive Safe... Drive Alive     Sleep health profoundly affects your health, mood, and your safety. 33% of the population (one in three of us) is not getting enough sleep and many have a sleep disorder. Not getting enough sleep or having an untreated / undertreated sleep condition may make us sleepy without even knowing it. In fact, our driving could be dramatically impaired due to our sleep health. As your provider, here are some things I would like you to know about driving:     Here are some warning signs for impairment and dangerous drowsy driving:              -Having been awake more than 16 hours               -Looking tired               -Eyelid drooping              -Head nodding (it could be too late at this point)              -Driving for more than 30 minutes     Some things you could do to make the driving safer if you are experiencing some drowsiness:              -Stop driving and rest              -Call for transportation              -Make sure your sleep disorder is adequately treated     Some things that have been shown NOT to work when experiencing drowsiness while driving:              -Turning on the radio              -Opening windows              -Eating any  distracting  /  entertaining  foods (e.g., sunflower seeds, candy, or any other)              -Talking on the phone      Your decision may not only impact your life, but also the life of others. Please, remember to drive safe for yourself and all of us.   Your BMI is Body mass index is 24.35 kg/m .    What is BMI?  Body mass index (BMI) is one way to tell whether you are at a healthy weight, overweight, or obese. It measures your weight in relation to your height.  A BMI of 18.5 to 24.9 is in the healthy range. A person with a BMI of 25 to 29.9 is considered overweight, and someone with a BMI of 30 or greater is considered obese.  Another way to find out if you are at risk for health problems caused by overweight and obesity is to measure  your waist. If you are a woman and your waist is more than 35 inches, or if you are a man and your waist is more than 40 inches, your risk of disease may be higher.  More than two-thirds of American adults are considered overweight or obese. Being overweight or obese increases the risk for further weight gain.  Excess weight may lead to heart disease and diabetes. Creating and following plans for healthy eating and physical activity may help you improve your health.    Methods for maintaining or losing weight.  Weight control is part of healthy lifestyle and includes exercise, emotional health, and healthy eating habits.  Careful eating habits lifelong is the mainstay of weight control.  Though there are significant health benefits from weight loss, long-term weight loss with diet alone may be very difficult to achieve- studies show long-term success with dietary management in less than 10% of people. Attaining a healthy weight may be especially difficult to achieve in those with severe obesity. In some cases, medications, devices and surgical management might be considered.    What can you do?  If you are overweight or obese and are interested in methods for weight loss, you should discuss this with your provider. In addition, we recommend that you review healthy life styles and methods for weight loss available through the National Institutes of Health patient information sites:   http://win.niddk.nih.gov/publications/index.htm              Your Body mass index is 24.35 kg/m .  Weight management is a personal decision.  If you are interested in exploring weight loss strategies, the following discussion covers the approaches that may be successful. Body mass index (BMI) is one way to tell whether you are at a healthy weight, overweight, or obese. It measures your weight in relation to your height.  A BMI of 18.5 to 24.9 is in the healthy range. A person with a BMI of 25 to 29.9 is considered overweight, and someone  with a BMI of 30 or greater is considered obese. More than two-thirds of American adults are considered overweight or obese.  Being overweight or obese increases the risk for further weight gain. Excess weight may lead to heart disease and diabetes.  Creating and following plans for healthy eating and physical activity may help you improve your health.  Weight control is part of healthy lifestyle and includes exercise, emotional health, and healthy eating habits. Careful eating habits lifelong are the mainstay of weight control. Though there are significant health benefits from weight loss, long-term weight loss with diet alone may be very difficult to achieve- studies show long-term success with dietary management in less than 10% of people. Attaining a healthy weight may be especially difficult to achieve in those with severe obesity. In some cases, medications, devices and surgical management might be considered.  What can you do?  If you are overweight or obese and are interested in methods for weight loss, you should discuss this with your provider.   Consider reducing daily calorie intake by 500 calories.   Keep a food journal.   Avoiding skipping meals, consider cutting portions instead.    Diet combined with exercise helps maintain muscle while optimizing fat loss. Strength training is particularly important for building and maintaining muscle mass. Exercise helps reduce stress, increase energy, and improves fitness. Increasing exercise without diet control, however, may not burn enough calories to loose weight.     Start walking three days a week 10-20 minutes at a time  Work towards walking thirty minutes five days a week   Eventually, increase the speed of your walking for 1-2 minutes at time    In addition, we recommend that you review healthy lifestyles and methods for weight loss available through the National Institutes of Health patient information  sites:  http://win.niddk.nih.gov/publications/index.htm    And look into health and wellness programs that may be available through your health insurance provider, employer, local community center, or teresita club.            Your Body mass index is 24.35 kg/m .  Weight management is a personal decision.  If you are interested in exploring weight loss strategies, the following discussion covers the approaches that may be successful. Body mass index (BMI) is one way to tell whether you are at a healthy weight, overweight, or obese. It measures your weight in relation to your height.  A BMI of 18.5 to 24.9 is in the healthy range. A person with a BMI of 25 to 29.9 is considered overweight, and someone with a BMI of 30 or greater is considered obese. More than two-thirds of American adults are considered overweight or obese.  Being overweight or obese increases the risk for further weight gain. Excess weight may lead to heart disease and diabetes.  Creating and following plans for healthy eating and physical activity may help you improve your health.  Weight control is part of healthy lifestyle and includes exercise, emotional health, and healthy eating habits. Careful eating habits lifelong are the mainstay of weight control. Though there are significant health benefits from weight loss, long-term weight loss with diet alone may be very difficult to achieve- studies show long-term success with dietary management in less than 10% of people. Attaining a healthy weight may be especially difficult to achieve in those with severe obesity. In some cases, medications, devices and surgical management might be considered.  What can you do?  If you are overweight or obese and are interested in methods for weight loss, you should discuss this with your provider.   Consider reducing daily calorie intake by 500 calories.   Keep a food journal.   Avoiding skipping meals, consider cutting portions instead.    Diet combined with  exercise helps maintain muscle while optimizing fat loss. Strength training is particularly important for building and maintaining muscle mass. Exercise helps reduce stress, increase energy, and improves fitness. Increasing exercise without diet control, however, may not burn enough calories to loose weight.     Start walking three days a week 10-20 minutes at a time  Work towards walking thirty minutes five days a week   Eventually, increase the speed of your walking for 1-2 minutes at time    In addition, we recommend that you review healthy lifestyles and methods for weight loss available through the National Institutes of Health patient information sites:  http://win.niddk.nih.gov/publications/index.htm    And look into health and wellness programs that may be available through your health insurance provider, employer, local community center, or [employer] club.

## 2024-05-13 DIAGNOSIS — E78.5 HYPERLIPIDEMIA LDL GOAL <100: ICD-10-CM

## 2024-05-14 RX ORDER — PRAVASTATIN SODIUM 20 MG
20 TABLET ORAL DAILY
Qty: 90 TABLET | Refills: 3 | Status: SHIPPED | OUTPATIENT
Start: 2024-05-14

## 2024-05-16 ENCOUNTER — INFUSION THERAPY VISIT (OUTPATIENT)
Dept: INFUSION THERAPY | Facility: CLINIC | Age: 79
End: 2024-05-16
Attending: INTERNAL MEDICINE
Payer: MEDICARE

## 2024-05-16 ENCOUNTER — LAB (OUTPATIENT)
Dept: LAB | Facility: CLINIC | Age: 79
End: 2024-05-16
Attending: INTERNAL MEDICINE
Payer: MEDICARE

## 2024-05-16 ENCOUNTER — VIRTUAL VISIT (OUTPATIENT)
Dept: ONCOLOGY | Facility: CLINIC | Age: 79
End: 2024-05-16
Attending: INTERNAL MEDICINE
Payer: MEDICARE

## 2024-05-16 VITALS
HEART RATE: 54 BPM | TEMPERATURE: 97.6 F | SYSTOLIC BLOOD PRESSURE: 124 MMHG | HEIGHT: 71 IN | WEIGHT: 172 LBS | OXYGEN SATURATION: 98 % | BODY MASS INDEX: 24.08 KG/M2 | RESPIRATION RATE: 16 BRPM | DIASTOLIC BLOOD PRESSURE: 64 MMHG

## 2024-05-16 DIAGNOSIS — C76.0 HEAD AND NECK CANCER (H): Primary | ICD-10-CM

## 2024-05-16 DIAGNOSIS — Z79.899 LONG-TERM USE OF HIGH-RISK MEDICATION: ICD-10-CM

## 2024-05-16 LAB
ALBUMIN SERPL BCG-MCNC: 3.6 G/DL (ref 3.5–5.2)
ALP SERPL-CCNC: 43 U/L (ref 40–150)
ALT SERPL W P-5'-P-CCNC: 19 U/L (ref 0–70)
ANION GAP SERPL CALCULATED.3IONS-SCNC: 10 MMOL/L (ref 7–15)
AST SERPL W P-5'-P-CCNC: 25 U/L (ref 0–45)
BILIRUB SERPL-MCNC: 0.7 MG/DL
BUN SERPL-MCNC: 16.3 MG/DL (ref 8–23)
CALCIUM SERPL-MCNC: 9.2 MG/DL (ref 8.8–10.2)
CHLORIDE SERPL-SCNC: 105 MMOL/L (ref 98–107)
CREAT SERPL-MCNC: 1.17 MG/DL (ref 0.67–1.17)
DEPRECATED HCO3 PLAS-SCNC: 26 MMOL/L (ref 22–29)
EGFRCR SERPLBLD CKD-EPI 2021: 64 ML/MIN/1.73M2
GLUCOSE SERPL-MCNC: 87 MG/DL (ref 70–99)
POTASSIUM SERPL-SCNC: 4.2 MMOL/L (ref 3.4–5.3)
PROT SERPL-MCNC: 6 G/DL (ref 6.4–8.3)
SODIUM SERPL-SCNC: 141 MMOL/L (ref 135–145)
TSH SERPL DL<=0.005 MIU/L-ACNC: 2.11 UIU/ML (ref 0.3–4.2)

## 2024-05-16 PROCEDURE — 96413 CHEMO IV INFUSION 1 HR: CPT

## 2024-05-16 PROCEDURE — G2211 COMPLEX E/M VISIT ADD ON: HCPCS | Mod: 95 | Performed by: INTERNAL MEDICINE

## 2024-05-16 PROCEDURE — 80053 COMPREHEN METABOLIC PANEL: CPT | Performed by: INTERNAL MEDICINE

## 2024-05-16 PROCEDURE — 84443 ASSAY THYROID STIM HORMONE: CPT | Performed by: INTERNAL MEDICINE

## 2024-05-16 PROCEDURE — 99213 OFFICE O/P EST LOW 20 MIN: CPT | Mod: 95 | Performed by: INTERNAL MEDICINE

## 2024-05-16 PROCEDURE — 250N000011 HC RX IP 250 OP 636: Mod: JZ | Performed by: INTERNAL MEDICINE

## 2024-05-16 PROCEDURE — 258N000003 HC RX IP 258 OP 636: Performed by: INTERNAL MEDICINE

## 2024-05-16 RX ORDER — ALBUTEROL SULFATE 90 UG/1
1-2 AEROSOL, METERED RESPIRATORY (INHALATION)
Status: CANCELLED
Start: 2024-05-16

## 2024-05-16 RX ORDER — ALBUTEROL SULFATE 0.83 MG/ML
2.5 SOLUTION RESPIRATORY (INHALATION)
Status: CANCELLED | OUTPATIENT
Start: 2024-05-16

## 2024-05-16 RX ORDER — EPINEPHRINE 1 MG/ML
0.3 INJECTION, SOLUTION, CONCENTRATE INTRAVENOUS EVERY 5 MIN PRN
Status: CANCELLED | OUTPATIENT
Start: 2024-05-16

## 2024-05-16 RX ORDER — NALOXONE HYDROCHLORIDE 0.4 MG/ML
.1-.4 INJECTION, SOLUTION INTRAMUSCULAR; INTRAVENOUS; SUBCUTANEOUS
Status: CANCELLED | OUTPATIENT
Start: 2024-05-16

## 2024-05-16 RX ORDER — METHYLPREDNISOLONE SODIUM SUCCINATE 125 MG/2ML
125 INJECTION, POWDER, LYOPHILIZED, FOR SOLUTION INTRAMUSCULAR; INTRAVENOUS
Status: CANCELLED
Start: 2024-05-16

## 2024-05-16 RX ORDER — SODIUM CHLORIDE 9 MG/ML
1000 INJECTION, SOLUTION INTRAVENOUS CONTINUOUS PRN
Status: CANCELLED
Start: 2024-05-16

## 2024-05-16 RX ORDER — LORAZEPAM 2 MG/ML
0.5 INJECTION INTRAMUSCULAR EVERY 4 HOURS PRN
Status: CANCELLED
Start: 2024-05-16

## 2024-05-16 RX ORDER — DIPHENHYDRAMINE HYDROCHLORIDE 50 MG/ML
50 INJECTION INTRAMUSCULAR; INTRAVENOUS
Status: CANCELLED
Start: 2024-05-16

## 2024-05-16 RX ORDER — MEPERIDINE HYDROCHLORIDE 25 MG/ML
25 INJECTION INTRAMUSCULAR; INTRAVENOUS; SUBCUTANEOUS EVERY 30 MIN PRN
Status: CANCELLED | OUTPATIENT
Start: 2024-05-16

## 2024-05-16 RX ADMIN — SODIUM CHLORIDE 250 ML: 9 INJECTION, SOLUTION INTRAVENOUS at 13:31

## 2024-05-16 RX ADMIN — SODIUM CHLORIDE 400 MG: 9 INJECTION, SOLUTION INTRAVENOUS at 13:31

## 2024-05-16 ASSESSMENT — PAIN SCALES - GENERAL: PAINLEVEL: NO PAIN (0)

## 2024-05-16 NOTE — PROGRESS NOTES
Infusion Nursing Note:  Newton Buchanan presents today for Keytruda.    Patient seen by provider today: Yes: Virtual with Lele   present during visit today: Not Applicable.    Note: N/A.      Intravenous Access:  Peripheral IV placed.    Treatment Conditions:  Lab Results   Component Value Date     05/16/2024    POTASSIUM 4.2 05/16/2024    CR 1.17 05/16/2024    JÚNIOR 9.2 05/16/2024    BILITOTAL 0.7 05/16/2024    ALBUMIN 3.6 05/16/2024    ALT 19 05/16/2024    AST 25 05/16/2024       Results reviewed, labs MET treatment parameters, ok to proceed with treatment.      Post Infusion Assessment:  Patient tolerated infusion without incident.  Blood return noted pre and post infusion.  Site patent and intact, free from redness, edema or discomfort.  No evidence of extravasations.  Access discontinued per protocol.       Discharge Plan:   Discharge instructions reviewed with: Patient.  Patient discharged in stable condition accompanied by: self.  Departure Mode: Ambulatory.      Missy Valdez RN

## 2024-05-16 NOTE — PROGRESS NOTES
Virtual Visit Details    Type of service:  Video Visit   Video Start Time:  12:24 PM  Video End Time: 12:38 PM    Originating Location (pt. 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 Royal    _____________________________________________________________________________    History of Present Illness/ Interval History    Mr. 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    He had first line Keytruda x 2 yrs through 7/2022, resulting in CR.  Since then he has had frequent treatment holidays.  His last dose of Keytruda was on 10/26/2023.  After a break of 4 months repeat PET scan showed evidence of disease progression.  So we restarted Keytruda on 2/15/2024.     Has done well on infusions.  No significant side effects.  Here with repeat PET scan.  This was a video visit.        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 FNAs benign. Followed.   -2/2020: progressive right neck swelling, no other symptoms.   -2/11/20 FNA biosy right neck mass: metastatic squamous cell cancer, HPV16+  -PET: hypermetabolic right cervical adenopathy, numerous bilateral lung mets, right hilar nodes.   -No evident primary site identified, but highly suspicious of H/N given IHC staining and radiographical findings/spread  -Neogenomic testing - CPS 70    Treatment:  5/2020 - 7/2022: Keytruda (initiated in [place], then transferred care to [place]). Completed 2 yrs of immunotherapy, resulted in CR by PET      11/2020: local progression in right neck only  -12/2020: palliative RT to right neck (3750 cGy/15)    1/2021: palpable right jaw/parotid lesion, PET+. Remainder of right neck disease improved after RT and lung mets stable. US-guided biopsy right parotid lesion attempted, but no lesion seen to biopsy so cancelled.    7/2022 - 12/2022: observation, treatment holiday until progression.   -12/2022 PET/CT: solitary recurrence in subcarinal LN    12/12/2022- present: Keytruda resumed  --Resulted in CR (PET) by 6/2023  --6/2023: changed to every 6 week cycles    -After dose of Keytruda on 10/26/2023, he took a break.    Disease progression on the PET scan with increased activity and enlargement within the subcarinal lymph node.  Also had increased FDG uptake within the left oropharynx and mildly FDG avid left cervical level 1B lymph node.  Mild FDG uptake within the distal esophagus/GE junction.    Restarted Keytruda on 2/15/2024        Physical Exam    GENERAL: Alert and oriented to time place and person. Seated comfortably. In no distress. Alone.      Lab Results    CMP and TSH WNL    Imaging    6/15/2023 PET: CR. No evident malignancy.    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  After resuming Keytruda he again had a complete response.  In June 2023 we switched him to every 6-week Keytruda infusions.  But he continued to have significant side effects including unintentional weight loss, fatigue and other issues which was thought to be due to Keytruda.  We gave him a break from Keytruda after his last dose on 10/26/2023.      Repeat PET scan in February of this year showed some disease progression so we restarted Keytruda.  He had a new subcarinal FDG avid lymph node which was concerning for richard recurrence.  He also had asymmetrically  increased FDG uptake in the left oropharynx with associated mucosal thickening and a contiguous mildly FDG avid left cervical level 1B lymph node.  He has received 2 doses of Keytruda since then.  Here with repeat PET scan.  I personally reviewed the PET scan images and report and independently interpreted the results.  On the current PET scan he has stable metabolic activity and size of the subcarinal lymph node.  No evidence of any new disease.  The previously noted hypermetabolic FDG uptake within the left level 1B cervical node has resolved now.  But he still has stable FDG uptake in the left oropharynx.  Clinically he is doing well.  No significant side effects.  Labs are pretty stable today.  So no contraindication to proceed with Keytruda.  Will keep 6 weekly infusions.  The fact that he has a stable disease is reassuring.  Plan is to recheck his PET scan in 12 weeks also.    3.    Osteoporosis  Managed by PCP and Endocrinologist.    The longitudinal plan of care for the diagnosis(es)/condition(s) as documented were addressed during this visit. Due to the added complexity in care, I will continue to support Newton in the subsequent management and with ongoing continuity of care.          Jensen Royal MD  Hematology and Medical Oncology  [facility]

## 2024-05-16 NOTE — LETTER
5/16/2024         RE: Newton Buchanan  [address]  [place] [address]        Dear Colleague,    Thank you for referring your patient, Newton Buchanan, to the [facility]. Please see a copy of my visit note below.    Virtual Visit Details    Type of service:  Video Visit   Video Start Time:  12:24 PM  Video End Time: 12:38 PM    Originating Location (pt. 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 Royal    _____________________________________________________________________________    History of Present Illness/ Interval History    Mr. 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    He had first line Keytruda x 2 yrs through 7/2022, resulting in CR.  Since then he has had frequent treatment holidays.  His last dose of Keytruda was on 10/26/2023.  After a break of 4 months repeat PET scan showed evidence of disease progression.  So we restarted Keytruda on 2/15/2024.     Has done well on infusions.  No significant side effects.  Here with repeat PET scan.  This was a video visit.        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 FNAs benign. Followed.   -2/2020: progressive right neck swelling, no other symptoms.   -2/11/20 FNA biosy right neck mass: metastatic squamous cell cancer, HPV16+  -PET: hypermetabolic right cervical adenopathy, numerous bilateral lung mets, right hilar nodes.   -No evident primary site identified, but highly  suspicious of H/N given IHC staining and radiographical findings/spread  -Neogenomic testing - CPS 70    Treatment:  5/2020 - 7/2022: Keytruda (initiated in [place], then transferred care to [place]). Completed 2 yrs of immunotherapy, resulted in CR by PET     11/2020: local progression in right neck only  -12/2020: palliative RT to right neck (3750 cGy/15)    1/2021: palpable right jaw/parotid lesion, PET+. Remainder of right neck disease improved after RT and lung mets stable. US-guided biopsy right parotid lesion attempted, but no lesion seen to biopsy so cancelled.    7/2022 - 12/2022: observation, treatment holiday until progression.   -12/2022 PET/CT: solitary recurrence in subcarinal LN    12/12/2022- present: Keytruda resumed  --Resulted in CR (PET) by 6/2023  --6/2023: changed to every 6 week cycles    -After dose of Keytruda on 10/26/2023, he took a break.    Disease progression on the PET scan with increased activity and enlargement within the subcarinal lymph node.  Also had increased FDG uptake within the left oropharynx and mildly FDG avid left cervical level 1B lymph node.  Mild FDG uptake within the distal esophagus/GE junction.    Restarted Keytruda on 2/15/2024        Physical Exam    GENERAL: Alert and oriented to time place and person. Seated comfortably. In no distress. Alone.      Lab Results    CMP and TSH WNL    Imaging    6/15/2023 PET: CR. No evident malignancy.    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  After resuming Keytruda he again had a complete response.  In June 2023 we switched him to every 6-week Keytruda infusions.  But he continued to have significant side effects including unintentional weight loss, fatigue and other issues which was thought to be due to Keytruda.  We gave him a break from Keytruda after  his last dose on 10/26/2023.      Repeat PET scan in February of this year showed some disease progression so we restarted Keytruda.  He had a new subcarinal FDG avid lymph node which was concerning for richard recurrence.  He also had asymmetrically increased FDG uptake in the left oropharynx with associated mucosal thickening and a contiguous mildly FDG avid left cervical level 1B lymph node.  He has received 2 doses of Keytruda since then.  Here with repeat PET scan.  I personally reviewed the PET scan images and report and independently interpreted the results.  On the current PET scan he has stable metabolic activity and size of the subcarinal lymph node.  No evidence of any new disease.  The previously noted hypermetabolic FDG uptake within the left level 1B cervical node has resolved now.  But he still has stable FDG uptake in the left oropharynx.  Clinically he is doing well.  No significant side effects.  Labs are pretty stable today.  So no contraindication to proceed with Keytruda.  Will keep 6 weekly infusions.  The fact that he has a stable disease is reassuring.  Plan is to recheck his PET scan in 12 weeks also.    3.    Osteoporosis  Managed by PCP and Endocrinologist.    The longitudinal plan of care for the diagnosis(es)/condition(s) as documented were addressed during this visit. Due to the added complexity in care, I will continue to support Newton in the subsequent management and with ongoing continuity of care.          Jensen Royal MD  Hematology and Medical Oncology  [facility]            Again, thank you for allowing me to participate in the care of your patient.        Sincerely,        Jensen Royal MD

## 2024-05-16 NOTE — LETTER
5/16/2024         RE: Newton Bucahnan  [address]  [place] [address]        Dear Colleague,    Thank you for referring your patient, Newton Buchanan, to the [facility]. Please see a copy of my visit note below.    Virtual Visit Details    Type of service:  Video Visit   Video Start Time:  12:24 PM  Video End Time: 12:38 PM    Originating Location (pt. 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 Royal    _____________________________________________________________________________    History of Present Illness/ Interval History    Mr. 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    He had first line Keytruda x 2 yrs through 7/2022, resulting in CR.  Since then he has had frequent treatment holidays.  His last dose of Keytruda was on 10/26/2023.  After a break of 4 months repeat PET scan showed evidence of disease progression.  So we restarted Keytruda on 2/15/2024.     Has done well on infusions.  No significant side effects.  Here with repeat PET scan.  This was a video visit.        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 FNAs benign. Followed.   -2/2020: progressive right neck swelling, no other symptoms.   -2/11/20 FNA biosy right neck mass: metastatic squamous cell cancer, HPV16+  -PET: hypermetabolic right cervical adenopathy, numerous bilateral lung mets, right hilar nodes.   -No evident primary site identified, but highly  suspicious of H/N given IHC staining and radiographical findings/spread  -Neogenomic testing - CPS 70    Treatment:  5/2020 - 7/2022: Keytruda (initiated in [place], then transferred care to [place]). Completed 2 yrs of immunotherapy, resulted in CR by PET     11/2020: local progression in right neck only  -12/2020: palliative RT to right neck (3750 cGy/15)    1/2021: palpable right jaw/parotid lesion, PET+. Remainder of right neck disease improved after RT and lung mets stable. US-guided biopsy right parotid lesion attempted, but no lesion seen to biopsy so cancelled.    7/2022 - 12/2022: observation, treatment holiday until progression.   -12/2022 PET/CT: solitary recurrence in subcarinal LN    12/12/2022- present: Keytruda resumed  --Resulted in CR (PET) by 6/2023  --6/2023: changed to every 6 week cycles    -After dose of Keytruda on 10/26/2023, he took a break.    Disease progression on the PET scan with increased activity and enlargement within the subcarinal lymph node.  Also had increased FDG uptake within the left oropharynx and mildly FDG avid left cervical level 1B lymph node.  Mild FDG uptake within the distal esophagus/GE junction.    Restarted Keytruda on 2/15/2024        Physical Exam    GENERAL: Alert and oriented to time place and person. Seated comfortably. In no distress. Alone.      Lab Results    CMP and TSH WNL    Imaging    6/15/2023 PET: CR. No evident malignancy.    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  After resuming Keytruda he again had a complete response.  In June 2023 we switched him to every 6-week Keytruda infusions.  But he continued to have significant side effects including unintentional weight loss, fatigue and other issues which was thought to be due to Keytruda.  We gave him a break from Keytruda after  his last dose on 10/26/2023.      Repeat PET scan in February of this year showed some disease progression so we restarted Keytruda.  He had a new subcarinal FDG avid lymph node which was concerning for richard recurrence.  He also had asymmetrically increased FDG uptake in the left oropharynx with associated mucosal thickening and a contiguous mildly FDG avid left cervical level 1B lymph node.  He has received 2 doses of Keytruda since then.  Here with repeat PET scan.  I personally reviewed the PET scan images and report and independently interpreted the results.  On the current PET scan he has stable metabolic activity and size of the subcarinal lymph node.  No evidence of any new disease.  The previously noted hypermetabolic FDG uptake within the left level 1B cervical node has resolved now.  But he still has stable FDG uptake in the left oropharynx.  Clinically he is doing well.  No significant side effects.  Labs are pretty stable today.  So no contraindication to proceed with Keytruda.  Will keep 6 weekly infusions.  The fact that he has a stable disease is reassuring.  Plan is to recheck his PET scan in 12 weeks also.    3.    Osteoporosis  Managed by PCP and Endocrinologist.    The longitudinal plan of care for the diagnosis(es)/condition(s) as documented were addressed during this visit. Due to the added complexity in care, I will continue to support Newton in the subsequent management and with ongoing continuity of care.          Jensen Royal MD  Hematology and Medical Oncology  [facility]            Again, thank you for allowing me to participate in the care of your patient.        Sincerely,        Jensen Royal MD

## 2024-06-10 ENCOUNTER — OFFICE VISIT (OUTPATIENT)
Dept: FAMILY MEDICINE | Facility: CLINIC | Age: 79
End: 2024-06-10
Payer: MEDICARE

## 2024-06-10 VITALS
HEIGHT: 71 IN | TEMPERATURE: 96.7 F | WEIGHT: 172.2 LBS | DIASTOLIC BLOOD PRESSURE: 58 MMHG | BODY MASS INDEX: 24.11 KG/M2 | OXYGEN SATURATION: 99 % | RESPIRATION RATE: 18 BRPM | HEART RATE: 61 BPM | SYSTOLIC BLOOD PRESSURE: 116 MMHG

## 2024-06-10 DIAGNOSIS — M81.0 AGE-RELATED OSTEOPOROSIS WITHOUT CURRENT PATHOLOGICAL FRACTURE: ICD-10-CM

## 2024-06-10 DIAGNOSIS — N48.9 PENILE LESION: Primary | ICD-10-CM

## 2024-06-10 DIAGNOSIS — N52.9 ERECTILE DYSFUNCTION, UNSPECIFIED ERECTILE DYSFUNCTION TYPE: ICD-10-CM

## 2024-06-10 PROBLEM — K55.9 ISCHEMIC COLITIS (H): Status: RESOLVED | Noted: 2024-02-14 | Resolved: 2024-06-10

## 2024-06-10 LAB — T PALLIDUM AB SER QL: NONREACTIVE

## 2024-06-10 PROCEDURE — 36415 COLL VENOUS BLD VENIPUNCTURE: CPT | Performed by: PHYSICIAN ASSISTANT

## 2024-06-10 PROCEDURE — 86780 TREPONEMA PALLIDUM: CPT | Performed by: PHYSICIAN ASSISTANT

## 2024-06-10 PROCEDURE — 99214 OFFICE O/P EST MOD 30 MIN: CPT | Performed by: PHYSICIAN ASSISTANT

## 2024-06-10 RX ORDER — VARDENAFIL HYDROCHLORIDE 10 MG/1
10 TABLET ORAL DAILY PRN
Qty: 30 TABLET | Refills: 2 | Status: SHIPPED | OUTPATIENT
Start: 2024-06-10 | End: 2024-06-12

## 2024-06-10 RX ORDER — RESPIRATORY SYNCYTIAL VIRUS VACCINE 120MCG/0.5
0.5 KIT INTRAMUSCULAR ONCE
Qty: 1 EACH | Refills: 0 | Status: CANCELLED | OUTPATIENT
Start: 2024-06-10 | End: 2024-06-10

## 2024-06-10 ASSESSMENT — PAIN SCALES - GENERAL: PAINLEVEL: NO PAIN (0)

## 2024-06-10 NOTE — PATIENT INSTRUCTIONS
I do not think this lesion is anything you can pass on to someone else. We will check for syphilis just to be sure.     Try Vardenafil for ED.     We will recheck a DEXA scan at your next physical.

## 2024-06-10 NOTE — PROGRESS NOTES
Assessment & Plan   Penile lesion  Intermittent. First presented 3 years ago. At the base of the glans but along the foreskin.  Resolves on its own in a few weeks.  Baby powder does seem to help this. No blisters, papules, pain. Just notes irritation. Exam without evidence of an ulceration, but he notes it is well on its way to healing. Given appearance today and intermittent nature, suspect a fungal etiology, or related to irritation from friction. Will check syphilis titer just to be safe. Encouraged to use OTC athletes foot cream on the area and to monitor symptoms for now.   - Treponema Abs w Reflex to RPR and Titer; Future  - Treponema Abs w Reflex to RPR and Titer    Erectile dysfunction, unspecified erectile dysfunction type  Sildenafil was not effective. Will trial Vardenafil. If not covered by insurance, he will use GoodRx and let me know which pharmacy to use.   - vardenafil (LEVITRA) 10 MG tablet; Take 1 tablet (10 mg) by mouth daily as needed (at least 60 minutes prior to intercourse)    Age-related osteoporosis without current pathological fracture  Shared decision making discussed. Pt would like to get dental work done prior to starting treatment for this. Will recheck Dexa in 1 year and determine if he would like to start treatment at that time.     Morenita Glez is a 78 year old, presenting for the following health issues:  Lesion        6/10/2024     9:44 AM   Additional Questions   Roomed by Jessica ALEXANDRE CMA   Accompanied by Self       History of Present Illness       Reason for visit:  Lesion/sore on penis  Symptom onset:  1-2 weeks ago  Symptoms include:  Open sore, emissions  Symptom intensity:  Moderate  Symptom progression:  Improving  Had these symptoms before:  Yes  Has tried/received treatment for these symptoms:  No  What makes it better:  Improved hygiene    He eats 2-3 servings of fruits and vegetables daily.He consumes 1 sweetened beverage(s) daily.He exercises with enough effort to  "increase his heart rate 10 to 19 minutes per day.  He exercises with enough effort to increase his heart rate 3 or less days per week.   He is taking medications regularly.     At the base of the foreskin. Erythema and irritation. No blistering.  Worse with use. Initially presented 3 years ago and waxes and wanes with use/friction.   Mos recent 3 weeks ago. Now healed. Resolves on its own.   Rash on the head sometimes.  No swelling or pain in the testicles or scrotum. No urinary symptoms.     Review of Systems  See HPI       Objective    /58 (BP Location: Right arm, Patient Position: Sitting, Cuff Size: Adult Large)   Pulse 61   Temp (!) 96.7  F (35.9  C) (Tympanic)   Resp 18   Ht 1.803 m (5' 11\")   Wt 78.1 kg (172 lb 3.2 oz)   SpO2 99%   BMI 24.02 kg/m    Body mass index is 24.02 kg/m .  Physical Exam    (male): testicles normal without atrophy or masses. There is a small linear area of erythema along the foreskin at the base of the glans without ulceration, or bleeding. No adhesions present. No urethral discharge.         Signed Electronically by: Luis Antonio Santiago PA-C    "

## 2024-06-12 ENCOUNTER — TELEPHONE (OUTPATIENT)
Dept: FAMILY MEDICINE | Facility: CLINIC | Age: 79
End: 2024-06-12
Payer: MEDICARE

## 2024-06-12 DIAGNOSIS — N52.9 ERECTILE DYSFUNCTION, UNSPECIFIED ERECTILE DYSFUNCTION TYPE: ICD-10-CM

## 2024-06-12 RX ORDER — VARDENAFIL HYDROCHLORIDE 10 MG/1
10 TABLET ORAL DAILY PRN
Qty: 30 TABLET | Refills: 2 | Status: SHIPPED | OUTPATIENT
Start: 2024-06-12

## 2024-06-12 NOTE — TELEPHONE ENCOUNTER
I recommend the patient use GoodRx program. For this. I do not think that Tadalafil will be strong enough for him. Please update him about this, and let me know which pharmacy he would like me to send Vardenafil to. Most common pharmacies that use GoodRx in the area are [facility], [facility] and [facility].     Luis Antonio Santiago PA-C

## 2024-06-12 NOTE — TELEPHONE ENCOUNTER
Vardenafil is not covered by .     Sildenafil is covered  but patient failed    Tadalafil requires PA     All ED drugs have a limit of 30/90days.     Send rx for Tadalafil to start PA?

## 2024-06-20 NOTE — PROGRESS NOTES
History of Present Illness - Newton Buchanan is a 78 year old male last seen by Dr Kelly on 9/13/2023.    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  The patient had richard disease in the neck and lung metastasis.  He has been doing quite well with treatment and does not have any signs of recurrent or persistent disease on his follow-up imaging.  The patient was last seen by Dr Kelly unrelated to his cancer surveillance on 5/25/2023 with new symptoms involving sinus issues, postnasal drainage, and cough after getting exposed to some garden chemical ([place])   He received a Kenalog injection and we started him on nasal irrigations and Flonase. He has been on maintenance Keytruda for four years    He is here for continued cancer surveillance.  He does still have issues with chronic nasal congestion in the morning.  He does use CPAP.  Also, with the congestion he has had  notable decrease in his appetite in general.  He has lost about 30 pounds.      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    Also, he just had a recent audiology visit on 6/28/2024 and the results were personally reviewed. The tympanograms were normal.  He had a steeply down sloping sensorineural hearing loss above 2000Hz down into the severe range.  No conductive hearing loss and word recognition was normal.    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  Alert and oriented to person and place, answers questions and cooperates with examination appropriately.   Head and Face - Normocephalic and atraumatic, with no gross asymmetry noted of the contour of the facial features.  The facial nerve is intact, with strong symmetric movements.  Eyes - Extraocular movements intact, and the pupils were reactive to light.  Sclera were not icteric or injected, conjunctiva were pink and moist.  Mouth - Examination of the oral cavity shows pink, healthy, moist mucosa.  No lesions or ulceration noted.  The dentition are in good repair.  The tongue is mobile and midline.    Flexible Endoscopy -     The patient was counseled that their symptoms and history require a direct visualization with an endoscopy procedure.  They understood and we proceeded with a fiberoptic examination.  First I sprayed both sides of the nose with a mixture of lidocaine and neosynephrine.  I then passed the scope through the nasal cavity.  Color photographs were taken for the permanent medical record.  The nasal cavity was unremarkable.  The nasopharynx was mucosally covered and symmetric.  The Eustachian tube openings were unobstructed.  Going further down I had a clear view of the base of tongue which had normal appearing lingual tonsillar tissue.  The base of tongue was free of lesions, and the vallecula was open.  The epiglottis was smooth and mucosally covered.  The supraglottic larynx was then clearly visualized.  The vocal cords moved smoothly and symmetrically, they were pearly white and no lesions were seen.  The pyriform sinuses were open, and the limited view of the postcricoid region did not show  any lesions.                      A/P - Newton FERREIRA Chanel  (C79.89,  C80.1) Squamous cell carcinoma metastatic to head and neck with unknown primary site (H)  (primary encounter diagnosis)  (Z92.3) History of head and neck radiation  (R09.81) Nasal congestion  (R63.0) Lack of appetite    First and foremost, there is no evidence of recurrent disease in the upper aerodigestive tract.  Follow-up in 6 months for another check up and endoscopy.    Based on the history, audiogram, and ear exam, I don't find any evidence of medical or surgical disease that would have caused the hearing loss and being out of the radiation field, I don't think it was that either.  Most likely his history of major noise exposure was the cause.    We discussed the natural history of the steady loss of human hearing, and things to help.  I certainly recommend considering hearing aids, and they have my medical clearance to look into being fitted, and information has been provided.    Finally, safety considerations such as loud smoke detectors, alarm clocks, and special aids for the hearing impaired using phones and television were also discussed.    I will refer to dietary consultation for his loss of appetite.    For the nose I am going to try him on Budesonide in [employer] sinus rinse for a month.

## 2024-06-27 ENCOUNTER — INFUSION THERAPY VISIT (OUTPATIENT)
Dept: INFUSION THERAPY | Facility: CLINIC | Age: 79
End: 2024-06-27
Attending: INTERNAL MEDICINE
Payer: MEDICARE

## 2024-06-27 ENCOUNTER — APPOINTMENT (OUTPATIENT)
Dept: LAB | Facility: CLINIC | Age: 79
End: 2024-06-27
Payer: MEDICARE

## 2024-06-27 ENCOUNTER — VIRTUAL VISIT (OUTPATIENT)
Dept: ONCOLOGY | Facility: CLINIC | Age: 79
End: 2024-06-27
Attending: INTERNAL MEDICINE
Payer: MEDICARE

## 2024-06-27 VITALS — HEART RATE: 53 BPM | DIASTOLIC BLOOD PRESSURE: 74 MMHG | SYSTOLIC BLOOD PRESSURE: 151 MMHG

## 2024-06-27 VITALS
SYSTOLIC BLOOD PRESSURE: 128 MMHG | DIASTOLIC BLOOD PRESSURE: 73 MMHG | RESPIRATION RATE: 16 BRPM | TEMPERATURE: 97.4 F | BODY MASS INDEX: 23.66 KG/M2 | HEIGHT: 71 IN | OXYGEN SATURATION: 100 % | WEIGHT: 169 LBS | HEART RATE: 56 BPM

## 2024-06-27 DIAGNOSIS — C78.02 MALIGNANT NEOPLASM METASTATIC TO BOTH LUNGS (H): ICD-10-CM

## 2024-06-27 DIAGNOSIS — C79.89 SECONDARY SQUAMOUS CELL CARCINOMA OF HEAD AND NECK WITH UNKNOWN PRIMARY SITE (H): ICD-10-CM

## 2024-06-27 DIAGNOSIS — C76.0 HEAD AND NECK CANCER (H): Primary | ICD-10-CM

## 2024-06-27 DIAGNOSIS — C78.01 MALIGNANT NEOPLASM METASTATIC TO BOTH LUNGS (H): ICD-10-CM

## 2024-06-27 DIAGNOSIS — C44.92 SECONDARY SQUAMOUS CELL CARCINOMA OF HEAD AND NECK WITH UNKNOWN PRIMARY SITE (H): ICD-10-CM

## 2024-06-27 LAB
ALBUMIN SERPL BCG-MCNC: 3.4 G/DL (ref 3.5–5.2)
ALP SERPL-CCNC: 40 U/L (ref 40–150)
ALT SERPL W P-5'-P-CCNC: 29 U/L (ref 0–70)
ANION GAP SERPL CALCULATED.3IONS-SCNC: 9 MMOL/L (ref 7–15)
AST SERPL W P-5'-P-CCNC: 52 U/L (ref 0–45)
BILIRUB SERPL-MCNC: 0.7 MG/DL
BUN SERPL-MCNC: 13.8 MG/DL (ref 8–23)
CALCIUM SERPL-MCNC: 9.1 MG/DL (ref 8.8–10.2)
CHLORIDE SERPL-SCNC: 104 MMOL/L (ref 98–107)
CREAT SERPL-MCNC: 0.86 MG/DL (ref 0.67–1.17)
DEPRECATED HCO3 PLAS-SCNC: 26 MMOL/L (ref 22–29)
EGFRCR SERPLBLD CKD-EPI 2021: 89 ML/MIN/1.73M2
GLUCOSE SERPL-MCNC: 96 MG/DL (ref 70–99)
POTASSIUM SERPL-SCNC: 4.2 MMOL/L (ref 3.4–5.3)
PROT SERPL-MCNC: 5.8 G/DL (ref 6.4–8.3)
SODIUM SERPL-SCNC: 139 MMOL/L (ref 135–145)
TSH SERPL DL<=0.005 MIU/L-ACNC: 1.85 UIU/ML (ref 0.3–4.2)

## 2024-06-27 PROCEDURE — 80053 COMPREHEN METABOLIC PANEL: CPT | Performed by: INTERNAL MEDICINE

## 2024-06-27 PROCEDURE — 84443 ASSAY THYROID STIM HORMONE: CPT | Performed by: INTERNAL MEDICINE

## 2024-06-27 PROCEDURE — G2211 COMPLEX E/M VISIT ADD ON: HCPCS | Mod: 95 | Performed by: INTERNAL MEDICINE

## 2024-06-27 PROCEDURE — 258N000003 HC RX IP 258 OP 636: Performed by: INTERNAL MEDICINE

## 2024-06-27 PROCEDURE — 96413 CHEMO IV INFUSION 1 HR: CPT

## 2024-06-27 PROCEDURE — 36415 COLL VENOUS BLD VENIPUNCTURE: CPT | Performed by: INTERNAL MEDICINE

## 2024-06-27 PROCEDURE — 99214 OFFICE O/P EST MOD 30 MIN: CPT | Mod: 95 | Performed by: INTERNAL MEDICINE

## 2024-06-27 PROCEDURE — 250N000011 HC RX IP 250 OP 636: Mod: JZ | Performed by: INTERNAL MEDICINE

## 2024-06-27 RX ORDER — LORAZEPAM 2 MG/ML
0.5 INJECTION INTRAMUSCULAR EVERY 4 HOURS PRN
Status: CANCELLED
Start: 2024-06-27

## 2024-06-27 RX ORDER — EPINEPHRINE 1 MG/ML
0.3 INJECTION, SOLUTION, CONCENTRATE INTRAVENOUS EVERY 5 MIN PRN
Status: CANCELLED | OUTPATIENT
Start: 2024-06-27

## 2024-06-27 RX ORDER — NALOXONE HYDROCHLORIDE 0.4 MG/ML
.1-.4 INJECTION, SOLUTION INTRAMUSCULAR; INTRAVENOUS; SUBCUTANEOUS
Status: CANCELLED | OUTPATIENT
Start: 2024-06-27

## 2024-06-27 RX ORDER — METHYLPREDNISOLONE SODIUM SUCCINATE 125 MG/2ML
125 INJECTION, POWDER, LYOPHILIZED, FOR SOLUTION INTRAMUSCULAR; INTRAVENOUS
Status: CANCELLED
Start: 2024-06-27

## 2024-06-27 RX ORDER — SODIUM CHLORIDE 9 MG/ML
1000 INJECTION, SOLUTION INTRAVENOUS CONTINUOUS PRN
Status: CANCELLED
Start: 2024-06-27

## 2024-06-27 RX ORDER — ALBUTEROL SULFATE 90 UG/1
1-2 AEROSOL, METERED RESPIRATORY (INHALATION)
Status: CANCELLED
Start: 2024-06-27

## 2024-06-27 RX ORDER — ALBUTEROL SULFATE 0.83 MG/ML
2.5 SOLUTION RESPIRATORY (INHALATION)
Status: CANCELLED | OUTPATIENT
Start: 2024-06-27

## 2024-06-27 RX ORDER — DIPHENHYDRAMINE HYDROCHLORIDE 50 MG/ML
50 INJECTION INTRAMUSCULAR; INTRAVENOUS
Status: CANCELLED
Start: 2024-06-27

## 2024-06-27 RX ORDER — MEPERIDINE HYDROCHLORIDE 25 MG/ML
25 INJECTION INTRAMUSCULAR; INTRAVENOUS; SUBCUTANEOUS EVERY 30 MIN PRN
Status: CANCELLED | OUTPATIENT
Start: 2024-06-27

## 2024-06-27 RX ADMIN — SODIUM CHLORIDE 400 MG: 9 INJECTION, SOLUTION INTRAVENOUS at 09:48

## 2024-06-27 RX ADMIN — SODIUM CHLORIDE 250 ML: 9 INJECTION, SOLUTION INTRAVENOUS at 09:26

## 2024-06-27 ASSESSMENT — PAIN SCALES - GENERAL: PAINLEVEL: NO PAIN (0)

## 2024-06-27 NOTE — LETTER
6/27/2024      Newton Buchanan  [address]  [place] [address]      Dear Colleague,    Thank you for referring your patient, Newton Buchanan, to the [facility]. Please see a copy of my visit note below.    Virtual Visit Details    Type of service:  Video Visit   Video Start Time:  8:58 AM  Video End Time: 9:08 AM    Originating Location (pt. Location): Other in clinic    Distant Location (provider location):  Off-site  Platform used for Video Visit: Enid Royal MD on 6/27/2024 at 8:51 AM            [facility] Hematology and Oncology Progress Note    Patient: Newton Buchanan  MRN: [id number]  Jun 27, 2024          Reason for Visit    Stage IV HPV+ head/neck cancer to lung    Primary Oncologist: Dr. Royal    _____________________________________________________________________________    History of Present Illness/ Interval History    Mr. 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    He had first line Keytruda x 2 yrs through 7/2022, resulting in CR.  Since then he has had frequent treatment holidays.  His last dose of Keytruda was on 10/26/2023.  After a break of 4 months repeat PET scan showed evidence of disease progression.  So we restarted Keytruda on 2/15/2024.     Continues to do well on Keytruda infusions.  Does complain of some weight loss and taste alterations.  He has good appetite.  No difficulty in swallowing.  Otherwise no other significant side effects from Keytruda.        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 FNAs benign. Followed.   -2/2020: progressive right neck swelling, no other symptoms.   -2/11/20 FNA biosy right neck mass: metastatic squamous cell cancer, HPV16+  -PET: hypermetabolic right cervical adenopathy, numerous bilateral  lung mets, right hilar nodes.   -No evident primary site identified, but highly suspicious of H/N given IHC staining and radiographical findings/spread  -Neogenomic testing - CPS 70    Treatment:  5/2020 - 7/2022: Keytruda (initiated in [place], then transferred care to [place]). Completed 2 yrs of immunotherapy, resulted in CR by PET     11/2020: local progression in right neck only  -12/2020: palliative RT to right neck (3750 cGy/15)    1/2021: palpable right jaw/parotid lesion, PET+. Remainder of right neck disease improved after RT and lung mets stable. US-guided biopsy right parotid lesion attempted, but no lesion seen to biopsy so cancelled.    7/2022 - 12/2022: observation, treatment holiday until progression.   -12/2022 PET/CT: solitary recurrence in subcarinal LN    12/12/2022- present: Keytruda resumed  --Resulted in CR (PET) by 6/2023  --6/2023: changed to every 6 week cycles    -After dose of Keytruda on 10/26/2023, he took a break.    Disease progression on the PET scan with increased activity and enlargement within the subcarinal lymph node.  Also had increased FDG uptake within the left oropharynx and mildly FDG avid left cervical level 1B lymph node.  Mild FDG uptake within the distal esophagus/GE junction.    Restarted Keytruda on 2/15/2024        Physical Exam    GENERAL: Alert and oriented to time place and person. Seated comfortably. In no distress. Alone.      Lab Results    CMP and TSH WNL    Imaging    6/15/2023 PET: CR. No evident malignancy.    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  After resuming Keytruda he again had a complete response.  In June 2023 we switched him to every 6-week Keytruda infusions.  But he continued to have significant side effects including unintentional weight loss, fatigue and other issues  which was thought to be due to Keytruda.  We gave him a break from Keytruda after his last dose on 10/26/2023.      Repeat PET scan in February of this year showed some disease progression so we restarted Keytruda.  He had a new subcarinal FDG avid lymph node which was concerning for richard recurrence.  He also had asymmetrically increased FDG uptake in the left oropharynx with associated mucosal thickening and a contiguous mildly FDG avid left cervical level 1B lymph node.  After 2 cycles of Keytruda again we repeated a PET scan which showed stable metabolic activity and size of the subcarinal lymph node.  No evidence of any new disease.  The previously noted hypermetabolic FDG uptake within the left level 1B cervical node had resolved, but he still had stable FDG uptake in the left oropharynx.     Part from some weight loss and taste alterations no other side effects from Keytruda.  It is unclear whether the taste alteration and weight loss is from the Keytruda or from prior radiation to the neck.  I encouraged him to increase protein supplementation by drinking boost or Ensure a couple times a day.  At least 1 to 1.5 g/kg of protein per day.  Labs reviewed today.  TSH is normal.  No contraindications to proceed with Keytruda today.  He will come back in 6 weeks with repeat labs and a PET scan.  Will look for response and make decisions based on the scan report.  He is agreeable to the plan..    3.    Osteoporosis  Managed by PCP and Endocrinologist.    The longitudinal plan of care for the diagnosis(es)/condition(s) as documented were addressed during this visit. Due to the added complexity in care, I will continue to support Newton in the subsequent management and with ongoing continuity of care.          Jensen Royal MD  Hematology and Medical Oncology  [facility]              Again, thank you for allowing me to participate in the care of your patient.         Sincerely,        Jensen Royal MD

## 2024-06-27 NOTE — PROGRESS NOTES
Virtual Visit Details    Type of service:  Video Visit   Video Start Time:  8:58 AM  Video End Time: 9:08 AM    Originating Location (pt. Location): Other in clinic    Distant Location (provider location):  Off-site  Platform used for Video Visit: Enid Royal MD on 6/27/2024 at 8:51 AM            [facility] Hematology and Oncology Progress Note    Patient: Newton Buchanan  MRN: [id number]  Jun 27, 2024          Reason for Visit    Stage IV HPV+ head/neck cancer to lung    Primary Oncologist: Dr. Royal    _____________________________________________________________________________    History of Present Illness/ Interval History    Mr. 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    He had first line Keytruda x 2 yrs through 7/2022, resulting in CR.  Since then he has had frequent treatment holidays.  His last dose of Keytruda was on 10/26/2023.  After a break of 4 months repeat PET scan showed evidence of disease progression.  So we restarted Keytruda on 2/15/2024.     Continues to do well on Keytruda infusions.  Does complain of some weight loss and taste alterations.  He has good appetite.  No difficulty in swallowing.  Otherwise no other significant side effects from Keytruda.        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 FNAs benign. Followed.   -2/2020: progressive right neck swelling, no other symptoms.   -2/11/20 FNA biosy right neck mass: metastatic squamous cell cancer, HPV16+  -PET: hypermetabolic right cervical adenopathy, numerous bilateral lung mets, right hilar nodes.   -No evident primary site identified, but highly suspicious of H/N given IHC staining and radiographical findings/spread  -Neogenomic testing - CPS 70    Treatment:  5/2020 - 7/2022: Keytruda (initiated in  [place], then transferred care to [place]). Completed 2 yrs of immunotherapy, resulted in CR by PET     11/2020: local progression in right neck only  -12/2020: palliative RT to right neck (3750 cGy/15)    1/2021: palpable right jaw/parotid lesion, PET+. Remainder of right neck disease improved after RT and lung mets stable. US-guided biopsy right parotid lesion attempted, but no lesion seen to biopsy so cancelled.    7/2022 - 12/2022: observation, treatment holiday until progression.   -12/2022 PET/CT: solitary recurrence in subcarinal LN    12/12/2022- present: Keytruda resumed  --Resulted in CR (PET) by 6/2023  --6/2023: changed to every 6 week cycles    -After dose of Keytruda on 10/26/2023, he took a break.    Disease progression on the PET scan with increased activity and enlargement within the subcarinal lymph node.  Also had increased FDG uptake within the left oropharynx and mildly FDG avid left cervical level 1B lymph node.  Mild FDG uptake within the distal esophagus/GE junction.    Restarted Keytruda on 2/15/2024        Physical Exam    GENERAL: Alert and oriented to time place and person. Seated comfortably. In no distress. Alone.      Lab Results    CMP and TSH WNL    Imaging    6/15/2023 PET: CR. No evident malignancy.    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  After resuming Keytruda he again had a complete response.  In June 2023 we switched him to every 6-week Keytruda infusions.  But he continued to have significant side effects including unintentional weight loss, fatigue and other issues which was thought to be due to Keytruda.  We gave him a break from Keytruda after his last dose on 10/26/2023.      Repeat PET scan in February of this year showed some disease progression so we restarted Keytruda.  He had a new subcarinal  FDG avid lymph node which was concerning for richard recurrence.  He also had asymmetrically increased FDG uptake in the left oropharynx with associated mucosal thickening and a contiguous mildly FDG avid left cervical level 1B lymph node.  After 2 cycles of Keytruda again we repeated a PET scan which showed stable metabolic activity and size of the subcarinal lymph node.  No evidence of any new disease.  The previously noted hypermetabolic FDG uptake within the left level 1B cervical node had resolved, but he still had stable FDG uptake in the left oropharynx.     Part from some weight loss and taste alterations no other side effects from Keytruda.  It is unclear whether the taste alteration and weight loss is from the Keytruda or from prior radiation to the neck.  I encouraged him to increase protein supplementation by drinking boost or Ensure a couple times a day.  At least 1 to 1.5 g/kg of protein per day.  Labs reviewed today.  TSH is normal.  No contraindications to proceed with Keytruda today.  He will come back in 6 weeks with repeat labs and a PET scan.  Will look for response and make decisions based on the scan report.  He is agreeable to the plan..    3.    Osteoporosis  Managed by PCP and Endocrinologist.    The longitudinal plan of care for the diagnosis(es)/condition(s) as documented were addressed during this visit. Due to the added complexity in care, I will continue to support Newton in the subsequent management and with ongoing continuity of care.          Jensen Royal MD  Hematology and Medical Oncology  [facility]

## 2024-06-27 NOTE — LETTER
6/27/2024      Newton Buchanan  [address]  [place] [address]      Dear Colleague,    Thank you for referring your patient, Newton Buchanan, to the [facility]. Please see a copy of my visit note below.    Virtual Visit Details    Type of service:  Video Visit   Video Start Time:  8:58 AM  Video End Time: 9:08 AM    Originating Location (pt. Location): Other in clinic    Distant Location (provider location):  Off-site  Platform used for Video Visit: Enid Royal MD on 6/27/2024 at 8:51 AM            [facility] Hematology and Oncology Progress Note    Patient: Newton Buchanan  MRN: [id number]  Jun 27, 2024          Reason for Visit    Stage IV HPV+ head/neck cancer to lung    Primary Oncologist: Dr. Royal    _____________________________________________________________________________    History of Present Illness/ Interval History    Mr. 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    He had first line Keytruda x 2 yrs through 7/2022, resulting in CR.  Since then he has had frequent treatment holidays.  His last dose of Keytruda was on 10/26/2023.  After a break of 4 months repeat PET scan showed evidence of disease progression.  So we restarted Keytruda on 2/15/2024.     Continues to do well on Keytruda infusions.  Does complain of some weight loss and taste alterations.  He has good appetite.  No difficulty in swallowing.  Otherwise no other significant side effects from Keytruda.        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 FNAs benign. Followed.   -2/2020: progressive right neck swelling, no other symptoms.   -2/11/20 FNA biosy right neck mass: metastatic squamous cell cancer, HPV16+  -PET: hypermetabolic right cervical adenopathy, numerous bilateral  lung mets, right hilar nodes.   -No evident primary site identified, but highly suspicious of H/N given IHC staining and radiographical findings/spread  -Neogenomic testing - CPS 70    Treatment:  5/2020 - 7/2022: Keytruda (initiated in [place], then transferred care to [place]). Completed 2 yrs of immunotherapy, resulted in CR by PET     11/2020: local progression in right neck only  -12/2020: palliative RT to right neck (3750 cGy/15)    1/2021: palpable right jaw/parotid lesion, PET+. Remainder of right neck disease improved after RT and lung mets stable. US-guided biopsy right parotid lesion attempted, but no lesion seen to biopsy so cancelled.    7/2022 - 12/2022: observation, treatment holiday until progression.   -12/2022 PET/CT: solitary recurrence in subcarinal LN    12/12/2022- present: Keytruda resumed  --Resulted in CR (PET) by 6/2023  --6/2023: changed to every 6 week cycles    -After dose of Keytruda on 10/26/2023, he took a break.    Disease progression on the PET scan with increased activity and enlargement within the subcarinal lymph node.  Also had increased FDG uptake within the left oropharynx and mildly FDG avid left cervical level 1B lymph node.  Mild FDG uptake within the distal esophagus/GE junction.    Restarted Keytruda on 2/15/2024        Physical Exam    GENERAL: Alert and oriented to time place and person. Seated comfortably. In no distress. Alone.      Lab Results    CMP and TSH WNL    Imaging    6/15/2023 PET: CR. No evident malignancy.    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  After resuming Keytruda he again had a complete response.  In June 2023 we switched him to every 6-week Keytruda infusions.  But he continued to have significant side effects including unintentional weight loss, fatigue and other issues  which was thought to be due to Keytruda.  We gave him a break from Keytruda after his last dose on 10/26/2023.      Repeat PET scan in February of this year showed some disease progression so we restarted Keytruda.  He had a new subcarinal FDG avid lymph node which was concerning for richard recurrence.  He also had asymmetrically increased FDG uptake in the left oropharynx with associated mucosal thickening and a contiguous mildly FDG avid left cervical level 1B lymph node.  After 2 cycles of Keytruda again we repeated a PET scan which showed stable metabolic activity and size of the subcarinal lymph node.  No evidence of any new disease.  The previously noted hypermetabolic FDG uptake within the left level 1B cervical node had resolved, but he still had stable FDG uptake in the left oropharynx.     Part from some weight loss and taste alterations no other side effects from Keytruda.  It is unclear whether the taste alteration and weight loss is from the Keytruda or from prior radiation to the neck.  I encouraged him to increase protein supplementation by drinking boost or Ensure a couple times a day.  At least 1 to 1.5 g/kg of protein per day.  Labs reviewed today.  TSH is normal.  No contraindications to proceed with Keytruda today.  He will come back in 6 weeks with repeat labs and a PET scan.  Will look for response and make decisions based on the scan report.  He is agreeable to the plan..    3.    Osteoporosis  Managed by PCP and Endocrinologist.    The longitudinal plan of care for the diagnosis(es)/condition(s) as documented were addressed during this visit. Due to the added complexity in care, I will continue to support Newton in the subsequent management and with ongoing continuity of care.          Jensen Royal MD  Hematology and Medical Oncology  [facility]              Again, thank you for allowing me to participate in the care of your patient.         Sincerely,        Jensen Royal MD

## 2024-06-27 NOTE — PROGRESS NOTES
Infusion Nursing Note:  Newton Buchanan presents today for [place].    Patient seen by provider today: No   present during visit today: Not Applicable.    Note: N/A.      Intravenous Access:  Peripheral IV placed.    Treatment Conditions:  Lab Results   Component Value Date     06/27/2024    POTASSIUM 4.2 06/27/2024    CR 0.86 06/27/2024    JÚNIOR 9.1 06/27/2024    BILITOTAL 0.7 06/27/2024    ALBUMIN 3.4 (L) 06/27/2024    ALT 29 06/27/2024    AST 52 (H) 06/27/2024       Results reviewed, labs MET treatment parameters, ok to proceed with treatment.      Post Infusion Assessment:  Patient tolerated infusion without incident.  Site patent and intact, free from redness, edema or discomfort.  No evidence of extravasations.  Access discontinued per protocol.       Discharge Plan:   Discharge instructions reviewed with: Patient.  Patient and/or family verbalized understanding of discharge instructions and all questions answered.  Patient discharged in stable condition accompanied by: self.  Departure Mode: Ambulatory.      Ana Mcmanus RN

## 2024-06-28 ENCOUNTER — OFFICE VISIT (OUTPATIENT)
Dept: AUDIOLOGY | Facility: CLINIC | Age: 79
End: 2024-06-28
Payer: MEDICARE

## 2024-06-28 DIAGNOSIS — H90.3 SENSORINEURAL HEARING LOSS, BILATERAL: Primary | ICD-10-CM

## 2024-06-28 PROCEDURE — 92550 TYMPANOMETRY & REFLEX THRESH: CPT | Performed by: AUDIOLOGIST

## 2024-06-28 PROCEDURE — 92557 COMPREHENSIVE HEARING TEST: CPT | Performed by: AUDIOLOGIST

## 2024-06-28 NOTE — PROGRESS NOTES
AUDIOLOGY REPORT    SUBJECTIVE:  Newton Buchanan is a 78 year old male who was seen in the Audiology Clinic [facility] on 6/28/24 for audiologic evaluation, referred by Dr. Rivera.  The patient reports a history of noise exposure during his  service in [employer] and a possible decline in hearing. The patient denies  bilateral tinnitus, bilateral otalgia, bilateral drainage, bilateral aural fullness, family history of hearing loss, and dizziness. Patient was unaccompanied to today's visit.     Abuse Screening:  Do you feel unsafe at home or work/school? No  Do you feel threatened by someone? No  Does anyone try to keep you from having contact with others, or doing things outside of your home? No  Physical signs of abuse present? No     Fall Risk Screen:  1. Have you fallen two or more times in the past year? No  2. Have you fallen and had an injury in the past year? No    OBJECTIVE:    Otoscopic exam indicates ears are clear of cerumen bilaterally     Pure Tone Thresholds assessed using standard techniques  audiometry with good  reliability from 250-8000 Hz bilaterally using insert earphones and circumaural headphones     RIGHT:  normal hearing sensitivity through 2000 Hz then a moderate sensorineural hearing loss    LEFT:    normal hearing sensitivity through 1500 Hz then a mild to moderate sensorineural hearing loss   NOTE: Change in transducers did not merit a change in thresholds.     Tympanogram:    RIGHT: normal eardrum mobility    LEFT:   normal eardrum mobility    Reflexes (reported by stimulus ear): 1000 Hz  RIGHT: Ipsilateral is absent at frequencies tested  RIGHT: Contralateral is absent at frequencies tested  LEFT:   Ipsilateral is absent at frequencies tested  LEFT:   Contralateral is absent at frequencies tested    Speech Reception Threshold:    RIGHT: 20 dB HL    LEFT:   15 dB HL    Word Recognition Score:     RIGHT: 96% at 60 dB HL using NU-6 recorded word  list.    LEFT:   100% at 60 dB HL using NU-6 recorded word list.    ASSESSMENT:   Bilateral sensorineural hearing loss      Today s results were discussed with the patient in detail. Patient was provided a copy of his audiogram.     PLAN:  Patient was counseled regarding hearing loss and impact on communication.  Patient is a good candidate for amplification at this time. Handout on good communication strategies, and hearing aid use was given to patient. It is recommended that the patient explore amplification.  Please call this clinic with questions regarding these results or recommendations.    [facility]  Licensed Audiologist #[id number]  6/28/2024    CC: Dr. Rivera

## 2024-07-01 ENCOUNTER — OFFICE VISIT (OUTPATIENT)
Dept: OTOLARYNGOLOGY | Facility: CLINIC | Age: 79
End: 2024-07-01
Attending: PHYSICIAN ASSISTANT
Payer: MEDICARE

## 2024-07-01 VITALS — DIASTOLIC BLOOD PRESSURE: 66 MMHG | SYSTOLIC BLOOD PRESSURE: 129 MMHG | HEART RATE: 57 BPM | OXYGEN SATURATION: 99 %

## 2024-07-01 DIAGNOSIS — J30.0 VASOMOTOR RHINITIS: ICD-10-CM

## 2024-07-01 DIAGNOSIS — C79.89 SQUAMOUS CELL CARCINOMA METASTATIC TO HEAD AND NECK WITH UNKNOWN PRIMARY SITE (H): Primary | ICD-10-CM

## 2024-07-01 DIAGNOSIS — C44.92 SQUAMOUS CELL CARCINOMA METASTATIC TO HEAD AND NECK WITH UNKNOWN PRIMARY SITE (H): Primary | ICD-10-CM

## 2024-07-01 DIAGNOSIS — Z92.3 HISTORY OF HEAD AND NECK RADIATION: ICD-10-CM

## 2024-07-01 DIAGNOSIS — R63.0 LACK OF APPETITE: ICD-10-CM

## 2024-07-01 DIAGNOSIS — R09.81 NASAL CONGESTION: ICD-10-CM

## 2024-07-01 PROCEDURE — 99213 OFFICE O/P EST LOW 20 MIN: CPT | Mod: 25 | Performed by: OTOLARYNGOLOGY

## 2024-07-01 PROCEDURE — 31575 DIAGNOSTIC LARYNGOSCOPY: CPT | Performed by: OTOLARYNGOLOGY

## 2024-07-01 RX ORDER — BUDESONIDE 0.5 MG/2ML
INHALANT ORAL
Qty: 60 ML | Refills: 12 | Status: SHIPPED | OUTPATIENT
Start: 2024-07-01 | End: 2024-07-01

## 2024-07-01 RX ORDER — BUDESONIDE 0.5 MG/2ML
INHALANT ORAL
Qty: 180 ML | Refills: 3 | Status: SHIPPED | OUTPATIENT
Start: 2024-07-01

## 2024-07-01 NOTE — TELEPHONE ENCOUNTER
Signed Prescriptions:                        Disp   Refills    budesonide (PULMICORT) 0.5 MG/2ML neb solu*180 mL 3        Sig: Add one ampule into Neilmed sinus rinse bottle with           saline mixture and irrigate nose daily  Authorizing Provider: SHU MEJIA  Ordering User: KAPIL HARRINGTON RN Care Coordinator, [facility] 07/01/24 2:15 PM

## 2024-07-01 NOTE — LETTER
7/1/2024      Newton Buchanan  [address]  [place] [address]      Dear Colleague,    Thank you for referring your patient, Newton Buchanan, to the [facility]. Please see a copy of my visit note below.    History of Present Illness - Newton Buchanan is a 78 year old male last seen by Dr Kelly on 9/13/2023.    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  The patient had richard disease in the neck and lung metastasis.  He has been doing quite well with treatment and does not have any signs of recurrent or persistent disease on his follow-up imaging.  The patient was last seen by Dr Kelly unrelated to his cancer surveillance on 5/25/2023 with new symptoms involving sinus issues, postnasal drainage, and cough after getting exposed to some garden chemical ([place])   He received a Kenalog injection and we started him on nasal irrigations and Flonase. He has been on maintenance Keytruda for four years    He is here for continued cancer surveillance.  He does still have issues with chronic nasal congestion in the morning.  He does use CPAP.  Also, with the congestion he has had  notable decrease in his appetite in general.  He has lost about 30 pounds.      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    Also, he just had a recent audiology visit on 6/28/2024 and the results were personally reviewed. The tympanograms were normal.  He had a steeply down sloping sensorineural hearing loss above 2000Hz down into the severe range.  No conductive hearing loss and word recognition was normal.    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  Alert and oriented to person and place, answers questions and cooperates with examination appropriately.   Head and Face - Normocephalic and atraumatic, with no gross asymmetry noted of the contour of the facial features.  The facial nerve is intact, with strong symmetric movements.  Eyes - Extraocular movements intact, and the pupils were reactive to light.  Sclera were not icteric or injected, conjunctiva were pink and moist.  Mouth - Examination of the oral cavity shows pink, healthy, moist mucosa.  No lesions or ulceration noted.  The dentition are in good repair.  The tongue is mobile and midline.    Flexible Endoscopy -     The patient was counseled that their symptoms and history require a direct visualization with an endoscopy procedure.  They understood and we proceeded with a fiberoptic examination.  First I sprayed both sides of the nose with a mixture of lidocaine and neosynephrine.  I then passed the scope through the nasal cavity.  Color photographs were taken for the permanent medical record.  The nasal cavity was unremarkable.  The nasopharynx was mucosally covered and symmetric.  The Eustachian tube openings were unobstructed.  Going further down I had a clear view of the base of tongue which had normal appearing lingual tonsillar tissue.  The base of tongue was free of lesions, and the vallecula was open.  The epiglottis was smooth and mucosally covered.   The supraglottic larynx was then clearly visualized.  The vocal cords moved smoothly and symmetrically, they were pearly white and no lesions were seen.  The pyriform sinuses were open, and the limited view of the postcricoid region did not show any lesions.                      A/P - Newton Buchanan  (C79.89,  C80.1) Squamous cell carcinoma metastatic to head and neck with unknown primary site (H)  (primary encounter diagnosis)  (Z92.3) History of head and neck radiation  (R09.81) Nasal congestion  (R63.0) Lack of appetite    First and foremost, there is no evidence of recurrent disease in the upper aerodigestive tract.  Follow-up in 6 months for another check up and endoscopy.    Based on the history, audiogram, and ear exam, I don't find any evidence of medical or surgical disease that would have caused the hearing loss and being out of the radiation field, I don't think it was that either.  Most likely his history of major noise exposure was the cause.    We discussed the natural history of the steady loss of human hearing, and things to help.  I certainly recommend considering hearing aids, and they have my medical clearance to look into being fitted, and information has been provided.    Finally, safety considerations such as loud smoke detectors, alarm clocks, and special aids for the hearing impaired using phones and television were also discussed.    I will refer to dietary consultation for his loss of appetite.    For the nose I am going to try him on Budesonide in NeilMed sinus rinse for a month.      Again, thank you for allowing me to participate in the care of your patient.        Sincerely,        Js Rivera MD

## 2024-07-03 ENCOUNTER — MYC REFILL (OUTPATIENT)
Dept: OTOLARYNGOLOGY | Facility: CLINIC | Age: 79
End: 2024-07-03
Payer: MEDICARE

## 2024-07-03 DIAGNOSIS — J30.0 VASOMOTOR RHINITIS: ICD-10-CM

## 2024-07-03 DIAGNOSIS — R09.81 NASAL CONGESTION: ICD-10-CM

## 2024-07-03 RX ORDER — BUDESONIDE 0.5 MG/2ML
INHALANT ORAL
Qty: 180 ML | Refills: 3 | OUTPATIENT
Start: 2024-07-03

## 2024-07-03 NOTE — TELEPHONE ENCOUNTER
Refused Prescriptions:                       Disp   Refills    budesonide (PULMICORT) 0.5 MG/2ML neb solu*180 mL 3        Sig: Add one ampule into Neilmed sinus rinse bottle with           saline mixture and irrigate nose daily  Refused By: KAPIL PARIKH  Reason for Refusal: Kvng Parikh RN Care Coordinator, [facility] 07/03/24 1:01 PM

## 2024-08-01 ENCOUNTER — HOSPITAL ENCOUNTER (OUTPATIENT)
Dept: PET IMAGING | Facility: CLINIC | Age: 79
Discharge: HOME | End: 2024-08-01
Attending: INTERNAL MEDICINE | Admitting: INTERNAL MEDICINE
Payer: MEDICARE

## 2024-08-01 DIAGNOSIS — C79.89 SECONDARY SQUAMOUS CELL CARCINOMA OF HEAD AND NECK WITH UNKNOWN PRIMARY SITE (H): ICD-10-CM

## 2024-08-01 DIAGNOSIS — C78.02 MALIGNANT NEOPLASM METASTATIC TO BOTH LUNGS (H): ICD-10-CM

## 2024-08-01 DIAGNOSIS — C78.01 MALIGNANT NEOPLASM METASTATIC TO BOTH LUNGS (H): ICD-10-CM

## 2024-08-01 DIAGNOSIS — C76.0 HEAD AND NECK CANCER (H): ICD-10-CM

## 2024-08-01 DIAGNOSIS — C44.92 SECONDARY SQUAMOUS CELL CARCINOMA OF HEAD AND NECK WITH UNKNOWN PRIMARY SITE (H): ICD-10-CM

## 2024-08-01 PROCEDURE — 78816 PET IMAGE W/CT FULL BODY: CPT | Mod: MG,PS

## 2024-08-01 PROCEDURE — 78816 PET IMAGE W/CT FULL BODY: CPT | Mod: 26 | Performed by: RADIOLOGY

## 2024-08-01 PROCEDURE — G1010 CDSM STANSON: HCPCS | Performed by: RADIOLOGY

## 2024-08-01 PROCEDURE — A9552 F18 FDG: HCPCS | Performed by: INTERNAL MEDICINE

## 2024-08-01 PROCEDURE — 343N000001 HC RX 343: Performed by: INTERNAL MEDICINE

## 2024-08-01 RX ORDER — FLUDEOXYGLUCOSE F 18 200 MCI/ML
10-18 INJECTION, SOLUTION INTRAVENOUS ONCE
Status: COMPLETED | OUTPATIENT
Start: 2024-08-01 | End: 2024-08-01

## 2024-08-01 RX ADMIN — FLUDEOXYGLUCOSE F 18 13.2 MILLICURIE: 200 INJECTION, SOLUTION INTRAVENOUS at 09:20

## 2024-08-08 ENCOUNTER — INFUSION THERAPY VISIT (OUTPATIENT)
Dept: INFUSION THERAPY | Facility: CLINIC | Age: 79
End: 2024-08-08
Attending: INTERNAL MEDICINE
Payer: MEDICARE

## 2024-08-08 ENCOUNTER — LAB (OUTPATIENT)
Dept: LAB | Facility: CLINIC | Age: 79
End: 2024-08-08
Payer: MEDICARE

## 2024-08-08 ENCOUNTER — VIRTUAL VISIT (OUTPATIENT)
Dept: ONCOLOGY | Facility: CLINIC | Age: 79
End: 2024-08-08
Attending: INTERNAL MEDICINE
Payer: MEDICARE

## 2024-08-08 ENCOUNTER — DOCUMENTATION ONLY (OUTPATIENT)
Dept: SLEEP MEDICINE | Facility: CLINIC | Age: 79
End: 2024-08-08
Payer: MEDICARE

## 2024-08-08 VITALS
SYSTOLIC BLOOD PRESSURE: 136 MMHG | OXYGEN SATURATION: 98 % | DIASTOLIC BLOOD PRESSURE: 65 MMHG | WEIGHT: 171 LBS | TEMPERATURE: 97.5 F | HEART RATE: 56 BPM | RESPIRATION RATE: 12 BRPM | HEIGHT: 71 IN | BODY MASS INDEX: 23.94 KG/M2

## 2024-08-08 VITALS — HEART RATE: 73 BPM | SYSTOLIC BLOOD PRESSURE: 128 MMHG | DIASTOLIC BLOOD PRESSURE: 75 MMHG

## 2024-08-08 DIAGNOSIS — C76.0 HEAD AND NECK CANCER (H): Primary | ICD-10-CM

## 2024-08-08 DIAGNOSIS — R43.2 TASTE SENSE ALTERED: ICD-10-CM

## 2024-08-08 DIAGNOSIS — E16.2 HYPOGLYCEMIA: ICD-10-CM

## 2024-08-08 DIAGNOSIS — G47.33 OSA (OBSTRUCTIVE SLEEP APNEA): Primary | ICD-10-CM

## 2024-08-08 DIAGNOSIS — R63.4 WEIGHT LOSS: ICD-10-CM

## 2024-08-08 LAB
ALBUMIN SERPL BCG-MCNC: 3.7 G/DL (ref 3.5–5.2)
ALP SERPL-CCNC: 42 U/L (ref 40–150)
ALT SERPL W P-5'-P-CCNC: 16 U/L (ref 0–70)
ANION GAP SERPL CALCULATED.3IONS-SCNC: 8 MMOL/L (ref 7–15)
AST SERPL W P-5'-P-CCNC: 19 U/L (ref 0–45)
BILIRUB SERPL-MCNC: 0.5 MG/DL
BUN SERPL-MCNC: 19.4 MG/DL (ref 8–23)
CALCIUM SERPL-MCNC: 9.6 MG/DL (ref 8.8–10.4)
CHLORIDE SERPL-SCNC: 106 MMOL/L (ref 98–107)
CREAT SERPL-MCNC: 0.98 MG/DL (ref 0.67–1.17)
EGFRCR SERPLBLD CKD-EPI 2021: 79 ML/MIN/1.73M2
GLUCOSE SERPL-MCNC: 67 MG/DL (ref 70–99)
HCO3 SERPL-SCNC: 27 MMOL/L (ref 22–29)
POTASSIUM SERPL-SCNC: 4 MMOL/L (ref 3.4–5.3)
PROT SERPL-MCNC: 6.3 G/DL (ref 6.4–8.3)
SODIUM SERPL-SCNC: 141 MMOL/L (ref 135–145)
TSH SERPL DL<=0.005 MIU/L-ACNC: 2.31 UIU/ML (ref 0.3–4.2)

## 2024-08-08 PROCEDURE — 36415 COLL VENOUS BLD VENIPUNCTURE: CPT | Performed by: INTERNAL MEDICINE

## 2024-08-08 PROCEDURE — G2211 COMPLEX E/M VISIT ADD ON: HCPCS | Mod: 95 | Performed by: INTERNAL MEDICINE

## 2024-08-08 PROCEDURE — 96413 CHEMO IV INFUSION 1 HR: CPT

## 2024-08-08 PROCEDURE — 99215 OFFICE O/P EST HI 40 MIN: CPT | Mod: 95 | Performed by: INTERNAL MEDICINE

## 2024-08-08 PROCEDURE — 84443 ASSAY THYROID STIM HORMONE: CPT | Performed by: INTERNAL MEDICINE

## 2024-08-08 PROCEDURE — 250N000011 HC RX IP 250 OP 636: Performed by: INTERNAL MEDICINE

## 2024-08-08 PROCEDURE — 258N000003 HC RX IP 258 OP 636: Performed by: INTERNAL MEDICINE

## 2024-08-08 PROCEDURE — 80053 COMPREHEN METABOLIC PANEL: CPT | Performed by: INTERNAL MEDICINE

## 2024-08-08 RX ORDER — NALOXONE HYDROCHLORIDE 0.4 MG/ML
.1-.4 INJECTION, SOLUTION INTRAMUSCULAR; INTRAVENOUS; SUBCUTANEOUS
Status: CANCELLED | OUTPATIENT
Start: 2024-09-05

## 2024-08-08 RX ORDER — ALBUTEROL SULFATE 90 UG/1
1-2 AEROSOL, METERED RESPIRATORY (INHALATION)
Status: CANCELLED
Start: 2024-08-15

## 2024-08-08 RX ORDER — NALOXONE HYDROCHLORIDE 0.4 MG/ML
.1-.4 INJECTION, SOLUTION INTRAMUSCULAR; INTRAVENOUS; SUBCUTANEOUS
Status: CANCELLED | OUTPATIENT
Start: 2024-08-15

## 2024-08-08 RX ORDER — MEPERIDINE HYDROCHLORIDE 25 MG/ML
25 INJECTION INTRAMUSCULAR; INTRAVENOUS; SUBCUTANEOUS EVERY 30 MIN PRN
Status: CANCELLED | OUTPATIENT
Start: 2024-08-15

## 2024-08-08 RX ORDER — METHYLPREDNISOLONE SODIUM SUCCINATE 125 MG/2ML
125 INJECTION, POWDER, LYOPHILIZED, FOR SOLUTION INTRAMUSCULAR; INTRAVENOUS
Status: CANCELLED
Start: 2024-08-15

## 2024-08-08 RX ORDER — LORAZEPAM 2 MG/ML
0.5 INJECTION INTRAMUSCULAR EVERY 4 HOURS PRN
Status: CANCELLED
Start: 2024-09-05

## 2024-08-08 RX ORDER — EPINEPHRINE 1 MG/ML
0.3 INJECTION, SOLUTION, CONCENTRATE INTRAVENOUS EVERY 5 MIN PRN
Status: CANCELLED | OUTPATIENT
Start: 2024-09-05

## 2024-08-08 RX ORDER — SODIUM CHLORIDE 9 MG/ML
1000 INJECTION, SOLUTION INTRAVENOUS CONTINUOUS PRN
Status: CANCELLED
Start: 2024-08-15

## 2024-08-08 RX ORDER — ALBUTEROL SULFATE 90 UG/1
1-2 AEROSOL, METERED RESPIRATORY (INHALATION)
Status: CANCELLED
Start: 2024-09-05

## 2024-08-08 RX ORDER — ALBUTEROL SULFATE 0.83 MG/ML
2.5 SOLUTION RESPIRATORY (INHALATION)
Status: CANCELLED | OUTPATIENT
Start: 2024-09-05

## 2024-08-08 RX ORDER — SODIUM CHLORIDE 9 MG/ML
1000 INJECTION, SOLUTION INTRAVENOUS CONTINUOUS PRN
Status: CANCELLED
Start: 2024-09-05

## 2024-08-08 RX ORDER — MEPERIDINE HYDROCHLORIDE 25 MG/ML
25 INJECTION INTRAMUSCULAR; INTRAVENOUS; SUBCUTANEOUS EVERY 30 MIN PRN
Status: CANCELLED | OUTPATIENT
Start: 2024-09-05

## 2024-08-08 RX ORDER — LORAZEPAM 2 MG/ML
0.5 INJECTION INTRAMUSCULAR EVERY 4 HOURS PRN
Status: CANCELLED
Start: 2024-08-15

## 2024-08-08 RX ORDER — DIPHENHYDRAMINE HYDROCHLORIDE 50 MG/ML
50 INJECTION INTRAMUSCULAR; INTRAVENOUS
Status: CANCELLED
Start: 2024-08-15

## 2024-08-08 RX ORDER — ALBUTEROL SULFATE 0.83 MG/ML
2.5 SOLUTION RESPIRATORY (INHALATION)
Status: CANCELLED | OUTPATIENT
Start: 2024-08-15

## 2024-08-08 RX ORDER — DIPHENHYDRAMINE HYDROCHLORIDE 50 MG/ML
50 INJECTION INTRAMUSCULAR; INTRAVENOUS
Status: CANCELLED
Start: 2024-09-05

## 2024-08-08 RX ORDER — METHYLPREDNISOLONE SODIUM SUCCINATE 125 MG/2ML
125 INJECTION, POWDER, LYOPHILIZED, FOR SOLUTION INTRAMUSCULAR; INTRAVENOUS
Status: CANCELLED
Start: 2024-09-05

## 2024-08-08 RX ORDER — EPINEPHRINE 1 MG/ML
0.3 INJECTION, SOLUTION, CONCENTRATE INTRAVENOUS EVERY 5 MIN PRN
Status: CANCELLED | OUTPATIENT
Start: 2024-08-15

## 2024-08-08 RX ADMIN — SODIUM CHLORIDE 250 ML: 9 INJECTION, SOLUTION INTRAVENOUS at 10:10

## 2024-08-08 RX ADMIN — SODIUM CHLORIDE 200 MG: 9 INJECTION, SOLUTION INTRAVENOUS at 10:16

## 2024-08-08 ASSESSMENT — PAIN SCALES - GENERAL: PAINLEVEL: NO PAIN (0)

## 2024-08-08 NOTE — LETTER
8/8/2024      Newton Buchanan  [address]  [place] [address]      Dear Colleague,    Thank you for referring your patient, Newton Buchanan, to the [facility]. Please see a copy of my visit note below.    Virtual Visit Details    Type of service:  Video Visit   Video Start Time:  8:58 AM  Video End Time: 9:08 AM    Originating Location (pt. 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 Royal    _____________________________________________________________________________    History of Present Illness/ Interval History    Mr. 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    He had first line Keytruda x 2 yrs through 7/2022, resulting in CR.  Since then he has had frequent treatment holidays.  His last dose of Keytruda was on 10/26/2023.  After a break of 4 months repeat PET scan showed evidence of disease progression.  So we restarted Keytruda on 2/15/2024.     Continues to do well on Keytruda infusions.  Biggest issue is weight loss and taste alterations.  He has been struggling with this for some time.  I think his taste issues are stemming from his prior radiation therapy.  He is seen as a video visit today with a repeat PET scan.  He just returned from a trip to [place].  Contracted COVID about a month ago there.  Has recovered fully now.        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 FNAs benign. Followed.   -2/2020: progressive right neck  swelling, no other symptoms.   -2/11/20 FNA biosy right neck mass: metastatic squamous cell cancer, HPV16+  -PET: hypermetabolic right cervical adenopathy, numerous bilateral lung mets, right hilar nodes.   -No evident primary site identified, but highly suspicious of H/N given IHC staining and radiographical findings/spread  -Neogenomic testing - CPS 70    Treatment:  5/2020 - 7/2022: Keytruda (initiated in [place], then transferred care to [place]). Completed 2 yrs of immunotherapy, resulted in CR by PET     11/2020: local progression in right neck only  -12/2020: palliative RT to right neck (3750 cGy/15)    1/2021: palpable right jaw/parotid lesion, PET+. Remainder of right neck disease improved after RT and lung mets stable. US-guided biopsy right parotid lesion attempted, but no lesion seen to biopsy so cancelled.    7/2022 - 12/2022: observation, treatment holiday until progression.   -12/2022 PET/CT: solitary recurrence in subcarinal LN    12/12/2022- present: Keytruda resumed  --Resulted in CR (PET) by 6/2023  --6/2023: changed to every 6 week cycles    -After dose of Keytruda on 10/26/2023, he took a break.    Disease progression on the PET scan with increased activity and enlargement within the subcarinal lymph node.  Also had increased FDG uptake within the left oropharynx and mildly FDG avid left cervical level 1B lymph node.  Mild FDG uptake within the distal esophagus/GE junction.    Restarted Keytruda on 2/15/2024        Physical Exam    GENERAL: Alert and oriented to time place and person. Seated comfortably. In no distress. Alone.      Lab Results    CMP and TSH WNL    Imaging    6/15/2023 PET: CR. No evident malignancy.    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  After resuming Keytruda he again had a complete response.  In June  2023 we switched him to every 6-week Keytruda infusions.  But he continued to have significant side effects including unintentional weight loss, fatigue and other issues which was thought to be due to Keytruda.  We gave him a break from Keytruda after his last dose on 10/26/2023.      Repeat PET scan in February 2024 showed some disease progression so we restarted Keytruda.  He had a new subcarinal FDG avid lymph node which was concerning for richard recurrence.  He also had asymmetrically increased FDG uptake in the left oropharynx with associated mucosal thickening and a contiguous mildly FDG avid left cervical level 1B lymph node.  After 2 cycles of Keytruda again we repeated a PET scan which showed stable metabolic activity and size of the subcarinal lymph node.  No evidence of any new disease.  The previously noted hypermetabolic FDG uptake within the left level 1B cervical node had resolved, but he still had stable FDG uptake in the left oropharynx.     Has been tolerating Keytruda fairly well. He is here with repeat PET scan.  I personally reviewed the PET scan images and report and independently interpreted the results.  PET scan from last week showing stable metabolic activity and size of the subcarinal lymph node.  No evidence of any new metastatic disease.  The FDG avid ET in the left oropharynx is unchanged.  It is associated with mild soft tissue thickening.  Most likely inflammatory.  Plan is to continue Keytruda as long as he is responding to it.  He does have some weight loss and some taste alterations.  I am not sure if these are related to Keytruda.  His labs have been pretty stable.  No contraindications to proceed with Keytruda today.  He has an upcoming travel in the middle of September and he will be gone for about 6 weeks.  So I have made some changes to his schedule.  Today he will get 3 weekly Keytruda dose at 200 mg.  He will come back in 3 weeks with repeat labs followed by 6 weekly dose  (400 mg).  Following that he will have a hiatus of about 8 weeks and he will come back on October 31 with labs followed by Keytruda infusion only.  And I will see him back on the November 7 with a PET scan prior.  He is agreeable to the plan.    Of note his LFTs have normalized now.  However his glucose was slightly low at 67.  This is the first time his glucose has gone down.  He did eat a little bit of a meal this morning.  Not sure what the etiology of this.  Very very rarely Keytruda can cause islet cell inflammation leading to unstable glucose levels.  Will keep an eye on this.  I asked him to look for signs or symptoms of hypoglycemia including sweating, jitteriness or lightheadedness.  Asked him to keep some candy or biscuits with him.    3.    Osteoporosis  Managed by PCP and Endocrinologist.    A total of 45 min was spent today on this visit which included face to face conversation with the patient, EMR review, counseling and co-ordination of care and medical documentation.    The longitudinal plan of care for the diagnosis(es)/condition(s) as documented were addressed during this visit. Due to the added complexity in care, I will continue to support Newton in the subsequent management and with ongoing continuity of care.        Jensen Royal MD  Hematology and Medical Oncology  [facility]              Again, thank you for allowing me to participate in the care of your patient.        Sincerely,        Jensen Royal MD

## 2024-08-08 NOTE — LETTER
8/8/2024      Newton Buchanan  [address]  [place] [address]      Dear Colleague,    Thank you for referring your patient, Newton Buchanan, to the [facility]. Please see a copy of my visit note below.    Virtual Visit Details    Type of service:  Video Visit   Video Start Time:  8:58 AM  Video End Time: 9:08 AM    Originating Location (pt. 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 Royal    _____________________________________________________________________________    History of Present Illness/ Interval History    Mr. 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    He had first line Keytruda x 2 yrs through 7/2022, resulting in CR.  Since then he has had frequent treatment holidays.  His last dose of Keytruda was on 10/26/2023.  After a break of 4 months repeat PET scan showed evidence of disease progression.  So we restarted Keytruda on 2/15/2024.     Continues to do well on Keytruda infusions.  Biggest issue is weight loss and taste alterations.  He has been struggling with this for some time.  I think his taste issues are stemming from his prior radiation therapy.  He is seen as a video visit today with a repeat PET scan.  He just returned from a trip to [place].  Contracted COVID about a month ago there.  Has recovered fully now.        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 FNAs benign. Followed.   -2/2020: progressive right neck  swelling, no other symptoms.   -2/11/20 FNA biosy right neck mass: metastatic squamous cell cancer, HPV16+  -PET: hypermetabolic right cervical adenopathy, numerous bilateral lung mets, right hilar nodes.   -No evident primary site identified, but highly suspicious of H/N given IHC staining and radiographical findings/spread  -Neogenomic testing - CPS 70    Treatment:  5/2020 - 7/2022: Keytruda (initiated in [place], then transferred care to [place]). Completed 2 yrs of immunotherapy, resulted in CR by PET     11/2020: local progression in right neck only  -12/2020: palliative RT to right neck (3750 cGy/15)    1/2021: palpable right jaw/parotid lesion, PET+. Remainder of right neck disease improved after RT and lung mets stable. US-guided biopsy right parotid lesion attempted, but no lesion seen to biopsy so cancelled.    7/2022 - 12/2022: observation, treatment holiday until progression.   -12/2022 PET/CT: solitary recurrence in subcarinal LN    12/12/2022- present: Keytruda resumed  --Resulted in CR (PET) by 6/2023  --6/2023: changed to every 6 week cycles    -After dose of Keytruda on 10/26/2023, he took a break.    Disease progression on the PET scan with increased activity and enlargement within the subcarinal lymph node.  Also had increased FDG uptake within the left oropharynx and mildly FDG avid left cervical level 1B lymph node.  Mild FDG uptake within the distal esophagus/GE junction.    Restarted Keytruda on 2/15/2024        Physical Exam    GENERAL: Alert and oriented to time place and person. Seated comfortably. In no distress. Alone.      Lab Results    CMP and TSH WNL    Imaging    6/15/2023 PET: CR. No evident malignancy.    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  After resuming Keytruda he again had a complete response.  In June  2023 we switched him to every 6-week Keytruda infusions.  But he continued to have significant side effects including unintentional weight loss, fatigue and other issues which was thought to be due to Keytruda.  We gave him a break from Keytruda after his last dose on 10/26/2023.      Repeat PET scan in February 2024 showed some disease progression so we restarted Keytruda.  He had a new subcarinal FDG avid lymph node which was concerning for richard recurrence.  He also had asymmetrically increased FDG uptake in the left oropharynx with associated mucosal thickening and a contiguous mildly FDG avid left cervical level 1B lymph node.  After 2 cycles of Keytruda again we repeated a PET scan which showed stable metabolic activity and size of the subcarinal lymph node.  No evidence of any new disease.  The previously noted hypermetabolic FDG uptake within the left level 1B cervical node had resolved, but he still had stable FDG uptake in the left oropharynx.     Has been tolerating Keytruda fairly well. He is here with repeat PET scan.  I personally reviewed the PET scan images and report and independently interpreted the results.  PET scan from last week showing stable metabolic activity and size of the subcarinal lymph node.  No evidence of any new metastatic disease.  The FDG avid ET in the left oropharynx is unchanged.  It is associated with mild soft tissue thickening.  Most likely inflammatory.  Plan is to continue Keytruda as long as he is responding to it.  He does have some weight loss and some taste alterations.  I am not sure if these are related to Keytruda.  His labs have been pretty stable.  No contraindications to proceed with Keytruda today.  He has an upcoming travel in the middle of September and he will be gone for about 6 weeks.  So I have made some changes to his schedule.  Today he will get 3 weekly Keytruda dose at 200 mg.  He will come back in 3 weeks with repeat labs followed by 6 weekly dose  (400 mg).  Following that he will have a hiatus of about 8 weeks and he will come back on October 31 with labs followed by Keytruda infusion only.  And I will see him back on the November 7 with a PET scan prior.  He is agreeable to the plan.    Of note his LFTs have normalized now.  However his glucose was slightly low at 67.  This is the first time his glucose has gone down.  He did eat a little bit of a meal this morning.  Not sure what the etiology of this.  Very very rarely Keytruda can cause islet cell inflammation leading to unstable glucose levels.  Will keep an eye on this.  I asked him to look for signs or symptoms of hypoglycemia including sweating, jitteriness or lightheadedness.  Asked him to keep some candy or biscuits with him.    3.    Osteoporosis  Managed by PCP and Endocrinologist.    A total of 45 min was spent today on this visit which included face to face conversation with the patient, EMR review, counseling and co-ordination of care and medical documentation.    The longitudinal plan of care for the diagnosis(es)/condition(s) as documented were addressed during this visit. Due to the added complexity in care, I will continue to support Newton in the subsequent management and with ongoing continuity of care.        Jensen Royal MD  Hematology and Medical Oncology  [facility]              Again, thank you for allowing me to participate in the care of your patient.        Sincerely,        Jensen Royal MD

## 2024-08-08 NOTE — PROGRESS NOTES
Infusion Nursing Note:  Newton Buchanan presents today for Keytruda.    Patient seen by provider today: Yes: vv with Dr. Royal   present during visit today: Not Applicable.    Note: N/A.      Intravenous Access:  Peripheral IV placed.    Treatment Conditions:  Results reviewed, labs MET treatment parameters, ok to proceed with treatment.      Post Infusion Assessment:  Patient tolerated infusion without incident.  Blood return noted pre and post infusion.  Site patent and intact, free from redness, edema or discomfort.  No evidence of extravasations.  Access discontinued per protocol.       Discharge Plan:   Patient discharged in stable condition accompanied by: self.  Departure Mode: Ambulatory.      Alejandra Monaco RN

## 2024-08-08 NOTE — PROGRESS NOTES
Virtual Visit Details    Type of service:  Video Visit   Video Start Time:  8:58 AM  Video End Time: 9:08 AM    Originating Location (pt. 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 Royal    _____________________________________________________________________________    History of Present Illness/ Interval History    Mr. 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    He had first line Keytruda x 2 yrs through 7/2022, resulting in CR.  Since then he has had frequent treatment holidays.  His last dose of Keytruda was on 10/26/2023.  After a break of 4 months repeat PET scan showed evidence of disease progression.  So we restarted Keytruda on 2/15/2024.     Continues to do well on Keytruda infusions.  Biggest issue is weight loss and taste alterations.  He has been struggling with this for some time.  I think his taste issues are stemming from his prior radiation therapy.  He is seen as a video visit today with a repeat PET scan.  He just returned from a trip to [place].  Contracted COVID about a month ago there.  Has recovered fully now.        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 FNAs benign. Followed.   -2/2020: progressive right neck swelling, no other symptoms.   -2/11/20 FNA biosy right neck mass: metastatic squamous cell cancer, HPV16+  -PET: hypermetabolic right cervical adenopathy, numerous bilateral lung mets, right hilar nodes.   -No evident primary site  identified, but highly suspicious of H/N given IHC staining and radiographical findings/spread  -Neogenomic testing - CPS 70    Treatment:  5/2020 - 7/2022: Keytruda (initiated in [place], then transferred care to [place]). Completed 2 yrs of immunotherapy, resulted in CR by PET     11/2020: local progression in right neck only  -12/2020: palliative RT to right neck (3750 cGy/15)    1/2021: palpable right jaw/parotid lesion, PET+. Remainder of right neck disease improved after RT and lung mets stable. US-guided biopsy right parotid lesion attempted, but no lesion seen to biopsy so cancelled.    7/2022 - 12/2022: observation, treatment holiday until progression.   -12/2022 PET/CT: solitary recurrence in subcarinal LN    12/12/2022- present: Keytruda resumed  --Resulted in CR (PET) by 6/2023  --6/2023: changed to every 6 week cycles    -After dose of Keytruda on 10/26/2023, he took a break.    Disease progression on the PET scan with increased activity and enlargement within the subcarinal lymph node.  Also had increased FDG uptake within the left oropharynx and mildly FDG avid left cervical level 1B lymph node.  Mild FDG uptake within the distal esophagus/GE junction.    Restarted Keytruda on 2/15/2024        Physical Exam    GENERAL: Alert and oriented to time place and person. Seated comfortably. In no distress. Alone.      Lab Results    CMP and TSH WNL    Imaging    6/15/2023 PET: CR. No evident malignancy.    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  After resuming Keytruda he again had a complete response.  In June 2023 we switched him to every 6-week Keytruda infusions.  But he continued to have significant side effects including unintentional weight loss, fatigue and other issues which was thought to be due to Keytruda.  We gave him a break  from Keytruda after his last dose on 10/26/2023.      Repeat PET scan in February 2024 showed some disease progression so we restarted Keytruda.  He had a new subcarinal FDG avid lymph node which was concerning for richard recurrence.  He also had asymmetrically increased FDG uptake in the left oropharynx with associated mucosal thickening and a contiguous mildly FDG avid left cervical level 1B lymph node.  After 2 cycles of Keytruda again we repeated a PET scan which showed stable metabolic activity and size of the subcarinal lymph node.  No evidence of any new disease.  The previously noted hypermetabolic FDG uptake within the left level 1B cervical node had resolved, but he still had stable FDG uptake in the left oropharynx.     Has been tolerating Keytruda fairly well. He is here with repeat PET scan.  I personally reviewed the PET scan images and report and independently interpreted the results.  PET scan from last week showing stable metabolic activity and size of the subcarinal lymph node.  No evidence of any new metastatic disease.  The FDG avid ET in the left oropharynx is unchanged.  It is associated with mild soft tissue thickening.  Most likely inflammatory.  Plan is to continue Keytruda as long as he is responding to it.  He does have some weight loss and some taste alterations.  I am not sure if these are related to Keytruda.  His labs have been pretty stable.  No contraindications to proceed with Keytruda today.  He has an upcoming travel in the middle of September and he will be gone for about 6 weeks.  So I have made some changes to his schedule.  Today he will get 3 weekly Keytruda dose at 200 mg.  He will come back in 3 weeks with repeat labs followed by 6 weekly dose (400 mg).  Following that he will have a hiatus of about 8 weeks and he will come back on October 31 with labs followed by Keytruda infusion only.  And I will see him back on the November 7 with a PET scan prior.  He is agreeable to  the plan.    Of note his LFTs have normalized now.  However his glucose was slightly low at 67.  This is the first time his glucose has gone down.  He did eat a little bit of a meal this morning.  Not sure what the etiology of this.  Very very rarely Keytruda can cause islet cell inflammation leading to unstable glucose levels.  Will keep an eye on this.  I asked him to look for signs or symptoms of hypoglycemia including sweating, jitteriness or lightheadedness.  Asked him to keep some candy or biscuits with him.    3.    Osteoporosis  Managed by PCP and Endocrinologist.    A total of 45 min was spent today on this visit which included face to face conversation with the patient, EMR review, counseling and co-ordination of care and medical documentation.    The longitudinal plan of care for the diagnosis(es)/condition(s) as documented were addressed during this visit. Due to the added complexity in care, I will continue to support Newton in the subsequent management and with ongoing continuity of care.        Jensen Royal MD  Hematology and Medical Oncology  [facility]

## 2024-08-08 NOTE — PROGRESS NOTES
Virtual Visit Details    Type of service:  Video Visit   Video Start Time: {video visit start/end time for provider to select:806087}  Video End Time:{video visit start/end time for provider to select:684179}    Originating Location (pt. Location): Other in clinic    Distant Location (provider location):  Off-site  Platform used for Video Visit: Enid Cornejo CMA on 8/8/2024 at 8:51 AM

## 2024-08-08 NOTE — PROGRESS NOTES
Patient was offered choice of vendor and chose [facility].  Patient Newton Buchanan was set up at [place]  on August 8, 2024. Patient received a Resmed Airsense 11 Pressures were set at  5-15 cm H2O.   Patient s ramp is 5 cm H2O for Auto and FLEX/EPR is EPR, 2.  Patient received a Resmed Mask name: LEGER FX  Pillow mask size Medium, heated tubing and heated humidifier.  Patient has the following compliance requirements: using and visit requirements  Patient has a follow up on 11/15/24 with Arminda Quiroz CNP.    Sherron Cook

## 2024-08-29 ENCOUNTER — INFUSION THERAPY VISIT (OUTPATIENT)
Dept: INFUSION THERAPY | Facility: CLINIC | Age: 79
End: 2024-08-29
Attending: INTERNAL MEDICINE
Payer: MEDICARE

## 2024-08-29 ENCOUNTER — LAB (OUTPATIENT)
Dept: LAB | Facility: CLINIC | Age: 79
End: 2024-08-29
Payer: MEDICARE

## 2024-08-29 VITALS
HEART RATE: 52 BPM | DIASTOLIC BLOOD PRESSURE: 60 MMHG | TEMPERATURE: 97.8 F | RESPIRATION RATE: 16 BRPM | SYSTOLIC BLOOD PRESSURE: 130 MMHG

## 2024-08-29 DIAGNOSIS — C76.0 HEAD AND NECK CANCER (H): Primary | ICD-10-CM

## 2024-08-29 LAB
ALBUMIN SERPL BCG-MCNC: 3.8 G/DL (ref 3.5–5.2)
ALP SERPL-CCNC: 50 U/L (ref 40–150)
ALT SERPL W P-5'-P-CCNC: 19 U/L (ref 0–70)
ANION GAP SERPL CALCULATED.3IONS-SCNC: 8 MMOL/L (ref 7–15)
AST SERPL W P-5'-P-CCNC: 24 U/L (ref 0–45)
BILIRUB SERPL-MCNC: 0.6 MG/DL
BUN SERPL-MCNC: 17.8 MG/DL (ref 8–23)
CALCIUM SERPL-MCNC: 9.2 MG/DL (ref 8.8–10.4)
CHLORIDE SERPL-SCNC: 107 MMOL/L (ref 98–107)
CREAT SERPL-MCNC: 0.99 MG/DL (ref 0.67–1.17)
EGFRCR SERPLBLD CKD-EPI 2021: 78 ML/MIN/1.73M2
GLUCOSE SERPL-MCNC: 79 MG/DL (ref 70–99)
HCO3 SERPL-SCNC: 27 MMOL/L (ref 22–29)
HOLD SPECIMEN: NORMAL
POTASSIUM SERPL-SCNC: 3.8 MMOL/L (ref 3.4–5.3)
PROT SERPL-MCNC: 6 G/DL (ref 6.4–8.3)
SODIUM SERPL-SCNC: 142 MMOL/L (ref 135–145)
TSH SERPL DL<=0.005 MIU/L-ACNC: 2.51 UIU/ML (ref 0.3–4.2)

## 2024-08-29 PROCEDURE — 96413 CHEMO IV INFUSION 1 HR: CPT

## 2024-08-29 PROCEDURE — 80053 COMPREHEN METABOLIC PANEL: CPT | Performed by: INTERNAL MEDICINE

## 2024-08-29 PROCEDURE — 258N000003 HC RX IP 258 OP 636: Performed by: INTERNAL MEDICINE

## 2024-08-29 PROCEDURE — 36415 COLL VENOUS BLD VENIPUNCTURE: CPT | Performed by: INTERNAL MEDICINE

## 2024-08-29 PROCEDURE — 84443 ASSAY THYROID STIM HORMONE: CPT | Performed by: INTERNAL MEDICINE

## 2024-08-29 PROCEDURE — 250N000011 HC RX IP 250 OP 636: Mod: JZ | Performed by: INTERNAL MEDICINE

## 2024-08-29 RX ADMIN — SODIUM CHLORIDE 400 MG: 9 INJECTION, SOLUTION INTRAVENOUS at 09:52

## 2024-08-29 RX ADMIN — SODIUM CHLORIDE 250 ML: 9 INJECTION, SOLUTION INTRAVENOUS at 09:51

## 2024-08-29 ASSESSMENT — PAIN SCALES - GENERAL: PAINLEVEL: NO PAIN (0)

## 2024-08-29 NOTE — PROGRESS NOTES
Infusion Nursing Note:  Newton Buchanan presents today for Keytruda.    Patient seen by provider today: No   present during visit today: Not Applicable.    Note: Labs drawn peripherally.      Intravenous Access:  Peripheral IV placed.    Treatment Conditions:  Lab Results   Component Value Date     08/29/2024    POTASSIUM 3.8 08/29/2024    CR 0.99 08/29/2024    JÚNIOR 9.2 08/29/2024    BILITOTAL 0.6 08/29/2024    ALBUMIN 3.8 08/29/2024    ALT 19 08/29/2024    AST 24 08/29/2024       Results reviewed, labs MET treatment parameters, ok to proceed with treatment.      Post Infusion Assessment:  Patient tolerated infusion without incident.  Site patent and intact, free from redness, edema or discomfort.  No evidence of extravasations.  Access discontinued per protocol.       Discharge Plan:   Patient discharged in stable condition accompanied by: wife.  Departure Mode: Ambulatory.      Shannan Melchor RN

## 2024-09-09 ENCOUNTER — PATIENT OUTREACH (OUTPATIENT)
Dept: ONCOLOGY | Facility: CLINIC | Age: 79
End: 2024-09-09
Payer: MEDICARE

## 2024-09-09 NOTE — PROGRESS NOTES
[facility]: Cancer Care                                                                                          Enrollment status: maintenance  Follow up scheduled: 11/12/24    Signature:  ANGELIKA FERRARI RN

## 2024-10-31 ENCOUNTER — LAB (OUTPATIENT)
Dept: LAB | Facility: CLINIC | Age: 79
End: 2024-10-31
Payer: MEDICARE

## 2024-10-31 ENCOUNTER — INFUSION THERAPY VISIT (OUTPATIENT)
Dept: INFUSION THERAPY | Facility: CLINIC | Age: 79
End: 2024-10-31
Attending: INTERNAL MEDICINE
Payer: MEDICARE

## 2024-10-31 VITALS
HEART RATE: 49 BPM | WEIGHT: 171.6 LBS | RESPIRATION RATE: 18 BRPM | TEMPERATURE: 97.7 F | BODY MASS INDEX: 23.77 KG/M2 | DIASTOLIC BLOOD PRESSURE: 72 MMHG | SYSTOLIC BLOOD PRESSURE: 121 MMHG

## 2024-10-31 DIAGNOSIS — C76.0 HEAD AND NECK CANCER (H): Primary | ICD-10-CM

## 2024-10-31 LAB
ALBUMIN SERPL BCG-MCNC: 3.8 G/DL (ref 3.5–5.2)
ALP SERPL-CCNC: 46 U/L (ref 40–150)
ALT SERPL W P-5'-P-CCNC: 17 U/L (ref 0–70)
ANION GAP SERPL CALCULATED.3IONS-SCNC: 7 MMOL/L (ref 7–15)
AST SERPL W P-5'-P-CCNC: 23 U/L (ref 0–45)
BILIRUB SERPL-MCNC: 0.6 MG/DL
BUN SERPL-MCNC: 13.7 MG/DL (ref 8–23)
CALCIUM SERPL-MCNC: 9.3 MG/DL (ref 8.8–10.4)
CHLORIDE SERPL-SCNC: 103 MMOL/L (ref 98–107)
CREAT SERPL-MCNC: 1.03 MG/DL (ref 0.67–1.17)
EGFRCR SERPLBLD CKD-EPI 2021: 74 ML/MIN/1.73M2
GLUCOSE SERPL-MCNC: 101 MG/DL (ref 70–99)
HCO3 SERPL-SCNC: 30 MMOL/L (ref 22–29)
POTASSIUM SERPL-SCNC: 4.2 MMOL/L (ref 3.4–5.3)
PROT SERPL-MCNC: 6.4 G/DL (ref 6.4–8.3)
SODIUM SERPL-SCNC: 140 MMOL/L (ref 135–145)
TSH SERPL DL<=0.005 MIU/L-ACNC: 2.8 UIU/ML (ref 0.3–4.2)

## 2024-10-31 PROCEDURE — 84155 ASSAY OF PROTEIN SERUM: CPT | Performed by: INTERNAL MEDICINE

## 2024-10-31 PROCEDURE — 82947 ASSAY GLUCOSE BLOOD QUANT: CPT | Performed by: INTERNAL MEDICINE

## 2024-10-31 PROCEDURE — 258N000003 HC RX IP 258 OP 636: Performed by: INTERNAL MEDICINE

## 2024-10-31 PROCEDURE — 96413 CHEMO IV INFUSION 1 HR: CPT

## 2024-10-31 PROCEDURE — 36415 COLL VENOUS BLD VENIPUNCTURE: CPT | Performed by: INTERNAL MEDICINE

## 2024-10-31 PROCEDURE — 84443 ASSAY THYROID STIM HORMONE: CPT | Performed by: INTERNAL MEDICINE

## 2024-10-31 PROCEDURE — 250N000011 HC RX IP 250 OP 636: Mod: JZ | Performed by: INTERNAL MEDICINE

## 2024-10-31 RX ORDER — DIPHENHYDRAMINE HYDROCHLORIDE 50 MG/ML
25 INJECTION INTRAMUSCULAR; INTRAVENOUS
Status: CANCELLED
Start: 2024-11-07

## 2024-10-31 RX ORDER — EPINEPHRINE 1 MG/ML
0.3 INJECTION, SOLUTION, CONCENTRATE INTRAVENOUS EVERY 5 MIN PRN
Status: CANCELLED | OUTPATIENT
Start: 2024-11-07

## 2024-10-31 RX ORDER — LORAZEPAM 2 MG/ML
0.5 INJECTION INTRAMUSCULAR EVERY 4 HOURS PRN
Status: CANCELLED
Start: 2024-11-07

## 2024-10-31 RX ORDER — METHYLPREDNISOLONE SODIUM SUCCINATE 40 MG/ML
40 INJECTION INTRAMUSCULAR; INTRAVENOUS
Status: CANCELLED
Start: 2024-11-07

## 2024-10-31 RX ORDER — ALBUTEROL SULFATE 90 UG/1
1-2 INHALANT RESPIRATORY (INHALATION)
Status: CANCELLED
Start: 2024-11-07

## 2024-10-31 RX ORDER — ALBUTEROL SULFATE 0.83 MG/ML
2.5 SOLUTION RESPIRATORY (INHALATION)
Status: CANCELLED | OUTPATIENT
Start: 2024-11-07

## 2024-10-31 RX ORDER — MEPERIDINE HYDROCHLORIDE 25 MG/ML
25 INJECTION INTRAMUSCULAR; INTRAVENOUS; SUBCUTANEOUS
Status: CANCELLED | OUTPATIENT
Start: 2024-11-07

## 2024-10-31 RX ORDER — DIPHENHYDRAMINE HYDROCHLORIDE 50 MG/ML
50 INJECTION INTRAMUSCULAR; INTRAVENOUS
Status: CANCELLED
Start: 2024-11-07

## 2024-10-31 RX ADMIN — SODIUM CHLORIDE 50 ML: 9 INJECTION, SOLUTION INTRAVENOUS at 10:13

## 2024-10-31 RX ADMIN — SODIUM CHLORIDE 400 MG: 9 INJECTION, SOLUTION INTRAVENOUS at 10:09

## 2024-10-31 ASSESSMENT — PAIN SCALES - GENERAL: PAINLEVEL_OUTOF10: NO PAIN (0)

## 2024-10-31 NOTE — PROGRESS NOTES
Infusion Nursing Note:  Newton Buchanan presents today for Keytruda C20D1.    Patient seen by provider today: No   present during visit today: Not Applicable.    Note: N?A.    Intravenous Access:  Peripheral IV placed.    Treatment Conditions:  Lab Results   Component Value Date     10/31/2024    POTASSIUM 4.2 10/31/2024    CR 1.03 10/31/2024    JÚNIOR 9.3 10/31/2024    BILITOTAL 0.6 10/31/2024    ALBUMIN 3.8 10/31/2024    ALT 17 10/31/2024    AST 23 10/31/2024   Results reviewed, labs MET treatment parameters, ok to proceed with treatment.    Post Infusion Assessment:  Patient tolerated infusion without incident.  Blood return noted pre and post infusion.  Site patent and intact, free from redness, edema or discomfort.  No evidence of extravasations.  Access discontinued per protocol.     Discharge Plan:   Discharge instructions reviewed with: Patient.  Patient and/or family verbalized understanding of discharge instructions and all questions answered.  AVS to patient via [employer].  Patient will return 11/12/2024 for visit with Dr. Royal for next appointment.   Patient discharged in stable condition accompanied by: self.  Departure Mode: Ambulatory.    Mercy Carvalho RN

## 2024-11-07 ENCOUNTER — HOSPITAL ENCOUNTER (OUTPATIENT)
Dept: PET IMAGING | Facility: CLINIC | Age: 79
Discharge: HOME | End: 2024-11-07
Attending: INTERNAL MEDICINE | Admitting: INTERNAL MEDICINE
Payer: MEDICARE

## 2024-11-07 DIAGNOSIS — C76.0 HEAD AND NECK CANCER (H): ICD-10-CM

## 2024-11-07 PROCEDURE — G1010 CDSM STANSON: HCPCS | Mod: PS

## 2024-11-07 PROCEDURE — 78816 PET IMAGE W/CT FULL BODY: CPT | Mod: 26 | Performed by: RADIOLOGY

## 2024-11-07 PROCEDURE — G1010 CDSM STANSON: HCPCS | Performed by: RADIOLOGY

## 2024-11-07 PROCEDURE — 343N000001 HC RX 343 MED OP 636: Performed by: INTERNAL MEDICINE

## 2024-11-07 PROCEDURE — A9552 F18 FDG: HCPCS | Performed by: INTERNAL MEDICINE

## 2024-11-07 RX ORDER — FLUDEOXYGLUCOSE F 18 200 MCI/ML
10-18 INJECTION, SOLUTION INTRAVENOUS ONCE
Status: COMPLETED | OUTPATIENT
Start: 2024-11-07 | End: 2024-11-07

## 2024-11-07 RX ADMIN — FLUDEOXYGLUCOSE F 18 12.52 MILLICURIE: 200 INJECTION, SOLUTION INTRAVENOUS at 07:55

## 2024-11-11 ASSESSMENT — SLEEP AND FATIGUE QUESTIONNAIRES
HOW LIKELY ARE YOU TO NOD OFF OR FALL ASLEEP IN A CAR, WHILE STOPPED FOR A FEW MINUTES IN TRAFFIC: WOULD NEVER DOZE
HOW LIKELY ARE YOU TO NOD OFF OR FALL ASLEEP WHILE SITTING AND TALKING TO SOMEONE: WOULD NEVER DOZE
HOW LIKELY ARE YOU TO NOD OFF OR FALL ASLEEP WHEN YOU ARE A PASSENGER IN A CAR FOR AN HOUR WITHOUT A BREAK: SLIGHT CHANCE OF DOZING
HOW LIKELY ARE YOU TO NOD OFF OR FALL ASLEEP WHILE SITTING QUIETLY AFTER LUNCH WITHOUT ALCOHOL: SLIGHT CHANCE OF DOZING
HOW LIKELY ARE YOU TO NOD OFF OR FALL ASLEEP WHILE SITTING AND READING: SLIGHT CHANCE OF DOZING
HOW LIKELY ARE YOU TO NOD OFF OR FALL ASLEEP WHILE LYING DOWN TO REST IN THE AFTERNOON WHEN CIRCUMSTANCES PERMIT: HIGH CHANCE OF DOZING
HOW LIKELY ARE YOU TO NOD OFF OR FALL ASLEEP WHILE WATCHING TV: SLIGHT CHANCE OF DOZING
HOW LIKELY ARE YOU TO NOD OFF OR FALL ASLEEP WHILE SITTING INACTIVE IN A PUBLIC PLACE: SLIGHT CHANCE OF DOZING

## 2024-11-12 ENCOUNTER — ONCOLOGY VISIT (OUTPATIENT)
Dept: ONCOLOGY | Facility: CLINIC | Age: 79
End: 2024-11-12
Attending: INTERNAL MEDICINE
Payer: MEDICARE

## 2024-11-12 VITALS
RESPIRATION RATE: 12 BRPM | DIASTOLIC BLOOD PRESSURE: 62 MMHG | HEIGHT: 71 IN | WEIGHT: 174.6 LBS | OXYGEN SATURATION: 97 % | SYSTOLIC BLOOD PRESSURE: 152 MMHG | BODY MASS INDEX: 24.44 KG/M2 | HEART RATE: 58 BPM | TEMPERATURE: 97.9 F

## 2024-11-12 DIAGNOSIS — C76.0 HEAD AND NECK CANCER (H): Primary | ICD-10-CM

## 2024-11-12 PROCEDURE — 99215 OFFICE O/P EST HI 40 MIN: CPT | Performed by: INTERNAL MEDICINE

## 2024-11-12 PROCEDURE — G0463 HOSPITAL OUTPT CLINIC VISIT: HCPCS | Performed by: INTERNAL MEDICINE

## 2024-11-12 PROCEDURE — G2211 COMPLEX E/M VISIT ADD ON: HCPCS | Performed by: INTERNAL MEDICINE

## 2024-11-12 RX ORDER — ALBUTEROL SULFATE 0.83 MG/ML
2.5 SOLUTION RESPIRATORY (INHALATION)
OUTPATIENT
Start: 2025-02-14

## 2024-11-12 RX ORDER — DIPHENHYDRAMINE HYDROCHLORIDE 50 MG/ML
50 INJECTION INTRAMUSCULAR; INTRAVENOUS
Start: 2025-02-14

## 2024-11-12 RX ORDER — EPINEPHRINE 1 MG/ML
0.3 INJECTION, SOLUTION, CONCENTRATE INTRAVENOUS EVERY 5 MIN PRN
OUTPATIENT
Start: 2024-12-19

## 2024-11-12 RX ORDER — MEPERIDINE HYDROCHLORIDE 25 MG/ML
25 INJECTION INTRAMUSCULAR; INTRAVENOUS; SUBCUTANEOUS
OUTPATIENT
Start: 2025-02-14

## 2024-11-12 RX ORDER — LORAZEPAM 2 MG/ML
0.5 INJECTION INTRAMUSCULAR EVERY 4 HOURS PRN
Start: 2025-02-14

## 2024-11-12 RX ORDER — DIPHENHYDRAMINE HYDROCHLORIDE 50 MG/ML
50 INJECTION INTRAMUSCULAR; INTRAVENOUS
Start: 2024-12-19

## 2024-11-12 RX ORDER — MEPERIDINE HYDROCHLORIDE 25 MG/ML
25 INJECTION INTRAMUSCULAR; INTRAVENOUS; SUBCUTANEOUS
OUTPATIENT
Start: 2024-12-19

## 2024-11-12 RX ORDER — DIPHENHYDRAMINE HYDROCHLORIDE 50 MG/ML
25 INJECTION INTRAMUSCULAR; INTRAVENOUS
Start: 2024-12-19

## 2024-11-12 RX ORDER — ALBUTEROL SULFATE 0.83 MG/ML
2.5 SOLUTION RESPIRATORY (INHALATION)
OUTPATIENT
Start: 2024-12-19

## 2024-11-12 RX ORDER — DIPHENHYDRAMINE HYDROCHLORIDE 50 MG/ML
25 INJECTION INTRAMUSCULAR; INTRAVENOUS
Start: 2025-02-14

## 2024-11-12 RX ORDER — METHYLPREDNISOLONE SODIUM SUCCINATE 40 MG/ML
40 INJECTION INTRAMUSCULAR; INTRAVENOUS
Start: 2024-12-19

## 2024-11-12 RX ORDER — LORAZEPAM 2 MG/ML
0.5 INJECTION INTRAMUSCULAR EVERY 4 HOURS PRN
Start: 2024-12-19

## 2024-11-12 RX ORDER — ALBUTEROL SULFATE 90 UG/1
1-2 INHALANT RESPIRATORY (INHALATION)
Start: 2025-02-14

## 2024-11-12 RX ORDER — ALBUTEROL SULFATE 90 UG/1
1-2 INHALANT RESPIRATORY (INHALATION)
Start: 2024-12-19

## 2024-11-12 RX ORDER — EPINEPHRINE 1 MG/ML
0.3 INJECTION, SOLUTION, CONCENTRATE INTRAVENOUS EVERY 5 MIN PRN
OUTPATIENT
Start: 2025-02-14

## 2024-11-12 RX ORDER — METHYLPREDNISOLONE SODIUM SUCCINATE 40 MG/ML
40 INJECTION INTRAMUSCULAR; INTRAVENOUS
Start: 2025-02-14

## 2024-11-12 ASSESSMENT — PAIN SCALES - GENERAL: PAINLEVEL_OUTOF10: NO PAIN (0)

## 2024-11-12 NOTE — PROGRESS NOTES
"Oncology Rooming Note    November 12, 2024 9:57 AM   Newton Buchanan is a 79 year old male who presents for:    No chief complaint on file.    Initial Vitals: BP (!) 152/62 (BP Location: Right arm, Patient Position: Sitting, Cuff Size: Adult Large)   Pulse 58   Temp 97.9  F (36.6  C) (Tympanic)   Resp 12   Ht 1.81 m (5' 11.25\")   Wt 79.2 kg (174 lb 9.6 oz)   SpO2 97%   BMI 24.18 kg/m   Estimated body mass index is 24.18 kg/m  as calculated from the following:    Height as of this encounter: 1.81 m (5' 11.25\").    Weight as of this encounter: 79.2 kg (174 lb 9.6 oz). Body surface area is 2 meters squared.  No Pain (0) Comment: Data Unavailable   No LMP for male patient.  Allergies reviewed: Yes  Medications reviewed: Yes    Medications: Medication refills not needed today.  Pharmacy name entered into [facility]:    [id number] - [place], [place] - [address] AT [address] OF [address] [place] [address] [facility] - [facility], [place] - [address] [facility] - [place], [place] - [address]     Frailty Screening:   Is the patient here for a new oncology consult visit in cancer care? 2. No      Clinical concerns: None today.        Nallely Thao MA            "

## 2024-11-12 NOTE — LETTER
"11/12/2024      Newton Buchanan  [address]  [place] [address]      Dear Colleague,    Thank you for referring your patient, Newton Buchanan, to the [facility]. Please see a copy of my visit note below.    Oncology Rooming Note    November 12, 2024 9:57 AM   Newton Buchanan is a 79 year old male who presents for:    No chief complaint on file.    Initial Vitals: BP (!) 152/62 (BP Location: Right arm, Patient Position: Sitting, Cuff Size: Adult Large)   Pulse 58   Temp 97.9  F (36.6  C) (Tympanic)   Resp 12   Ht 1.81 m (5' 11.25\")   Wt 79.2 kg (174 lb 9.6 oz)   SpO2 97%   BMI 24.18 kg/m   Estimated body mass index is 24.18 kg/m  as calculated from the following:    Height as of this encounter: 1.81 m (5' 11.25\").    Weight as of this encounter: 79.2 kg (174 lb 9.6 oz). Body surface area is 2 meters squared.  No Pain (0) Comment: Data Unavailable   No LMP for male patient.  Allergies reviewed: Yes  Medications reviewed: Yes    Medications: Medication refills not needed today.  Pharmacy name entered into [employer]:    [facility][id number] - [place], [place] - [address] AT [facility] OF [address] [place] [address] HOME DELIVERY - [facility], [place] - [address] [facility] - [place], [place] - [address]     Frailty Screening:   Is the patient here for a new oncology consult visit in cancer care? 2. No      Clinical concerns: None today.        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 Royal    _____________________________________________________________________________    History of Present Illness/ Interval History    Mr. Newton Buchanan is a 77 year old with metastatic head/neck cancer to cervical nodes, bilateral lung and " subcarinal node, diagnosed 2020.    He had first line Keytruda x 2 yrs through 7/2022, resulting in CR.  Since then he has had frequent treatment holidays.  His last dose of Keytruda was on 10/26/2023.  After a break of 4 months repeat PET scan showed evidence of disease progression.  So we restarted Keytruda on 2/15/2024.     Continues to do well on Keytruda infusions.      Overall doing well.  He did travel in September and October.  So his last Keytruda infusions have been every 8 weeks apart.  He received a dose on 10/31/2024.  He is here with repeat PET scan.    No new issues today.  Feeling more energetic.    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 FNAs benign. Followed.   -2/2020: progressive right neck swelling, no other symptoms.   -2/11/20 FNA biosy right neck mass: metastatic squamous cell cancer, HPV16+  -PET: hypermetabolic right cervical adenopathy, numerous bilateral lung mets, right hilar nodes.   -No evident primary site identified, but highly suspicious of H/N given IHC staining and radiographical findings/spread  -Neogenomic testing - CPS 70    Treatment:  5/2020 - 7/2022: Keytruda (initiated in [place], then transferred care to [place]). Completed 2 yrs of immunotherapy, resulted in CR by PET     11/2020: local progression in right neck only  -12/2020: palliative RT to right neck (3750 cGy/15)    1/2021: palpable right jaw/parotid lesion, PET+. Remainder of right neck disease improved after RT and lung mets stable. US-guided biopsy right parotid lesion attempted, but no lesion seen to biopsy so cancelled.    7/2022 - 12/2022: observation, treatment holiday until progression.   -12/2022 PET/CT: solitary recurrence in subcarinal LN    12/12/2022- present: Keytruda resumed  --Resulted in CR (PET) by 6/2023  --6/2023: changed to every 6 week cycles    -After dose of Keytruda on 10/26/2023, he  took a break.    Disease progression on the PET scan with increased activity and enlargement within the subcarinal lymph node.  Also had increased FDG uptake within the left oropharynx and mildly FDG avid left cervical level 1B lymph node.  Mild FDG uptake within the distal esophagus/GE junction.    Restarted Keytruda on 2/15/2024        Physical Exam    GENERAL: Alert and oriented to time place and person. Seated comfortably. In no distress. Alone.      Lab Results    CMP and TSH WNL    Imaging    6/15/2023 PET: CR. No evident malignancy.    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  After resuming Keytruda he again had a complete response.  In June 2023 we switched him to every 6-week Keytruda infusions.  But he continued to have significant side effects including unintentional weight loss, fatigue and other issues which was thought to be due to Keytruda.  We gave him a break from Keytruda after his last dose on 10/26/2023.      Repeat PET scan in February 2024 showed some disease progression so we restarted Keytruda.  He had a new subcarinal FDG avid lymph node which was concerning for richard recurrence.  He also had asymmetrically increased FDG uptake in the left oropharynx with associated mucosal thickening and a contiguous mildly FDG avid left cervical level 1B lymph node.  After 2 cycles of Keytruda again we repeated a PET scan which showed stable metabolic activity and size of the subcarinal lymph node.  No evidence of any new disease.  The previously noted hypermetabolic FDG uptake within the left level 1B cervical node had resolved, but he still had stable FDG uptake in the left oropharynx.     Has been tolerating Keytruda fairly well.  He received cycle he did some traveling in September and October.  Received Keytruda on 8/29/2024 and  had an 8-week After that.  Last dose was on 10/31/2024.  He is here with repeat PET scan.  I personally reviewed the PET scan images and report and independently interpreted the results.  On the current PET scan he has stable looking subcarinal FDG avid lymph node.  However he has developed a small slightly hypermetabolic right level 4 cervical lymph node measuring up to 6 mm.  No evidence of any metastatic disease elsewhere.  I am not sure what to make of this new lymph node.  It is pretty small.  Will continue to monitor.  I do not think we need to change anything right now.  No new changes in the oropharyngeal area.    Will continue Keytruda infusions every 6 to 8 weeks.  He has again some travel coming up in January of next year.  He will receive his next dose of Keytruda on December 12.  Following that I will see him back on the February 6 with a repeat PET scan and arrange Keytruda infusion on February 7.  So it would be an 8-week infusion.  I think the grand scheme of things this is okay.    He is agreeable to the plan.    3.    Osteoporosis  Managed by PCP and Endocrinologist.    A total of 40 min was spent today on this visit which included face to face conversation with the patient, EMR review, counseling and co-ordination of care and medical documentation.    The longitudinal plan of care for the diagnosis(es)/condition(s) as documented were addressed during this visit. Due to the added complexity in care, I will continue to support Newton in the subsequent management and with ongoing continuity of care.    I have personally reviewed the CT scan/PET scan images and report and independently interpreted the results to my ability.    I independently reviewed and interpreted lab studies and imaging, reviewed pertinent notes from physicians and care providers from other specialties and ordered lab tests.      Jensen Royal MD  Hematology and Medical Oncology  [facility]  [facility]              Again, thank you for allowing me to participate in the care of your patient.        Sincerely,        Jensen Royal MD

## 2024-11-12 NOTE — PROGRESS NOTES
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 Royal    _____________________________________________________________________________    History of Present Illness/ Interval History    Mr. 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    He had first line Keytruda x 2 yrs through 7/2022, resulting in CR.  Since then he has had frequent treatment holidays.  His last dose of Keytruda was on 10/26/2023.  After a break of 4 months repeat PET scan showed evidence of disease progression.  So we restarted Keytruda on 2/15/2024.     Continues to do well on Keytruda infusions.      Overall doing well.  He did travel in September and October.  So his last Keytruda infusions have been every 8 weeks apart.  He received a dose on 10/31/2024.  He is here with repeat PET scan.    No new issues today.  Feeling more energetic.    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 FNAs benign. Followed.   -2/2020: progressive right neck swelling, no other symptoms.   -2/11/20 FNA biosy right neck mass: metastatic squamous cell cancer, HPV16+  -PET: hypermetabolic right cervical adenopathy, numerous bilateral lung mets, right hilar nodes.   -No evident primary site identified, but highly suspicious of H/N given IHC staining and radiographical findings/spread  -Neogenomic testing - CPS 70    Treatment:  5/2020 - 7/2022: Keytruda (initiated in [place], then transferred care to [place]). Completed 2 yrs of immunotherapy, resulted in CR by PET     11/2020: local progression in right neck only  -12/2020: palliative RT to right neck (3750 cGy/15)    1/2021: palpable right  jaw/parotid lesion, PET+. Remainder of right neck disease improved after RT and lung mets stable. US-guided biopsy right parotid lesion attempted, but no lesion seen to biopsy so cancelled.    7/2022 - 12/2022: observation, treatment holiday until progression.   -12/2022 PET/CT: solitary recurrence in subcarinal LN    12/12/2022- present: Keytruda resumed  --Resulted in CR (PET) by 6/2023  --6/2023: changed to every 6 week cycles    -After dose of Keytruda on 10/26/2023, he took a break.    Disease progression on the PET scan with increased activity and enlargement within the subcarinal lymph node.  Also had increased FDG uptake within the left oropharynx and mildly FDG avid left cervical level 1B lymph node.  Mild FDG uptake within the distal esophagus/GE junction.    Restarted Keytruda on 2/15/2024        Physical Exam    GENERAL: Alert and oriented to time place and person. Seated comfortably. In no distress. Alone.      Lab Results    CMP and TSH WNL    Imaging    6/15/2023 PET: CR. No evident malignancy.    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  After resuming Keytruda he again had a complete response.  In June 2023 we switched him to every 6-week Keytruda infusions.  But he continued to have significant side effects including unintentional weight loss, fatigue and other issues which was thought to be due to Keytruda.  We gave him a break from Keytruda after his last dose on 10/26/2023.      Repeat PET scan in February 2024 showed some disease progression so we restarted Keytruda.  He had a new subcarinal FDG avid lymph node which was concerning for richard recurrence.  He also had asymmetrically increased FDG uptake in the left oropharynx with associated mucosal thickening and a contiguous mildly FDG avid left cervical level 1B lymph  node.  After 2 cycles of Keytruda again we repeated a PET scan which showed stable metabolic activity and size of the subcarinal lymph node.  No evidence of any new disease.  The previously noted hypermetabolic FDG uptake within the left level 1B cervical node had resolved, but he still had stable FDG uptake in the left oropharynx.     Has been tolerating Keytruda fairly well.  He received cycle he did some traveling in September and October.  Received Keytruda on 8/29/2024 and had an 8-week After that.  Last dose was on 10/31/2024.  He is here with repeat PET scan.  I personally reviewed the PET scan images and report and independently interpreted the results.  On the current PET scan he has stable looking subcarinal FDG avid lymph node.  However he has developed a small slightly hypermetabolic right level 4 cervical lymph node measuring up to 6 mm.  No evidence of any metastatic disease elsewhere.  I am not sure what to make of this new lymph node.  It is pretty small.  Will continue to monitor.  I do not think we need to change anything right now.  No new changes in the oropharyngeal area.    Will continue Keytruda infusions every 6 to 8 weeks.  He has again some travel coming up in January of next year.  He will receive his next dose of Keytruda on December 12.  Following that I will see him back on the February 6 with a repeat PET scan and arrange Keytruda infusion on February 7.  So it would be an 8-week infusion.  I think the grand scheme of things this is okay.    He is agreeable to the plan.    3.    Osteoporosis  Managed by PCP and Endocrinologist.    A total of 40 min was spent today on this visit which included face to face conversation with the patient, EMR review, counseling and co-ordination of care and medical documentation.    The longitudinal plan of care for the diagnosis(es)/condition(s) as documented were addressed during this visit. Due to the added complexity in care, I will continue to  support Newton in the subsequent management and with ongoing continuity of care.    I have personally reviewed the CT scan/PET scan images and report and independently interpreted the results to my ability.    I independently reviewed and interpreted lab studies and imaging, reviewed pertinent notes from physicians and care providers from other specialties and ordered lab tests.      Jensen Royal MD  Hematology and Medical Oncology  [facility]

## 2024-11-15 ENCOUNTER — OFFICE VISIT (OUTPATIENT)
Dept: SLEEP MEDICINE | Facility: CLINIC | Age: 79
End: 2024-11-15
Payer: MEDICARE

## 2024-11-15 VITALS
SYSTOLIC BLOOD PRESSURE: 120 MMHG | BODY MASS INDEX: 24.54 KG/M2 | OXYGEN SATURATION: 97 % | HEART RATE: 57 BPM | WEIGHT: 175.3 LBS | DIASTOLIC BLOOD PRESSURE: 66 MMHG | HEIGHT: 71 IN

## 2024-11-15 DIAGNOSIS — I10 PRIMARY HYPERTENSION: ICD-10-CM

## 2024-11-15 DIAGNOSIS — C76.0 HEAD AND NECK CANCER (H): ICD-10-CM

## 2024-11-15 DIAGNOSIS — K21.9 GASTROESOPHAGEAL REFLUX DISEASE WITHOUT ESOPHAGITIS: ICD-10-CM

## 2024-11-15 DIAGNOSIS — G47.33 OBSTRUCTIVE SLEEP APNEA: Primary | ICD-10-CM

## 2024-11-15 DIAGNOSIS — G47.33 OSA (OBSTRUCTIVE SLEEP APNEA): ICD-10-CM

## 2024-11-15 DIAGNOSIS — E78.5 HYPERLIPIDEMIA, UNSPECIFIED HYPERLIPIDEMIA TYPE: ICD-10-CM

## 2024-11-15 PROCEDURE — 99213 OFFICE O/P EST LOW 20 MIN: CPT | Performed by: NURSE PRACTITIONER

## 2024-11-15 NOTE — PATIENT INSTRUCTIONS
Your BMI is Body mass index is 24.27 kg/m .    What is BMI?  Body mass index (BMI) is one way to tell whether you are at a healthy weight, overweight, or obese. It measures your weight in relation to your height.  A BMI of 18.5 to 24.9 is in the healthy range. A person with a BMI of 25 to 29.9 is considered overweight, and someone with a BMI of 30 or greater is considered obese.  Another way to find out if you are at risk for health problems caused by overweight and obesity is to measure your waist. If you are a woman and your waist is more than 35 inches, or if you are a man and your waist is more than 40 inches, your risk of disease may be higher.  More than two-thirds of American adults are considered overweight or obese. Being overweight or obese increases the risk for further weight gain.  Excess weight may lead to heart disease and diabetes. Creating and following plans for healthy eating and physical activity may help you improve your health.    Methods for maintaining or losing weight.  Weight control is part of healthy lifestyle and includes exercise, emotional health, and healthy eating habits.  Careful eating habits lifelong is the mainstay of weight control.  Though there are significant health benefits from weight loss, long-term weight loss with diet alone may be very difficult to achieve- studies show long-term success with dietary management in less than 10% of people. Attaining a healthy weight may be especially difficult to achieve in those with severe obesity. In some cases, medications, devices and surgical management might be considered.    What can you do?  If you are overweight or obese and are interested in methods for weight loss, you should discuss this with your provider. In addition, we recommend that you review healthy life styles and methods for weight loss available through the National Institutes of Health patient information sites:    http://win.niddk.nih.gov/publications/index.htm         Drive Safe... Drive Alive     Sleep health profoundly affects your health, mood, and your safety. 33% of the population (one in three of us) is not getting enough sleep and many have a sleep disorder. Not getting enough sleep or having an untreated / undertreated sleep condition may make us sleepy without even knowing it. In fact, our driving could be dramatically impaired due to our sleep health. As your provider, here are some things I would like you to know about driving:     Here are some warning signs for impairment and dangerous drowsy driving:              -Having been awake more than 16 hours               -Looking tired               -Eyelid drooping              -Head nodding (it could be too late at this point)              -Driving for more than 30 minutes     Some things you could do to make the driving safer if you are experiencing some drowsiness:              -Stop driving and rest              -Call for transportation              -Make sure your sleep disorder is adequately treated     Some things that have been shown NOT to work when experiencing drowsiness while driving:              -Turning on the radio              -Opening windows              -Eating any  distracting  /  entertaining  foods (e.g., sunflower seeds, candy, or any other)              -Talking on the phone      Your decision may not only impact your life, but also the life of others. Please, remember to drive safe for yourself and all of us.     Your Body mass index is 24.27 kg/m .  Weight management is a personal decision.  If you are interested in exploring weight loss strategies, the following discussion covers the approaches that may be successful. Body mass index (BMI) is one way to tell whether you are at a healthy weight, overweight, or obese. It measures your weight in relation to your height.  A BMI of 18.5 to 24.9 is in the healthy range. A person with a BMI of  25 to 29.9 is considered overweight, and someone with a BMI of 30 or greater is considered obese. More than two-thirds of American adults are considered overweight or obese.  Being overweight or obese increases the risk for further weight gain. Excess weight may lead to heart disease and diabetes.  Creating and following plans for healthy eating and physical activity may help you improve your health.  Weight control is part of healthy lifestyle and includes exercise, emotional health, and healthy eating habits. Careful eating habits lifelong are the mainstay of weight control. Though there are significant health benefits from weight loss, long-term weight loss with diet alone may be very difficult to achieve- studies show long-term success with dietary management in less than 10% of people. Attaining a healthy weight may be especially difficult to achieve in those with severe obesity. In some cases, medications, devices and surgical management might be considered.  What can you do?  If you are overweight or obese and are interested in methods for weight loss, you should discuss this with your provider.   Consider reducing daily calorie intake by 500 calories.   Keep a food journal.   Avoiding skipping meals, consider cutting portions instead.    Diet combined with exercise helps maintain muscle while optimizing fat loss. Strength training is particularly important for building and maintaining muscle mass. Exercise helps reduce stress, increase energy, and improves fitness. Increasing exercise without diet control, however, may not burn enough calories to loose weight.     Start walking three days a week 10-20 minutes at a time  Work towards walking thirty minutes five days a week   Eventually, increase the speed of your walking for 1-2 minutes at time    In addition, we recommend that you review healthy lifestyles and methods for weight loss available through the National Institutes of Health patient information  sites:  http://win.niddk.nih.gov/publications/index.htm    And look into health and wellness programs that may be available through your health insurance provider, employer, local community center, or [employer].            Your Body mass index is 24.27 kg/m .  Weight management is a personal decision.  If you are interested in exploring weight loss strategies, the following discussion covers the approaches that may be successful. Body mass index (BMI) is one way to tell whether you are at a healthy weight, overweight, or obese. It measures your weight in relation to your height.  A BMI of 18.5 to 24.9 is in the healthy range. A person with a BMI of 25 to 29.9 is considered overweight, and someone with a BMI of 30 or greater is considered obese. More than two-thirds of American adults are considered overweight or obese.  Being overweight or obese increases the risk for further weight gain. Excess weight may lead to heart disease and diabetes.  Creating and following plans for healthy eating and physical activity may help you improve your health.  Weight control is part of healthy lifestyle and includes exercise, emotional health, and healthy eating habits. Careful eating habits lifelong are the mainstay of weight control. Though there are significant health benefits from weight loss, long-term weight loss with diet alone may be very difficult to achieve- studies show long-term success with dietary management in less than 10% of people. Attaining a healthy weight may be especially difficult to achieve in those with severe obesity. In some cases, medications, devices and surgical management might be considered.  What can you do?  If you are overweight or obese and are interested in methods for weight loss, you should discuss this with your provider.   Consider reducing daily calorie intake by 500 calories.   Keep a food journal.   Avoiding skipping meals, consider cutting portions instead.    Diet combined with  exercise helps maintain muscle while optimizing fat loss. Strength training is particularly important for building and maintaining muscle mass. Exercise helps reduce stress, increase energy, and improves fitness. Increasing exercise without diet control, however, may not burn enough calories to loose weight.     Start walking three days a week 10-20 minutes at a time  Work towards walking thirty minutes five days a week   Eventually, increase the speed of your walking for 1-2 minutes at time    In addition, we recommend that you review healthy lifestyles and methods for weight loss available through the National Institutes of Health patient information sites:  http://win.niddk.nih.gov/publications/index.htm    And look into health and wellness programs that may be available through your health insurance provider, employer, local community center, or [employer].

## 2024-11-15 NOTE — PROGRESS NOTES
Obstructive Sleep Apnea - PAP Follow-Up Visit:    Chief Complaint   Patient presents with    Study Results     Sleep study results, CPAP compliance       Newton Buchanan comes in today for follow-up of their severe sleep apnea, managed with CPAP.     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  He was treated with systemic immunotherapy with good response in the chest and local progression in the right neck.  He subsequently completed palliative radiation therapy to the right neck.  Patient also has a history of allergic rhinitis, osteoporosis, erectile dysfunction, diverticular disease, hyperlipidemia, ischemic colitis, peripheral neuropathy, and GERD.       Do you use a CPAP Machine at home: (Patient-Rptd) Yes  Overall, on a scale of 0-10 how would you rate your CPAP (0 poor, 10 great): (Patient-Rptd) 8  Is your mask comfortable: (Patient-Rptd) Yes  If not, why:    Is you mask leaking: (Patient-Rptd) No  If yes, where do you feel it:    How many night per week does the mask leak (0-7):    Do you notice snoring with mask on: (Patient-Rptd) No  Do you notice gasping arousals with mask on:    Are you having significant oral or nasal dryness: (Patient-Rptd) No  Is the pressure setting comfortable: (Patient-Rptd) Yes  In not, why:    What type of mask do you use: (Patient-Rptd) Nasal Pillow  What is your typical bedtime: (Patient-Rptd) 10:00  How long does it take you to go to sleep on PAP therapy: (Patient-Rptd) 5  What time do you typically get out of bed for the day: (Patient-Rptd) 7:30  How many hours on average per night are you using PAP therapy: (Patient-Rptd) 8  How many hours are you sleeping per night: (Patient-Rptd) 8  Do you feel well rested in the morning: (Patient-Rptd) Yes    Naps occasionally, if he does not have anything else to do.     SCALES:  EPWORTH  SLEEPINESS SCALE    Sitting and reading (Patient-Rptd) 1   Watching TV (Patient-Rptd) 1   Sitting, inactive in a public place (theatre or mtg.) (Patient-Rptd) 1    As a passenger in a car (Patient-Rptd) 1   Lying down to rest in the afternoon when circumstance permit (Patient-Rptd) 3   Sitting and talking to someone (Patient-Rptd) 0   Sitting quietly after lunch without alcohol (Patient-Rptd) 1   In a car, while stopped for a few minutes in traffic (Patient-Rptd) 0   TOTAL SCORE (Patient-Rptd) 8     INSOMNIA SEVERITY INDEX     Difficulty falling asleep (Patient-Rptd) 0   Difficult staying asleep (Patient-Rptd) 0   Problems waking up to early (Patient-Rptd) 0   How SATISFIED/DISSATISFIED are you with your CURRENT sleep pattern? (Patient-Rptd) 2   How NOTICEABLE to others do you think your sleep pattern is in terms of your quality of life? (Patient-Rptd) 1   How WORRIED/DISTRESSED are you about your current sleep pattern? (Patient-Rptd) 1   To what extent do you consider your sleep problem to INTERFERE with your daily fuctioning(e.g. daytime fatigue, mood, ability to function at work/daily chores, concentration, mood,etc.) CURRENTLY? (Patient-Rptd) 1   INSOMNIA SEVERITY INDEX TOTAL SCORE (Patient-Rptd) 5           Guidelines for Scoring/Interpretation:  Total score categories:  0-7 = No clinically significant insomnia   8-14 = Subthreshold insomnia   15-21 = Clinical insomnia (moderate severity)  22-28 = Clinical insomnia (severe)  Used via courtesy of www.[employer].va.gov with permission from Juventino Jones PhD., [facility]    [employer]   Auto-PAP 5.0 - 15.0 cmH2O 30 day usage data:    100% of days with > 4 hours of use. 0/30 days with no use.   Average use 511 minutes per day.   95%ile Leak 38.63 L/min.   CPAP 95% pressure 9.2 cm.   AHI 2.21 events per hour.     Previous Sleep Studies:  He was diagnosed with obstructive sleep apnea roughly 10 years ago.  Prior to moving back to [place], he followed in  [place].  He have a repeat split-night sleep study performed the evening of November 15, 2017 with AHI 64.2, RDI 75.8, mean SPO2 94%, cristina SPO2 90%. Treated with CPAP auto titrate 5-12 cm H2O      Reviewed by team:  Tobacco  Allergies  Meds  Problems  Med Hx  Surg Hx  Fam Hx        Reviewed by provider:  Tobacco  Allergies  Meds  Problems  Med Hx  Surg Hx  Fam Hx           Problem List:  Patient Active Problem List    Diagnosis Date Noted    Lack of appetite 07/01/2024     Priority: Medium    Lower gastrointestinal hemorrhage 02/14/2024     Priority: Medium    Diverticular disease 03/27/2023     Priority: Medium    Age-related osteoporosis without current pathological fracture 02/09/2023     Priority: Medium    Long-term use of high-risk medication 01/25/2023     Priority: Medium    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  We discussed that treatment would be palliative and not curative- goals would be to reduce tumor burden, decrease symptoms and improve quality of life.   10/26/2020  Transferred care to Dr. Gustavo CORRAL Oncology notes:Presumed  H&N (right cervical LAD) with metastatic spread to lung with bilateral multiple lung nodules. He is first-line palliative treatment with Keytruda was started when he was in [place] early May 2020.  1/18/21 Oncology notes Dr. Johns:He was treated with systemic immunotherapy with good response in the chest with local progression in the right neck.  He subsequently completed palliative radiation therapy to the right neck-OPX region (50 Gy in 20  "fractions, 12/22/20).   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 FNAs benign. Followed.   -2/2020: progressive right neck swelling, no other symptoms.   -2/11/20 FNA biosy right neck mass: metastatic squamous cell cancer, HPV16+  -PET: hypermetabolic right cervical adenopathy, numerous bilateral lung mets, right hilar nodes.   -No evident primary site identified, but highly suspicious of H/N given IHC staining and radiographical findings/spread  High PD-1 70% on biopsy        Gastroesophageal reflux disease without esophagitis 06/08/2016     Priority: Medium    Personal history of prostate cancer 06/08/2016     Priority: Medium     2008:Robotic prostate removal.  6/8/2016:He reports PSA has been oK.       Peripheral neuropathy 10/19/2014     Priority: Medium     Formatting of this note might be different from the original.  No sensation to monofilament over plantar surface metatarsal heads either foot.      Hypertension 11/22/2011     Priority: Medium    Allergic rhinitis 11/22/2011     Priority: Medium    Obstructive sleep apnea 05/04/2009     Priority: Medium     Uses CPAP.  Uses prn nasal sprays for nasal congestion at times so he can use his CPAP.      Impotence of organic origin 12/29/2008     Priority: Medium     Formatting of this note might be different from the original.  12/29/08: trial cialis 10-20mg      Hyperlipidemia 05/25/2007     Priority: Medium     Formatting of this note might be different from the original.  10 yr risk 16.7 %. Refuses medication            Ht 1.81 m (5' 11.26\")   Wt 79.5 kg (175 lb 4.8 oz)   BMI 24.27 kg/m      Impression/Plan:    ICD-10-CM    1. Obstructive sleep apnea  G47.33 CPAP Order for DME - ONLY FOR DME      2. Primary hypertension  I10 CPAP Order for DME - ONLY FOR DME      3. Head and neck cancer (H)  C76.0 CPAP Order for DME - ONLY FOR DME      4. Hyperlipidemia, unspecified " hyperlipidemia type  E78.5 CPAP Order for DME - ONLY FOR DME      5. Gastroesophageal reflux disease without esophagitis  K21.9 CPAP Order for DME - ONLY FOR DME      6. CRYSTAL (obstructive sleep apnea)  G47.33 CPAP Order for DME - ONLY FOR DME      Newton Buchanan is a 79-year-old male who presents to the sleep medicine center for an evaluation of efficacy and compliance in the treatment of his severe sleep apnea with CPAP therapy.  He appreciates the benefits that CPAP brings to him, and denies any symptoms of daytime sleepiness that interfere with his routine functioning.  His [place] sleepiness score is 8/24 and his residual AHI is 2.21 events per hour.  We did note that he has a leak indicating he needs to change his mask.  And he will address that when he is able to obtain a new mask after receiving this authorization.  A comprehensive order for needed supplies and equipment was placed today.  And Newton was instructed to return to the sleep medicine center in approximately 1 year for reevaluation of its efficacy and compliance.  Should he need anything else in the interim he is encouraged to reach out to [facility] or with a phone call.  In the interim also, instructed to optimize his sleep schedule as well as his sleep hygiene practices to mitigate any further sleep disruption.  Recommend to employ safe driving practices such as not driving a motor vehicle should he become drowsy.  Recommend he continue to maintain his BMI below 30.0.      Newton admits to using his CPAP on a nightly basis and attempts to do so for the entire duration of his sleep.  He fully realizes the value that CPAP therapy brings to him.  He denies any symptoms commonly associated with obstructive sleep apnea such as daytime sleepiness as well as intrusion of sleepiness into his driving capability.  A comprehensive order for needed supplies and equipment was placed today for the coming year.  Recommend that Newton optimize his sleep schedule as  well as his sleep hygiene practices to mitigate any further sleep disruption.  Recommend that patient employ safe driving practices such as not driving motor vehicle should he become drowsy.    Newton Buchanan will follow up in about 1 year, sooner    19 minutes spent with patient, all of which were spent face-to-face counseling, consulting, coordinating plan of care.      GERALD Palma CNP    CC:  Luis Antonio Santiago,

## 2024-12-12 ENCOUNTER — LAB (OUTPATIENT)
Dept: LAB | Facility: CLINIC | Age: 79
End: 2024-12-12
Payer: MEDICARE

## 2024-12-12 ENCOUNTER — INFUSION THERAPY VISIT (OUTPATIENT)
Dept: INFUSION THERAPY | Facility: CLINIC | Age: 79
End: 2024-12-12
Attending: INTERNAL MEDICINE
Payer: MEDICARE

## 2024-12-12 VITALS
WEIGHT: 175.2 LBS | SYSTOLIC BLOOD PRESSURE: 125 MMHG | DIASTOLIC BLOOD PRESSURE: 71 MMHG | TEMPERATURE: 97.6 F | BODY MASS INDEX: 24.26 KG/M2

## 2024-12-12 DIAGNOSIS — C76.0 HEAD AND NECK CANCER (H): Primary | ICD-10-CM

## 2024-12-12 LAB
ALBUMIN SERPL BCG-MCNC: 3.9 G/DL (ref 3.5–5.2)
ALP SERPL-CCNC: 48 U/L (ref 40–150)
ALT SERPL W P-5'-P-CCNC: 19 U/L (ref 0–70)
ANION GAP SERPL CALCULATED.3IONS-SCNC: 7 MMOL/L (ref 7–15)
AST SERPL W P-5'-P-CCNC: 22 U/L (ref 0–45)
BILIRUB SERPL-MCNC: 0.7 MG/DL
BUN SERPL-MCNC: 18.1 MG/DL (ref 8–23)
CALCIUM SERPL-MCNC: 9.4 MG/DL (ref 8.8–10.4)
CHLORIDE SERPL-SCNC: 103 MMOL/L (ref 98–107)
CREAT SERPL-MCNC: 0.94 MG/DL (ref 0.67–1.17)
EGFRCR SERPLBLD CKD-EPI 2021: 83 ML/MIN/1.73M2
GLUCOSE SERPL-MCNC: 69 MG/DL (ref 70–99)
HCO3 SERPL-SCNC: 27 MMOL/L (ref 22–29)
HOLD SPECIMEN: NORMAL
POTASSIUM SERPL-SCNC: 4 MMOL/L (ref 3.4–5.3)
PROT SERPL-MCNC: 6.5 G/DL (ref 6.4–8.3)
SODIUM SERPL-SCNC: 137 MMOL/L (ref 135–145)
TSH SERPL DL<=0.005 MIU/L-ACNC: 3.78 UIU/ML (ref 0.3–4.2)

## 2024-12-12 PROCEDURE — 84443 ASSAY THYROID STIM HORMONE: CPT | Performed by: INTERNAL MEDICINE

## 2024-12-12 PROCEDURE — 84155 ASSAY OF PROTEIN SERUM: CPT | Performed by: INTERNAL MEDICINE

## 2024-12-12 PROCEDURE — 258N000003 HC RX IP 258 OP 636: Performed by: INTERNAL MEDICINE

## 2024-12-12 PROCEDURE — 82435 ASSAY OF BLOOD CHLORIDE: CPT | Performed by: INTERNAL MEDICINE

## 2024-12-12 PROCEDURE — 36415 COLL VENOUS BLD VENIPUNCTURE: CPT | Performed by: INTERNAL MEDICINE

## 2024-12-12 RX ADMIN — SODIUM CHLORIDE 400 MG: 9 INJECTION, SOLUTION INTRAVENOUS at 09:49

## 2024-12-12 NOTE — PROGRESS NOTES
Infusion Nursing Note:  Newton Buchanan presents today for Keytruda.    Patient seen by provider today: No   present during visit today: Not Applicable.    Note: N/A.      Intravenous Access:  Peripheral IV placed.    Treatment Conditions:  Lab Results   Component Value Date     12/12/2024    POTASSIUM 4.0 12/12/2024    CR 0.94 12/12/2024    JÚNIOR 9.4 12/12/2024    BILITOTAL 0.7 12/12/2024    ALBUMIN 3.9 12/12/2024    ALT 19 12/12/2024    AST 22 12/12/2024       Results reviewed, labs MET treatment parameters, ok to proceed with treatment.      Post Infusion Assessment:  Patient tolerated infusion without incident.  Blood return noted pre and post infusion.  Site patent and intact, free from redness, edema or discomfort.  No evidence of extravasations.  Access discontinued per protocol.       Discharge Plan:   Copy of AVS reviewed with patient and/or family.  Patient will return 2/11/25 for next appointment.  Patient discharged in stable condition accompanied by: self.  Departure Mode: Ambulatory.      ANGELIKA FERRARI RN

## 2024-12-26 NOTE — PROGRESS NOTES
History of Present Illness - Newton Buchanan is a 79 year old male last seen by Dr eKlly on 9/13/2023.  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  The patient had richard disease in the neck and lung metastasis.  He has been doing quite well with treatment and does not have any signs of recurrent or persistent disease on his follow-up imaging.  The patient was last seen by Dr Kelly unrelated to his cancer surveillance on 5/25/2023 with new symptoms involving sinus issues, postnasal drainage, and cough after getting exposed to some garden chemical ([place])   He received a Kenalog injection and we started him on nasal irrigations and Flonase. 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    Also, he just had a recent audiology visit on 6/28/2024 and the results were personally reviewed. The tympanograms were normal.  He had a steeply down sloping sensorineural hearing loss above 2000Hz down into the severe range.  No conductive hearing loss and word recognition was normal.    He is here for continued cancer surveillance.  He does still have issues with chronic nasal congestion in the morning.  He does use CPAP.  Also, with the congestion he has had  notable decrease in his appetite in general.  He has lost about 30 pounds.   At the end of the 7/1/24 visit things looked great.  He did have some nasal issues, so I did prescribe Budesonide in [facility].    Past medical history -   Patient Active Problem List   Diagnosis    Hypertension    Gastroesophageal reflux disease without esophagitis    Obstructive sleep apnea    Personal history of prostate cancer    Head and neck cancer (H)    Long-term use of high-risk medication    Secondary squamous cell carcinoma of head and neck with unknown  primary site (H)    Age-related osteoporosis without current pathological fracture    Allergic rhinitis    Diverticular disease    Impotence of organic origin    Hyperlipidemia    Peripheral neuropathy    Lower gastrointestinal hemorrhage    Lack of appetite    Malignant neoplasm of prostate (H)       There were no vitals taken for this visit.      General - The patient is well nourished and well developed, and appears to have good nutritional status.  Alert and oriented to person and place, answers questions and cooperates with examination appropriately.   Head and Face - Normocephalic and atraumatic, with no gross asymmetry noted of the contour of the facial features.  The facial nerve is intact, with strong symmetric movements.  Eyes - Extraocular movements intact, and the pupils were reactive to light.  Sclera were not icteric or injected, conjunctiva were pink and moist.  Mouth - Examination of the oral cavity shows pink, healthy, moist mucosa.  No lesions or ulceration noted.  The dentition are in good repair.  The tongue is mobile and midline.    Flexible Endoscopy -     The patient was counseled that their symptoms and history require a direct visualization with an endoscopy procedure.  They understood and we proceeded with a fiberoptic examination.  First I sprayed both sides of the nose with a mixture of lidocaine and neosynephrine.  I then passed the scope through the nasal cavity.  Color photographs were taken for the permanent medical record.  The nasal cavity was unremarkable.  The nasopharynx was mucosally covered and symmetric.  The Eustachian tube openings were unobstructed.  Going further down I had a clear view of the base of tongue which had normal appearing lingual tonsillar tissue.  The base of tongue was free of lesions, and the vallecula was open.  The epiglottis was smooth and mucosally covered.  The supraglottic larynx was then clearly visualized.  The vocal cords moved smoothly and  symmetrically, they were pearly white and no lesions were seen.  The pyriform sinuses were open, and the limited view of the postcricoid region did not show any lesions.                        A/P - Newton Buchanan  (C79.89,  C80.1) Squamous cell carcinoma metastatic to head and neck with unknown primary site (H)  (primary encounter diagnosis)  (Z92.3) History of head and neck radiation  (R09.81) Nasal congestion  (R63.0) Lack of appetite    First and foremost, there is no evidence of recurrent disease in the upper aerodigestive tract.  Follow-up in 6 months for another check up and endoscopy.    His next PET CT is 2/5/2025.  I do not appreciate any suspicious lesions or asymmetry where the recent PET picked up some increased uptake    For the nose I am going to try him on IPRATROPIUM BROMIDE NASAL SPRAY

## 2025-01-02 ENCOUNTER — OFFICE VISIT (OUTPATIENT)
Dept: OTOLARYNGOLOGY | Facility: CLINIC | Age: 80
End: 2025-01-02
Payer: MEDICARE

## 2025-01-02 VITALS
OXYGEN SATURATION: 99 % | SYSTOLIC BLOOD PRESSURE: 135 MMHG | HEART RATE: 55 BPM | WEIGHT: 177 LBS | DIASTOLIC BLOOD PRESSURE: 71 MMHG | BODY MASS INDEX: 24.51 KG/M2

## 2025-01-02 DIAGNOSIS — Z92.3 HISTORY OF HEAD AND NECK RADIATION: ICD-10-CM

## 2025-01-02 DIAGNOSIS — J30.0 VASOMOTOR RHINITIS: Primary | ICD-10-CM

## 2025-01-02 DIAGNOSIS — C79.89 SQUAMOUS CELL CARCINOMA METASTATIC TO HEAD AND NECK WITH UNKNOWN PRIMARY SITE (H): ICD-10-CM

## 2025-01-02 DIAGNOSIS — C44.92 SQUAMOUS CELL CARCINOMA METASTATIC TO HEAD AND NECK WITH UNKNOWN PRIMARY SITE (H): ICD-10-CM

## 2025-01-02 RX ORDER — IPRATROPIUM BROMIDE 42 UG/1
2 SPRAY, METERED NASAL 4 TIMES DAILY
Qty: 15 ML | Refills: 11 | Status: SHIPPED | OUTPATIENT
Start: 2025-01-02

## 2025-01-02 NOTE — LETTER
1/2/2025      Newton Buchanan  [address]  [place] [address]      Dear Colleague,    Thank you for referring your patient, Newton Buchanan, to the [facility]. Please see a copy of my visit note below.    History of Present Illness - Newton Buchanan is a 79 year old male last seen by Dr Kelly on 9/13/2023.  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  The patient had richard disease in the neck and lung metastasis.  He has been doing quite well with treatment and does not have any signs of recurrent or persistent disease on his follow-up imaging.  The patient was last seen by Dr Kelly unrelated to his cancer surveillance on 5/25/2023 with new symptoms involving sinus issues, postnasal drainage, and cough after getting exposed to some garden chemical ([place])   He received a Kenalog injection and we started him on nasal irrigations and Flonase. 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    Also, he just had a recent audiology visit on 6/28/2024 and the results were personally reviewed. The tympanograms were normal.  He had a steeply down sloping sensorineural hearing loss above 2000Hz down into the severe range.  No conductive hearing loss and word recognition was normal.    He is here for continued cancer surveillance.  He does still have issues with chronic nasal congestion in the morning.  He does use CPAP.  Also, with the congestion he has had  notable decrease in his appetite in general.  He has lost about 30 pounds.   At the end of the 7/1/24 visit things looked great.  He did have some nasal issues, so I did prescribe Budesonide in [facility].    Past medical history -   Patient Active Problem List   Diagnosis     Hypertension     Gastroesophageal reflux  disease without esophagitis     Obstructive sleep apnea     Personal history of prostate cancer     Head and neck cancer (H)     Long-term use of high-risk medication     Secondary squamous cell carcinoma of head and neck with unknown primary site (H)     Age-related osteoporosis without current pathological fracture     Allergic rhinitis     Diverticular disease     Impotence of organic origin     Hyperlipidemia     Peripheral neuropathy     Lower gastrointestinal hemorrhage     Lack of appetite     Malignant neoplasm of prostate (H)       There were no vitals taken for this visit.      General - The patient is well nourished and well developed, and appears to have good nutritional status.  Alert and oriented to person and place, answers questions and cooperates with examination appropriately.   Head and Face - Normocephalic and atraumatic, with no gross asymmetry noted of the contour of the facial features.  The facial nerve is intact, with strong symmetric movements.  Eyes - Extraocular movements intact, and the pupils were reactive to light.  Sclera were not icteric or injected, conjunctiva were pink and moist.  Mouth - Examination of the oral cavity shows pink, healthy, moist mucosa.  No lesions or ulceration noted.  The dentition are in good repair.  The tongue is mobile and midline.    Flexible Endoscopy -     The patient was counseled that their symptoms and history require a direct visualization with an endoscopy procedure.  They understood and we proceeded with a fiberoptic examination.  First I sprayed both sides of the nose with a mixture of lidocaine and neosynephrine.  I then passed the scope through the nasal cavity.  Color photographs were taken for the permanent medical record.  The nasal cavity was unremarkable.  The nasopharynx was mucosally covered and symmetric.  The Eustachian tube openings were unobstructed.  Going further down I had a clear view of the base of tongue which had normal  appearing lingual tonsillar tissue.  The base of tongue was free of lesions, and the vallecula was open.  The epiglottis was smooth and mucosally covered.  The supraglottic larynx was then clearly visualized.  The vocal cords moved smoothly and symmetrically, they were pearly white and no lesions were seen.  The pyriform sinuses were open, and the limited view of the postcricoid region did not show any lesions.                        A/P - Newton Buchanan  (C79.89,  C80.1) Squamous cell carcinoma metastatic to head and neck with unknown primary site (H)  (primary encounter diagnosis)  (Z92.3) History of head and neck radiation  (R09.81) Nasal congestion  (R63.0) Lack of appetite    First and foremost, there is no evidence of recurrent disease in the upper aerodigestive tract.  Follow-up in 6 months for another check up and endoscopy.    His next PET CT is 2/5/2025.  I do not appreciate any suspicious lesions or asymmetry where the recent PET picked up some increased uptake    For the nose I am going to try him on IPRATROPIUM BROMIDE NASAL SPRAY       Again, thank you for allowing me to participate in the care of your patient.        Sincerely,        Js Rivera MD    Electronically signed

## 2025-02-06 ENCOUNTER — HOSPITAL ENCOUNTER (OUTPATIENT)
Dept: PET IMAGING | Facility: CLINIC | Age: 80
End: 2025-02-06
Attending: INTERNAL MEDICINE
Payer: MEDICARE

## 2025-02-06 DIAGNOSIS — C76.0 HEAD AND NECK CANCER (H): ICD-10-CM

## 2025-02-06 PROCEDURE — 78816 PET IMAGE W/CT FULL BODY: CPT | Mod: PS

## 2025-02-06 PROCEDURE — A9552 F18 FDG: HCPCS | Performed by: INTERNAL MEDICINE

## 2025-02-06 PROCEDURE — 343N000001 HC RX 343 MED OP 636: Performed by: INTERNAL MEDICINE

## 2025-02-06 RX ORDER — FLUDEOXYGLUCOSE F 18 200 MCI/ML
10-18 INJECTION, SOLUTION INTRAVENOUS ONCE
Status: COMPLETED | OUTPATIENT
Start: 2025-02-06 | End: 2025-02-06

## 2025-02-06 RX ADMIN — FLUDEOXYGLUCOSE F 18 14.04 MILLICURIE: 200 INJECTION, SOLUTION INTRAVENOUS at 08:39

## 2025-02-11 ENCOUNTER — LAB (OUTPATIENT)
Dept: LAB | Facility: CLINIC | Age: 80
End: 2025-02-11
Attending: INTERNAL MEDICINE
Payer: MEDICARE

## 2025-02-11 ENCOUNTER — ONCOLOGY VISIT (OUTPATIENT)
Dept: ONCOLOGY | Facility: CLINIC | Age: 80
End: 2025-02-11
Attending: INTERNAL MEDICINE
Payer: MEDICARE

## 2025-02-11 ENCOUNTER — INFUSION THERAPY VISIT (OUTPATIENT)
Dept: INFUSION THERAPY | Facility: CLINIC | Age: 80
End: 2025-02-11
Attending: INTERNAL MEDICINE
Payer: MEDICARE

## 2025-02-11 VITALS — SYSTOLIC BLOOD PRESSURE: 127 MMHG | HEART RATE: 54 BPM | DIASTOLIC BLOOD PRESSURE: 76 MMHG

## 2025-02-11 VITALS
BODY MASS INDEX: 24.5 KG/M2 | OXYGEN SATURATION: 98 % | TEMPERATURE: 97.4 F | HEIGHT: 71 IN | SYSTOLIC BLOOD PRESSURE: 134 MMHG | DIASTOLIC BLOOD PRESSURE: 63 MMHG | RESPIRATION RATE: 16 BRPM | HEART RATE: 55 BPM | WEIGHT: 175 LBS

## 2025-02-11 DIAGNOSIS — M19.011 ARTHRITIS OF RIGHT SHOULDER REGION: ICD-10-CM

## 2025-02-11 DIAGNOSIS — C76.0 HEAD AND NECK CANCER (H): ICD-10-CM

## 2025-02-11 DIAGNOSIS — C76.0 HEAD AND NECK CANCER (H): Primary | ICD-10-CM

## 2025-02-11 DIAGNOSIS — Z79.899 HIGH RISK MEDICATION USE: Primary | ICD-10-CM

## 2025-02-11 DIAGNOSIS — M25.511 CHRONIC RIGHT SHOULDER PAIN: Primary | ICD-10-CM

## 2025-02-11 DIAGNOSIS — G89.29 CHRONIC RIGHT SHOULDER PAIN: Primary | ICD-10-CM

## 2025-02-11 DIAGNOSIS — C44.92 SECONDARY SQUAMOUS CELL CARCINOMA OF HEAD AND NECK WITH UNKNOWN PRIMARY SITE (H): ICD-10-CM

## 2025-02-11 DIAGNOSIS — C79.89 SECONDARY SQUAMOUS CELL CARCINOMA OF HEAD AND NECK WITH UNKNOWN PRIMARY SITE (H): ICD-10-CM

## 2025-02-11 DIAGNOSIS — Z79.899 HIGH RISK MEDICATION USE: ICD-10-CM

## 2025-02-11 LAB
ALBUMIN SERPL BCG-MCNC: 3.4 G/DL (ref 3.5–5.2)
ALP SERPL-CCNC: 41 U/L (ref 40–150)
ALT SERPL W P-5'-P-CCNC: 16 U/L (ref 0–70)
ANION GAP SERPL CALCULATED.3IONS-SCNC: 5 MMOL/L (ref 7–15)
AST SERPL W P-5'-P-CCNC: 19 U/L (ref 0–45)
BILIRUB SERPL-MCNC: 0.6 MG/DL
BUN SERPL-MCNC: 16.6 MG/DL (ref 8–23)
CALCIUM SERPL-MCNC: 9.2 MG/DL (ref 8.8–10.4)
CHLORIDE SERPL-SCNC: 106 MMOL/L (ref 98–107)
CREAT SERPL-MCNC: 0.95 MG/DL (ref 0.67–1.17)
EGFRCR SERPLBLD CKD-EPI 2021: 82 ML/MIN/1.73M2
GLUCOSE SERPL-MCNC: 93 MG/DL (ref 70–99)
HCO3 SERPL-SCNC: 30 MMOL/L (ref 22–29)
HOLD SPECIMEN: NORMAL
POTASSIUM SERPL-SCNC: 4.1 MMOL/L (ref 3.4–5.3)
PROT SERPL-MCNC: 5.8 G/DL (ref 6.4–8.3)
SODIUM SERPL-SCNC: 141 MMOL/L (ref 135–145)
TSH SERPL DL<=0.005 MIU/L-ACNC: 2.64 UIU/ML (ref 0.3–4.2)

## 2025-02-11 PROCEDURE — G0463 HOSPITAL OUTPT CLINIC VISIT: HCPCS | Performed by: INTERNAL MEDICINE

## 2025-02-11 PROCEDURE — 36415 COLL VENOUS BLD VENIPUNCTURE: CPT | Performed by: INTERNAL MEDICINE

## 2025-02-11 PROCEDURE — 84443 ASSAY THYROID STIM HORMONE: CPT | Performed by: INTERNAL MEDICINE

## 2025-02-11 PROCEDURE — 250N000011 HC RX IP 250 OP 636: Performed by: INTERNAL MEDICINE

## 2025-02-11 PROCEDURE — 99214 OFFICE O/P EST MOD 30 MIN: CPT | Performed by: INTERNAL MEDICINE

## 2025-02-11 PROCEDURE — G2211 COMPLEX E/M VISIT ADD ON: HCPCS | Performed by: INTERNAL MEDICINE

## 2025-02-11 PROCEDURE — 96413 CHEMO IV INFUSION 1 HR: CPT

## 2025-02-11 PROCEDURE — 84295 ASSAY OF SERUM SODIUM: CPT | Performed by: INTERNAL MEDICINE

## 2025-02-11 PROCEDURE — 258N000003 HC RX IP 258 OP 636: Performed by: INTERNAL MEDICINE

## 2025-02-11 RX ORDER — DIPHENHYDRAMINE HYDROCHLORIDE 50 MG/ML
25 INJECTION INTRAMUSCULAR; INTRAVENOUS
Start: 2025-04-01

## 2025-02-11 RX ORDER — METHYLPREDNISOLONE SODIUM SUCCINATE 40 MG/ML
40 INJECTION INTRAMUSCULAR; INTRAVENOUS
Start: 2025-04-01

## 2025-02-11 RX ORDER — MEPERIDINE HYDROCHLORIDE 25 MG/ML
25 INJECTION INTRAMUSCULAR; INTRAVENOUS; SUBCUTANEOUS
OUTPATIENT
Start: 2025-04-01

## 2025-02-11 RX ORDER — DIPHENHYDRAMINE HYDROCHLORIDE 50 MG/ML
50 INJECTION INTRAMUSCULAR; INTRAVENOUS
Start: 2025-04-01

## 2025-02-11 RX ORDER — ALBUTEROL SULFATE 0.83 MG/ML
2.5 SOLUTION RESPIRATORY (INHALATION)
OUTPATIENT
Start: 2025-04-01

## 2025-02-11 RX ORDER — ALBUTEROL SULFATE 90 UG/1
1-2 INHALANT RESPIRATORY (INHALATION)
Start: 2025-04-01

## 2025-02-11 RX ORDER — EPINEPHRINE 1 MG/ML
0.3 INJECTION, SOLUTION, CONCENTRATE INTRAVENOUS EVERY 5 MIN PRN
OUTPATIENT
Start: 2025-04-01

## 2025-02-11 RX ORDER — LORAZEPAM 2 MG/ML
0.5 INJECTION INTRAMUSCULAR EVERY 4 HOURS PRN
Start: 2025-04-01

## 2025-02-11 RX ADMIN — SODIUM CHLORIDE 400 MG: 9 INJECTION, SOLUTION INTRAVENOUS at 10:11

## 2025-02-11 RX ADMIN — SODIUM CHLORIDE 250 ML: 0.9 INJECTION, SOLUTION INTRAVENOUS at 10:11

## 2025-02-11 ASSESSMENT — PAIN SCALES - GENERAL: PAINLEVEL_OUTOF10: NO PAIN (0)

## 2025-02-11 NOTE — PROGRESS NOTES
"Oncology Rooming Note    February 11, 2025 9:23 AM   Newton Buchanan is a 79 year old male who presents for:    Chief Complaint   Patient presents with    Oncology Clinic Visit     Malignant neoplasm of prostate - Labs provider and infusion     Initial Vitals: /63 (BP Location: Right arm, Patient Position: Sitting, Cuff Size: Adult Regular)   Pulse 55   Temp 97.4  F (36.3  C) (Tympanic)   Resp 16   Ht 1.81 m (5' 11.26\")   Wt 79.4 kg (175 lb)   SpO2 98%   BMI 24.23 kg/m   Estimated body mass index is 24.23 kg/m  as calculated from the following:    Height as of this encounter: 1.81 m (5' 11.26\").    Weight as of this encounter: 79.4 kg (175 lb). Body surface area is 2 meters squared.  No Pain (0) Comment: Data Unavailable   No LMP for male patient.  Allergies reviewed: Yes  Medications reviewed: Yes    Medications: Medication refills not needed today.  Pharmacy name entered into [facility]:    [id number] - [place], [place] - [address] AT [address]  [facility] - [place], [place] - [address]    Frailty Screening:   Is the patient here for a new oncology consult visit in cancer care? 2. No      Clinical concerns:  None today.      Meli Cornejo CMA              "

## 2025-02-11 NOTE — PROGRESS NOTES
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 Royal    _____________________________________________________________________________    History of Present Illness/ Interval History    Mr. 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    He had first line Keytruda x 2 yrs through 7/2022, resulting in CR.  Since then he has had frequent treatment holidays.  His last dose of Keytruda was on 10/26/2023.  After a break of 4 months repeat PET scan showed evidence of disease progression.  So we restarted Keytruda on 2/15/2024.     He has been on Keytruda since then.  No significant side effects.  He is here with repeat PET scan.    Appetite is stable.  No swallowing difficulties.  No significant weight loss.    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 FNAs benign. Followed.   -2/2020: progressive right neck swelling, no other symptoms.   -2/11/20 FNA biosy right neck mass: metastatic squamous cell cancer, HPV16+  -PET: hypermetabolic right cervical adenopathy, numerous bilateral lung mets, right hilar nodes.   -No evident primary site identified, but highly suspicious of H/N given IHC staining and radiographical findings/spread  -Neogenomic testing - CPS 70    Treatment:  5/2020 - 7/2022: Keytruda (initiated in [place], then transferred care to [place]). Completed 2 yrs of immunotherapy, resulted in CR by PET     11/2020: local progression in right neck only  -12/2020: palliative RT to right neck (3750 cGy/15)    1/2021: palpable right jaw/parotid lesion, PET+. Remainder of right neck disease improved after RT and lung mets stable. US-guided biopsy  right parotid lesion attempted, but no lesion seen to biopsy so cancelled.    7/2022 - 12/2022: observation, treatment holiday until progression.   -12/2022 PET/CT: solitary recurrence in subcarinal LN    12/12/2022- present: Keytruda resumed  --Resulted in CR (PET) by 6/2023  --6/2023: changed to every 6 week cycles    -After dose of Keytruda on 10/26/2023, he took a break.    Disease progression on the PET scan with increased activity and enlargement within the subcarinal lymph node.  Also had increased FDG uptake within the left oropharynx and mildly FDG avid left cervical level 1B lymph node.  Mild FDG uptake within the distal esophagus/GE junction.    Restarted Keytruda on 2/15/2024    Stable disease to partial response since restarting Keytruda.    Physical Exam    GENERAL: Alert and oriented to time place and person. Seated comfortably. In no distress. Alone.      Lab Results    CMP and TSH WNL    Imaging    6/15/2023 PET: CR. No evident malignancy.    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  After resuming Keytruda he again had a complete response.  In June 2023 we switched him to every 6-week Keytruda infusions.  But he continued to have significant side effects including unintentional weight loss, fatigue and other issues which was thought to be due to Keytruda.  We gave him a break from Keytruda after his last dose on 10/26/2023.      Repeat PET scan in February 2024 showed some disease progression so we restarted Keytruda.  He had a new subcarinal FDG avid lymph node which was concerning for richard recurrence.  He also had asymmetrically increased FDG uptake in the left oropharynx with associated mucosal thickening and a contiguous mildly FDG avid left cervical level 1B lymph node.      Since restarting Keytruda he has had stable  disease to partial response seen on the PET scans.  He is here with repeat PET scan.  I reviewed the PET scan images and report personally and independently interpreted the results.  PET scan dated 2/6/2025 showing slightly decreased metabolic activity within the previously noted subcarinal lymph node and right level 4 cervical lymph node suggesting continued response.  No evidence of any new disease elsewhere.  He continues to have stable but mild FDG uptake in the left oropharyngeal wall which is thought to be inflammatory rather than malignant.  Will continue to follow this.  No significant side effects from the Keytruda.  Will continue 6 weekly Keytruda infusions.  Giving it every 6 to 8 weeks over the last few months due to his travel plans.  His last Keytruda was 8 weeks ago.  From today will resume 6 weekly infusions.    He will come back in 6 weeks for Keytruda infusion.  Following that he has some travel plans we will schedule the dose after that on May 13.  Will get a PET scan sometime in early July.  I am planning to do PET scans every 4 to 6 months from now on.    Labs reviewed today.  Serum chemistry shows stable creatinine and LFTs.  His total protein is slightly down again.  Normal TSH.  No contraindications to proceed with Keytruda.    3.    Osteoporosis  Managed by PCP and Endocrinologist.    4.  Right shoulder pain:  he has chronic shoulder pain issues.  On PET scan looks like he has some arthritis there.  Will put a referral to physical therapy.    A total of 35 min was spent today on this visit which included face to face conversation with the patient, EMR review, counseling and co-ordination of care and medical documentation.    The longitudinal plan of care for the diagnosis(es)/condition(s) as documented were addressed during this visit. Due to the added complexity in care, I will continue to support Newton in the subsequent management and with ongoing continuity of care.    I have personally  reviewed the CT scan/PET scan images and report and independently interpreted the results to my ability.        Jensen Royal MD  Hematology and Medical Oncology  [facility]

## 2025-02-11 NOTE — PROGRESS NOTES
Infusion Nursing Note:  Newton Buchanan presents today for Keytruda.    Patient seen by provider today: Yes: Dr. Royal   present during visit today: Not Applicable.    Note: N/A.    Intravenous Access:  Peripheral IV placed.    Treatment Conditions:  Lab Results   Component Value Date     02/11/2025    POTASSIUM 4.1 02/11/2025    CR 0.95 02/11/2025    JÚNIOR 9.2 02/11/2025    BILITOTAL 0.6 02/11/2025    ALBUMIN 3.4 (L) 02/11/2025    ALT 16 02/11/2025    AST 19 02/11/2025       Results reviewed, labs MET treatment parameters, ok to proceed with treatment.    Post Infusion Assessment:  Patient tolerated infusion without incident.  Blood return noted pre and post infusion.  Site patent and intact, free from redness, edema or discomfort.  No evidence of extravasations.  Access discontinued per protocol.     Discharge Plan:   Patient and/or family verbalized understanding of discharge instructions and all questions answered.  Patient discharged in stable condition accompanied by: significant other.  Departure Mode: Ambulatory.    Crescencio Cervantes RN

## 2025-02-11 NOTE — LETTER
2/11/2025      Newton Buchanan  [address]  [place] [address]      Dear Colleague,    Thank you for referring your patient, Newton Buchanan, to the [facility]. Please see a copy of my visit note below.                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 Royal    _____________________________________________________________________________    History of Present Illness/ Interval History    Mr. 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    He had first line Keytruda x 2 yrs through 7/2022, resulting in CR.  Since then he has had frequent treatment holidays.  His last dose of Keytruda was on 10/26/2023.  After a break of 4 months repeat PET scan showed evidence of disease progression.  So we restarted Keytruda on 2/15/2024.     He has been on Keytruda since then.  No significant side effects.  He is here with repeat PET scan.    Appetite is stable.  No swallowing difficulties.  No significant weight loss.    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 FNAs benign. Followed.   -2/2020: progressive right neck swelling, no other symptoms.   -2/11/20 FNA biosy right neck mass: metastatic squamous cell cancer, HPV16+  -PET: hypermetabolic right cervical adenopathy, numerous bilateral lung mets, right hilar nodes.   -No evident primary site identified, but highly suspicious of H/N given IHC staining and radiographical findings/spread  -Neogenomic testing - CPS 70    Treatment:  5/2020 - 7/2022: Keytruda (initiated in [place], then transferred care to [place]). Completed 2 yrs of immunotherapy, resulted in CR by PET      11/2020: local progression in right neck only  -12/2020: palliative RT to right neck (3750 cGy/15)    1/2021: palpable right jaw/parotid lesion, PET+. Remainder of right neck disease improved after RT and lung mets stable. US-guided biopsy right parotid lesion attempted, but no lesion seen to biopsy so cancelled.    7/2022 - 12/2022: observation, treatment holiday until progression.   -12/2022 PET/CT: solitary recurrence in subcarinal LN    12/12/2022- present: Keytruda resumed  --Resulted in CR (PET) by 6/2023  --6/2023: changed to every 6 week cycles    -After dose of Keytruda on 10/26/2023, he took a break.    Disease progression on the PET scan with increased activity and enlargement within the subcarinal lymph node.  Also had increased FDG uptake within the left oropharynx and mildly FDG avid left cervical level 1B lymph node.  Mild FDG uptake within the distal esophagus/GE junction.    Restarted Keytruda on 2/15/2024    Stable disease to partial response since restarting Keytruda.    Physical Exam    GENERAL: Alert and oriented to time place and person. Seated comfortably. In no distress. Alone.      Lab Results    CMP and TSH WNL    Imaging    6/15/2023 PET: CR. No evident malignancy.    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  After resuming Keytruda he again had a complete response.  In June 2023 we switched him to every 6-week Keytruda infusions.  But he continued to have significant side effects including unintentional weight loss, fatigue and other issues which was thought to be due to Keytruda.  We gave him a break from Keytruda after his last dose on 10/26/2023.      Repeat PET scan in February 2024 showed some disease progression so we restarted Keytruda.  He had a new subcarinal FDG avid lymph node which was concerning for  richard recurrence.  He also had asymmetrically increased FDG uptake in the left oropharynx with associated mucosal thickening and a contiguous mildly FDG avid left cervical level 1B lymph node.      Since restarting Keytruda he has had stable disease to partial response seen on the PET scans.  He is here with repeat PET scan.  I reviewed the PET scan images and report personally and independently interpreted the results.  PET scan dated 2/6/2025 showing slightly decreased metabolic activity within the previously noted subcarinal lymph node and right level 4 cervical lymph node suggesting continued response.  No evidence of any new disease elsewhere.  He continues to have stable but mild FDG uptake in the left oropharyngeal wall which is thought to be inflammatory rather than malignant.  Will continue to follow this.  No significant side effects from the Keytruda.  Will continue 6 weekly Keytruda infusions.  Giving it every 6 to 8 weeks over the last few months due to his travel plans.  His last Keytruda was 8 weeks ago.  From today will resume 6 weekly infusions.    He will come back in 6 weeks for Keytruda infusion.  Following that he has some travel plans we will schedule the dose after that on May 13.  Will get a PET scan sometime in early July.  I am planning to do PET scans every 4 to 6 months from now on.    Labs reviewed today.  Serum chemistry shows stable creatinine and LFTs.  His total protein is slightly down again.  Normal TSH.  No contraindications to proceed with Keytruda.    3.    Osteoporosis  Managed by PCP and Endocrinologist.    4.  Right shoulder pain:  he has chronic shoulder pain issues.  On PET scan looks like he has some arthritis there.  Will put a referral to physical therapy.    A total of 35 min was spent today on this visit which included face to face conversation with the patient, EMR review, counseling and co-ordination of care and medical documentation.    The longitudinal plan of care  "for the diagnosis(es)/condition(s) as documented were addressed during this visit. Due to the added complexity in care, I will continue to support Newton in the subsequent management and with ongoing continuity of care.    I have personally reviewed the CT scan/PET scan images and report and independently interpreted the results to my ability.        Jensen Royal MD  Hematology and Medical Oncology  [facility]              Oncology Rooming Note    February 11, 2025 9:23 AM   Newton Buchanan is a 79 year old male who presents for:    Chief Complaint   Patient presents with     Oncology Clinic Visit     Malignant neoplasm of prostate - Labs provider and infusion     Initial Vitals: /63 (BP Location: Right arm, Patient Position: Sitting, Cuff Size: Adult Regular)   Pulse 55   Temp 97.4  F (36.3  C) (Tympanic)   Resp 16   Ht 1.81 m (5' 11.26\")   Wt 79.4 kg (175 lb)   SpO2 98%   BMI 24.23 kg/m   Estimated body mass index is 24.23 kg/m  as calculated from the following:    Height as of this encounter: 1.81 m (5' 11.26\").    Weight as of this encounter: 79.4 kg (175 lb). Body surface area is 2 meters squared.  No Pain (0) Comment: Data Unavailable   No LMP for male patient.  Allergies reviewed: Yes  Medications reviewed: Yes    Medications: Medication refills not needed today.  Pharmacy name entered into [employer]:    [id number] - [place], [place] - [address] [facility] OF [address] [place] [address] HOME DELIVERY - [place] [id number] [facility]    Frailty Screening:   Is the patient here for a new oncology consult visit in cancer care? 2. No      Clinical concerns:  None today.      Meli Cornejo CMA                Again, thank you for allowing me to participate in the care of your patient.        Sincerely,        Jensen Royal MD    Electronically signed"

## 2025-02-17 SDOH — HEALTH STABILITY: PHYSICAL HEALTH: ON AVERAGE, HOW MANY MINUTES DO YOU ENGAGE IN EXERCISE AT THIS LEVEL?: 20 MIN

## 2025-02-17 SDOH — HEALTH STABILITY: PHYSICAL HEALTH: ON AVERAGE, HOW MANY DAYS PER WEEK DO YOU ENGAGE IN MODERATE TO STRENUOUS EXERCISE (LIKE A BRISK WALK)?: 2 DAYS

## 2025-02-17 ASSESSMENT — SOCIAL DETERMINANTS OF HEALTH (SDOH): HOW OFTEN DO YOU GET TOGETHER WITH FRIENDS OR RELATIVES?: TWICE A WEEK

## 2025-02-19 ENCOUNTER — OFFICE VISIT (OUTPATIENT)
Dept: FAMILY MEDICINE | Facility: CLINIC | Age: 80
End: 2025-02-19
Payer: MEDICARE

## 2025-02-19 VITALS
HEART RATE: 60 BPM | WEIGHT: 172.6 LBS | SYSTOLIC BLOOD PRESSURE: 150 MMHG | TEMPERATURE: 97.1 F | RESPIRATION RATE: 18 BRPM | DIASTOLIC BLOOD PRESSURE: 70 MMHG | BODY MASS INDEX: 23.38 KG/M2 | HEIGHT: 72 IN | OXYGEN SATURATION: 98 %

## 2025-02-19 DIAGNOSIS — Z00.00 ENCOUNTER FOR MEDICARE ANNUAL WELLNESS EXAM: ICD-10-CM

## 2025-02-19 DIAGNOSIS — I10 PRIMARY HYPERTENSION: ICD-10-CM

## 2025-02-19 DIAGNOSIS — N52.9 IMPOTENCE OF ORGANIC ORIGIN: ICD-10-CM

## 2025-02-19 DIAGNOSIS — E78.2 MIXED HYPERLIPIDEMIA: Primary | ICD-10-CM

## 2025-02-19 DIAGNOSIS — M81.0 AGE-RELATED OSTEOPOROSIS WITHOUT CURRENT PATHOLOGICAL FRACTURE: ICD-10-CM

## 2025-02-19 PROBLEM — Z79.899 LONG-TERM USE OF HIGH-RISK MEDICATION: Status: RESOLVED | Noted: 2023-01-25 | Resolved: 2025-02-19

## 2025-02-19 PROBLEM — R63.0 LACK OF APPETITE: Status: RESOLVED | Noted: 2024-07-01 | Resolved: 2025-02-19

## 2025-02-19 LAB
CHOLEST SERPL-MCNC: 157 MG/DL
FASTING STATUS PATIENT QL REPORTED: NO
HDLC SERPL-MCNC: 56 MG/DL
LDLC SERPL CALC-MCNC: 87 MG/DL
NONHDLC SERPL-MCNC: 101 MG/DL
TRIGL SERPL-MCNC: 70 MG/DL

## 2025-02-19 PROCEDURE — 80061 LIPID PANEL: CPT | Performed by: PHYSICIAN ASSISTANT

## 2025-02-19 PROCEDURE — G0439 PPPS, SUBSEQ VISIT: HCPCS | Performed by: PHYSICIAN ASSISTANT

## 2025-02-19 PROCEDURE — 99214 OFFICE O/P EST MOD 30 MIN: CPT | Mod: 25 | Performed by: PHYSICIAN ASSISTANT

## 2025-02-19 PROCEDURE — 36415 COLL VENOUS BLD VENIPUNCTURE: CPT | Performed by: PHYSICIAN ASSISTANT

## 2025-02-19 PROCEDURE — G2211 COMPLEX E/M VISIT ADD ON: HCPCS | Performed by: PHYSICIAN ASSISTANT

## 2025-02-19 RX ORDER — LOSARTAN POTASSIUM 25 MG/1
25 TABLET ORAL DAILY
Qty: 90 TABLET | Refills: 3 | Status: SHIPPED | OUTPATIENT
Start: 2025-02-19

## 2025-02-19 RX ORDER — PRAVASTATIN SODIUM 20 MG
20 TABLET ORAL DAILY
Qty: 90 TABLET | Refills: 3 | Status: SHIPPED | OUTPATIENT
Start: 2025-02-19

## 2025-02-19 RX ORDER — SILDENAFIL 50 MG/1
50-100 TABLET, FILM COATED ORAL DAILY PRN
Qty: 30 TABLET | Refills: 2 | Status: SHIPPED | OUTPATIENT
Start: 2025-02-19

## 2025-02-19 ASSESSMENT — ACTIVITIES OF DAILY LIVING (ADL)
AT_ITS_WORST?: 7
WASHING_YOUR_BACK: 8
PLACING_AN_OBJECT_ON_A_HIGH_SHELF: 5
REMOVING_SOMETHING_FROM_YOUR_BACK_POCKET: 5
PLEASE_INDICATE_YOR_PRIMARY_REASON_FOR_REFERRAL_TO_THERAPY:: SHOULDER
WASHING_YOUR_HAIR?: 3
PUTTING_ON_A_SHIRT_THAT_BUTTONS_DOWN_THE_FRONT: 3
CARRYING_A_HEAVY_OBJECT_OF_10_POUNDS: 5
WHEN_LYING_ON_THE_INVOLVED_SIDE: 6
PUSHING_WITH_THE_INVOLVED_ARM: 5
REACHING_FOR_SOMETHING_ON_A_HIGH_SHELF: 6
TOUCHING_THE_BACK_OF_YOUR_NECK: 6
PUTTING_ON_AN_UNDERSHIRT_OR_A_PULLOVER_SWEATER: 6
PUTTING_ON_YOUR_PANTS: 4

## 2025-02-19 ASSESSMENT — PAIN SCALES - GENERAL: PAINLEVEL_OUTOF10: MILD PAIN (2)

## 2025-02-19 NOTE — PATIENT INSTRUCTIONS
Patient Education   Preventive Care Advice   This is general advice given by our system to help you stay healthy. However, your care team may have specific advice just for you. Please talk to your care team about your preventive care needs.  Nutrition  Eat 5 or more servings of fruits and vegetables each day.  Try wheat bread, brown rice and whole grain pasta (instead of white bread, rice, and pasta).  Get enough calcium and vitamin D. Check the label on foods and aim for 100% of the RDA (recommended daily allowance).  Lifestyle  Exercise at least 150 minutes each week  (30 minutes a day, 5 days a week).  Do muscle strengthening activities 2 days a week. These help control your weight and prevent disease.  No smoking.  Wear sunscreen to prevent skin cancer.  Have a dental exam and cleaning every 6 months.  Yearly exams  See your health care team every year to talk about:  Any changes in your health.  Any medicines your care team has prescribed.  Preventive care, family planning, and ways to prevent chronic diseases.  Shots (vaccines)   HPV shots (up to age 26), if you've never had them before.  Hepatitis B shots (up to age 59), if you've never had them before.  COVID-19 shot: Get this shot when it's due.  Flu shot: Get a flu shot every year.  Tetanus shot: Get a tetanus shot every 10 years.  Pneumococcal, hepatitis A, and RSV shots: Ask your care team if you need these based on your risk.  Shingles shot (for age 50 and up)  General health tests  Diabetes screening:  Starting at age 35, Get screened for diabetes at least every 3 years.  If you are younger than age 35, ask your care team if you should be screened for diabetes.  Cholesterol test: At age 39, start having a cholesterol test every 5 years, or more often if advised.  Bone density scan (DEXA): At age 50, ask your care team if you should have this scan for osteoporosis (brittle bones).  Hepatitis C: Get tested at least once in your life.  STIs (sexually  transmitted infections)  Before age 24: Ask your care team if you should be screened for STIs.  After age 24: Get screened for STIs if you're at risk. You are at risk for STIs (including HIV) if:  You are sexually active with more than one person.  You don't use condoms every time.  You or a partner was diagnosed with a sexually transmitted infection.  If you are at risk for HIV, ask about PrEP medicine to prevent HIV.  Get tested for HIV at least once in your life, whether you are at risk for HIV or not.  Cancer screening tests  Cervical cancer screening: If you have a cervix, begin getting regular cervical cancer screening tests starting at age 21.  Breast cancer scan (mammogram): If you've ever had breasts, begin having regular mammograms starting at age 40. This is a scan to check for breast cancer.  Colon cancer screening: It is important to start screening for colon cancer at age 45.  Have a colonoscopy test every 10 years (or more often if you're at risk) Or, ask your provider about stool tests like a FIT test every year or Cologuard test every 3 years.  To learn more about your testing options, visit:   .  For help making a decision, visit:   https://bit.ly/hr79521.  Prostate cancer screening test: If you have a prostate, ask your care team if a prostate cancer screening test (PSA) at age 55 is right for you.  Lung cancer screening: If you are a current or former smoker ages 50 to 80, ask your care team if ongoing lung cancer screenings are right for you.  For informational purposes only. Not to replace the advice of your health care provider. Copyright   2023 [facility] [employer]. All rights reserved. Clinically reviewed by the [facility]. Avtozaper [id number] - REV 01/24.  Hearing Loss: Care Instructions  Overview     Hearing loss is a sudden or slow decrease in how well you hear. It can range from slight to profound. Permanent hearing loss can occur with aging. It also can  happen when you are exposed long-term to loud noise. Examples include listening to loud music, riding motorcycles, or being around other loud machines.  Hearing loss can affect your work and home life. It can make you feel lonely or depressed. You may feel that you have lost your independence. But hearing aids and other devices can help you hear better and feel connected to others.  Follow-up care is a key part of your treatment and safety. Be sure to make and go to all appointments, and call your doctor if you are having problems. It's also a good idea to know your test results and keep a list of the medicines you take.  How can you care for yourself at home?  Avoid loud noises whenever possible. This helps keep your hearing from getting worse.  Always wear hearing protection around loud noises.  Wear a hearing aid as directed.  A professional can help you pick a hearing aid that will work best for you.  You can also get hearing aids over the counter for mild to moderate hearing loss.  Have hearing tests as your doctor suggests. They can show whether your hearing has changed. Your hearing aid may need to be adjusted.  Use other devices as needed. These may include:  Telephone amplifiers and hearing aids that can connect to a television, stereo, radio, or microphone.  Devices that use lights or vibrations. These alert you to the doorbell, a ringing telephone, or a baby monitor.  Television closed-captioning. This shows the words at the bottom of the screen. Most new TVs can do this.  TTY (text telephone). This lets you type messages back and forth on the telephone instead of talking or listening. These devices are also called TDD. When messages are typed on the keyboard, they are sent over the phone line to a receiving TTY. The message is shown on a monitor.  Use text messaging, social media, and email if it is hard for you to communicate by telephone.  Try to learn a listening technique called speechreading. It is  "not lipreading. You pay attention to people's gestures, expressions, posture, and tone of voice. These clues can help you understand what a person is saying. Face the person you are talking to, and have them face you. Make sure the lighting is good. You need to see the other person's face clearly.  Think about counseling if you need help to adjust to your hearing loss.  When should you call for help?  Watch closely for changes in your health, and be sure to contact your doctor if:    You think your hearing is getting worse.     You have new symptoms, such as dizziness or nausea.   Where can you learn more?  Go to https://www.DuckDuckGo.net/patiented  Enter R798 in the search box to learn more about \"Hearing Loss: Care Instructions.\"  Current as of: September 27, 2023  Content Version: 14.3    2024 [employer].   Care instructions adapted under license by your healthcare professional. If you have questions about a medical condition or this instruction, always ask your healthcare professional. [employer] disclaims any warranty or liability for your use of this information.    9 Ways to Cut Back on Drinking  Maybe you've found yourself drinking more alcohol than you'd prefer. If you want to cut back, here are some ideas to try.    Think before you drink.  Do you really want a drink, or is it just a habit? If you're used to having a drink at a certain time, try doing something else then.     Look for substitutes.  Find some no-alcohol drinks that you enjoy, like flavored seltzer water, tea with honey, or tonic with a slice of lime. Or try alcohol-free beer or \"virgin\" cocktails (without the alcohol).     Drink more water.  Use water to quench your thirst. Drink a glass of water before you have any alcohol. Have another glass along with every drink or between drinks.     Shrink your drink.  For example, have a bottle of beer instead of a pint. Use a smaller glass for wine. Choose drinks with lower " "alcohol content (ABV%). Or use less liquor and more mixer in cocktails.     Slow down.  It's easy to drink quickly and without thinking about it. Pay attention, and make each drink last longer.     Do the math.  Total up how much you spend on alcohol each month. How much is that a year? If you cut back, what could you do with the money you save?     Take a break.  Choose a day or two each week when you won't drink at all. Notice how you feel on those days, physically and emotionally. How did you sleep? Do you feel better? Over time, add more break days.     Count calories.  Would you like to lose some weight? For some people that's a good motivator for cutting back. Figure out how many calories are in each drink. How many does that add up to in a day? In a week? In a month?     Practice saying no.  Be ready when someone offers you a drink. Try: \"Thanks, I've had enough.\" Or \"Thanks, but I'm cutting back.\" Or \"No, thanks. I feel better when I drink less.\"   Current as of: November 15, 2023  Content Version: 14.3    2024 [employer].   Care instructions adapted under license by your healthcare professional. If you have questions about a medical condition or this instruction, always ask your healthcare professional. [employer] disclaims any warranty or liability for your use of this information.     "

## 2025-02-19 NOTE — PROGRESS NOTES
Preventive Care Visit  [facility]  Luis Antonio Santiago PA-C, Family Medicine  Feb 19, 2025      Assessment & Plan   Mixed hyperlipidemia  Questions about ongoing need for statin, all answered today. Refilled and recheck lipid panel.   - Lipid panel reflex to direct LDL Non-fasting; Future  - pravastatin (PRAVACHOL) 20 MG tablet; Take 1 tablet (20 mg) by mouth daily.    Primary hypertension  Normotensive here in clinic. Refilled.   - losartan (COZAAR) 25 MG tablet; Take 1 tablet (25 mg) by mouth daily.    Impotence of organic origin  Refilled.   - sildenafil (VIAGRA) 50 MG tablet; Take 1-2 tablets ( mg) by mouth daily as needed (at least 30 minutes prior to intercourse).    Age-related osteoporosis without current pathological fracture  Due for recheck. Last Dexa was 2 years ago.   - DX Bone Density; Future    Encounter for Medicare annual wellness exam  79 yr old here for physical exam. Last physical exam done 1 year ago. Discussed preventative screenings which are updated below. Counseled on immunizations and healthy lifestyle. Follow-up in 1 year for repeat physical exam.     Patient has been advised of split billing requirements and indicates understanding: Yes    Counseling  Appropriate preventive services were addressed with this patient via screening, questionnaire, or discussion as appropriate for fall prevention, nutrition, physical activity, Tobacco-use cessation, social engagement, weight loss and cognition.  Checklist reviewing preventive services available has been given to the patient.  Reviewed patient's diet, addressing concerns and/or questions.   He is at risk for lack of exercise and has been provided with information to increase physical activity for the benefit of his well-being.   The patient reports drinking more than 3 alcoholic drinks per day and/or more than 7 drhnks per week. The patient was counseled and given information about possible harmful effects of  excessive alcohol intake.The patient was provided with written information regarding signs of hearing loss.     Morenita Glez is a 79 year old, presenting for the following:  Physical        2/19/2025    10:21 AM   Additional Questions   Roomed by Jessica ALEXANDRE CMA   Accompanied by Partner-Linette SCHUSTER  Health Care Directive  Patient does not have a Health Care Directive: Discussed advance care planning with patient; information given to patient to review.        2/17/2025   General Health   How would you rate your overall physical health? Good   Feel stress (tense, anxious, or unable to sleep) Not at all         2/17/2025   Nutrition   Diet: Regular (no restrictions)         2/17/2025   Exercise   Days per week of moderate/strenous exercise 2 days   Average minutes spent exercising at this level 20 min   (!) EXERCISE CONCERN      2/17/2025   Social Factors   Frequency of gathering with friends or relatives Twice a week   Worry food won't last until get money to buy more No   Food not last or not have enough money for food? No   Do you have housing? (Housing is defined as stable permanent housing and does not include staying ouside in a car, in a tent, in an abandoned building, in an overnight shelter, or couch-surfing.) Yes   Are you worried about losing your housing? No   Lack of transportation? No   Unable to get utilities (heat,electricity)? No         2/17/2025   Fall Risk   Fallen 2 or more times in the past year? No   Trouble with walking or balance? No          2/17/2025   Activities of Daily Living- Home Safety   Needs help with the following daily activites None of the above   Safety concerns in the home None of the above         2/17/2025   Dental   Dentist two times every year? Yes         2/17/2025   Hearing Screening   Hearing concerns? (!) I NEED TO ASK PEOPLE TO SPEAK UP OR REPEAT THEMSELVES.         2/17/2025   Driving Risk Screening   Patient/family members have concerns about driving No          2/17/2025   General Alertness/Fatigue Screening   Have you been more tired than usual lately? No         2/17/2025   Urinary Incontinence Screening   Bothered by leaking urine in past 6 months No         2/11/2024   TB Screening   Were you born outside of the [place]? No         Today's PHQ-2 Score:       2/19/2025    10:02 AM   PHQ-2 ( 1999 Pf[employer])   Q1: Little interest or pleasure in doing things 0   Q2: Feeling down, depressed or hopeless 0   PHQ-2 Score 0    Q1: Little interest or pleasure in doing things Not at all   Q2: Feeling down, depressed or hopeless Not at all   PHQ-2 Score 0       Patient-reported           2/17/2025   Substance Use   Alcohol more than 3/day or more than 7/wk Yes   How often do you have a drink containing alcohol 4 or more times a week   How many alcohol drinks on typical day 1 or 2   How often do you have 5+ drinks at one occasion Never   Audit 2/3 Score 0   How often not able to stop drinking once started Never   How often failed to do what normally expected Never   How often needed first drink in am after a heavy drinking session Never   How often feeling of guilt or remorse after drinking Never   How often unable to remember what happened the night before Never   Have you or someone else been injured because of your drinking No   Has anyone been concerned or suggested you cut down on drinking No   TOTAL SCORE - AUDIT 4   Do you have a current opioid prescription? No   How severe/bad is pain from 1 to 10? 4/10   Do you use any other substances recreationally? No     Social History     Tobacco Use    Smoking status: Never    Smokeless tobacco: Never   Vaping Use    Vaping status: Never Used   Substance Use Topics    Alcohol use: Yes     Comment: occas    Drug use: No       ASCVD Risk   The 10-year ASCVD risk score (Ralf SHELL, et al., 2019) is: 38.4%    Values used to calculate the score:      Age: 79 years      Sex: Male      Is Non- : No       Diabetic: No      Tobacco smoker: No      Systolic Blood Pressure: 150 mmHg      Is BP treated: Yes      HDL Cholesterol: 55 mg/dL      Total Cholesterol: 141 mg/dL          Reviewed and updated as needed this visit by Provider                    Current providers sharing in care for this patient include:  Patient Care Team:  Luis Antonio Santiago PA-C as PCP - General (Family Medicine)  Geetha Grossman RN as Specialty Care Coordinator (Oncology)  Ronen Johns MD as MD (Radiation Oncology)  Alan Ruiz MD as MD (Hematology & Oncology)  Luis Antonio Santiago PA-C as Assigned PCP  Arminda Quiroz APRN CNP as Assigned Pulmonology Provider  Belle Dave AuD as Audiologist (Audiology)  Jensen Royal MD as Assigned Cancer Care Provider  Js Rivera MD as Assigned Surgical Provider    The following health maintenance items are reviewed in Epic and correct as of today:  Health Maintenance   Topic Date Due    ZOSTER IMMUNIZATION (2 of 2) 03/04/2021    RSV VACCINE (1 - 1-dose 75+ series) Never done    INFLUENZA VACCINE (1) 09/01/2024    LIPID  02/14/2025    ANNUAL REVIEW OF HM ORDERS  02/14/2025    MEDICARE ANNUAL WELLNESS VISIT  02/14/2025    COVID-19 Vaccine (8 - Moderna risk 2024-25 season) 03/09/2025    BMP  02/11/2026    FALL RISK ASSESSMENT  02/19/2026    ADVANCE CARE PLANNING  02/09/2028    GLUCOSE  02/11/2028    DTAP/TDAP/TD IMMUNIZATION (3 - Td or Tdap) 02/08/2031    HEPATITIS C SCREENING  Completed    PHQ-2 (once per calendar year)  Completed    Pneumococcal Vaccine: 50+ Years  Completed    HPV IMMUNIZATION  Aged Out    MENINGITIS IMMUNIZATION  Aged Out    COLORECTAL CANCER SCREENING  Discontinued     Review of Systems  See HPI      Objective    Exam  BP (!) 150/70 (BP Location: Right arm, Patient Position: Sitting, Cuff Size: Adult Regular)   Pulse 60   Temp 97.1  F (36.2  C) (Tympanic)   Resp 18   Ht 1.829 m (6')   Wt 78.3 kg (172 lb 9.6 oz)   SpO2 98%   BMI 23.41 kg/m      Estimated body mass index is 23.41 kg/m  as calculated from the following:    Height as of this encounter: 1.829 m (6').    Weight as of this encounter: 78.3 kg (172 lb 9.6 oz).    Physical Exam  GENERAL: alert and no distress  NECK: no adenopathy, no asymmetry, masses, or scars  RESP: lungs clear to auscultation - no rales, rhonchi or wheezes  CV: regular rate and rhythm, normal S1 S2, no S3 or S4, no murmur, click or rub, no peripheral edema  MS: no gross musculoskeletal defects noted, no edema         2/19/2025   Mini Cog   Clock Draw Score 0 Abnormal   3 Item Recall 3 objects recalled   Mini Cog Total Score 3              Signed Electronically by: Luis Antonio Santiago PA-C

## 2025-02-20 ENCOUNTER — THERAPY VISIT (OUTPATIENT)
Dept: PHYSICAL THERAPY | Facility: CLINIC | Age: 80
End: 2025-02-20
Attending: INTERNAL MEDICINE
Payer: MEDICARE

## 2025-02-20 ENCOUNTER — PATIENT OUTREACH (OUTPATIENT)
Dept: CARE COORDINATION | Facility: CLINIC | Age: 80
End: 2025-02-20
Payer: MEDICARE

## 2025-02-20 DIAGNOSIS — M25.511 CHRONIC RIGHT SHOULDER PAIN: ICD-10-CM

## 2025-02-20 DIAGNOSIS — G89.29 CHRONIC RIGHT SHOULDER PAIN: ICD-10-CM

## 2025-02-20 DIAGNOSIS — M19.011 ARTHRITIS OF RIGHT SHOULDER REGION: ICD-10-CM

## 2025-02-20 PROCEDURE — 97161 PT EVAL LOW COMPLEX 20 MIN: CPT | Mod: GP | Performed by: PHYSICAL THERAPIST

## 2025-02-20 PROCEDURE — 97110 THERAPEUTIC EXERCISES: CPT | Mod: GP | Performed by: PHYSICAL THERAPIST

## 2025-02-20 NOTE — PROGRESS NOTES
"PHYSICAL THERAPY EVALUATION  Type of Visit: Evaluation       Fall Risk Screen:  Fall screen completed by: PT  Have you fallen 2 or more times in the past year?: No  Have you fallen and had an injury in the past year?: No  Is patient a fall risk?: No    Subjective   Pt presents with bilateral shoulder pain, R > L.  Pt reports that he has arthritis in both shoulders. Pain is worse with sleeping on shoulders, starting snowblower and mower, shoveling, using chainsaw, and overhead movement. Waking up at night with shoulder pain.  Notices arm \" falls asleep\" when sleeping on side. Right handed. 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        Presenting condition or subjective complaint: Shoulder pain  Date of onset: 02/11/25    Relevant medical history: Cancer   Dates & types of surgery:      Prior diagnostic imaging/testing results: Other pet scans   Prior therapy history for the same diagnosis, illness or injury: No      Prior Level of Function  Transfers: Independent  Ambulation: Independent  ADL: Independent    Living Environment  Social support: With a significant other or spouse   Type of home: House   Stairs to enter the home: No       Ramp: No   Stairs inside the home: Yes 1 Is there a railing: Yes     Help at home: None  Equipment owned:       Employment: No    Hobbies/Interests: Cars, woodworking    Patient goals for therapy: Sleep on sides/shoulders       Objective   SHOULDER EVALUATION  PAIN: Pain Level at Rest: 2/10  Pain Level with Use: 7/10  Pain Quality: Aching and Sharp  Pain is Exacerbated By: see subjective  Pain is Relieved By: rest  POSTURE: Standing Posture: Thoracic kyphosis increased  ROM:   (Degrees) Left AROM Right AROM    Shoulder Flexion 116 116   Shoulder Abduction 124 117   Shoulder Internal Rotation T7 L2   Shoulder External Rotation C6 C6   Pain: With flex and abd zeynep, IR R  STRENGTH:   Pain: - none + mild ++ moderate +++ severe  Strength Scale: 0-5/5 " Left Right   Shoulder Flexion 5, painful 4, painful   Shoulder Abduction 5, painful 4, painful   Shoulder Internal Rotation 5 5   Shoulder External Rotation 4+ 4, painful     SPECIAL TESTS:    Left Right   Impingement     Neer's Positive Positive   Hawkin's-Octaviano Positive Positive   Empty can Positive Positive     PALPATION:  TTP over biceps short head origin amado   CERVICAL SCREEN: WNL    Assessment & Plan   CLINICAL IMPRESSIONS  Medical Diagnosis: Chronic right shoulder pain (M25.511, G89.29), 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  The following significant findings have been identified: Pain, Decreased ROM/flexibility, Decreased strength, Impaired muscle performance, and Decreased activity tolerance. These impairments interfere with their ability to perform recreational activities as compared to previous level of function.     Clinical Decision Making (Complexity):  Clinical Presentation: Stable/Uncomplicated  Clinical Presentation Rationale: based on medical and personal factors listed in PT evaluation  Clinical Decision Making (Complexity): Low complexity    PLAN OF CARE  Treatment Interventions:  Modalities: Dry Needling, E-stim, Ultrasound  Interventions: Manual Therapy, Neuromuscular Re-education, Therapeutic Activity, Therapeutic Exercise, Self-Care/Home Management    Long Term Goals     PT Goal 1  Goal Identifier: 1  Goal Description: Pt will achieve 130* shoulder flexion AROM with pain <3/10 in order to reach overhead into cabinets  Target Date: 04/17/25  PT Goal 2  Goal Identifier: 2  Goal Description: Pt will complete 10 rows with BTB with pain <3/10 in order to start chainsaw and show blower  Target Date: 04/17/25  PT Goal 3  Goal Identifier: 3  Goal Description: Pt will be independent with [facility] for self-management  of symptoms  Target Date: 04/17/25      Frequency of Treatment: 1x/week  Duration of Treatment: 8 weeks    Education Assessment:   Learner/Method: Patient;Listening;Reading;Demonstration;Pictures/Video;No Barriers to Learning    Risks and benefits of evaluation/treatment have been explained.   Patient/Family/caregiver agrees with Plan of Care.     Evaluation Time:          Signing Clinician: Leslie Gay PT and CÉSAR Galvin        [facility]                                                                                   OUTPATIENT PHYSICAL THERAPY      PLAN OF TREATMENT FOR OUTPATIENT REHABILITATION   Patient's Last Name, First Name, JASPERSHARA BuchananNewton  HANNA YOB: 1945   Provider's Name   [facility]   Medical Record No.  [id number]     Onset Date: 02/11/25  Start of Care Date: 02/20/25     Medical Diagnosis:  Chronic right shoulder pain (M25.511, G89.29), Arthritis of right shoulder region (M19.011)      PT Treatment Diagnosis:  Amado shoulder pain Plan of Treatment  Frequency/Duration: 1x/week/ 8 weeks    Certification date from 02/20/25 to 04/17/25         See note for plan of treatment details and functional goals     Leslie Gay, PT                         I CERTIFY THE NEED FOR THESE SERVICES FURNISHED UNDER        THIS PLAN OF TREATMENT AND WHILE UNDER MY CARE     (Physician attestation of this document indicates review and certification of the therapy plan).              Referring Provider:  Jensen Royal MD    Initial Assessment  See Epic Evaluation- Start of Care Date: 02/20/25

## 2025-02-27 ENCOUNTER — THERAPY VISIT (OUTPATIENT)
Dept: PHYSICAL THERAPY | Facility: CLINIC | Age: 80
End: 2025-02-27
Attending: INTERNAL MEDICINE
Payer: MEDICARE

## 2025-02-27 DIAGNOSIS — M19.011 ARTHRITIS OF RIGHT SHOULDER REGION: ICD-10-CM

## 2025-02-27 DIAGNOSIS — G89.29 CHRONIC RIGHT SHOULDER PAIN: Primary | ICD-10-CM

## 2025-02-27 DIAGNOSIS — M25.511 CHRONIC RIGHT SHOULDER PAIN: Primary | ICD-10-CM

## 2025-02-27 PROCEDURE — 97110 THERAPEUTIC EXERCISES: CPT | Mod: GP

## 2025-02-27 PROCEDURE — 97140 MANUAL THERAPY 1/> REGIONS: CPT | Mod: GP

## 2025-03-06 ENCOUNTER — THERAPY VISIT (OUTPATIENT)
Dept: PHYSICAL THERAPY | Facility: CLINIC | Age: 80
End: 2025-03-06
Attending: INTERNAL MEDICINE
Payer: MEDICARE

## 2025-03-06 DIAGNOSIS — M19.011 ARTHRITIS OF RIGHT SHOULDER REGION: ICD-10-CM

## 2025-03-06 DIAGNOSIS — G89.29 CHRONIC RIGHT SHOULDER PAIN: Primary | ICD-10-CM

## 2025-03-06 DIAGNOSIS — M25.511 CHRONIC RIGHT SHOULDER PAIN: Primary | ICD-10-CM

## 2025-03-06 PROCEDURE — 97140 MANUAL THERAPY 1/> REGIONS: CPT | Mod: GP

## 2025-03-06 PROCEDURE — 97110 THERAPEUTIC EXERCISES: CPT | Mod: GP

## 2025-03-08 ENCOUNTER — HEALTH MAINTENANCE LETTER (OUTPATIENT)
Age: 80
End: 2025-03-08

## 2025-03-13 ENCOUNTER — THERAPY VISIT (OUTPATIENT)
Dept: PHYSICAL THERAPY | Facility: CLINIC | Age: 80
End: 2025-03-13
Attending: INTERNAL MEDICINE
Payer: MEDICARE

## 2025-03-13 DIAGNOSIS — G89.29 CHRONIC RIGHT SHOULDER PAIN: Primary | ICD-10-CM

## 2025-03-13 DIAGNOSIS — M25.511 CHRONIC RIGHT SHOULDER PAIN: Primary | ICD-10-CM

## 2025-03-13 DIAGNOSIS — M19.011 ARTHRITIS OF RIGHT SHOULDER REGION: ICD-10-CM

## 2025-03-13 PROCEDURE — 97140 MANUAL THERAPY 1/> REGIONS: CPT | Mod: GP

## 2025-03-13 PROCEDURE — 97110 THERAPEUTIC EXERCISES: CPT | Mod: GP

## 2025-03-25 ENCOUNTER — LAB (OUTPATIENT)
Dept: LAB | Facility: CLINIC | Age: 80
End: 2025-03-25
Attending: INTERNAL MEDICINE
Payer: MEDICARE

## 2025-03-25 ENCOUNTER — INFUSION THERAPY VISIT (OUTPATIENT)
Dept: INFUSION THERAPY | Facility: CLINIC | Age: 80
End: 2025-03-25
Attending: INTERNAL MEDICINE
Payer: MEDICARE

## 2025-03-25 VITALS
HEART RATE: 54 BPM | TEMPERATURE: 97.6 F | DIASTOLIC BLOOD PRESSURE: 71 MMHG | RESPIRATION RATE: 18 BRPM | BODY MASS INDEX: 23.44 KG/M2 | SYSTOLIC BLOOD PRESSURE: 124 MMHG | WEIGHT: 172.8 LBS

## 2025-03-25 DIAGNOSIS — Z79.899 HIGH RISK MEDICATION USE: Primary | ICD-10-CM

## 2025-03-25 DIAGNOSIS — Z79.899 HIGH RISK MEDICATION USE: ICD-10-CM

## 2025-03-25 DIAGNOSIS — C76.0 HEAD AND NECK CANCER (H): ICD-10-CM

## 2025-03-25 DIAGNOSIS — C76.0 HEAD AND NECK CANCER (H): Primary | ICD-10-CM

## 2025-03-25 LAB
ALBUMIN SERPL BCG-MCNC: 3.7 G/DL (ref 3.5–5.2)
ALP SERPL-CCNC: 49 U/L (ref 40–150)
ALT SERPL W P-5'-P-CCNC: 19 U/L (ref 0–70)
ANION GAP SERPL CALCULATED.3IONS-SCNC: 9 MMOL/L (ref 7–15)
AST SERPL W P-5'-P-CCNC: 23 U/L (ref 0–45)
BILIRUB SERPL-MCNC: 0.6 MG/DL
BUN SERPL-MCNC: 16 MG/DL (ref 8–23)
CALCIUM SERPL-MCNC: 9.4 MG/DL (ref 8.8–10.4)
CHLORIDE SERPL-SCNC: 105 MMOL/L (ref 98–107)
CREAT SERPL-MCNC: 1.03 MG/DL (ref 0.67–1.17)
EGFRCR SERPLBLD CKD-EPI 2021: 74 ML/MIN/1.73M2
GLUCOSE SERPL-MCNC: 79 MG/DL (ref 70–99)
HCO3 SERPL-SCNC: 27 MMOL/L (ref 22–29)
POTASSIUM SERPL-SCNC: 4.2 MMOL/L (ref 3.4–5.3)
PROT SERPL-MCNC: 6.3 G/DL (ref 6.4–8.3)
SODIUM SERPL-SCNC: 141 MMOL/L (ref 135–145)
TSH SERPL DL<=0.005 MIU/L-ACNC: 2.09 UIU/ML (ref 0.3–4.2)

## 2025-03-25 PROCEDURE — 84075 ASSAY ALKALINE PHOSPHATASE: CPT | Performed by: INTERNAL MEDICINE

## 2025-03-25 PROCEDURE — 84443 ASSAY THYROID STIM HORMONE: CPT | Performed by: INTERNAL MEDICINE

## 2025-03-25 PROCEDURE — 36415 COLL VENOUS BLD VENIPUNCTURE: CPT | Performed by: INTERNAL MEDICINE

## 2025-03-25 PROCEDURE — 250N000011 HC RX IP 250 OP 636: Mod: JZ | Performed by: INTERNAL MEDICINE

## 2025-03-25 PROCEDURE — 96413 CHEMO IV INFUSION 1 HR: CPT

## 2025-03-25 PROCEDURE — 84155 ASSAY OF PROTEIN SERUM: CPT | Performed by: INTERNAL MEDICINE

## 2025-03-25 PROCEDURE — 258N000003 HC RX IP 258 OP 636: Performed by: INTERNAL MEDICINE

## 2025-03-25 PROCEDURE — 82310 ASSAY OF CALCIUM: CPT | Performed by: INTERNAL MEDICINE

## 2025-03-25 RX ADMIN — SODIUM CHLORIDE 250 ML: 0.9 INJECTION, SOLUTION INTRAVENOUS at 11:34

## 2025-03-25 RX ADMIN — SODIUM CHLORIDE 400 MG: 9 INJECTION, SOLUTION INTRAVENOUS at 11:55

## 2025-03-25 ASSESSMENT — PAIN SCALES - GENERAL: PAINLEVEL_OUTOF10: NO PAIN (0)

## 2025-03-25 NOTE — PROGRESS NOTES
Infusion Nursing Note:  Newton Buchanan presents today for Keyuda   Patient seen by provider today: No   present during visit today: Not Applicable.    Note: N/A.    Intravenous Access:  Peripheral IV placed.    Treatment Conditions:  Lab Results   Component Value Date     03/25/2025    POTASSIUM 4.2 03/25/2025    CR 1.03 03/25/2025    JÚNIOR 9.4 03/25/2025    BILITOTAL 0.6 03/25/2025    ALBUMIN 3.7 03/25/2025    ALT 19 03/25/2025    AST 23 03/25/2025   Results reviewed, labs MET treatment parameters, ok to proceed with treatment.    Post Infusion Assessment:  Patient tolerated infusion without incident.  Blood return noted pre and post infusion.  Site patent and intact, free from redness, edema or discomfort.  No evidence of extravasations.  Access discontinued per protocol.     Discharge Plan:   Discharge instructions reviewed with: Patient.  Patient and/or family verbalized understanding of discharge instructions and all questions answered.  AVS to patient via [employer].  Patient will return 5/13/2025 for next appointment.   Patient discharged in stable condition accompanied by: self.  Departure Mode: Ambulatory.    Mercy Carvalho RN

## 2025-03-27 ENCOUNTER — THERAPY VISIT (OUTPATIENT)
Dept: PHYSICAL THERAPY | Facility: CLINIC | Age: 80
End: 2025-03-27
Attending: INTERNAL MEDICINE
Payer: MEDICARE

## 2025-03-27 DIAGNOSIS — M19.011 ARTHRITIS OF RIGHT SHOULDER REGION: ICD-10-CM

## 2025-03-27 DIAGNOSIS — G89.29 CHRONIC RIGHT SHOULDER PAIN: Primary | ICD-10-CM

## 2025-03-27 DIAGNOSIS — M25.511 CHRONIC RIGHT SHOULDER PAIN: Primary | ICD-10-CM

## 2025-03-27 PROCEDURE — 97110 THERAPEUTIC EXERCISES: CPT | Mod: GP

## 2025-03-27 PROCEDURE — 97140 MANUAL THERAPY 1/> REGIONS: CPT | Mod: GP

## 2025-03-27 NOTE — PROGRESS NOTES
03/27/25 0500   Appointment Info   Signing clinician's name / credentials Anneliese Young, PT   Visits Used 5   Medical Diagnosis Chronic right shoulder pain (M25.511, G89.29), Arthritis of right shoulder region (M19.011)   PT Tx Diagnosis [place] shoulder pain   Progress Note/Certification   Start of Care Date 02/20/25   Onset of illness/injury or Date of Surgery 02/11/25   Therapy Frequency 1x/week   Predicted Duration 8 weeks   Certification date from 02/20/25   Certification date to 04/17/25   Progress Note Due Date 04/17/25   Progress Note Completed Date 02/20/25   GOALS   PT Goals 2;3   PT Goal 1   Goal Identifier 1   Goal Description Pt will achieve 130* shoulder flexion AROM with pain <3/10 in order to reach overhead into cabinets   Target Date 04/17/25   Date Met 03/27/25   PT Goal 2   Goal Identifier 2   Goal Description Pt will complete 10 rows with BTB with pain <3/10 in order to start chainsaw and show blower   Target Date 04/17/25   Date Met 03/06/25   PT Goal 3   Goal Identifier 3   Goal Description Pt will be independent with HEP for self-management of symptoms   Target Date 04/17/25   Date Met 03/27/25   Subjective Report   Subjective Report Pt reports his shlds are better. Rates his R shld at 2-3/10 with certain mvmts, L shld at 0/10. Can reach an item on a high shelf with 2/10 pain. He feels ready to do the HEP independently.   Objective Measures   Objective Measures Objective Measure 1;Objective Measure 2;Objective Measure 3   Objective Measure 1   Objective Measure R shld PROM   Details flex 135*, abd 120*, ER 70*, IR 65*   Objective Measure 2   Objective Measure L shld PROM   Details flex 146*, abd 107*, ER 76*, IR 65*   Treatment Interventions (PT)   Interventions Therapeutic Procedure/Exercise;Manual Therapy   Therapeutic Procedure/Exercise   Therapeutic Procedures: strength, endurance, ROM, flexibility minutes (60182) 15   Ther Proc 1 - Details instructed pt in progression of exs and decr freq to  every other day once R shld becomes painfree   PTRx Ther Proc 12 Shoulder Scapular Retraction with Tubing   PTRx Ther Proc 12 - Details RTB x 10   PTRx Ther Proc 13 Shoulder Horizontal Abduction/Diagonals With Theraband   PTRx Ther Proc 13 - Details YTB for R arm up; RTB for L arm up x 10 ea   Skilled Intervention Instructed in technique and form to improve ROM and strength, educated in HEP   Patient Response/Progress demo well   Manual Therapy   Manual Therapy: Mobilization, MFR, MLD, friction massage minutes (06132) 8   Manual Therapy 1 jt mobs   Manual Therapy 1 - Details axial distraction, caudal, lateral and post glide R shld gr III-IV   Education   Learner/Method Patient;Listening;Reading;Demonstration;Pictures/Video;No Barriers to Learning   Plan   Home program PTRx - phone and paper   Plan for next session D/C PT   Total Session Time   Timed Code Treatment Minutes 23   Total Treatment Time (sum of timed and untimed services) 23         DISCHARGE  Reason for Discharge: Patient has met all goals.    Equipment Issued: [employer]    Discharge Plan: Patient to continue home program.    Referring Provider:  Jensen Royal

## 2025-04-14 ENCOUNTER — MYC MEDICAL ADVICE (OUTPATIENT)
Dept: FAMILY MEDICINE | Facility: CLINIC | Age: 80
End: 2025-04-14
Payer: MEDICARE

## 2025-04-14 DIAGNOSIS — E78.2 MIXED HYPERLIPIDEMIA: ICD-10-CM

## 2025-04-14 DIAGNOSIS — I10 PRIMARY HYPERTENSION: ICD-10-CM

## 2025-04-14 DIAGNOSIS — N52.9 IMPOTENCE OF ORGANIC ORIGIN: ICD-10-CM

## 2025-04-14 RX ORDER — LOSARTAN POTASSIUM 25 MG/1
25 TABLET ORAL DAILY
Qty: 90 TABLET | Refills: 3 | Status: SHIPPED | OUTPATIENT
Start: 2025-04-14

## 2025-04-14 RX ORDER — SILDENAFIL 50 MG/1
50-100 TABLET, FILM COATED ORAL DAILY PRN
Qty: 30 TABLET | Refills: 2 | Status: SHIPPED | OUTPATIENT
Start: 2025-04-14

## 2025-04-14 RX ORDER — PRAVASTATIN SODIUM 20 MG
20 TABLET ORAL DAILY
Qty: 90 TABLET | Refills: 3 | Status: SHIPPED | OUTPATIENT
Start: 2025-04-14

## 2025-05-13 ENCOUNTER — ONCOLOGY VISIT (OUTPATIENT)
Dept: ONCOLOGY | Facility: CLINIC | Age: 80
End: 2025-05-13
Attending: NURSE PRACTITIONER
Payer: MEDICARE

## 2025-05-13 ENCOUNTER — APPOINTMENT (OUTPATIENT)
Dept: LAB | Facility: CLINIC | Age: 80
End: 2025-05-13
Attending: INTERNAL MEDICINE
Payer: MEDICARE

## 2025-05-13 ENCOUNTER — INFUSION THERAPY VISIT (OUTPATIENT)
Dept: INFUSION THERAPY | Facility: CLINIC | Age: 80
End: 2025-05-13
Attending: INTERNAL MEDICINE
Payer: MEDICARE

## 2025-05-13 VITALS
SYSTOLIC BLOOD PRESSURE: 128 MMHG | RESPIRATION RATE: 16 BRPM | OXYGEN SATURATION: 98 % | DIASTOLIC BLOOD PRESSURE: 64 MMHG | HEIGHT: 71 IN | TEMPERATURE: 97.8 F | HEART RATE: 54 BPM | WEIGHT: 167 LBS | BODY MASS INDEX: 23.38 KG/M2

## 2025-05-13 VITALS — DIASTOLIC BLOOD PRESSURE: 72 MMHG | HEART RATE: 48 BPM | SYSTOLIC BLOOD PRESSURE: 137 MMHG

## 2025-05-13 DIAGNOSIS — C78.01 MALIGNANT NEOPLASM METASTATIC TO BOTH LUNGS (H): ICD-10-CM

## 2025-05-13 DIAGNOSIS — C76.0 HEAD AND NECK CANCER (H): Primary | ICD-10-CM

## 2025-05-13 DIAGNOSIS — Z79.899 HIGH RISK MEDICATION USE: ICD-10-CM

## 2025-05-13 DIAGNOSIS — C78.02 MALIGNANT NEOPLASM METASTATIC TO BOTH LUNGS (H): ICD-10-CM

## 2025-05-13 LAB
ALBUMIN SERPL BCG-MCNC: 3.6 G/DL (ref 3.5–5.2)
ALP SERPL-CCNC: 49 U/L (ref 40–150)
ALT SERPL W P-5'-P-CCNC: 19 U/L (ref 0–70)
ANION GAP SERPL CALCULATED.3IONS-SCNC: 6 MMOL/L (ref 7–15)
AST SERPL W P-5'-P-CCNC: 24 U/L (ref 0–45)
BILIRUB SERPL-MCNC: 0.7 MG/DL
BUN SERPL-MCNC: 15.4 MG/DL (ref 8–23)
CALCIUM SERPL-MCNC: 9.4 MG/DL (ref 8.8–10.4)
CHLORIDE SERPL-SCNC: 103 MMOL/L (ref 98–107)
CREAT SERPL-MCNC: 1.03 MG/DL (ref 0.67–1.17)
EGFRCR SERPLBLD CKD-EPI 2021: 74 ML/MIN/1.73M2
GLUCOSE SERPL-MCNC: 91 MG/DL (ref 70–99)
HCO3 SERPL-SCNC: 29 MMOL/L (ref 22–29)
POTASSIUM SERPL-SCNC: 4.3 MMOL/L (ref 3.4–5.3)
PROT SERPL-MCNC: 6.2 G/DL (ref 6.4–8.3)
SODIUM SERPL-SCNC: 138 MMOL/L (ref 135–145)
TSH SERPL DL<=0.005 MIU/L-ACNC: 1.4 UIU/ML (ref 0.3–4.2)

## 2025-05-13 PROCEDURE — 250N000011 HC RX IP 250 OP 636: Mod: JZ | Performed by: NURSE PRACTITIONER

## 2025-05-13 PROCEDURE — G0463 HOSPITAL OUTPT CLINIC VISIT: HCPCS | Performed by: NURSE PRACTITIONER

## 2025-05-13 PROCEDURE — 84155 ASSAY OF PROTEIN SERUM: CPT | Performed by: INTERNAL MEDICINE

## 2025-05-13 PROCEDURE — 99214 OFFICE O/P EST MOD 30 MIN: CPT | Performed by: NURSE PRACTITIONER

## 2025-05-13 PROCEDURE — 36415 COLL VENOUS BLD VENIPUNCTURE: CPT | Performed by: INTERNAL MEDICINE

## 2025-05-13 PROCEDURE — 84443 ASSAY THYROID STIM HORMONE: CPT | Performed by: INTERNAL MEDICINE

## 2025-05-13 PROCEDURE — 96413 CHEMO IV INFUSION 1 HR: CPT

## 2025-05-13 PROCEDURE — 258N000003 HC RX IP 258 OP 636: Performed by: NURSE PRACTITIONER

## 2025-05-13 PROCEDURE — G2211 COMPLEX E/M VISIT ADD ON: HCPCS | Performed by: NURSE PRACTITIONER

## 2025-05-13 RX ORDER — ALBUTEROL SULFATE 90 UG/1
1-2 INHALANT RESPIRATORY (INHALATION)
Status: CANCELLED
Start: 2025-05-20

## 2025-05-13 RX ORDER — DIPHENHYDRAMINE HYDROCHLORIDE 50 MG/ML
50 INJECTION, SOLUTION INTRAMUSCULAR; INTRAVENOUS
Start: 2025-07-01

## 2025-05-13 RX ORDER — MEPERIDINE HYDROCHLORIDE 25 MG/ML
25 INJECTION INTRAMUSCULAR; INTRAVENOUS; SUBCUTANEOUS
OUTPATIENT
Start: 2025-07-01

## 2025-05-13 RX ORDER — ALBUTEROL SULFATE 90 UG/1
1-2 INHALANT RESPIRATORY (INHALATION)
Start: 2025-07-01

## 2025-05-13 RX ORDER — METHYLPREDNISOLONE SODIUM SUCCINATE 40 MG/ML
40 INJECTION INTRAMUSCULAR; INTRAVENOUS
Status: CANCELLED
Start: 2025-05-20

## 2025-05-13 RX ORDER — MEPERIDINE HYDROCHLORIDE 25 MG/ML
25 INJECTION INTRAMUSCULAR; INTRAVENOUS; SUBCUTANEOUS
Status: CANCELLED | OUTPATIENT
Start: 2025-05-20

## 2025-05-13 RX ORDER — ALBUTEROL SULFATE 0.83 MG/ML
2.5 SOLUTION RESPIRATORY (INHALATION)
OUTPATIENT
Start: 2025-07-01

## 2025-05-13 RX ORDER — LORAZEPAM 2 MG/ML
0.5 INJECTION INTRAMUSCULAR EVERY 4 HOURS PRN
Status: CANCELLED
Start: 2025-05-20

## 2025-05-13 RX ORDER — LORAZEPAM 2 MG/ML
0.5 INJECTION INTRAMUSCULAR EVERY 4 HOURS PRN
Start: 2025-07-01

## 2025-05-13 RX ORDER — DIPHENHYDRAMINE HYDROCHLORIDE 50 MG/ML
25 INJECTION, SOLUTION INTRAMUSCULAR; INTRAVENOUS
Start: 2025-07-01

## 2025-05-13 RX ORDER — EPINEPHRINE 1 MG/ML
0.3 INJECTION, SOLUTION, CONCENTRATE INTRAVENOUS EVERY 5 MIN PRN
Status: CANCELLED | OUTPATIENT
Start: 2025-05-20

## 2025-05-13 RX ORDER — DIPHENHYDRAMINE HYDROCHLORIDE 50 MG/ML
50 INJECTION, SOLUTION INTRAMUSCULAR; INTRAVENOUS
Status: CANCELLED
Start: 2025-05-20

## 2025-05-13 RX ORDER — METHYLPREDNISOLONE SODIUM SUCCINATE 40 MG/ML
40 INJECTION INTRAMUSCULAR; INTRAVENOUS
Start: 2025-07-01

## 2025-05-13 RX ORDER — ALBUTEROL SULFATE 0.83 MG/ML
2.5 SOLUTION RESPIRATORY (INHALATION)
Status: CANCELLED | OUTPATIENT
Start: 2025-05-20

## 2025-05-13 RX ORDER — EPINEPHRINE 1 MG/ML
0.3 INJECTION, SOLUTION, CONCENTRATE INTRAVENOUS EVERY 5 MIN PRN
OUTPATIENT
Start: 2025-07-01

## 2025-05-13 RX ORDER — DIPHENHYDRAMINE HYDROCHLORIDE 50 MG/ML
25 INJECTION, SOLUTION INTRAMUSCULAR; INTRAVENOUS
Status: CANCELLED
Start: 2025-05-20

## 2025-05-13 RX ADMIN — SODIUM CHLORIDE 400 MG: 9 INJECTION, SOLUTION INTRAVENOUS at 11:10

## 2025-05-13 RX ADMIN — SODIUM CHLORIDE 250 ML: 0.9 INJECTION, SOLUTION INTRAVENOUS at 10:59

## 2025-05-13 ASSESSMENT — PAIN SCALES - GENERAL: PAINLEVEL_OUTOF10: MILD PAIN (3)

## 2025-05-13 NOTE — PROGRESS NOTES
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 Royal    _____________________________________________________________________________    History of Present Illness/ Interval History    Mr. 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    He had first line Keytruda x 2 yrs through 7/2022, resulting in CR.  Since then he has had frequent treatment holidays.  His last dose of Keytruda was on 10/26/2023.  After a break of 4 months repeat PET scan showed evidence of disease progression.  So, was restarted Keytruda on 2/15/2024, which he continues on (every 6 week dosing). Getting stable/AL on this per last PET scan.      Returns for 3-mth visit.   Tolerating Keytruda generally well.  Notes early satiety and lower appetite, chronic over time.   No swallowing difficulties.  5 lb weight loss over last 2 mths.  Constipation, no diarrhea; managed. No dyspnea.   Thin skin, bruises easily. No rash.     Chronic R shoulder pain due to arthritis. Worked with PT.     Doing a lot of travelling. Just returned from [facility] this week.     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 FNAs benign. Followed.   -2/2020: progressive right neck swelling, no other symptoms.   -2/11/20 FNA biosy right neck mass: metastatic squamous cell cancer, HPV16+  -PET: hypermetabolic right cervical adenopathy, numerous bilateral lung mets, right hilar nodes.   -No evident primary site identified, but highly suspicious of H/N given IHC staining and radiographical findings/spread  -Neogenomic testing - CPS 70    Treatment:  5/2020 - 7/2022: Keytruda (initiated in [place], then transferred care  to [place]). Completed 2 yrs of immunotherapy, resulted in CR by PET     11/2020: local progression in right neck only  -12/2020: palliative RT to right neck (3750 cGy/15)    1/2021: palpable right jaw/parotid lesion, PET+. Remainder of right neck disease improved after RT and lung mets stable. US-guided biopsy right parotid lesion attempted, but no lesion seen to biopsy so cancelled.    7/2022 - 12/2022: observation, treatment holiday until progression.   -12/2022 PET/CT: solitary recurrence in subcarinal LN    12/12/2022- 10/26/2026: Keytruda resumed  --Resulted in CR (PET) by 6/2023  --6/2023: changed to every 6 week cycles  --After dose of Keytruda on 10/26/2023, he took a break with sustained response.    2/2024 - present: Keytruda 400 mg every 6 weeks resumed for progression on PET (increased activity and enlargement within the subcarinal lymph node, left oropharynx and mildly FDG avid left cervical level 1B lymph node)  ---Stable disease to partial response since restarting Keytruda.    Physical Exam    GENERAL: Alert and oriented to time place and person. Seated comfortably. In no distress.  HEAD: Atraumatic and normocephalic. No alopecia.  EYES: COSMO, EOMI. No erythema. No icterus.  ORAL CAVITY: Moist. No mucosal lesion or tonsillar enlargement.  NECK: supple. No thyroid enlargement.  LYMPH NODES: No palpable supraclavicular, cervical, axillary lymphadenopathy.  CHEST: clear to auscultation bilaterally. Resonant to percussion throughout bilaterally. Symmetrical breath movements bilaterally.  CVS: RRR  ABDOMEN: Soft. Not tender. Not distended. No palpable hepatomegaly or splenomegaly. No other mass palpable. Bowel sounds present.  EXTREMITIES: Warm. No peripheral edema.  SKIN: no rash, or bruising or purpura.   NEURO: No gross deficit noted. Non-antalgic gait.      Lab Results    Recent Results (from the past week)   Comprehensive metabolic panel   Result Value Ref Range    Sodium 138 135 - 145 mmol/L     Potassium 4.3 3.4 - 5.3 mmol/L    Carbon Dioxide (CO2) 29 22 - 29 mmol/L    Anion Gap 6 (L) 7 - 15 mmol/L    Urea Nitrogen 15.4 8.0 - 23.0 mg/dL    Creatinine 1.03 0.67 - 1.17 mg/dL    GFR Estimate 74 >60 mL/min/1.73m2    Calcium 9.4 8.8 - 10.4 mg/dL    Chloride 103 98 - 107 mmol/L    Glucose 91 70 - 99 mg/dL    Alkaline Phosphatase 49 40 - 150 U/L    AST 24 0 - 45 U/L    ALT 19 0 - 70 U/L    Protein Total 6.2 (L) 6.4 - 8.3 g/dL    Albumin 3.6 3.5 - 5.2 g/dL    Bilirubin Total 0.7 <=1.2 mg/dL   TSH with free T4 reflex   Result Value Ref Range    TSH 1.40 0.30 - 4.20 uIU/mL         Imaging    2/6/2025 PET: [place]/stable disease.    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  After resuming Keytruda he again had a complete response.  In June 2023 we switched him to every 6-week Keytruda infusions with side effects including unintentional weight loss, fatigue so he had treatment holiday 10/2023 - 2/2024. He was found to have disease progression so was restarted on Keytruda 2/2024, which he continues. He's had stable/MA on it since restarting.     No significant side effects from the Keytruda.      Labs: CMP and TSH WNL    No clinical concern for progression.   1/2025 ENT exam normal, no local recurrence.     Plan:  -Continue Keytruda 400 mg every 6 weeks. He works his cycles around travel.  -Return in 7 weeks for Keytruda only (following a trip)  -Return in 13 weeks with PET scan and provider visit (planning every 4-6 mths imaging)    3.    Osteoporosis  Managed by PCP and Endocrinologist.    4.  Right shoulder pain:    He has chronic shoulder pain issues.  On PET scan looks like he has some arthritis there.  Assessed in PT and working on home exercises. Using ibuprofen or topical analgesic PRN.     Billing:  Total time 30 minutes, to include face to  face visit, review of EMR, ordering, documentation and coordination of care on date of service.    complexity modifier for longitudinal care.       Signed by: Janay Fan, NP

## 2025-05-13 NOTE — PROGRESS NOTES
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    Weight as of this encounter: 75.8 kg (167 lb). Body surface area is 1.95 meters squared.  Mild Pain (3) Comment: Data Unavailable   No LMP for male patient.  Allergies reviewed: Yes  Medications reviewed: Yes    Medications: Medication refills not needed today.  Pharmacy name entered into [facility]:    [id number] - [place], [place] - [address] [facility] [address] [facility] AT [facility] OF [address] [place] [address] [employer] HOME DELIVERY - [facility], [place] - [facility]    Frailty Screening:   Is the patient here for a new oncology consult visit in cancer care? 2. No    PHQ9:  Did this patient require a PHQ9?: No      Clinical concerns:  None today      Meli Cornejo, PAUL              "

## 2025-05-13 NOTE — PROGRESS NOTES
Infusion Nursing Note:  Newton Buchanan presents today for Keytruda.    Patient seen by provider today: Yes: Janay Fan NP   present during visit today: Not Applicable.    Note: N/A.      Intravenous Access:  Peripheral IV placed.    Treatment Conditions:  Results reviewed, labs MET treatment parameters, ok to proceed with treatment.      Post Infusion Assessment:  Patient tolerated infusion without incident.  Blood return noted pre and post infusion.  Site patent and intact, free from redness, edema or discomfort.  No evidence of extravasations.  Access discontinued per protocol.       Discharge Plan:   Patient discharged in stable condition accompanied by: friend.  Departure Mode: Ambulatory.      Alejandra Monaco RN

## 2025-05-13 NOTE — LETTER
5/13/2025      Newton Buchanan  [address]  [place] [address]      Dear Colleague,    Thank you for referring your patient, Newton Buchanan, to the [facility]. Please see a copy of my visit note below.    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 Royal    _____________________________________________________________________________    History of Present Illness/ Interval History    Mr. 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    He had first line Keytruda x 2 yrs through 7/2022, resulting in CR.  Since then he has had frequent treatment holidays.  His last dose of Keytruda was on 10/26/2023.  After a break of 4 months repeat PET scan showed evidence of disease progression.  So, was restarted Keytruda on 2/15/2024, which he continues on (every 6 week dosing). Getting stable/[place] on this per last PET scan.      Returns for 3-mth visit.   Tolerating Keytruda generally well.  Notes early satiety and lower appetite, chronic over time.   No swallowing difficulties.  5 lb weight loss over last 2 mths.  Constipation, no diarrhea; managed. No dyspnea.   Thin skin, bruises easily. No rash.     Chronic R shoulder pain due to arthritis. Worked with PT.     Doing a lot of travelling. Just returned from [place] this week.     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 FNAs benign. Followed.   -2/2020: progressive right neck swelling, no other symptoms.   -2/11/20 FNA biosy right neck mass: metastatic squamous cell cancer, HPV16+  -PET: hypermetabolic right cervical adenopathy, numerous bilateral lung mets, right hilar nodes.    -No evident primary site identified, but highly suspicious of H/N given IHC staining and radiographical findings/spread  -Neogenomic testing - CPS 70    Treatment:  5/2020 - 7/2022: Keytruda (initiated in [place], then transferred care to [place]). Completed 2 yrs of immunotherapy, resulted in CR by PET     11/2020: local progression in right neck only  -12/2020: palliative RT to right neck (3750 cGy/15)    1/2021: palpable right jaw/parotid lesion, PET+. Remainder of right neck disease improved after RT and lung mets stable. US-guided biopsy right parotid lesion attempted, but no lesion seen to biopsy so cancelled.    7/2022 - 12/2022: observation, treatment holiday until progression.   -12/2022 PET/CT: solitary recurrence in subcarinal LN    12/12/2022- 10/26/2026: Keytruda resumed  --Resulted in CR (PET) by 6/2023  --6/2023: changed to every 6 week cycles  --After dose of Keytruda on 10/26/2023, he took a break with sustained response.    2/2024 - present: Keytruda 400 mg every 6 weeks resumed for progression on PET (increased activity and enlargement within the subcarinal lymph node, left oropharynx and mildly FDG avid left cervical level 1B lymph node)  ---Stable disease to partial response since restarting Keytruda.    Physical Exam    GENERAL: Alert and oriented to time place and person. Seated comfortably. In no distress.  HEAD: Atraumatic and normocephalic. No alopecia.  EYES: COSMO, EOMI. No erythema. No icterus.  ORAL CAVITY: Moist. No mucosal lesion or tonsillar enlargement.  NECK: supple. No thyroid enlargement.  LYMPH NODES: No palpable supraclavicular, cervical, axillary lymphadenopathy.  CHEST: clear to auscultation bilaterally. Resonant to percussion throughout bilaterally. Symmetrical breath movements bilaterally.  CVS: RRR  ABDOMEN: Soft. Not tender. Not distended. No palpable hepatomegaly or splenomegaly. No other mass palpable. Bowel sounds present.  EXTREMITIES: Warm. No peripheral  edema.  SKIN: no rash, or bruising or purpura.   NEURO: No gross deficit noted. Non-antalgic gait.      Lab Results    Recent Results (from the past week)   Comprehensive metabolic panel   Result Value Ref Range    Sodium 138 135 - 145 mmol/L    Potassium 4.3 3.4 - 5.3 mmol/L    Carbon Dioxide (CO2) 29 22 - 29 mmol/L    Anion Gap 6 (L) 7 - 15 mmol/L    Urea Nitrogen 15.4 8.0 - 23.0 mg/dL    Creatinine 1.03 0.67 - 1.17 mg/dL    GFR Estimate 74 >60 mL/min/1.73m2    Calcium 9.4 8.8 - 10.4 mg/dL    Chloride 103 98 - 107 mmol/L    Glucose 91 70 - 99 mg/dL    Alkaline Phosphatase 49 40 - 150 U/L    AST 24 0 - 45 U/L    ALT 19 0 - 70 U/L    Protein Total 6.2 (L) 6.4 - 8.3 g/dL    Albumin 3.6 3.5 - 5.2 g/dL    Bilirubin Total 0.7 <=1.2 mg/dL   TSH with free T4 reflex   Result Value Ref Range    TSH 1.40 0.30 - 4.20 uIU/mL         Imaging    2/6/2025 PET: [place]/stable disease.    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  After resuming Keytruda he again had a complete response.  In June 2023 we switched him to every 6-week Keytruda infusions with side effects including unintentional weight loss, fatigue so he had treatment holiday 10/2023 - 2/2024. He was found to have disease progression so was restarted on Keytruda 2/2024, which he continues. He's had stable/[place] on it since restarting.     No significant side effects from the Keytruda.      Labs: CMP and TSH WNL    No clinical concern for progression.   1/2025 ENT exam normal, no local recurrence.     Plan:  -Continue Keytruda 400 mg every 6 weeks. He works his cycles around travel.  -Return in 7 weeks for Keytruda only (following a trip)  -Return in 13 weeks with PET scan and provider visit (planning every 4-6 mths imaging)    3.    Osteoporosis  Managed by PCP and Endocrinologist.    4.  Right shoulder  "pain:    He has chronic shoulder pain issues.  On PET scan looks like he has some arthritis there.  Assessed in PT and working on home exercises. Using ibuprofen or topical analgesic PRN.     Billing:  Total time 30 minutes, to include face to face visit, review of EMR, ordering, documentation and coordination of care on date of service.    complexity modifier for longitudinal care.       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    Weight as of this encounter: 75.8 kg (167 lb). Body surface area is 1.95 meters squared.  Mild Pain (3) Comment: Data Unavailable   No LMP for male patient.  Allergies reviewed: Yes  Medications reviewed: Yes    Medications: Medication refills not needed today.  Pharmacy name entered into [facility]:    [id number] - [place], [place] - [address] [facility] AT [facility] OF [address] [place] [address] HOME DELIVERY - [address]    Frailty Screening:   Is the patient here for a new oncology consult visit in cancer care? 2. No    PHQ9:  Did this patient require a PHQ9?: No      Clinical concerns:  None today      Meli Cornejo CMA                Again, thank you for allowing me to participate in the care of your patient.        Sincerely,        Janay Fan NP    Electronically signed"

## 2025-07-01 ENCOUNTER — APPOINTMENT (OUTPATIENT)
Dept: LAB | Facility: CLINIC | Age: 80
End: 2025-07-01
Attending: INTERNAL MEDICINE
Payer: MEDICARE

## 2025-07-01 ENCOUNTER — INFUSION THERAPY VISIT (OUTPATIENT)
Dept: INFUSION THERAPY | Facility: CLINIC | Age: 80
End: 2025-07-01
Attending: INTERNAL MEDICINE
Payer: MEDICARE

## 2025-07-01 VITALS
TEMPERATURE: 98.1 F | DIASTOLIC BLOOD PRESSURE: 71 MMHG | BODY MASS INDEX: 22.93 KG/M2 | SYSTOLIC BLOOD PRESSURE: 125 MMHG | WEIGHT: 165.6 LBS

## 2025-07-01 DIAGNOSIS — Z79.899 HIGH RISK MEDICATION USE: ICD-10-CM

## 2025-07-01 DIAGNOSIS — C76.0 HEAD AND NECK CANCER (H): Primary | ICD-10-CM

## 2025-07-01 LAB
ALBUMIN SERPL BCG-MCNC: 3.9 G/DL (ref 3.5–5.2)
ALP SERPL-CCNC: 52 U/L (ref 40–150)
ALT SERPL W P-5'-P-CCNC: 17 U/L (ref 0–70)
ANION GAP SERPL CALCULATED.3IONS-SCNC: 10 MMOL/L (ref 7–15)
AST SERPL W P-5'-P-CCNC: 21 U/L (ref 0–45)
BILIRUB SERPL-MCNC: 0.6 MG/DL
BUN SERPL-MCNC: 12.3 MG/DL (ref 8–23)
CALCIUM SERPL-MCNC: 9.4 MG/DL (ref 8.8–10.4)
CHLORIDE SERPL-SCNC: 102 MMOL/L (ref 98–107)
CREAT SERPL-MCNC: 0.88 MG/DL (ref 0.67–1.17)
EGFRCR SERPLBLD CKD-EPI 2021: 88 ML/MIN/1.73M2
GLUCOSE SERPL-MCNC: 88 MG/DL (ref 70–99)
HCO3 SERPL-SCNC: 26 MMOL/L (ref 22–29)
POTASSIUM SERPL-SCNC: 4.2 MMOL/L (ref 3.4–5.3)
PROT SERPL-MCNC: 6.3 G/DL (ref 6.4–8.3)
SODIUM SERPL-SCNC: 138 MMOL/L (ref 135–145)
TSH SERPL DL<=0.005 MIU/L-ACNC: 2.62 UIU/ML (ref 0.3–4.2)

## 2025-07-01 PROCEDURE — 258N000003 HC RX IP 258 OP 636: Performed by: NURSE PRACTITIONER

## 2025-07-01 PROCEDURE — 82247 BILIRUBIN TOTAL: CPT | Performed by: NURSE PRACTITIONER

## 2025-07-01 PROCEDURE — 36415 COLL VENOUS BLD VENIPUNCTURE: CPT | Performed by: NURSE PRACTITIONER

## 2025-07-01 PROCEDURE — 96413 CHEMO IV INFUSION 1 HR: CPT

## 2025-07-01 PROCEDURE — 84443 ASSAY THYROID STIM HORMONE: CPT | Performed by: NURSE PRACTITIONER

## 2025-07-01 PROCEDURE — 82040 ASSAY OF SERUM ALBUMIN: CPT | Performed by: NURSE PRACTITIONER

## 2025-07-01 RX ADMIN — SODIUM CHLORIDE 400 MG: 9 INJECTION, SOLUTION INTRAVENOUS at 12:15

## 2025-07-01 NOTE — PROGRESS NOTES
Infusion Nursing Note:  Newton Buchanan presents today for Keytruda.    Patient seen by provider today: No   present during visit today: Not Applicable.    Note: N/A.      Intravenous Access:  Peripheral IV placed.    Treatment Conditions:  Lab Results   Component Value Date     07/01/2025    POTASSIUM 4.2 07/01/2025    CR 0.88 07/01/2025    JÚNIOR 9.4 07/01/2025    BILITOTAL 0.6 07/01/2025    ALBUMIN 3.9 07/01/2025    ALT 17 07/01/2025    AST 21 07/01/2025       Results reviewed, labs MET treatment parameters, ok to proceed with treatment.      Post Infusion Assessment:  Patient tolerated infusion without incident.  Blood return noted pre and post infusion.  Site patent and intact, free from redness, edema or discomfort.  No evidence of extravasations.  Access discontinued per protocol.       Discharge Plan:   Copy of AVS reviewed with patient and/or family.  Patient will return 8/12/25 for next appointment.  Patient discharged in stable condition accompanied by: self and wife.  Departure Mode: Ambulatory.      ANGELIKA FERRARI RN

## 2025-07-09 ENCOUNTER — HOSPITAL ENCOUNTER (OUTPATIENT)
Dept: MRI IMAGING | Facility: CLINIC | Age: 80
Discharge: HOME | End: 2025-07-09
Attending: PHYSICIAN ASSISTANT
Payer: MEDICARE

## 2025-07-09 DIAGNOSIS — M25.511 CHRONIC RIGHT SHOULDER PAIN: ICD-10-CM

## 2025-07-09 DIAGNOSIS — G89.29 CHRONIC RIGHT SHOULDER PAIN: ICD-10-CM

## 2025-07-09 PROCEDURE — 73221 MRI JOINT UPR EXTREM W/O DYE: CPT | Mod: RT

## 2025-07-09 PROCEDURE — 73221 MRI JOINT UPR EXTREM W/O DYE: CPT | Mod: 26 | Performed by: RADIOLOGY

## 2025-07-10 ENCOUNTER — PATIENT OUTREACH (OUTPATIENT)
Dept: CARE COORDINATION | Facility: CLINIC | Age: 80
End: 2025-07-10
Payer: MEDICARE

## 2025-07-23 ENCOUNTER — HOSPITAL ENCOUNTER (OUTPATIENT)
Dept: BONE DENSITY | Facility: CLINIC | Age: 80
Discharge: HOME | End: 2025-07-23
Attending: PHYSICIAN ASSISTANT
Payer: MEDICARE

## 2025-07-23 PROCEDURE — 77080 DXA BONE DENSITY AXIAL: CPT

## 2025-07-27 SDOH — HEALTH STABILITY: PHYSICAL HEALTH: ON AVERAGE, HOW MANY DAYS PER WEEK DO YOU ENGAGE IN MODERATE TO STRENUOUS EXERCISE (LIKE A BRISK WALK)?: 2 DAYS

## 2025-07-27 SDOH — HEALTH STABILITY: PHYSICAL HEALTH: ON AVERAGE, HOW MANY MINUTES DO YOU ENGAGE IN EXERCISE AT THIS LEVEL?: 20 MIN

## 2025-07-28 ENCOUNTER — OFFICE VISIT (OUTPATIENT)
Dept: ORTHOPEDICS | Facility: CLINIC | Age: 80
End: 2025-07-28
Attending: PHYSICIAN ASSISTANT
Payer: MEDICARE

## 2025-07-28 DIAGNOSIS — M25.511 CHRONIC RIGHT SHOULDER PAIN: ICD-10-CM

## 2025-07-28 DIAGNOSIS — M19.011 ARTHRITIS OF RIGHT SHOULDER: Primary | ICD-10-CM

## 2025-07-28 DIAGNOSIS — G89.29 CHRONIC RIGHT SHOULDER PAIN: ICD-10-CM

## 2025-07-28 PROCEDURE — 20611 DRAIN/INJ JOINT/BURSA W/US: CPT | Mod: RT | Performed by: FAMILY MEDICINE

## 2025-07-28 PROCEDURE — 99204 OFFICE O/P NEW MOD 45 MIN: CPT | Mod: 25 | Performed by: FAMILY MEDICINE

## 2025-07-28 RX ORDER — BETAMETHASONE SODIUM PHOSPHATE AND BETAMETHASONE ACETATE 3; 3 MG/ML; MG/ML
6 INJECTION, SUSPENSION INTRA-ARTICULAR; INTRALESIONAL; INTRAMUSCULAR; SOFT TISSUE
Status: COMPLETED | OUTPATIENT
Start: 2025-07-28 | End: 2025-07-28

## 2025-07-28 RX ORDER — ROPIVACAINE HYDROCHLORIDE 5 MG/ML
4 INJECTION, SOLUTION EPIDURAL; INFILTRATION; PERINEURAL
Status: COMPLETED | OUTPATIENT
Start: 2025-07-28 | End: 2025-07-28

## 2025-07-28 RX ADMIN — ROPIVACAINE HYDROCHLORIDE 4 ML: 5 INJECTION, SOLUTION EPIDURAL; INFILTRATION; PERINEURAL at 09:20

## 2025-07-28 RX ADMIN — BETAMETHASONE SODIUM PHOSPHATE AND BETAMETHASONE ACETATE 6 MG: 3; 3 INJECTION, SUSPENSION INTRA-ARTICULAR; INTRALESIONAL; INTRAMUSCULAR; SOFT TISSUE at 09:20

## 2025-07-28 NOTE — PATIENT INSTRUCTIONS
# Acute on Chronic Right Shoulder Pain: Newton Buchanan  was seen today for right shoulder pain. Symptoms had been going on for years, worsening over the past few months. On examination there are positive findings of crepitus on exam, posterior shoulder joint tenderness to palpation, limited range of motion. Imaging findings showed severe shoulder arthritis. 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 Ok for steroid injection per hem/onc. Likely cause of patient's condition due to flare of right shoulder arthritis. Other possible conditions contributing to symptoms include irritated rotator cuff tendon.  Counseled patient on nature of condition and treatment options.  Given this plan as below, follow-up 1 mon as needed.     Image Findings: reviewed right shoulder MRI, showing severe arthritis  Treatment: Activities as tolerated, home exercises given today  Medications/Injections: Limited tylenol/ibuprofen for pain for 1-2 weeks, Topical Voltaren gel, right shoulder joint steroid injection  Follow-up: In 3-4 weeks if symptoms do not improve, sooner if worsening  Can consider gel injection     Please call [phone]   Ask for my team if you have any questions or concerns    If you have not yet received the influenza vaccine but would like to get one, please call  [phone] or you can schedule via [employer]    It was great seeing you today!    Derick Fenton MD, CAQSM     [facility] Injection Discharge Instructions    Procedure: right shoulder joint steroid injection     You may shower, however avoid swimming, tub baths or hot tubs for 24 hours following your procedure  You may have a mild to moderate increase in pain for several days following the injection.  It may take up to 14 days for the steroid medication to start working although you may feel the effect as early as a few days after the procedure.  You may use ice packs for 10-15 minutes, 3 to 4 times a day at the  injection site for comfort  You may use anti-inflammatory medications (such as Ibuprofen or Aleve or Advil) or Tylenol for pain control if necessary  If you were fasting, you may resume your normal diet and medications after the procedure  If you have diabetes, check your blood sugar more frequently than usual as your blood sugar may be higher than normal for 10-14 days following a steroid injection. Contact your doctor who manages your diabetes if your blood sugar is higher than usual    If you experience any of the following, call [facility] @ [phone] or [phone]  -Fever over 100 degree F  -Swelling, bleeding, redness, drainage, warmth at the injection site  - New or worsening pain

## 2025-07-28 NOTE — LETTER
7/28/2025      Newton Buchanan   [address]  [place] [address]      Dear Colleague,    Thank you for referring your patient, Newton Buchanan, to the [facility] [place]. Please see a copy of my visit note below.    ASSESSMENT & PLAN    Newton was seen today for pain.    Diagnoses and all orders for this visit:    Arthritis of right shoulder  -     (PRE-AUTH REQUEST) 48 mg hylan (SYNVISC ONE) injection 48 mg/6mL-ONCE  -     POC US GUIDANCE NEEDLE PLACEMENT; Future  -     Large Joint Injection/Arthocentesis: R glenohumeral joint    Chronic right shoulder pain  -     Orthopedic  Referral  -     POC US GUIDANCE NEEDLE PLACEMENT; Future      This issue is acute on chronic and Worsening.    # Acute on Chronic Right Shoulder Pain: Newton Buchanan  was seen today for right shoulder pain. Symptoms had been going on for years, worsening over the past few months. On examination there are positive findings of crepitus on exam, posterior shoulder joint tenderness to palpation, limited range of motion. Imaging findings showed severe shoulder arthritis. 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 Ok for steroid injection per hem/onc. Likely cause of patient's condition due to flare of right shoulder arthritis. Other possible conditions contributing to symptoms include irritated rotator cuff tendon.  Counseled patient on nature of condition and treatment options.  Given this plan as below, follow-up 1 mon as needed.     Image Findings: reviewed right shoulder MRI, showing severe arthritis  Treatment: Activities as tolerated, home exercises given today  Medications/Injections: Limited tylenol/ibuprofen for pain for 1-2 weeks, Topical Voltaren gel, right shoulder joint steroid injection  Follow-up: In 3-4 weeks if symptoms do not improve, sooner if worsening  Can consider gel injection       Derick Fenton MD  [facility]  [place]    -----  Chief Complaint   Patient presents with     Right Shoulder - Pain       SUBJECTIVE  Newton Buchanan is a/an 79 year old male who is seen in consultation at the request of  Luis Antonio Santiago PA-C for evaluation of right shoulder pain. Reviewed PCP notes.    The patient is seen their significant other, Pat.  The patient is Right handed    Onset: A few years(s) ago. Reports insidious onset without acute precipitating event. Right shoulder MRI 7/9/25.   Location of Pain: right shoulder-diffuse  Worsened by: shoulder flexion, overhead activities   Better with: rest, activity avoidance   Treatments tried: rest/activity avoidance, Tylenol, Ibuprofen   Associated symptoms: grinding, numbness in arm occasionally      Orthopedic/Surgical history: YES -chronic shoulder pain   Social History/Occupation: Retired    No family history pertinent to patient's problem today.      REVIEW OF SYSTEMS:  Review of Systems  Constitutional, HEENT, cardiovascular, pulmonary, gi and gu systems are negative, except as otherwise noted.    OBJECTIVE:  There were no vitals taken for this visit.   General: healthy, alert and in no distress  HEENT: no scleral icterus or conjunctival erythema  Skin: no suspicious lesions or rash. No jaundice.  CV: distal perfusion intact    Resp: normal respiratory effort without conversational dyspnea   Psych: normal mood and affect  Gait: normal steady gait with appropriate coordination and balance    Neuro: Normal light sensory exam of right upper extremity     Ortho Exam   RIGHT SHOULDER  Inspection:    no swelling, bruising, discoloration, or obvious deformity or asymmetry  Palpation:    Tender about the posterior shoulder joint. Remainder of bony and tendinous landmarks are nontender.  Active Range of Motion:     Abduction 900, , , IR hip pocket.         Strength:    Scapular plane abduction 5-/5, painful, weakness,  ER 5-/5, weakness, IR 5-/5, weakness, biceps 5/5, triceps  5/5  Special Tests:    Positive: supraspinatus (empty can)     CERVICAL SPINE  Inspection:    normal cervical lordosis present, rounded shoulders, forward head posture  Palpation:    Nontender.  Range of Motion:     Flexion full    Extension full    Right side bend full    Left side bend full    Right rotation full    Left rotation full  Strength:    Full strength throughout all neck muscles  Special Tests:    Positive: None    Negative: Spurling's (bilateral)    RADIOLOGY:  I independently, visualized and reviewed these images with the patient       EXAM: MR right shoulder without  contrast 7/9/2025 11:01 AM     TECHNIQUE: Multiplanar, multisequence imaging of the right shoulder  were obtained without administration of intravenous or intra-articular  gadolinium contrast using routine protocol.     History: Chronic right shoulder pain; Chronic right shoulder pain      Comparison: None     Findings:     ROTATOR CUFF and ASSOCIATED STRUCTURES  Rotator cuff: Moderate grade partial tearing of the supraspinatus  anterior and mid fibers, with milder partial tearing of the posterior  supraspinatus fibers at the footprint. Intact infraspinatus cuff  tendon with at least mild scattered tendinopathy. Intact subscapularis  cuff tendon with mild scattered tendinopathy. Teres minor tendon is  intact.     Bursa: Small-volume subacromial or subdeltoid bursal fluid.     Musculature: Mild fatty infiltration and mild muscular atrophy of the  rotator cuff muscles. Preserved deltoid muscle bulk. No evidence of  muscle denervation pattern.     Acromioclavicular joint  Moderate acromioclavicular degenerative arthritis with mild  undersurface prominence of the distal clavicle. No significant  acromial downsloping. No os acromiale. Intact coracoacromial ligament.     OSSEOUS STRUCTURES  No fracture, marrow contusion or marrow infiltration. Greater and  lesser tuberosity subcortical cystic changes.     LONG BICIPITAL TENDON  The long head  of the biceps tendon is normally situated within the  bicipital groove. No complete or partial biceps tendon tear is  present.     GLENOHUMERAL JOINT  Joint fluid: Small/moderate joint effusion with communicating joint  fluid extending to the biceps tendon sheath.     Cartilage and subarticular bone:  Diffuse full-thickness loss of  glenohumeral articular cartilage with some central subchondral  cystic/edematous change.     Labrum: Attenuated diffusely irregular labrum secondary to  degenerative change. Intact non-thickened inferior capsule. Normal fat  signal in the rotator cuff interval.     ANCILLARY FINDINGS:  None.                                                                      Impression:  1. Severe glenohumeral degenerative osteoarthrosis with diffuse  full-thickness cartilage loss, joint space narrowing, degenerative  labral tearing, osteophytic spurring and minimal bony remodeling.     2. Moderate grade intrasubstance partial tearing of the supraspinatus  tendon with low-grade intrasubstance partial thickness tearing of the  subscapularis tendon. No full-thickness tendon tear or tendon  retraction involving the rotator cuff tendons.     3. Mild tendinopathy of the remainder rotator cuff tendons.     4. Moderate osteoarthrosis of the acromioclavicular joint.     5. Minimal fatty infiltration with mild edema in the rotator cuff  musculature without diffuse atrophy.     6. Tendinosis of the intra-articular biceps tendon.     I have personally reviewed the examination and initial interpretation  and I agree with the findings.     DANNY HARPER MD      Review of external notes as documented elsewhere in note  Review of the result(s) of each unique test - right shoulder MRI       Disclaimer: This note consists of symbols derived from keyboarding, dictation and/or voice recognition software. As a result, there may be errors in the script that have gone undetected. Please consider this when interpreting  information found in this chart.    Large Joint Injection/Arthocentesis: R glenohumeral joint    Date/Time: 7/28/2025 9:20 AM    Performed by: Derick Fenton MD  Authorized by: Derick Fenton MD    Indications:  Pain  Needle Size:  25 G  Guidance: ultrasound    Approach:  Posterolateral  Location:  Shoulder      Site:  R glenohumeral joint  Medications:  6 mg betamethasone acet & sod phos 6 (3-3) MG/ML; 4 mL ROPivacaine 5 MG/ML  Outcome:  Tolerated well, no immediate complications  Procedure discussed: discussed risks, benefits, and alternatives    Consent Given by:  Patient  Timeout: timeout called immediately prior to procedure    Prep: patient was prepped and draped in usual sterile fashion     Ultrasound images of procedure were permanently stored.     Patient reported some improvement of pain after the numbing portion right glenohumeral joint steroid injection.  Ultrasound guided images were permanently stored.   Aftercare instructions given to patient.  Plan to follow-up as discussed above.     Derick Fenton MD [facility]              Again, thank you for allowing me to participate in the care of your patient.        Sincerely,        Derick Fenton MD    Electronically signed

## 2025-07-28 NOTE — PROGRESS NOTES
ASSESSMENT & PLAN    Newton was seen today for pain.    Diagnoses and all orders for this visit:    Arthritis of right shoulder  -     (PRE-AUTH REQUEST) 48 mg hylan (SYNVISC ONE) injection 48 mg/6mL-ONCE  -     POC US GUIDANCE NEEDLE PLACEMENT; Future  -     Large Joint Injection/Arthocentesis: R glenohumeral joint    Chronic right shoulder pain  -     Orthopedic  Referral  -     POC US GUIDANCE NEEDLE PLACEMENT; Future      This issue is acute on chronic and Worsening.    # Acute on Chronic Right Shoulder Pain: Newton Buchanan  was seen today for right shoulder pain. Symptoms had been going on for years, worsening over the past few months. On examination there are positive findings of crepitus on exam, posterior shoulder joint tenderness to palpation, limited range of motion. Imaging findings showed severe shoulder arthritis. 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 Ok for steroid injection per hem/onc. Likely cause of patient's condition due to flare of right shoulder arthritis. Other possible conditions contributing to symptoms include irritated rotator cuff tendon.  Counseled patient on nature of condition and treatment options.  Given this plan as below, follow-up 1 mon as needed.     Image Findings: reviewed right shoulder MRI, showing severe arthritis  Treatment: Activities as tolerated, home exercises given today  Medications/Injections: Limited tylenol/ibuprofen for pain for 1-2 weeks, Topical Voltaren gel, right shoulder joint steroid injection  Follow-up: In 3-4 weeks if symptoms do not improve, sooner if worsening  Can consider gel injection       Derick Fenton MD  [facility]    -----  Chief Complaint   Patient presents with    Right Shoulder - Pain       SUBJECTIVE  Newton Buchanan is a/an 79 year old male who is seen in consultation at the request of  Luis Antonio Santiago PA-C for evaluation of right shoulder  pain. Reviewed PCP notes.    The patient is seen their significant other, Pat.  The patient is Right handed    Onset: A few years(s) ago. Reports insidious onset without acute precipitating event. Right shoulder MRI 7/9/25.   Location of Pain: right shoulder-diffuse  Worsened by: shoulder flexion, overhead activities   Better with: rest, activity avoidance   Treatments tried: rest/activity avoidance, Tylenol, Ibuprofen   Associated symptoms: grinding, numbness in arm occasionally      Orthopedic/Surgical history: YES -chronic shoulder pain   Social History/Occupation: Retired    No family history pertinent to patient's problem today.      REVIEW OF SYSTEMS:  Review of Systems  Constitutional, HEENT, cardiovascular, pulmonary, gi and gu systems are negative, except as otherwise noted.    OBJECTIVE:  There were no vitals taken for this visit.   General: healthy, alert and in no distress  HEENT: no scleral icterus or conjunctival erythema  Skin: no suspicious lesions or rash. No jaundice.  CV: distal perfusion intact    Resp: normal respiratory effort without conversational dyspnea   Psych: normal mood and affect  Gait: normal steady gait with appropriate coordination and balance    Neuro: Normal light sensory exam of right upper extremity     Ortho Exam   RIGHT SHOULDER  Inspection:    no swelling, bruising, discoloration, or obvious deformity or asymmetry  Palpation:    Tender about the posterior shoulder joint. Remainder of bony and tendinous landmarks are nontender.  Active Range of Motion:     Abduction 900, , , IR hip pocket.         Strength:    Scapular plane abduction 5-/5, painful, weakness,  ER 5-/5, weakness, IR 5-/5, weakness, biceps 5/5, triceps 5/5  Special Tests:    Positive: supraspinatus (empty can)     CERVICAL SPINE  Inspection:    normal cervical lordosis present, rounded shoulders, forward head posture  Palpation:    Nontender.  Range of Motion:     Flexion full    Extension full     Right side bend full    Left side bend full    Right rotation full    Left rotation full  Strength:    Full strength throughout all neck muscles  Special Tests:    Positive: None    Negative: Spurling's (bilateral)    RADIOLOGY:  I independently, visualized and reviewed these images with the patient       EXAM: MR right shoulder without  contrast 7/9/2025 11:01 AM     TECHNIQUE: Multiplanar, multisequence imaging of the right shoulder  were obtained without administration of intravenous or intra-articular  gadolinium contrast using routine protocol.     History: Chronic right shoulder pain; Chronic right shoulder pain      Comparison: None     Findings:     ROTATOR CUFF and ASSOCIATED STRUCTURES  Rotator cuff: Moderate grade partial tearing of the supraspinatus  anterior and mid fibers, with milder partial tearing of the posterior  supraspinatus fibers at the footprint. Intact infraspinatus cuff  tendon with at least mild scattered tendinopathy. Intact subscapularis  cuff tendon with mild scattered tendinopathy. Teres minor tendon is  intact.     Bursa: Small-volume subacromial or subdeltoid bursal fluid.     Musculature: Mild fatty infiltration and mild muscular atrophy of the  rotator cuff muscles. Preserved deltoid muscle bulk. No evidence of  muscle denervation pattern.     Acromioclavicular joint  Moderate acromioclavicular degenerative arthritis with mild  undersurface prominence of the distal clavicle. No significant  acromial downsloping. No os acromiale. Intact coracoacromial ligament.     OSSEOUS STRUCTURES  No fracture, marrow contusion or marrow infiltration. Greater and  lesser tuberosity subcortical cystic changes.     LONG BICIPITAL TENDON  The long head of the biceps tendon is normally situated within the  bicipital groove. No complete or partial biceps tendon tear is  present.     GLENOHUMERAL JOINT  Joint fluid: Small/moderate joint effusion with communicating joint  fluid extending to the biceps  tendon sheath.     Cartilage and subarticular bone:  Diffuse full-thickness loss of  glenohumeral articular cartilage with some central subchondral  cystic/edematous change.     Labrum: Attenuated diffusely irregular labrum secondary to  degenerative change. Intact non-thickened inferior capsule. Normal fat  signal in the rotator cuff interval.     ANCILLARY FINDINGS:  None.                                                                      Impression:  1. Severe glenohumeral degenerative osteoarthrosis with diffuse  full-thickness cartilage loss, joint space narrowing, degenerative  labral tearing, osteophytic spurring and minimal bony remodeling.     2. Moderate grade intrasubstance partial tearing of the supraspinatus  tendon with low-grade intrasubstance partial thickness tearing of the  subscapularis tendon. No full-thickness tendon tear or tendon  retraction involving the rotator cuff tendons.     3. Mild tendinopathy of the remainder rotator cuff tendons.     4. Moderate osteoarthrosis of the acromioclavicular joint.     5. Minimal fatty infiltration with mild edema in the rotator cuff  musculature without diffuse atrophy.     6. Tendinosis of the intra-articular biceps tendon.     I have personally reviewed the examination and initial interpretation  and I agree with the findings.     DANNY HARPER MD      Review of external notes as documented elsewhere in note  Review of the result(s) of each unique test - right shoulder MRI       Disclaimer: This note consists of symbols derived from keyboarding, dictation and/or voice recognition software. As a result, there may be errors in the script that have gone undetected. Please consider this when interpreting information found in this chart.    Large Joint Injection/Arthocentesis: R glenohumeral joint    Date/Time: 7/28/2025 9:20 AM    Performed by: Derick Fenton MD  Authorized by: Derick Fenton MD    Indications:  Pain  Needle Size:  25  G  Guidance: ultrasound    Approach:  Posterolateral  Location:  Shoulder      Site:  R glenohumeral joint  Medications:  6 mg betamethasone acet & sod phos 6 (3-3) MG/ML; 4 mL ROPivacaine 5 MG/ML  Outcome:  Tolerated well, no immediate complications  Procedure discussed: discussed risks, benefits, and alternatives    Consent Given by:  Patient  Timeout: timeout called immediately prior to procedure    Prep: patient was prepped and draped in usual sterile fashion     Ultrasound images of procedure were permanently stored.     Patient reported some improvement of pain after the numbing portion right glenohumeral joint steroid injection.  Ultrasound guided images were permanently stored.   Aftercare instructions given to patient.  Plan to follow-up as discussed above.     Derick Fenton MD [facility]

## 2025-08-12 ENCOUNTER — APPOINTMENT (OUTPATIENT)
Dept: LAB | Facility: CLINIC | Age: 80
End: 2025-08-12
Attending: NURSE PRACTITIONER
Payer: MEDICARE

## 2025-08-12 ENCOUNTER — ONCOLOGY VISIT (OUTPATIENT)
Dept: ONCOLOGY | Facility: CLINIC | Age: 80
End: 2025-08-12
Attending: NURSE PRACTITIONER
Payer: MEDICARE

## 2025-08-12 ENCOUNTER — INFUSION THERAPY VISIT (OUTPATIENT)
Dept: INFUSION THERAPY | Facility: CLINIC | Age: 80
End: 2025-08-12
Attending: NURSE PRACTITIONER
Payer: MEDICARE

## 2025-08-12 VITALS — SYSTOLIC BLOOD PRESSURE: 120 MMHG | HEART RATE: 58 BPM | DIASTOLIC BLOOD PRESSURE: 75 MMHG

## 2025-08-12 VITALS
TEMPERATURE: 98.1 F | RESPIRATION RATE: 16 BRPM | SYSTOLIC BLOOD PRESSURE: 127 MMHG | HEART RATE: 62 BPM | HEIGHT: 71 IN | DIASTOLIC BLOOD PRESSURE: 64 MMHG | WEIGHT: 164 LBS | BODY MASS INDEX: 22.96 KG/M2 | OXYGEN SATURATION: 96 %

## 2025-08-12 DIAGNOSIS — M25.511 CHRONIC PAIN OF BOTH SHOULDERS: ICD-10-CM

## 2025-08-12 DIAGNOSIS — G89.29 CHRONIC PAIN OF BOTH SHOULDERS: ICD-10-CM

## 2025-08-12 DIAGNOSIS — M25.512 CHRONIC PAIN OF BOTH SHOULDERS: ICD-10-CM

## 2025-08-12 DIAGNOSIS — D64.9 ANEMIA, UNSPECIFIED TYPE: ICD-10-CM

## 2025-08-12 DIAGNOSIS — C10.9 SQUAMOUS CELL CARCINOMA OF OROPHARYNX (H): ICD-10-CM

## 2025-08-12 DIAGNOSIS — R63.4 WEIGHT LOSS: Primary | ICD-10-CM

## 2025-08-12 DIAGNOSIS — R63.0 LACK OF APPETITE: ICD-10-CM

## 2025-08-12 DIAGNOSIS — Z79.899 HIGH RISK MEDICATION USE: ICD-10-CM

## 2025-08-12 DIAGNOSIS — Z13.1 ENCOUNTER FOR SCREENING FOR DIABETES MELLITUS: ICD-10-CM

## 2025-08-12 DIAGNOSIS — R63.4 WEIGHT LOSS: ICD-10-CM

## 2025-08-12 DIAGNOSIS — C76.0 HEAD AND NECK CANCER (H): Primary | ICD-10-CM

## 2025-08-12 LAB
ALBUMIN SERPL BCG-MCNC: 3.7 G/DL (ref 3.5–5.2)
ALP SERPL-CCNC: 61 U/L (ref 40–150)
ALT SERPL W P-5'-P-CCNC: 17 U/L (ref 0–70)
ANION GAP SERPL CALCULATED.3IONS-SCNC: 12 MMOL/L (ref 7–15)
AST SERPL W P-5'-P-CCNC: 22 U/L (ref 0–45)
BASOPHILS # BLD AUTO: 0.04 10E3/UL (ref 0–0.2)
BASOPHILS NFR BLD AUTO: 0.4 %
BILIRUB SERPL-MCNC: 0.6 MG/DL
BUN SERPL-MCNC: 15.3 MG/DL (ref 8–23)
CALCIUM SERPL-MCNC: 9.5 MG/DL (ref 8.8–10.4)
CHLORIDE SERPL-SCNC: 100 MMOL/L (ref 98–107)
CREAT SERPL-MCNC: 0.8 MG/DL (ref 0.67–1.17)
CRP SERPL-MCNC: 22.5 MG/L
EGFRCR SERPLBLD CKD-EPI 2021: >90 ML/MIN/1.73M2
EOSINOPHIL # BLD AUTO: 0.19 10E3/UL (ref 0–0.7)
EOSINOPHIL NFR BLD AUTO: 2 %
ERYTHROCYTE [DISTWIDTH] IN BLOOD BY AUTOMATED COUNT: 12.4 % (ref 10–15)
ERYTHROCYTE [SEDIMENTATION RATE] IN BLOOD BY WESTERGREN METHOD: 42 MM/HR (ref 0–20)
EST. AVERAGE GLUCOSE BLD GHB EST-MCNC: 120 MG/DL
FERRITIN SERPL-MCNC: 162 NG/ML (ref 31–409)
GLUCOSE SERPL-MCNC: 109 MG/DL (ref 70–99)
HBA1C MFR BLD: 5.8 %
HCO3 SERPL-SCNC: 22 MMOL/L (ref 22–29)
HCT VFR BLD AUTO: 33.7 % (ref 40–53)
HGB BLD-MCNC: 11.4 G/DL (ref 13.3–17.7)
HOLD SPECIMEN: NORMAL
IMM GRANULOCYTES # BLD: 0.03 10E3/UL
IMM GRANULOCYTES NFR BLD: 0.3 %
IRON BINDING CAPACITY (ROCHE): 213 UG/DL (ref 240–430)
IRON SATN MFR SERPL: 27 % (ref 15–46)
IRON SERPL-MCNC: 58 UG/DL (ref 61–157)
LYMPHOCYTES # BLD AUTO: 1.12 10E3/UL (ref 0.8–5.3)
LYMPHOCYTES NFR BLD AUTO: 11.6 %
MCH RBC QN AUTO: 29.2 PG (ref 26.5–33)
MCHC RBC AUTO-ENTMCNC: 33.8 G/DL (ref 31.5–36.5)
MCV RBC AUTO: 86.2 FL (ref 78–100)
MONOCYTES # BLD AUTO: 0.86 10E3/UL (ref 0–1.3)
MONOCYTES NFR BLD AUTO: 8.9 %
NEUTROPHILS # BLD AUTO: 7.44 10E3/UL (ref 1.6–8.3)
NEUTROPHILS NFR BLD AUTO: 76.8 %
NRBC # BLD AUTO: 0 10E3/UL
NRBC BLD AUTO-RTO: 0 /100
PLATELET # BLD AUTO: 324 10E3/UL (ref 150–450)
POTASSIUM SERPL-SCNC: 4 MMOL/L (ref 3.4–5.3)
PROT SERPL-MCNC: 6.4 G/DL (ref 6.4–8.3)
RBC # BLD AUTO: 3.91 10E6/UL (ref 4.4–5.9)
RHEUMATOID FACT SERPL-ACNC: <10 IU/ML
SODIUM SERPL-SCNC: 134 MMOL/L (ref 135–145)
TSH SERPL DL<=0.005 MIU/L-ACNC: 2.06 UIU/ML (ref 0.3–4.2)
VIT B12 SERPL-MCNC: 172 PG/ML (ref 232–1245)
WBC # BLD AUTO: 9.68 10E3/UL (ref 4–11)

## 2025-08-12 PROCEDURE — 84155 ASSAY OF PROTEIN SERUM: CPT | Performed by: NURSE PRACTITIONER

## 2025-08-12 PROCEDURE — 84443 ASSAY THYROID STIM HORMONE: CPT | Performed by: NURSE PRACTITIONER

## 2025-08-12 PROCEDURE — 85004 AUTOMATED DIFF WBC COUNT: CPT | Performed by: NURSE PRACTITIONER

## 2025-08-12 PROCEDURE — 83550 IRON BINDING TEST: CPT | Performed by: NURSE PRACTITIONER

## 2025-08-12 PROCEDURE — 86140 C-REACTIVE PROTEIN: CPT | Performed by: NURSE PRACTITIONER

## 2025-08-12 PROCEDURE — 83036 HEMOGLOBIN GLYCOSYLATED A1C: CPT | Performed by: NURSE PRACTITIONER

## 2025-08-12 PROCEDURE — G0463 HOSPITAL OUTPT CLINIC VISIT: HCPCS | Mod: 25 | Performed by: NURSE PRACTITIONER

## 2025-08-12 PROCEDURE — 86200 CCP ANTIBODY: CPT | Performed by: NURSE PRACTITIONER

## 2025-08-12 PROCEDURE — 86431 RHEUMATOID FACTOR QUANT: CPT | Performed by: NURSE PRACTITIONER

## 2025-08-12 PROCEDURE — 82607 VITAMIN B-12: CPT | Performed by: NURSE PRACTITIONER

## 2025-08-12 PROCEDURE — 85652 RBC SED RATE AUTOMATED: CPT | Performed by: NURSE PRACTITIONER

## 2025-08-12 PROCEDURE — 82728 ASSAY OF FERRITIN: CPT | Performed by: NURSE PRACTITIONER

## 2025-08-12 RX ORDER — LORAZEPAM 2 MG/ML
0.5 INJECTION INTRAMUSCULAR EVERY 4 HOURS PRN
Status: CANCELLED
Start: 2025-08-19

## 2025-08-12 RX ORDER — ALBUTEROL SULFATE 90 UG/1
1-2 INHALANT RESPIRATORY (INHALATION)
Status: CANCELLED
Start: 2025-08-19

## 2025-08-12 RX ORDER — METHYLPREDNISOLONE SODIUM SUCCINATE 40 MG/ML
40 INJECTION INTRAMUSCULAR; INTRAVENOUS
Status: CANCELLED
Start: 2025-08-19

## 2025-08-12 RX ORDER — ALBUTEROL SULFATE 0.83 MG/ML
2.5 SOLUTION RESPIRATORY (INHALATION)
Status: CANCELLED | OUTPATIENT
Start: 2025-08-19

## 2025-08-12 RX ORDER — MEPERIDINE HYDROCHLORIDE 25 MG/ML
25 INJECTION INTRAMUSCULAR; INTRAVENOUS; SUBCUTANEOUS
Status: CANCELLED | OUTPATIENT
Start: 2025-08-19

## 2025-08-12 RX ORDER — EPINEPHRINE 1 MG/ML
0.3 INJECTION, SOLUTION, CONCENTRATE INTRAVENOUS EVERY 5 MIN PRN
Status: CANCELLED | OUTPATIENT
Start: 2025-08-19

## 2025-08-12 RX ORDER — DIPHENHYDRAMINE HYDROCHLORIDE 50 MG/ML
25 INJECTION, SOLUTION INTRAMUSCULAR; INTRAVENOUS
Status: CANCELLED
Start: 2025-08-19

## 2025-08-12 RX ORDER — DIPHENHYDRAMINE HYDROCHLORIDE 50 MG/ML
50 INJECTION, SOLUTION INTRAMUSCULAR; INTRAVENOUS
Status: CANCELLED
Start: 2025-08-19

## 2025-08-12 RX ADMIN — Medication 250 ML: at 10:48

## 2025-08-12 ASSESSMENT — PAIN SCALES - GENERAL: PAINLEVEL_OUTOF10: MILD PAIN (3)

## 2025-08-13 LAB — CCP AB SER IA-ACNC: 0.6 U/ML

## 2025-08-15 DIAGNOSIS — C76.0 HEAD AND NECK CANCER (H): Primary | ICD-10-CM

## 2025-08-15 DIAGNOSIS — Z79.899 HIGH RISK MEDICATION USE: ICD-10-CM

## 2025-08-17 PROCEDURE — 82272 OCCULT BLD FECES 1-3 TESTS: CPT

## 2025-08-20 ENCOUNTER — LAB (OUTPATIENT)
Dept: LAB | Facility: CLINIC | Age: 80
End: 2025-08-20
Payer: MEDICARE

## 2025-08-20 DIAGNOSIS — D64.9 ANEMIA, UNSPECIFIED TYPE: Primary | ICD-10-CM

## 2025-08-20 DIAGNOSIS — M25.511 CHRONIC PAIN OF BOTH SHOULDERS: ICD-10-CM

## 2025-08-20 DIAGNOSIS — G89.29 CHRONIC PAIN OF BOTH SHOULDERS: Primary | ICD-10-CM

## 2025-08-20 DIAGNOSIS — R63.4 WEIGHT LOSS: ICD-10-CM

## 2025-08-20 DIAGNOSIS — C76.0 HEAD AND NECK CANCER (H): ICD-10-CM

## 2025-08-20 DIAGNOSIS — M25.512 CHRONIC PAIN OF BOTH SHOULDERS: ICD-10-CM

## 2025-08-20 DIAGNOSIS — M25.512 CHRONIC PAIN OF BOTH SHOULDERS: Primary | ICD-10-CM

## 2025-08-20 DIAGNOSIS — G89.29 CHRONIC PAIN OF BOTH SHOULDERS: ICD-10-CM

## 2025-08-20 DIAGNOSIS — M25.511 CHRONIC PAIN OF BOTH SHOULDERS: Primary | ICD-10-CM

## 2025-08-20 DIAGNOSIS — D64.9 ANEMIA, UNSPECIFIED TYPE: ICD-10-CM

## 2025-08-20 DIAGNOSIS — E53.8 VITAMIN B12 DEFICIENCY (NON ANEMIC): ICD-10-CM

## 2025-08-20 LAB — HEMOCCULT STL QL: NEGATIVE

## 2025-08-22 ENCOUNTER — ANCILLARY PROCEDURE (OUTPATIENT)
Dept: GENERAL RADIOLOGY | Facility: CLINIC | Age: 80
End: 2025-08-22
Attending: FAMILY MEDICINE
Payer: MEDICARE

## 2025-08-22 DIAGNOSIS — M25.512 CHRONIC LEFT SHOULDER PAIN: ICD-10-CM

## 2025-08-22 DIAGNOSIS — G89.29 CHRONIC LEFT SHOULDER PAIN: ICD-10-CM

## 2025-08-22 PROCEDURE — 73030 X-RAY EXAM OF SHOULDER: CPT | Mod: TC | Performed by: RADIOLOGY

## 2025-09-04 ENCOUNTER — TELEPHONE (OUTPATIENT)
Dept: ORTHOPEDICS | Facility: CLINIC | Age: 80
End: 2025-09-04
Payer: MEDICARE

## 2025-09-04 DIAGNOSIS — M19.011 ARTHRITIS OF RIGHT SHOULDER: Primary | ICD-10-CM
